# Patient Record
Sex: MALE | Race: WHITE | Employment: OTHER | URBAN - METROPOLITAN AREA
[De-identification: names, ages, dates, MRNs, and addresses within clinical notes are randomized per-mention and may not be internally consistent; named-entity substitution may affect disease eponyms.]

---

## 2017-03-24 ENCOUNTER — HOSPITAL ENCOUNTER (OUTPATIENT)
Dept: LAB | Age: 62
Discharge: HOME | End: 2017-03-24
Attending: FAMILY MEDICINE | Admitting: FAMILY MEDICINE

## 2017-03-24 LAB
ALBUMIN SERPL-MCNC: 3.6 GM/DL (ref 3.4–5)
ALP BLD-CCNC: 61 IU/L (ref 40–128)
ALT SERPL-CCNC: 14 U/L (ref 10–40)
AMYLASE: 143 U/L (ref 25–115)
ANION GAP SERPL CALCULATED.3IONS-SCNC: 13 MMOL/L (ref 4–16)
AST SERPL-CCNC: 12 IU/L (ref 15–37)
BACTERIA: ABNORMAL /HPF
BILIRUB SERPL-MCNC: 0.2 MG/DL (ref 0–1)
BILIRUBIN URINE: NEGATIVE MG/DL
BLOOD, URINE: NEGATIVE
BUN BLDV-MCNC: 14 MG/DL (ref 6–23)
CALCIUM SERPL-MCNC: 9.2 MG/DL (ref 8.3–10.6)
CHLORIDE BLD-SCNC: 101 MMOL/L (ref 99–110)
CHOLESTEROL: 167 MG/DL
CLARITY: CLEAR
CO2: 26 MMOL/L (ref 21–32)
COLOR: ABNORMAL
CREAT SERPL-MCNC: 1.3 MG/DL (ref 0.9–1.3)
GFR AFRICAN AMERICAN: >60 ML/MIN/1.73M2
GFR NON-AFRICAN AMERICAN: 56 ML/MIN/1.73M2
GLUCOSE BLD-MCNC: 112 MG/DL (ref 70–140)
GLUCOSE, URINE: NEGATIVE MG/DL
HCT VFR BLD CALC: 32 % (ref 42–52)
HDLC SERPL-MCNC: 26 MG/DL
HEMOGLOBIN: 9.7 GM/DL (ref 13.5–18)
KETONES, URINE: ABNORMAL MG/DL
LDL CHOLESTEROL DIRECT: 127 MG/DL
LEUKOCYTE ESTERASE, URINE: NEGATIVE
MCH RBC QN AUTO: 25.9 PG (ref 27–31)
MCHC RBC AUTO-ENTMCNC: 30.3 % (ref 32–36)
MCV RBC AUTO: 85.6 FL (ref 78–100)
MUCUS: ABNORMAL HPF
NITRITE URINE, QUANTITATIVE: NEGATIVE
PDW BLD-RTO: 15.9 % (ref 11.7–14.9)
PH, URINE: 5 (ref 5–8)
PLATELET # BLD: 403 K/CU MM (ref 140–440)
PMV BLD AUTO: 9.1 FL (ref 7.5–11.1)
POTASSIUM SERPL-SCNC: 4.2 MMOL/L (ref 3.5–5.1)
PROTEIN UA: NEGATIVE MG/DL
RBC # BLD: 3.74 M/CU MM (ref 4.6–6.2)
RBC URINE: 1 /HPF (ref 0–3)
SODIUM BLD-SCNC: 140 MMOL/L (ref 135–145)
SPECIFIC GRAVITY UA: 1.02 (ref 1–1.03)
SQUAMOUS EPITHELIAL: <1 /HPF
TOTAL PROTEIN: 6.7 GM/DL (ref 6.4–8.2)
TRIGL SERPL-MCNC: 103 MG/DL
TSH HIGH SENSITIVITY: 2.36 UIU/ML (ref 0.27–4.2)
UROBILINOGEN, URINE: 1 EU/DL (ref 0.2–1)
WBC # BLD: 8.5 K/CU MM (ref 4–10.5)
WBC UA: 3 /HPF (ref 0–2)

## 2017-03-25 LAB — TESTOSTERONE TOTAL-MALE: 254

## 2017-10-16 ENCOUNTER — HOSPITAL ENCOUNTER (OUTPATIENT)
Dept: LAB | Age: 62
Discharge: HOME | End: 2017-10-16
Attending: FAMILY MEDICINE | Admitting: FAMILY MEDICINE

## 2017-10-16 LAB
ALBUMIN SERPL-MCNC: 4.2 GM/DL (ref 3.4–5)
ALP BLD-CCNC: 71 IU/L (ref 40–128)
ALT SERPL-CCNC: 12 U/L (ref 10–40)
ANION GAP SERPL CALCULATED.3IONS-SCNC: 10 MMOL/L (ref 4–16)
AST SERPL-CCNC: 12 IU/L (ref 15–37)
BACTERIA: ABNORMAL /HPF
BILIRUB SERPL-MCNC: 0.4 MG/DL (ref 0–1)
BILIRUBIN URINE: NEGATIVE MG/DL
BLOOD, URINE: NEGATIVE
BUN BLDV-MCNC: 17 MG/DL (ref 6–23)
CALCIUM SERPL-MCNC: 9 MG/DL (ref 8.3–10.6)
CHLORIDE BLD-SCNC: 99 MMOL/L (ref 99–110)
CHOLESTEROL: 134 MG/DL
CLARITY: CLEAR
CO2: 29 MMOL/L (ref 21–32)
COLOR: YELLOW
CREAT SERPL-MCNC: 1.4 MG/DL (ref 0.9–1.3)
GFR AFRICAN AMERICAN: >60 ML/MIN/1.73M2
GFR NON-AFRICAN AMERICAN: 51 ML/MIN/1.73M2
GLUCOSE FASTING: 138 MG/DL (ref 70–99)
GLUCOSE, URINE: NEGATIVE MG/DL
HCT VFR BLD CALC: 46.8 % (ref 42–52)
HDLC SERPL-MCNC: 28 MG/DL
HEMOGLOBIN: 15.5 GM/DL (ref 13.5–18)
KETONES, URINE: NEGATIVE MG/DL
LDL CHOLESTEROL DIRECT: 77 MG/DL
LEUKOCYTE ESTERASE, URINE: NEGATIVE
MCH RBC QN AUTO: 29.7 PG (ref 27–31)
MCHC RBC AUTO-ENTMCNC: 33.1 % (ref 32–36)
MCV RBC AUTO: 89.7 FL (ref 78–100)
MUCUS: ABNORMAL HPF
NITRITE URINE, QUANTITATIVE: NEGATIVE
PDW BLD-RTO: 14.1 % (ref 11.7–14.9)
PH, URINE: 7 (ref 5–8)
PLATELET # BLD: 228 K/CU MM (ref 140–440)
PMV BLD AUTO: 9.8 FL (ref 7.5–11.1)
POTASSIUM SERPL-SCNC: 4 MMOL/L (ref 3.5–5.1)
PROTEIN UA: 30 MG/DL
RBC # BLD: 5.22 M/CU MM (ref 4.6–6.2)
RBC URINE: 2 /HPF (ref 0–3)
SODIUM BLD-SCNC: 138 MMOL/L (ref 135–145)
SPECIFIC GRAVITY UA: 1.02 (ref 1–1.03)
SQUAMOUS EPITHELIAL: <1 /HPF
TOTAL PROTEIN: 6.8 GM/DL (ref 6.4–8.2)
TRICHOMONAS: ABNORMAL /HPF
TRIGL SERPL-MCNC: 136 MG/DL
TSH HIGH SENSITIVITY: 2.54 UIU/ML (ref 0.27–4.2)
UROBILINOGEN, URINE: 1 MG/DL (ref 0.2–1)
WBC # BLD: 9.8 K/CU MM (ref 4–10.5)
WBC UA: 3 /HPF (ref 0–2)
YEAST: ABNORMAL /HPF

## 2017-10-17 LAB — TESTOSTERONE TOTAL-MALE: 390

## 2018-04-23 ENCOUNTER — HOSPITAL ENCOUNTER (OUTPATIENT)
Dept: LAB | Age: 63
Discharge: HOME | End: 2018-04-23
Attending: NURSE PRACTITIONER | Admitting: NURSE PRACTITIONER

## 2018-04-23 LAB
ESTIMATED AVERAGE GLUCOSE: 160 MG/DL
HBA1C MFR BLD: 7.2 % (ref 4.2–6.3)
HCT VFR BLD CALC: 45.6 % (ref 42–52)
HEMOGLOBIN: 15 GM/DL (ref 13.5–18)
MCH RBC QN AUTO: 31.2 PG (ref 27–31)
MCHC RBC AUTO-ENTMCNC: 32.9 % (ref 32–36)
MCV RBC AUTO: 94.8 FL (ref 78–100)
PDW BLD-RTO: 13.6 % (ref 11.7–14.9)
PLATELET # BLD: 231 K/CU MM (ref 140–440)
PMV BLD AUTO: 9.8 FL (ref 7.5–11.1)
RBC # BLD: 4.81 M/CU MM (ref 4.6–6.2)
WBC # BLD: 10.7 K/CU MM (ref 4–10.5)

## 2018-04-24 LAB
PROSTATE SPECIFIC ANTIGEN FREE: 0.4
PROSTATE SPECIFIC ANTIGEN PERCENT FREE: 50
PROSTATE SPECIFIC ANTIGEN: 0.8

## 2018-04-25 LAB — TESTOSTERONE TOTAL-MALE: 512

## 2019-01-11 ENCOUNTER — HOSPITAL ENCOUNTER (OUTPATIENT)
Age: 64
Discharge: HOME | End: 2019-01-11
Payer: MEDICARE

## 2019-01-11 LAB
ALBUMIN SERPL-MCNC: 4.4 GM/DL (ref 3.4–5)
ALP BLD-CCNC: 60 IU/L (ref 40–128)
ALT SERPL-CCNC: 36 U/L (ref 10–40)
ANION GAP SERPL CALCULATED.3IONS-SCNC: 15 MMOL/L (ref 4–16)
AST SERPL-CCNC: 23 IU/L (ref 15–37)
BASOPHILS ABSOLUTE: 0.1 K/CU MM
BASOPHILS RELATIVE PERCENT: 0.9 % (ref 0–1)
BILIRUB SERPL-MCNC: 0.4 MG/DL (ref 0–1)
BUN BLDV-MCNC: 30 MG/DL (ref 6–23)
CALCIUM SERPL-MCNC: 9.7 MG/DL (ref 8.3–10.6)
CHLORIDE BLD-SCNC: 99 MMOL/L (ref 99–110)
CHOLESTEROL: 127 MG/DL
CO2: 24 MMOL/L (ref 21–32)
CREAT SERPL-MCNC: 2.5 MG/DL (ref 0.9–1.3)
DIFFERENTIAL TYPE: ABNORMAL
EOSINOPHILS ABSOLUTE: 1.2 K/CU MM
EOSINOPHILS RELATIVE PERCENT: 10.8 % (ref 0–3)
ESTIMATED AVERAGE GLUCOSE: 146 MG/DL
GFR AFRICAN AMERICAN: 32 ML/MIN/1.73M2
GFR NON-AFRICAN AMERICAN: 26 ML/MIN/1.73M2
GLUCOSE FASTING: 118 MG/DL (ref 70–99)
HBA1C MFR BLD: 6.7 % (ref 4.2–6.3)
HCT VFR BLD CALC: 45.1 % (ref 42–52)
HDLC SERPL-MCNC: 36 MG/DL
HEMOGLOBIN: 14.5 GM/DL (ref 13.5–18)
IMMATURE NEUTROPHIL %: 0.3 % (ref 0–0.43)
LDL CHOLESTEROL DIRECT: 75 MG/DL
LYMPHOCYTES ABSOLUTE: 3.1 K/CU MM
LYMPHOCYTES RELATIVE PERCENT: 26.6 % (ref 24–44)
MCH RBC QN AUTO: 30.7 PG (ref 27–31)
MCHC RBC AUTO-ENTMCNC: 32.2 % (ref 32–36)
MCV RBC AUTO: 95.6 FL (ref 78–100)
MONOCYTES ABSOLUTE: 0.8 K/CU MM
MONOCYTES RELATIVE PERCENT: 6.6 % (ref 0–4)
NUCLEATED RBC %: 0 %
PDW BLD-RTO: 12.8 % (ref 11.7–14.9)
PLATELET # BLD: 246 K/CU MM (ref 140–440)
PMV BLD AUTO: 10 FL (ref 7.5–11.1)
POTASSIUM SERPL-SCNC: 4.9 MMOL/L (ref 3.5–5.1)
PROSTATE SPECIFIC ANTIGEN: 0.48 NG/ML (ref 0–4)
RBC # BLD: 4.72 M/CU MM (ref 4.6–6.2)
SEGMENTED NEUTROPHILS ABSOLUTE COUNT: 6.3 K/CU MM
SEGMENTED NEUTROPHILS RELATIVE PERCENT: 54.8 % (ref 36–66)
SODIUM BLD-SCNC: 138 MMOL/L (ref 135–145)
T4 FREE: 1.15 NG/DL (ref 0.9–1.8)
TOTAL IMMATURE NEUTOROPHIL: 0.04 K/CU MM
TOTAL NUCLEATED RBC: 0 K/CU MM
TOTAL PROTEIN: 6.6 GM/DL (ref 6.4–8.2)
TRIGL SERPL-MCNC: 158 MG/DL
TSH HIGH SENSITIVITY: 5.99 UIU/ML (ref 0.27–4.2)
WBC # BLD: 11.5 K/CU MM (ref 4–10.5)

## 2019-01-11 PROCEDURE — 83721 ASSAY OF BLOOD LIPOPROTEIN: CPT

## 2019-01-11 PROCEDURE — 84403 ASSAY OF TOTAL TESTOSTERONE: CPT

## 2019-01-11 PROCEDURE — G0103 PSA SCREENING: HCPCS

## 2019-01-11 PROCEDURE — 80053 COMPREHEN METABOLIC PANEL: CPT

## 2019-01-11 PROCEDURE — 84443 ASSAY THYROID STIM HORMONE: CPT

## 2019-01-11 PROCEDURE — 85025 COMPLETE CBC W/AUTO DIFF WBC: CPT

## 2019-01-11 PROCEDURE — 84439 ASSAY OF FREE THYROXINE: CPT

## 2019-01-11 PROCEDURE — 36415 COLL VENOUS BLD VENIPUNCTURE: CPT

## 2019-01-11 PROCEDURE — 80061 LIPID PANEL: CPT

## 2019-01-11 PROCEDURE — 83036 HEMOGLOBIN GLYCOSYLATED A1C: CPT

## 2019-01-14 LAB — TESTOSTERONE TOTAL-MALE: 17

## 2019-04-25 ENCOUNTER — HOSPITAL ENCOUNTER (OUTPATIENT)
Age: 64
Discharge: HOME | End: 2019-04-25
Payer: MEDICARE

## 2019-04-25 ENCOUNTER — HOSPITAL ENCOUNTER (OUTPATIENT)
Dept: GENERAL RADIOLOGY | Age: 64
Discharge: HOME | End: 2019-04-25
Payer: MEDICARE

## 2019-04-25 DIAGNOSIS — M25.562 LEFT KNEE PAIN, UNSPECIFIED CHRONICITY: ICD-10-CM

## 2019-04-25 LAB
ALBUMIN SERPL-MCNC: 4.2 GM/DL (ref 3.4–5)
ALP BLD-CCNC: 60 IU/L (ref 40–128)
ALT SERPL-CCNC: 16 U/L (ref 10–40)
ANION GAP SERPL CALCULATED.3IONS-SCNC: 12 MMOL/L (ref 4–16)
AST SERPL-CCNC: 14 IU/L (ref 15–37)
BILIRUB SERPL-MCNC: 0.2 MG/DL (ref 0–1)
BUN BLDV-MCNC: 21 MG/DL (ref 6–23)
CALCIUM SERPL-MCNC: 9.1 MG/DL (ref 8.3–10.6)
CHLORIDE BLD-SCNC: 98 MMOL/L (ref 99–110)
CO2: 28 MMOL/L (ref 21–32)
CREAT SERPL-MCNC: 2.1 MG/DL (ref 0.9–1.3)
GFR AFRICAN AMERICAN: 39 ML/MIN/1.73M2
GFR NON-AFRICAN AMERICAN: 32 ML/MIN/1.73M2
GLUCOSE BLD-MCNC: 118 MG/DL (ref 70–99)
POTASSIUM SERPL-SCNC: 4.6 MMOL/L (ref 3.5–5.1)
SODIUM BLD-SCNC: 138 MMOL/L (ref 135–145)
T4 FREE: 1.11 NG/DL (ref 0.9–1.8)
TOTAL PROTEIN: 6.5 GM/DL (ref 6.4–8.2)
TSH HIGH SENSITIVITY: 3.88 UIU/ML (ref 0.27–4.2)

## 2019-04-25 PROCEDURE — 73560 X-RAY EXAM OF KNEE 1 OR 2: CPT

## 2019-04-25 PROCEDURE — 84443 ASSAY THYROID STIM HORMONE: CPT

## 2019-04-25 PROCEDURE — 80053 COMPREHEN METABOLIC PANEL: CPT

## 2019-04-25 PROCEDURE — 36415 COLL VENOUS BLD VENIPUNCTURE: CPT

## 2019-04-25 PROCEDURE — 84439 ASSAY OF FREE THYROXINE: CPT

## 2019-04-25 PROCEDURE — 84403 ASSAY OF TOTAL TESTOSTERONE: CPT

## 2019-04-27 LAB
TESTOSTERONE TOTAL-MALE: 665 NG/DL (ref 300–720)
TESTOSTERONE TOTAL-MALE: NORMAL NG/DL (ref 300–720)

## 2019-05-24 ENCOUNTER — HOSPITAL ENCOUNTER (OUTPATIENT)
Dept: NEUROLOGY | Age: 64
Discharge: HOME | End: 2019-05-24
Payer: COMMERCIAL

## 2019-07-31 ENCOUNTER — HOSPITAL ENCOUNTER (OUTPATIENT)
Dept: ULTRASOUND IMAGING | Age: 64
Discharge: HOME | End: 2019-07-31
Payer: MEDICARE

## 2019-07-31 ENCOUNTER — HOSPITAL ENCOUNTER (OUTPATIENT)
Age: 64
Discharge: HOME | End: 2019-07-31
Payer: MEDICARE

## 2019-07-31 DIAGNOSIS — N18.30 CHRONIC KIDNEY DISEASE, STAGE III (MODERATE) (HCC): ICD-10-CM

## 2019-07-31 DIAGNOSIS — I12.9: ICD-10-CM

## 2019-07-31 LAB
ALBUMIN SERPL-MCNC: 4 GM/DL (ref 3.4–5)
ANION GAP SERPL CALCULATED.3IONS-SCNC: 11 MMOL/L (ref 4–16)
BACTERIA: NEGATIVE /HPF
BASOPHILS ABSOLUTE: 0.1 K/CU MM
BASOPHILS RELATIVE PERCENT: 1.1 % (ref 0–1)
BILIRUBIN URINE: NEGATIVE MG/DL
BLOOD, URINE: NEGATIVE
BUN BLDV-MCNC: 18 MG/DL (ref 6–23)
CALCIUM SERPL-MCNC: 9.2 MG/DL (ref 8.3–10.6)
CHLORIDE BLD-SCNC: 99 MMOL/L (ref 99–110)
CLARITY: CLEAR
CO2: 25 MMOL/L (ref 21–32)
COLOR: YELLOW
CREAT SERPL-MCNC: 2.1 MG/DL (ref 0.9–1.3)
CREATININE URINE: 329.4 MG/DL (ref 39–259)
DIFFERENTIAL TYPE: ABNORMAL
EOSINOPHILS ABSOLUTE: 0.8 K/CU MM
EOSINOPHILS RELATIVE PERCENT: 9 % (ref 0–3)
GFR AFRICAN AMERICAN: 39 ML/MIN/1.73M2
GFR NON-AFRICAN AMERICAN: 32 ML/MIN/1.73M2
GLUCOSE BLD-MCNC: 125 MG/DL (ref 70–99)
GLUCOSE, URINE: NEGATIVE MG/DL
HCT VFR BLD CALC: 45.9 % (ref 42–52)
HEMOGLOBIN: 14.4 GM/DL (ref 13.5–18)
IMMATURE NEUTROPHIL %: 0.5 % (ref 0–0.43)
KETONES, URINE: ABNORMAL MG/DL
LEUKOCYTE ESTERASE, URINE: NEGATIVE
LYMPHOCYTES ABSOLUTE: 2 K/CU MM
LYMPHOCYTES RELATIVE PERCENT: 23.1 % (ref 24–44)
MCH RBC QN AUTO: 29.5 PG (ref 27–31)
MCHC RBC AUTO-ENTMCNC: 31.4 % (ref 32–36)
MCV RBC AUTO: 94.1 FL (ref 78–100)
MONOCYTES ABSOLUTE: 0.6 K/CU MM
MONOCYTES RELATIVE PERCENT: 7 % (ref 0–4)
MUCUS: ABNORMAL HPF
NITRITE URINE, QUANTITATIVE: NEGATIVE
NUCLEATED RBC %: 0 %
PDW BLD-RTO: 14.5 % (ref 11.7–14.9)
PH, URINE: 5 (ref 5–8)
PHOSPHORUS: 2.3 MG/DL (ref 2.5–4.9)
PLATELET # BLD: 248 K/CU MM (ref 140–440)
PMV BLD AUTO: 10.9 FL (ref 7.5–11.1)
POTASSIUM SERPL-SCNC: 4.7 MMOL/L (ref 3.5–5.1)
PROT/CREAT RATIO, UR: ABNORMAL
PROTEIN UA: NEGATIVE MG/DL
RBC # BLD: 4.88 M/CU MM (ref 4.6–6.2)
RBC URINE: 2 /HPF (ref 0–3)
SEGMENTED NEUTROPHILS ABSOLUTE COUNT: 5 K/CU MM
SEGMENTED NEUTROPHILS RELATIVE PERCENT: 59.3 % (ref 36–66)
SODIUM BLD-SCNC: 135 MMOL/L (ref 135–145)
SPECIFIC GRAVITY UA: 1.02 (ref 1–1.03)
SQUAMOUS EPITHELIAL: <1 /HPF
TOTAL IMMATURE NEUTOROPHIL: 0.04 K/CU MM
TOTAL NUCLEATED RBC: 0 K/CU MM
TRICHOMONAS: ABNORMAL /HPF
URINE TOTAL PROTEIN: 30.8 MG/DL
UROBILINOGEN, URINE: 1 MG/DL (ref 0.2–1)
WBC # BLD: 8.4 K/CU MM (ref 4–10.5)
WBC UA: 2 /HPF (ref 0–2)

## 2019-07-31 PROCEDURE — 84156 ASSAY OF PROTEIN URINE: CPT

## 2019-07-31 PROCEDURE — 83516 IMMUNOASSAY NONANTIBODY: CPT

## 2019-07-31 PROCEDURE — 76775 US EXAM ABDO BACK WALL LIM: CPT

## 2019-07-31 PROCEDURE — 81001 URINALYSIS AUTO W/SCOPE: CPT

## 2019-07-31 PROCEDURE — 36415 COLL VENOUS BLD VENIPUNCTURE: CPT

## 2019-07-31 PROCEDURE — 82570 ASSAY OF URINE CREATININE: CPT

## 2019-07-31 PROCEDURE — 85025 COMPLETE CBC W/AUTO DIFF WBC: CPT

## 2019-07-31 PROCEDURE — 84165 PROTEIN E-PHORESIS SERUM: CPT

## 2019-07-31 PROCEDURE — 80069 RENAL FUNCTION PANEL: CPT

## 2019-08-01 LAB
ALBUMIN ELP: 3.5 GM/DL (ref 3.2–5.6)
ALPHA-1-GLOBULIN: 0.2 GM/DL (ref 0.1–0.4)
ALPHA-2-GLOBULIN: 0.8 GM/DL (ref 0.4–1.2)
BETA GLOBULIN: 0.9 GM/DL (ref 0.5–1.3)
GAMMA GLOBULIN: 0.8 GM/DL (ref 0.5–1.6)
SPEP INTERPRETATION: ABNORMAL
TOTAL PROTEIN: 6.2 GM/DL (ref 6.4–8.2)

## 2019-08-02 LAB
MYELOPEROXIDASE AB: 0 AU/ML (ref 0–19)
MYELOPEROXIDASE AB: NORMAL AU/ML (ref 0–19)
SERINE PR3 (ANCA): 0 AU/ML (ref 0–19)
SERINE PR3 (ANCA): NORMAL AU/ML (ref 0–19)

## 2019-08-30 ENCOUNTER — HOSPITAL ENCOUNTER (OUTPATIENT)
Dept: ULTRASOUND IMAGING | Age: 64
Discharge: HOME | End: 2019-08-30
Payer: MEDICARE

## 2019-08-30 ENCOUNTER — HOSPITAL ENCOUNTER (OUTPATIENT)
Age: 64
Discharge: HOME | End: 2019-08-30
Payer: MEDICARE

## 2019-08-30 ENCOUNTER — HOSPITAL ENCOUNTER (OUTPATIENT)
Dept: NUCLEAR MEDICINE | Age: 64
Discharge: HOME | End: 2019-08-30
Payer: MEDICARE

## 2019-08-30 DIAGNOSIS — N18.6 TYPE 2 DIABETES MELLITUS WITH ESRD (END-STAGE RENAL DISEASE) (HCC): ICD-10-CM

## 2019-08-30 DIAGNOSIS — N18.30 CHRONIC KIDNEY DISEASE, STAGE III (MODERATE) (HCC): ICD-10-CM

## 2019-08-30 DIAGNOSIS — E11.22 TYPE 2 DIABETES MELLITUS WITH ESRD (END-STAGE RENAL DISEASE) (HCC): ICD-10-CM

## 2019-08-30 DIAGNOSIS — I12.9 RENAL HYPERTENSION: ICD-10-CM

## 2019-08-30 PROCEDURE — 93975 VASCULAR STUDY: CPT

## 2019-08-30 PROCEDURE — 3430000000 HC RX DIAGNOSTIC RADIOPHARMACEUTICAL: Performed by: INTERNAL MEDICINE

## 2019-08-30 PROCEDURE — 78708 K FLOW/FUNCT IMAGE W/DRUG: CPT

## 2019-08-30 PROCEDURE — 6360000002 HC RX W HCPCS: Performed by: INTERNAL MEDICINE

## 2019-08-30 PROCEDURE — 76775 US EXAM ABDO BACK WALL LIM: CPT

## 2019-08-30 PROCEDURE — A9562 TC99M MERTIATIDE: HCPCS | Performed by: INTERNAL MEDICINE

## 2019-08-30 RX ORDER — FUROSEMIDE 10 MG/ML
40 INJECTION INTRAMUSCULAR; INTRAVENOUS ONCE
Status: COMPLETED | OUTPATIENT
Start: 2019-08-30 | End: 2019-08-30

## 2019-08-30 RX ADMIN — Medication 5.5 MILLICURIE: at 10:15

## 2019-08-30 RX ADMIN — FUROSEMIDE 40 MG: 10 INJECTION, SOLUTION INTRAMUSCULAR; INTRAVENOUS at 10:35

## 2019-10-17 ENCOUNTER — HOSPITAL ENCOUNTER (OUTPATIENT)
Age: 64
Discharge: HOME | End: 2019-10-17
Payer: MEDICARE

## 2019-10-17 LAB
ANION GAP SERPL CALCULATED.3IONS-SCNC: 13 MMOL/L (ref 4–16)
BUN BLDV-MCNC: 21 MG/DL (ref 6–23)
CALCIUM SERPL-MCNC: 9.4 MG/DL (ref 8.3–10.6)
CHLORIDE BLD-SCNC: 97 MMOL/L (ref 99–110)
CO2: 27 MMOL/L (ref 21–32)
CREAT SERPL-MCNC: 2 MG/DL (ref 0.9–1.3)
GFR AFRICAN AMERICAN: 41 ML/MIN/1.73M2
GFR NON-AFRICAN AMERICAN: 34 ML/MIN/1.73M2
GLUCOSE BLD-MCNC: 120 MG/DL (ref 70–99)
POTASSIUM SERPL-SCNC: 4.3 MMOL/L (ref 3.5–5.1)
SODIUM BLD-SCNC: 137 MMOL/L (ref 135–145)

## 2019-10-17 PROCEDURE — 36415 COLL VENOUS BLD VENIPUNCTURE: CPT

## 2019-10-17 PROCEDURE — 80048 BASIC METABOLIC PNL TOTAL CA: CPT

## 2019-10-21 ENCOUNTER — HOSPITAL ENCOUNTER (OUTPATIENT)
Dept: GENERAL RADIOLOGY | Age: 64
Discharge: HOME | End: 2019-10-21
Payer: MEDICARE

## 2019-10-21 ENCOUNTER — HOSPITAL ENCOUNTER (OUTPATIENT)
Age: 64
Discharge: HOME | End: 2019-10-21
Payer: MEDICARE

## 2019-10-21 DIAGNOSIS — M51.16 INTERVERTEBRAL DISC DISORDER WITH RADICULOPATHY OF LUMBAR REGION: ICD-10-CM

## 2019-10-21 PROCEDURE — 72220 X-RAY EXAM SACRUM TAILBONE: CPT

## 2019-10-21 PROCEDURE — 72100 X-RAY EXAM L-S SPINE 2/3 VWS: CPT

## 2019-12-13 ENCOUNTER — HOSPITAL ENCOUNTER (OUTPATIENT)
Dept: MRI IMAGING | Age: 64
Discharge: HOME | End: 2019-12-13
Payer: MEDICARE

## 2019-12-13 ENCOUNTER — HOSPITAL ENCOUNTER (OUTPATIENT)
Age: 64
Discharge: HOME | End: 2019-12-13
Payer: MEDICARE

## 2019-12-13 DIAGNOSIS — M54.16 LUMBAR RADICULOPATHY: ICD-10-CM

## 2019-12-13 DIAGNOSIS — M40.204 KYPHOSIS OF THORACIC REGION, UNSPECIFIED KYPHOSIS TYPE: ICD-10-CM

## 2019-12-13 PROCEDURE — 36415 COLL VENOUS BLD VENIPUNCTURE: CPT

## 2019-12-13 PROCEDURE — 72146 MRI CHEST SPINE W/O DYE: CPT

## 2019-12-13 PROCEDURE — 72148 MRI LUMBAR SPINE W/O DYE: CPT

## 2019-12-16 LAB
3-OH-COTININE: 119 NG/ML
COTININE: 230 NG/ML
NICOTINE: 11 NG/ML
NICOTINE: NORMAL NG/ML

## 2020-02-08 ENCOUNTER — HOSPITAL ENCOUNTER (OUTPATIENT)
Age: 65
Discharge: HOME | End: 2020-02-08
Payer: MEDICARE

## 2020-02-08 LAB
ALBUMIN SERPL-MCNC: 4.2 GM/DL (ref 3.4–5)
ALP BLD-CCNC: 66 IU/L (ref 40–128)
ALT SERPL-CCNC: 16 U/L (ref 10–40)
ANION GAP SERPL CALCULATED.3IONS-SCNC: 15 MMOL/L (ref 4–16)
AST SERPL-CCNC: 14 IU/L (ref 15–37)
BILIRUB SERPL-MCNC: 0.4 MG/DL (ref 0–1)
BUN BLDV-MCNC: 17 MG/DL (ref 6–23)
CALCIUM SERPL-MCNC: 9.2 MG/DL (ref 8.3–10.6)
CHLORIDE BLD-SCNC: 97 MMOL/L (ref 99–110)
CHOLESTEROL: 212 MG/DL
CO2: 25 MMOL/L (ref 21–32)
CREAT SERPL-MCNC: 1.9 MG/DL (ref 0.9–1.3)
CREATININE URINE: 124.5 MG/DL (ref 39–259)
ESTIMATED AVERAGE GLUCOSE: 151 MG/DL
GFR AFRICAN AMERICAN: 43 ML/MIN/1.73M2
GFR NON-AFRICAN AMERICAN: 36 ML/MIN/1.73M2
GLUCOSE FASTING: 132 MG/DL (ref 70–99)
HBA1C MFR BLD: 6.9 % (ref 4.2–6.3)
HCT VFR BLD CALC: 51.1 % (ref 42–52)
HDLC SERPL-MCNC: 32 MG/DL
HEMOGLOBIN: 16.2 GM/DL (ref 13.5–18)
LDL CHOLESTEROL DIRECT: 159 MG/DL
MCH RBC QN AUTO: 29.7 PG (ref 27–31)
MCHC RBC AUTO-ENTMCNC: 31.7 % (ref 32–36)
MCV RBC AUTO: 93.8 FL (ref 78–100)
MICROALBUMIN/CREAT 24H UR: 3.6 MG/DL
MICROALBUMIN/CREAT UR-RTO: 28.9 MG/G CREAT (ref 0–30)
PDW BLD-RTO: 13.9 % (ref 11.7–14.9)
PLATELET # BLD: 235 K/CU MM (ref 140–440)
PMV BLD AUTO: 10.9 FL (ref 7.5–11.1)
POTASSIUM SERPL-SCNC: 4.6 MMOL/L (ref 3.5–5.1)
PROSTATE SPECIFIC ANTIGEN: 0.69 NG/ML (ref 0–4)
RBC # BLD: 5.45 M/CU MM (ref 4.6–6.2)
SODIUM BLD-SCNC: 137 MMOL/L (ref 135–145)
TOTAL PROTEIN: 6.7 GM/DL (ref 6.4–8.2)
TRIGL SERPL-MCNC: 195 MG/DL
TSH HIGH SENSITIVITY: 2.96 UIU/ML (ref 0.27–4.2)
WBC # BLD: 8.8 K/CU MM (ref 4–10.5)

## 2020-02-08 PROCEDURE — 82570 ASSAY OF URINE CREATININE: CPT

## 2020-02-08 PROCEDURE — 82043 UR ALBUMIN QUANTITATIVE: CPT

## 2020-02-08 PROCEDURE — 80053 COMPREHEN METABOLIC PANEL: CPT

## 2020-02-08 PROCEDURE — 85027 COMPLETE CBC AUTOMATED: CPT

## 2020-02-08 PROCEDURE — 84443 ASSAY THYROID STIM HORMONE: CPT

## 2020-02-08 PROCEDURE — G0103 PSA SCREENING: HCPCS

## 2020-02-08 PROCEDURE — 84403 ASSAY OF TOTAL TESTOSTERONE: CPT

## 2020-02-08 PROCEDURE — 83036 HEMOGLOBIN GLYCOSYLATED A1C: CPT

## 2020-02-08 PROCEDURE — 83721 ASSAY OF BLOOD LIPOPROTEIN: CPT

## 2020-02-08 PROCEDURE — 80061 LIPID PANEL: CPT

## 2020-02-08 PROCEDURE — 36415 COLL VENOUS BLD VENIPUNCTURE: CPT

## 2020-02-11 LAB
TESTOSTERONE TOTAL-MALE: 340 NG/DL (ref 300–720)
TESTOSTERONE TOTAL-MALE: NORMAL NG/DL (ref 300–720)

## 2020-07-13 ENCOUNTER — HOSPITAL ENCOUNTER (OUTPATIENT)
Age: 65
Discharge: HOME | End: 2020-07-13
Payer: MEDICARE

## 2020-07-13 LAB
ALBUMIN SERPL-MCNC: 4.4 GM/DL (ref 3.4–5)
ALP BLD-CCNC: 63 IU/L (ref 40–128)
ALT SERPL-CCNC: 16 U/L (ref 10–40)
ANION GAP SERPL CALCULATED.3IONS-SCNC: 12 MMOL/L (ref 4–16)
AST SERPL-CCNC: 13 IU/L (ref 15–37)
BASOPHILS ABSOLUTE: 0.1 K/CU MM
BASOPHILS RELATIVE PERCENT: 1.1 % (ref 0–1)
BILIRUB SERPL-MCNC: 0.4 MG/DL (ref 0–1)
BUN BLDV-MCNC: 19 MG/DL (ref 6–23)
CALCIUM SERPL-MCNC: 9.2 MG/DL (ref 8.3–10.6)
CHLORIDE BLD-SCNC: 96 MMOL/L (ref 99–110)
CHOLESTEROL: 215 MG/DL
CO2: 27 MMOL/L (ref 21–32)
CREAT SERPL-MCNC: 1.8 MG/DL (ref 0.9–1.3)
DIFFERENTIAL TYPE: ABNORMAL
EOSINOPHILS ABSOLUTE: 0.7 K/CU MM
EOSINOPHILS RELATIVE PERCENT: 8.3 % (ref 0–3)
ESTIMATED AVERAGE GLUCOSE: 137 MG/DL
GFR AFRICAN AMERICAN: 46 ML/MIN/1.73M2
GFR NON-AFRICAN AMERICAN: 38 ML/MIN/1.73M2
GLUCOSE BLD-MCNC: 119 MG/DL (ref 70–99)
HBA1C MFR BLD: 6.4 % (ref 4.2–6.3)
HCT VFR BLD CALC: 52.6 % (ref 42–52)
HDLC SERPL-MCNC: 31 MG/DL
HEMOGLOBIN: 17 GM/DL (ref 13.5–18)
IMMATURE NEUTROPHIL %: 0.4 % (ref 0–0.43)
LDL CHOLESTEROL DIRECT: 158 MG/DL
LYMPHOCYTES ABSOLUTE: 2 K/CU MM
LYMPHOCYTES RELATIVE PERCENT: 21.9 % (ref 24–44)
MCH RBC QN AUTO: 30.4 PG (ref 27–31)
MCHC RBC AUTO-ENTMCNC: 32.3 % (ref 32–36)
MCV RBC AUTO: 94.1 FL (ref 78–100)
MONOCYTES ABSOLUTE: 0.6 K/CU MM
MONOCYTES RELATIVE PERCENT: 7.2 % (ref 0–4)
NUCLEATED RBC %: 0 %
PDW BLD-RTO: 14.6 % (ref 11.7–14.9)
PLATELET # BLD: 253 K/CU MM (ref 140–440)
PMV BLD AUTO: 10.2 FL (ref 7.5–11.1)
POTASSIUM SERPL-SCNC: 4.6 MMOL/L (ref 3.5–5.1)
PROSTATE SPECIFIC ANTIGEN: 0.71 NG/ML (ref 0–4)
RBC # BLD: 5.59 M/CU MM (ref 4.6–6.2)
SEGMENTED NEUTROPHILS ABSOLUTE COUNT: 5.4 K/CU MM
SEGMENTED NEUTROPHILS RELATIVE PERCENT: 61.1 % (ref 36–66)
SODIUM BLD-SCNC: 135 MMOL/L (ref 135–145)
T4 FREE: 1.19 NG/DL (ref 0.9–1.8)
TOTAL IMMATURE NEUTOROPHIL: 0.04 K/CU MM
TOTAL NUCLEATED RBC: 0 K/CU MM
TOTAL PROTEIN: 6.8 GM/DL (ref 6.4–8.2)
TRIGL SERPL-MCNC: 168 MG/DL
TSH HIGH SENSITIVITY: 2.83 UIU/ML (ref 0.27–4.2)
WBC # BLD: 8.9 K/CU MM (ref 4–10.5)

## 2020-07-13 PROCEDURE — 84439 ASSAY OF FREE THYROXINE: CPT

## 2020-07-13 PROCEDURE — G0103 PSA SCREENING: HCPCS

## 2020-07-13 PROCEDURE — 80053 COMPREHEN METABOLIC PANEL: CPT

## 2020-07-13 PROCEDURE — 80061 LIPID PANEL: CPT

## 2020-07-13 PROCEDURE — 83721 ASSAY OF BLOOD LIPOPROTEIN: CPT

## 2020-07-13 PROCEDURE — 84443 ASSAY THYROID STIM HORMONE: CPT

## 2020-07-13 PROCEDURE — 83036 HEMOGLOBIN GLYCOSYLATED A1C: CPT

## 2020-07-13 PROCEDURE — 85025 COMPLETE CBC W/AUTO DIFF WBC: CPT

## 2020-07-13 PROCEDURE — 36415 COLL VENOUS BLD VENIPUNCTURE: CPT

## 2020-08-27 ENCOUNTER — HOSPITAL ENCOUNTER (OUTPATIENT)
Age: 65
Discharge: HOME | End: 2020-08-27
Payer: COMMERCIAL

## 2020-08-27 PROCEDURE — 84403 ASSAY OF TOTAL TESTOSTERONE: CPT

## 2020-08-27 PROCEDURE — 36415 COLL VENOUS BLD VENIPUNCTURE: CPT

## 2020-08-29 LAB — TESTOSTERONE TOTAL-MALE: 313 NG/DL (ref 300–720)

## 2020-10-30 ENCOUNTER — HOSPITAL ENCOUNTER (OUTPATIENT)
Age: 65
Discharge: HOME | End: 2020-10-30
Payer: MEDICARE

## 2020-10-30 LAB
BASOPHILS ABSOLUTE: 0.1 K/CU MM
BASOPHILS RELATIVE PERCENT: 0.8 % (ref 0–1)
DIFFERENTIAL TYPE: ABNORMAL
EOSINOPHILS ABSOLUTE: 0.5 K/CU MM
EOSINOPHILS RELATIVE PERCENT: 4.5 % (ref 0–3)
HCT VFR BLD CALC: 56.1 % (ref 42–52)
HEMOGLOBIN: 18.7 GM/DL (ref 13.5–18)
IMMATURE NEUTROPHIL %: 1 % (ref 0–0.43)
LYMPHOCYTES ABSOLUTE: 2.3 K/CU MM
LYMPHOCYTES RELATIVE PERCENT: 22 % (ref 24–44)
MCH RBC QN AUTO: 31.5 PG (ref 27–31)
MCHC RBC AUTO-ENTMCNC: 33.3 % (ref 32–36)
MCV RBC AUTO: 94.4 FL (ref 78–100)
MONOCYTES ABSOLUTE: 0.7 K/CU MM
MONOCYTES RELATIVE PERCENT: 6.6 % (ref 0–4)
NUCLEATED RBC %: 0 %
PDW BLD-RTO: 15.1 % (ref 11.7–14.9)
PLATELET # BLD: 255 K/CU MM (ref 140–440)
PMV BLD AUTO: 10.7 FL (ref 7.5–11.1)
RBC # BLD: 5.94 M/CU MM (ref 4.6–6.2)
REASON FOR REJECTION: NORMAL
REJECTED TEST: NORMAL
SEGMENTED NEUTROPHILS ABSOLUTE COUNT: 6.8 K/CU MM
SEGMENTED NEUTROPHILS RELATIVE PERCENT: 65.1 % (ref 36–66)
TOTAL IMMATURE NEUTOROPHIL: 0.11 K/CU MM
TOTAL NUCLEATED RBC: 0 K/CU MM
WBC # BLD: 10.5 K/CU MM (ref 4–10.5)

## 2020-10-30 PROCEDURE — 85025 COMPLETE CBC W/AUTO DIFF WBC: CPT

## 2020-10-30 PROCEDURE — 83550 IRON BINDING TEST: CPT

## 2020-10-30 PROCEDURE — 36415 COLL VENOUS BLD VENIPUNCTURE: CPT

## 2020-11-03 ENCOUNTER — HOSPITAL ENCOUNTER (OUTPATIENT)
Age: 65
Setting detail: SPECIMEN
Discharge: HOME | End: 2020-11-03
Payer: MEDICARE

## 2020-11-03 LAB
IRON: 72 UG/DL (ref 59–158)
PCT TRANSFERRIN: 23 % (ref 10–44)
TOTAL IRON BINDING CAPACITY: 315 UG/DL (ref 250–450)
TROPONIN T: <0.01 NG/ML
UNSATURATED IRON BINDING CAPACITY: 243 UG/DL (ref 110–370)

## 2020-11-03 PROCEDURE — 83550 IRON BINDING TEST: CPT

## 2020-11-03 PROCEDURE — 84484 ASSAY OF TROPONIN QUANT: CPT

## 2020-11-03 PROCEDURE — 83540 ASSAY OF IRON: CPT

## 2020-11-03 PROCEDURE — 36415 COLL VENOUS BLD VENIPUNCTURE: CPT

## 2020-11-19 ENCOUNTER — HOSPITAL ENCOUNTER (OUTPATIENT)
Age: 65
Discharge: HOME | End: 2020-11-19
Payer: MEDICARE

## 2020-11-19 LAB
ANION GAP SERPL CALCULATED.3IONS-SCNC: 14 MMOL/L (ref 4–16)
BACTERIA: NEGATIVE /HPF
BILIRUBIN URINE: NEGATIVE MG/DL
BLOOD, URINE: NEGATIVE
BUN BLDV-MCNC: 11 MG/DL (ref 6–23)
CALCIUM SERPL-MCNC: 9.3 MG/DL (ref 8.3–10.6)
CHLORIDE BLD-SCNC: 92 MMOL/L (ref 99–110)
CLARITY: CLEAR
CO2: 25 MMOL/L (ref 21–32)
COLOR: YELLOW
CREAT SERPL-MCNC: 1.7 MG/DL (ref 0.9–1.3)
CREATININE URINE: 77.9 MG/DL (ref 39–259)
GFR AFRICAN AMERICAN: 49 ML/MIN/1.73M2
GFR NON-AFRICAN AMERICAN: 40 ML/MIN/1.73M2
GLUCOSE BLD-MCNC: 172 MG/DL (ref 70–99)
GLUCOSE, URINE: 50 MG/DL
KETONES, URINE: NEGATIVE MG/DL
LEUKOCYTE ESTERASE, URINE: NEGATIVE
MUCUS: ABNORMAL HPF
NITRITE URINE, QUANTITATIVE: NEGATIVE
PH, URINE: 6 (ref 5–8)
POTASSIUM SERPL-SCNC: 4.8 MMOL/L (ref 3.5–5.1)
PROT/CREAT RATIO, UR: 0.4
PROTEIN UA: 30 MG/DL
RBC URINE: 1 /HPF (ref 0–3)
SODIUM BLD-SCNC: 131 MMOL/L (ref 135–145)
SPECIFIC GRAVITY UA: 1.01 (ref 1–1.03)
TRICHOMONAS: ABNORMAL /HPF
URINE TOTAL PROTEIN: 28.8 MG/DL
UROBILINOGEN, URINE: NORMAL MG/DL (ref 0.2–1)
VITAMIN D 25-HYDROXY: 12.22 NG/ML
WBC UA: <1 /HPF (ref 0–2)

## 2020-11-19 PROCEDURE — 80048 BASIC METABOLIC PNL TOTAL CA: CPT

## 2020-11-19 PROCEDURE — 82306 VITAMIN D 25 HYDROXY: CPT

## 2020-11-19 PROCEDURE — 82570 ASSAY OF URINE CREATININE: CPT

## 2020-11-19 PROCEDURE — 81001 URINALYSIS AUTO W/SCOPE: CPT

## 2020-11-19 PROCEDURE — 36415 COLL VENOUS BLD VENIPUNCTURE: CPT

## 2020-11-19 PROCEDURE — 84156 ASSAY OF PROTEIN URINE: CPT

## 2020-11-20 PROBLEM — F17.200 SMOKING: Status: ACTIVE | Noted: 2020-11-20

## 2020-11-20 PROBLEM — I10 ESSENTIAL HYPERTENSION: Status: ACTIVE | Noted: 2020-11-20

## 2020-11-20 PROBLEM — E87.1 HYPONATREMIA: Status: ACTIVE | Noted: 2020-11-20

## 2020-11-20 PROBLEM — M89.9 CHRONIC KIDNEY DISEASE-MINERAL AND BONE DISORDER: Status: ACTIVE | Noted: 2020-11-20

## 2020-11-20 PROBLEM — E83.9 CHRONIC KIDNEY DISEASE-MINERAL AND BONE DISORDER: Status: ACTIVE | Noted: 2020-11-20

## 2020-11-20 PROBLEM — R06.09 DYSPNEA ON EXERTION: Status: ACTIVE | Noted: 2020-11-20

## 2020-11-20 PROBLEM — N18.31 STAGE 3A CHRONIC KIDNEY DISEASE (HCC): Status: ACTIVE | Noted: 2020-11-20

## 2020-11-20 PROBLEM — N18.9 CHRONIC KIDNEY DISEASE-MINERAL AND BONE DISORDER: Status: ACTIVE | Noted: 2020-11-20

## 2020-11-23 ENCOUNTER — HOSPITAL ENCOUNTER (OUTPATIENT)
Dept: CT IMAGING | Age: 65
Discharge: HOME | End: 2020-11-23
Payer: COMMERCIAL

## 2020-11-23 PROCEDURE — G0297 LDCT FOR LUNG CA SCREEN: HCPCS

## 2020-11-30 ENCOUNTER — INITIAL CONSULT (OUTPATIENT)
Dept: PULMONOLOGY | Age: 65
End: 2020-11-30
Payer: MEDICARE

## 2020-11-30 ENCOUNTER — TELEPHONE (OUTPATIENT)
Dept: PULMONOLOGY | Age: 65
End: 2020-11-30

## 2020-11-30 VITALS
DIASTOLIC BLOOD PRESSURE: 78 MMHG | OXYGEN SATURATION: 97 % | HEART RATE: 113 BPM | SYSTOLIC BLOOD PRESSURE: 142 MMHG | HEIGHT: 67 IN | BODY MASS INDEX: 34.84 KG/M2 | WEIGHT: 222 LBS

## 2020-11-30 PROBLEM — F17.200 SMOKING: Status: RESOLVED | Noted: 2020-11-20 | Resolved: 2020-11-30

## 2020-11-30 PROBLEM — J44.9 COPD, MODERATE (HCC): Status: ACTIVE | Noted: 2020-11-30

## 2020-11-30 PROBLEM — Z72.0 TOBACCO ABUSE: Status: ACTIVE | Noted: 2020-11-30

## 2020-11-30 PROBLEM — J43.9 PULMONARY EMPHYSEMA DETERMINED BY X-RAY (HCC): Status: ACTIVE | Noted: 2020-11-30

## 2020-11-30 PROBLEM — G47.19 EXCESSIVE DAYTIME SLEEPINESS: Status: ACTIVE | Noted: 2020-11-30

## 2020-11-30 LAB
EXPIRATORY TIME-POST: NORMAL
EXPIRATORY TIME: NORMAL
FEF 25-75% %CHNG: NORMAL
FEF 25-75% %PRED-POST: NORMAL
FEF 25-75% %PRED-PRE: NORMAL
FEF 25-75% PRED: NORMAL
FEF 25-75%-POST: NORMAL
FEF 25-75%-PRE: NORMAL
FEV1 %PRED-POST: 53.7 %
FEV1 %PRED-PRE: 46.4 %
FEV1 PRED: 2.99 L
FEV1-POST: 1.61 L
FEV1-PRE: 1.39 L
FEV1/FVC %PRED-POST: 93.3 %
FEV1/FVC %PRED-PRE: 92.2 %
FEV1/FVC PRED: 74.2 %
FEV1/FVC-POST: 69.2 %
FEV1/FVC-PRE: 68.4 %
FVC %PRED-POST: 57.5 L
FVC %PRED-PRE: 50.2 %
FVC PRED: 4.04 L
FVC-POST: 2.32 L
FVC-PRE: 2.03 L
PEF %PRED-POST: NORMAL
PEF %PRED-PRE: NORMAL
PEF PRED: NORMAL
PEF%CHNG: NORMAL
PEF-POST: NORMAL
PEF-PRE: NORMAL

## 2020-11-30 PROCEDURE — 3017F COLORECTAL CA SCREEN DOC REV: CPT | Performed by: INTERNAL MEDICINE

## 2020-11-30 PROCEDURE — 4040F PNEUMOC VAC/ADMIN/RCVD: CPT | Performed by: INTERNAL MEDICINE

## 2020-11-30 PROCEDURE — G8484 FLU IMMUNIZE NO ADMIN: HCPCS | Performed by: INTERNAL MEDICINE

## 2020-11-30 PROCEDURE — G8417 CALC BMI ABV UP PARAM F/U: HCPCS | Performed by: INTERNAL MEDICINE

## 2020-11-30 PROCEDURE — G8427 DOCREV CUR MEDS BY ELIG CLIN: HCPCS | Performed by: INTERNAL MEDICINE

## 2020-11-30 PROCEDURE — 99204 OFFICE O/P NEW MOD 45 MIN: CPT | Performed by: INTERNAL MEDICINE

## 2020-11-30 PROCEDURE — 3023F SPIROM DOC REV: CPT | Performed by: INTERNAL MEDICINE

## 2020-11-30 PROCEDURE — G8926 SPIRO NO PERF OR DOC: HCPCS | Performed by: INTERNAL MEDICINE

## 2020-11-30 PROCEDURE — 1036F TOBACCO NON-USER: CPT | Performed by: INTERNAL MEDICINE

## 2020-11-30 PROCEDURE — 1123F ACP DISCUSS/DSCN MKR DOCD: CPT | Performed by: INTERNAL MEDICINE

## 2020-11-30 RX ORDER — BUDESONIDE AND FORMOTEROL FUMARATE DIHYDRATE 160; 4.5 UG/1; UG/1
1 AEROSOL RESPIRATORY (INHALATION) 2 TIMES DAILY
COMMUNITY
End: 2021-02-11

## 2020-11-30 RX ORDER — ALBUTEROL SULFATE 90 UG/1
2 AEROSOL, METERED RESPIRATORY (INHALATION) EVERY 6 HOURS PRN
Qty: 1 INHALER | Refills: 11 | Status: SHIPPED | OUTPATIENT
Start: 2020-11-30 | End: 2021-01-01 | Stop reason: ALTCHOICE

## 2020-11-30 RX ORDER — ALBUTEROL SULFATE 90 UG/1
2 AEROSOL, METERED RESPIRATORY (INHALATION) EVERY 6 HOURS PRN
COMMUNITY
End: 2021-02-11

## 2020-11-30 RX ORDER — PREDNISONE 1 MG/1
TABLET ORAL
Qty: 38 TABLET | Refills: 0 | Status: SHIPPED | OUTPATIENT
Start: 2020-11-30 | End: 2021-02-11 | Stop reason: ALTCHOICE

## 2020-11-30 ASSESSMENT — PULMONARY FUNCTION TESTS
FEV1_PERCENT_PREDICTED_POST: 53.7
FEV1/FVC_POST: 69.2
FEV1/FVC_PERCENT_PREDICTED_PRE: 92.2
FEV1_PRE: 1.39
FVC_PERCENT_PREDICTED_POST: 57.5
FVC_POST: 2.32
FEV1_PREDICTED: 2.99
FEV1/FVC_PREDICTED: 74.2
FVC_PERCENT_PREDICTED_PRE: 50.2
FVC_PRE: 2.03
FEV1_PERCENT_PREDICTED_PRE: 46.4
FEV1_POST: 1.61
FEV1/FVC_PERCENT_PREDICTED_POST: 93.3
FEV1/FVC_PRE: 68.4
FVC_PREDICTED: 4.04

## 2020-11-30 NOTE — PROGRESS NOTES
Subjective:   CHIEF COMPLAINT / HPI:    Fatoumata Bunch is a 78-year-old male referred here for evaluation of shortness of breath/dyspnea on exertion, weakness and the recent finding of emphysema on a low-dose CT lung screen. He has been a pack-a-day smoker for 43 years but recently stopped 3 weeks ago. He mentions that he has had chest congestion with cough over the past month and does expectorate a small amount of sputum which is nonpurulent and he has had no episodes of hemoptysis. He does carry history of chronic kidney disease with a single functioning left kidney. He denies pleuritic chest pain and does not carry history of pulmonary embolus. During his last visit with Dr. Katlyn Shannon he was started on azithromycin for bronchitis. He mentions that he has been on inhalers in the past and was recently advised to get Symbicort but it was far too expensive. He does not have a rescue albuterol inhaler at this time. He denies a history of asthma as a child or adolescent but mentions that his brother had asthma. 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 He also carries a history of testicular cancer. He is not aware of a family history of chronic lung disease or lung cancer. He states that he has had a long history of snoring and more recently excessive daytime sleepiness but has not been told he had witnessed apnea and has never undergone a work-up for obstructive sleep apnea. He has had no known COVID-19 exposure and denies any fever associate with cough. He has been socially distancing himself and wears a face mask when out in public. He carries a long history of GERD but is on Prilosec and has not symptomatic at this time. Office spirometry demonstrates an FEV1 of 1.39 L with an FVC of 2.03 L consistent with a moderate obstructive defect. Because his FEV1 and FVC are both reduced I cannot rule out a restrictive lung process without further testing.   Following bronchodilators he had a no inflammation, no tenderness. NEURO: oriented X 3, No focal deficits  SKIN: no rashes, No lesions    RADIOLOGY: CT lung screening 11/23/2020 showed mild emphysematous changes but no worrisome lung mass seen    PFT: Office spirometry demonstrates an FEV1 of 1.39 L with an FVC of 2.03 L consistent with a moderate obstructive defect. Because his FEV1 and FVC are both reduced I cannot rule out a restrictive lung process without further testing. Following bronchodilators he had a significant and almost asthmatic-like response. Assessment:     1. Dyspnea on exertion    2. COPD, moderate ([address]    3. Pulmonary emphysema determined by X-ray ([address]    4. Tobacco abuse    5. Excessive daytime sleepiness        Plan:   I suspect his dyspnea exertion is multifactorial and probably a combination of mild to moderate COPD with emphysema, deconditioning, overweight. He states that he has not had a cardiac evaluation recently and I think that would be very important in the work-up of his dyspnea on exertion. This would also include an echocardiogram to determine whether there is any evidence of pulmonary hypertension. He may have obstructive sleep apnea and I will obtain apnea screening. I will try to find a long-acting bronchodilator with a either inhaled steroid or muscarinic agent that he can afford and allow him to use a ProAir albuterol rescue inhaler as needed for rescue. I urged him to continue to be smoke-free. We have discussed the need to maintain yearly flu immunization, pneumococcal vaccination. We have discussed Coronavirus precaution including handwashing practice, wiping items touched in public such as gas pumps, door handles, shopping carts, etc. Self monitoring for infection - fever, chills, cough, SOB. Should they develop symptoms they should call office for further instructions.     Return in about 6 weeks (around 1/11/2021) for Recheck for COPD, Recheck for Shortness of Breath, Recheck for emphysema. This dictation was performed with a verbal recognition program and it was checked for errors. It is possible that there are still dictated errors within this office note. Any errors should be brought immediately to my attention for correction. All efforts were made to ensure that this office note is accurate.

## 2021-01-01 ENCOUNTER — HOSPITAL ENCOUNTER (OUTPATIENT)
Age: 66
Discharge: HOME | DRG: 315 | End: 2021-10-27
Payer: MEDICARE

## 2021-01-01 ENCOUNTER — HOSPITAL ENCOUNTER (OUTPATIENT)
Dept: CARDIAC REHAB | Age: 66
Setting detail: THERAPIES SERIES
Discharge: HOME | End: 2021-10-28
Payer: MEDICARE

## 2021-01-01 ENCOUNTER — HOSPITAL ENCOUNTER (INPATIENT)
Age: 66
LOS: 1 days | Discharge: HOME | DRG: 291 | End: 2021-07-04
Attending: HOSPITALIST | Admitting: HOSPITALIST
Payer: MEDICARE

## 2021-01-01 ENCOUNTER — APPOINTMENT (OUTPATIENT)
Dept: ULTRASOUND IMAGING | Age: 66
DRG: 315 | End: 2021-01-01
Payer: MEDICARE

## 2021-01-01 ENCOUNTER — HOSPITAL ENCOUNTER (OUTPATIENT)
Dept: CARDIAC REHAB | Age: 66
Setting detail: THERAPIES SERIES
Discharge: HOME | End: 2021-09-20
Payer: MEDICARE

## 2021-01-01 ENCOUNTER — HOSPITAL ENCOUNTER (OUTPATIENT)
Dept: CARDIAC REHAB | Age: 66
Setting detail: THERAPIES SERIES
Discharge: HOME | End: 2021-06-22
Payer: MEDICARE

## 2021-01-01 ENCOUNTER — APPOINTMENT (OUTPATIENT)
Dept: GENERAL RADIOLOGY | Age: 66
DRG: 291 | End: 2021-01-01
Payer: MEDICARE

## 2021-01-01 ENCOUNTER — APPOINTMENT (OUTPATIENT)
Dept: CT IMAGING | Age: 66
DRG: 315 | End: 2021-01-01
Payer: MEDICARE

## 2021-01-01 ENCOUNTER — APPOINTMENT (OUTPATIENT)
Dept: NUCLEAR MEDICINE | Age: 66
DRG: 315 | End: 2021-01-01
Payer: MEDICARE

## 2021-01-01 ENCOUNTER — HOSPITAL ENCOUNTER (OUTPATIENT)
Dept: CARDIAC REHAB | Age: 66
Setting detail: THERAPIES SERIES
Discharge: HOME | End: 2021-07-20
Payer: MEDICARE

## 2021-01-01 ENCOUNTER — HOSPITAL ENCOUNTER (OUTPATIENT)
Dept: CARDIAC REHAB | Age: 66
Setting detail: THERAPIES SERIES
Discharge: HOME | End: 2021-08-02
Payer: MEDICARE

## 2021-01-01 ENCOUNTER — HOSPITAL ENCOUNTER (OUTPATIENT)
Dept: CARDIAC REHAB | Age: 66
Setting detail: THERAPIES SERIES
Discharge: HOME | End: 2021-09-14
Payer: MEDICARE

## 2021-01-01 ENCOUNTER — HOSPITAL ENCOUNTER (OUTPATIENT)
Dept: CARDIAC REHAB | Age: 66
Setting detail: THERAPIES SERIES
Discharge: HOME | End: 2021-09-13
Payer: MEDICARE

## 2021-01-01 ENCOUNTER — HOSPITAL ENCOUNTER (OUTPATIENT)
Dept: CARDIAC REHAB | Age: 66
Setting detail: THERAPIES SERIES
Discharge: HOME | End: 2021-07-13
Payer: MEDICARE

## 2021-01-01 ENCOUNTER — HOSPITAL ENCOUNTER (OUTPATIENT)
Age: 66
Discharge: HOME | End: 2021-08-19
Payer: MEDICARE

## 2021-01-01 ENCOUNTER — HOSPITAL ENCOUNTER (OUTPATIENT)
Dept: CARDIAC REHAB | Age: 66
Setting detail: THERAPIES SERIES
Discharge: HOME | End: 2021-06-28
Payer: MEDICARE

## 2021-01-01 ENCOUNTER — HOSPITAL ENCOUNTER (OUTPATIENT)
Dept: CARDIAC REHAB | Age: 66
Setting detail: THERAPIES SERIES
Discharge: HOME | End: 2021-06-21
Payer: MEDICARE

## 2021-01-01 ENCOUNTER — TELEPHONE (OUTPATIENT)
Dept: CARDIOLOGY CLINIC | Age: 66
End: 2021-01-01

## 2021-01-01 ENCOUNTER — OFFICE VISIT (OUTPATIENT)
Dept: PULMONOLOGY | Age: 66
End: 2021-01-01
Payer: MEDICARE

## 2021-01-01 ENCOUNTER — HOSPITAL ENCOUNTER (OUTPATIENT)
Dept: CARDIAC REHAB | Age: 66
Setting detail: THERAPIES SERIES
Discharge: HOME | End: 2021-08-10
Payer: MEDICARE

## 2021-01-01 ENCOUNTER — HOSPITAL ENCOUNTER (OUTPATIENT)
Dept: CARDIAC REHAB | Age: 66
Setting detail: THERAPIES SERIES
Discharge: HOME | End: 2021-08-09
Payer: MEDICARE

## 2021-01-01 ENCOUNTER — APPOINTMENT (OUTPATIENT)
Dept: GENERAL RADIOLOGY | Age: 66
End: 2021-01-01
Attending: INTERNAL MEDICINE
Payer: MEDICARE

## 2021-01-01 ENCOUNTER — INITIAL CONSULT (OUTPATIENT)
Dept: CARDIOLOGY CLINIC | Age: 66
End: 2021-01-01
Payer: MEDICARE

## 2021-01-01 ENCOUNTER — HOSPITAL ENCOUNTER (OUTPATIENT)
Dept: CARDIAC REHAB | Age: 66
Setting detail: THERAPIES SERIES
Discharge: HOME | End: 2021-08-17
Payer: MEDICARE

## 2021-01-01 ENCOUNTER — HOSPITAL ENCOUNTER (OUTPATIENT)
Dept: CARDIAC REHAB | Age: 66
Setting detail: THERAPIES SERIES
Discharge: HOME | End: 2021-09-16
Payer: MEDICARE

## 2021-01-01 ENCOUNTER — HOSPITAL ENCOUNTER (OUTPATIENT)
Dept: CARDIAC REHAB | Age: 66
Setting detail: THERAPIES SERIES
Discharge: HOME | End: 2021-09-02
Payer: MEDICARE

## 2021-01-01 ENCOUNTER — HOSPITAL ENCOUNTER (OUTPATIENT)
Dept: CARDIAC REHAB | Age: 66
Setting detail: THERAPIES SERIES
Discharge: HOME | End: 2021-08-26
Payer: MEDICARE

## 2021-01-01 ENCOUNTER — HOSPITAL ENCOUNTER (OUTPATIENT)
Dept: CARDIAC REHAB | Age: 66
Setting detail: THERAPIES SERIES
Discharge: HOME | End: 2021-08-03
Payer: MEDICARE

## 2021-01-01 ENCOUNTER — HOSPITAL ENCOUNTER (OUTPATIENT)
Age: 66
Discharge: HOME | DRG: 291 | End: 2021-12-01
Payer: MEDICARE

## 2021-01-01 ENCOUNTER — NURSE ONLY (OUTPATIENT)
Dept: CARDIOLOGY CLINIC | Age: 66
End: 2021-01-01

## 2021-01-01 ENCOUNTER — HOSPITAL ENCOUNTER (OUTPATIENT)
Age: 66
Discharge: HOME | End: 2021-05-11
Payer: MEDICARE

## 2021-01-01 ENCOUNTER — HOSPITAL ENCOUNTER (OUTPATIENT)
Dept: CARDIAC REHAB | Age: 66
Setting detail: THERAPIES SERIES
Discharge: HOME | End: 2021-07-08
Payer: MEDICARE

## 2021-01-01 ENCOUNTER — APPOINTMENT (OUTPATIENT)
Dept: MRI IMAGING | Age: 66
DRG: 291 | End: 2021-01-01
Payer: MEDICARE

## 2021-01-01 ENCOUNTER — OFFICE VISIT (OUTPATIENT)
Dept: CARDIOLOGY CLINIC | Age: 66
End: 2021-01-01

## 2021-01-01 ENCOUNTER — TELEPHONE (OUTPATIENT)
Dept: PULMONOLOGY | Age: 66
End: 2021-01-01

## 2021-01-01 ENCOUNTER — HOSPITAL ENCOUNTER (OUTPATIENT)
Dept: CARDIAC REHAB | Age: 66
Setting detail: THERAPIES SERIES
Discharge: HOME | End: 2021-09-09
Payer: MEDICARE

## 2021-01-01 ENCOUNTER — APPOINTMENT (OUTPATIENT)
Dept: CARDIAC REHAB | Age: 66
End: 2021-01-01
Payer: MEDICARE

## 2021-01-01 ENCOUNTER — APPOINTMENT (OUTPATIENT)
Dept: GENERAL RADIOLOGY | Age: 66
DRG: 315 | End: 2021-01-01
Payer: MEDICARE

## 2021-01-01 ENCOUNTER — HOSPITAL ENCOUNTER (OUTPATIENT)
Dept: CARDIAC REHAB | Age: 66
Setting detail: THERAPIES SERIES
Discharge: HOME | End: 2021-07-19
Payer: MEDICARE

## 2021-01-01 ENCOUNTER — HOSPITAL ENCOUNTER (OUTPATIENT)
Dept: CARDIAC REHAB | Age: 66
Setting detail: THERAPIES SERIES
Discharge: HOME | End: 2021-08-30
Payer: MEDICARE

## 2021-01-01 ENCOUNTER — HOSPITAL ENCOUNTER (OUTPATIENT)
Dept: CARDIAC REHAB | Age: 66
Setting detail: THERAPIES SERIES
Discharge: HOME | End: 2021-10-26
Payer: MEDICARE

## 2021-01-01 ENCOUNTER — HOSPITAL ENCOUNTER (OUTPATIENT)
Dept: CARDIAC REHAB | Age: 66
Setting detail: THERAPIES SERIES
Discharge: HOME | End: 2021-07-26
Payer: MEDICARE

## 2021-01-01 ENCOUNTER — HOSPITAL ENCOUNTER (OUTPATIENT)
Dept: CARDIAC CATH/INVASIVE PROCEDURES | Age: 66
Setting detail: OBSERVATION
Discharge: ANOTHER HOSPITAL | End: 2021-05-06
Attending: INTERNAL MEDICINE | Admitting: HOSPITALIST
Payer: MEDICARE

## 2021-01-01 ENCOUNTER — HOSPITAL ENCOUNTER (OUTPATIENT)
Dept: GENERAL RADIOLOGY | Age: 66
Discharge: HOME | DRG: 315 | End: 2021-10-27
Payer: MEDICARE

## 2021-01-01 ENCOUNTER — HOSPITAL ENCOUNTER (OUTPATIENT)
Dept: CARDIAC REHAB | Age: 66
Setting detail: THERAPIES SERIES
Discharge: HOME | End: 2021-07-12
Payer: MEDICARE

## 2021-01-01 ENCOUNTER — HOSPITAL ENCOUNTER (OUTPATIENT)
Dept: CARDIAC REHAB | Age: 66
Setting detail: THERAPIES SERIES
Discharge: HOME | End: 2021-07-22
Payer: MEDICARE

## 2021-01-01 ENCOUNTER — HOSPITAL ENCOUNTER (OUTPATIENT)
Dept: CARDIAC REHAB | Age: 66
Setting detail: THERAPIES SERIES
Discharge: HOME | End: 2021-10-18
Payer: MEDICARE

## 2021-01-01 ENCOUNTER — HOSPITAL ENCOUNTER (OUTPATIENT)
Dept: CARDIAC CATH/INVASIVE PROCEDURES | Age: 66
Discharge: HOME | End: 2021-11-02
Attending: INTERNAL MEDICINE | Admitting: INTERNAL MEDICINE
Payer: MEDICARE

## 2021-01-01 ENCOUNTER — HOSPITAL ENCOUNTER (OUTPATIENT)
Dept: CARDIAC REHAB | Age: 66
Setting detail: THERAPIES SERIES
Discharge: HOME | End: 2021-06-16
Payer: MEDICARE

## 2021-01-01 ENCOUNTER — HOSPITAL ENCOUNTER (OUTPATIENT)
Dept: CARDIAC REHAB | Age: 66
Setting detail: THERAPIES SERIES
Discharge: HOME | End: 2021-08-05
Payer: MEDICARE

## 2021-01-01 ENCOUNTER — HOSPITAL ENCOUNTER (OUTPATIENT)
Age: 66
Setting detail: SPECIMEN
Discharge: HOME | DRG: 315 | End: 2021-10-26
Payer: MEDICARE

## 2021-01-01 ENCOUNTER — NURSE ONLY (OUTPATIENT)
Dept: CARDIOLOGY CLINIC | Age: 66
End: 2021-01-01
Payer: MEDICARE

## 2021-01-01 ENCOUNTER — HOSPITAL ENCOUNTER (OUTPATIENT)
Age: 66
Discharge: HOME | End: 2021-04-20
Payer: MEDICARE

## 2021-01-01 ENCOUNTER — HOSPITAL ENCOUNTER (OUTPATIENT)
Dept: CARDIAC REHAB | Age: 66
Setting detail: THERAPIES SERIES
Discharge: HOME | End: 2021-10-21
Payer: MEDICARE

## 2021-01-01 ENCOUNTER — HOSPITAL ENCOUNTER (OUTPATIENT)
Age: 66
Discharge: HOME | End: 2021-10-04
Payer: MEDICARE

## 2021-01-01 ENCOUNTER — HOSPITAL ENCOUNTER (OUTPATIENT)
Age: 66
Discharge: HOME | DRG: 291 | End: 2021-07-01
Payer: MEDICARE

## 2021-01-01 ENCOUNTER — HOSPITAL ENCOUNTER (OUTPATIENT)
Dept: LAB | Age: 66
Discharge: HOME | End: 2021-05-03
Payer: MEDICARE

## 2021-01-01 ENCOUNTER — HOSPITAL ENCOUNTER (OUTPATIENT)
Dept: CARDIAC REHAB | Age: 66
Setting detail: THERAPIES SERIES
Discharge: HOME | End: 2021-08-23
Payer: MEDICARE

## 2021-01-01 ENCOUNTER — HOSPITAL ENCOUNTER (OUTPATIENT)
Age: 66
Discharge: HOME | End: 2021-06-18
Payer: MEDICARE

## 2021-01-01 ENCOUNTER — OFFICE VISIT (OUTPATIENT)
Dept: CARDIOLOGY CLINIC | Age: 66
End: 2021-01-01
Payer: MEDICARE

## 2021-01-01 ENCOUNTER — HOSPITAL ENCOUNTER (OUTPATIENT)
Age: 66
Discharge: HOME | End: 2021-07-12
Payer: MEDICARE

## 2021-01-01 ENCOUNTER — HOSPITAL ENCOUNTER (OUTPATIENT)
Dept: CARDIAC REHAB | Age: 66
Setting detail: THERAPIES SERIES
Discharge: HOME | End: 2021-06-29
Payer: MEDICARE

## 2021-01-01 ENCOUNTER — APPOINTMENT (OUTPATIENT)
Dept: CT IMAGING | Age: 66
DRG: 291 | End: 2021-01-01
Payer: MEDICARE

## 2021-01-01 ENCOUNTER — HOSPITAL ENCOUNTER (OUTPATIENT)
Dept: CARDIAC REHAB | Age: 66
Setting detail: THERAPIES SERIES
Discharge: HOME | End: 2021-08-19
Payer: MEDICARE

## 2021-01-01 ENCOUNTER — HOSPITAL ENCOUNTER (OUTPATIENT)
Dept: GENERAL RADIOLOGY | Age: 66
Discharge: HOME | DRG: 291 | End: 2021-12-01
Payer: MEDICARE

## 2021-01-01 ENCOUNTER — HOSPITAL ENCOUNTER (OUTPATIENT)
Dept: CARDIAC REHAB | Age: 66
Setting detail: THERAPIES SERIES
Discharge: HOME | End: 2021-08-16
Payer: MEDICARE

## 2021-01-01 ENCOUNTER — HOSPITAL ENCOUNTER (INPATIENT)
Age: 66
LOS: 2 days | Discharge: HOME | DRG: 291 | End: 2021-07-31
Attending: HOSPITALIST | Admitting: HOSPITALIST
Payer: MEDICARE

## 2021-01-01 ENCOUNTER — HOSPITAL ENCOUNTER (OUTPATIENT)
Dept: CARDIAC REHAB | Age: 66
Setting detail: THERAPIES SERIES
Discharge: HOME | End: 2021-10-19
Payer: MEDICARE

## 2021-01-01 ENCOUNTER — HOSPITAL ENCOUNTER (OUTPATIENT)
Dept: CARDIAC REHAB | Age: 66
Setting detail: THERAPIES SERIES
Discharge: HOME | End: 2021-08-31
Payer: MEDICARE

## 2021-01-01 ENCOUNTER — HOSPITAL ENCOUNTER (OUTPATIENT)
Dept: CT IMAGING | Age: 66
Discharge: HOME | End: 2021-10-13
Payer: MEDICARE

## 2021-01-01 ENCOUNTER — HOSPITAL ENCOUNTER (INPATIENT)
Age: 66
LOS: 2 days | Discharge: HOME | DRG: 291 | End: 2021-12-04
Attending: EMERGENCY MEDICINE
Payer: MEDICARE

## 2021-01-01 ENCOUNTER — HOSPITAL ENCOUNTER (INPATIENT)
Age: 66
LOS: 2 days | Discharge: HOME | DRG: 315 | End: 2021-10-30
Attending: STUDENT IN AN ORGANIZED HEALTH CARE EDUCATION/TRAINING PROGRAM
Payer: MEDICARE

## 2021-01-01 VITALS
DIASTOLIC BLOOD PRESSURE: 74 MMHG | SYSTOLIC BLOOD PRESSURE: 118 MMHG | BODY MASS INDEX: 38.97 KG/M2 | WEIGHT: 233.91 LBS | RESPIRATION RATE: 15 BRPM | TEMPERATURE: 97.6 F | HEART RATE: 89 BPM | HEIGHT: 65 IN | OXYGEN SATURATION: 99 %

## 2021-01-01 VITALS
TEMPERATURE: 97.9 F | BODY MASS INDEX: 38.45 KG/M2 | OXYGEN SATURATION: 93 % | DIASTOLIC BLOOD PRESSURE: 55 MMHG | HEART RATE: 71 BPM | RESPIRATION RATE: 16 BRPM | SYSTOLIC BLOOD PRESSURE: 98 MMHG | HEIGHT: 67 IN | WEIGHT: 245 LBS

## 2021-01-01 VITALS
SYSTOLIC BLOOD PRESSURE: 120 MMHG | HEART RATE: 76 BPM | RESPIRATION RATE: 16 BRPM | BODY MASS INDEX: 37.31 KG/M2 | WEIGHT: 237.7 LBS | HEIGHT: 67 IN | DIASTOLIC BLOOD PRESSURE: 79 MMHG | TEMPERATURE: 98.3 F | OXYGEN SATURATION: 96 %

## 2021-01-01 VITALS
BODY MASS INDEX: 38.57 KG/M2 | HEIGHT: 66 IN | OXYGEN SATURATION: 98 % | WEIGHT: 240 LBS | SYSTOLIC BLOOD PRESSURE: 100 MMHG | DIASTOLIC BLOOD PRESSURE: 64 MMHG | HEART RATE: 78 BPM

## 2021-01-01 VITALS
SYSTOLIC BLOOD PRESSURE: 118 MMHG | DIASTOLIC BLOOD PRESSURE: 72 MMHG | BODY MASS INDEX: 39.37 KG/M2 | WEIGHT: 245 LBS | HEIGHT: 66 IN | HEART RATE: 76 BPM

## 2021-01-01 VITALS — SYSTOLIC BLOOD PRESSURE: 120 MMHG | DIASTOLIC BLOOD PRESSURE: 80 MMHG

## 2021-01-01 VITALS
DIASTOLIC BLOOD PRESSURE: 68 MMHG | HEART RATE: 62 BPM | HEIGHT: 67 IN | RESPIRATION RATE: 36 BRPM | SYSTOLIC BLOOD PRESSURE: 118 MMHG | BODY MASS INDEX: 36.91 KG/M2 | OXYGEN SATURATION: 98 % | WEIGHT: 235.2 LBS

## 2021-01-01 VITALS
RESPIRATION RATE: 19 BRPM | BODY MASS INDEX: 39.15 KG/M2 | WEIGHT: 235 LBS | TEMPERATURE: 98.3 F | HEART RATE: 90 BPM | SYSTOLIC BLOOD PRESSURE: 147 MMHG | DIASTOLIC BLOOD PRESSURE: 95 MMHG | HEIGHT: 65 IN | OXYGEN SATURATION: 97 %

## 2021-01-01 VITALS
TEMPERATURE: 98 F | BODY MASS INDEX: 38.94 KG/M2 | SYSTOLIC BLOOD PRESSURE: 155 MMHG | WEIGHT: 242.29 LBS | RESPIRATION RATE: 14 BRPM | HEART RATE: 85 BPM | DIASTOLIC BLOOD PRESSURE: 98 MMHG | HEIGHT: 66 IN | OXYGEN SATURATION: 99 %

## 2021-01-01 VITALS — TEMPERATURE: 96.7 F

## 2021-01-01 VITALS
WEIGHT: 242 LBS | HEIGHT: 65 IN | TEMPERATURE: 96.2 F | OXYGEN SATURATION: 94 % | BODY MASS INDEX: 40.32 KG/M2 | RESPIRATION RATE: 15 BRPM | HEART RATE: 93 BPM | SYSTOLIC BLOOD PRESSURE: 132 MMHG | DIASTOLIC BLOOD PRESSURE: 64 MMHG

## 2021-01-01 VITALS
BODY MASS INDEX: 37.39 KG/M2 | DIASTOLIC BLOOD PRESSURE: 80 MMHG | HEART RATE: 103 BPM | SYSTOLIC BLOOD PRESSURE: 116 MMHG | WEIGHT: 238.2 LBS | HEIGHT: 67 IN

## 2021-01-01 DIAGNOSIS — Z95.810 S/P ICD (INTERNAL CARDIAC DEFIBRILLATOR) PROCEDURE: Primary | ICD-10-CM

## 2021-01-01 DIAGNOSIS — R09.89 GLOBUS SENSATION: ICD-10-CM

## 2021-01-01 DIAGNOSIS — R79.89 ELEVATED BRAIN NATRIURETIC PEPTIDE (BNP) LEVEL: ICD-10-CM

## 2021-01-01 DIAGNOSIS — I13.0 CARDIORENAL SYNDROME WITH RENAL FAILURE, STAGE 1-4 OR UNSPECIFIED CHRONIC KIDNEY DISEASE, WITH HEART FAILURE (HCC): Primary | ICD-10-CM

## 2021-01-01 DIAGNOSIS — I25.10 CAD IN NATIVE ARTERY: ICD-10-CM

## 2021-01-01 DIAGNOSIS — G47.33 OBSTRUCTIVE SLEEP APNEA: ICD-10-CM

## 2021-01-01 DIAGNOSIS — Z95.0 STATUS POST PLACEMENT OF CARDIAC PACEMAKER: Primary | ICD-10-CM

## 2021-01-01 DIAGNOSIS — R06.02 SHORTNESS OF BREATH: ICD-10-CM

## 2021-01-01 DIAGNOSIS — N18.30 STAGE 3 CHRONIC KIDNEY DISEASE, UNSPECIFIED WHETHER STAGE 3A OR 3B CKD (HCC): ICD-10-CM

## 2021-01-01 DIAGNOSIS — I48.0 PAROXYSMAL ATRIAL FIBRILLATION (HCC): ICD-10-CM

## 2021-01-01 DIAGNOSIS — J44.9 COPD, MODERATE (HCC): Primary | ICD-10-CM

## 2021-01-01 DIAGNOSIS — R06.00 DYSPNEA AND RESPIRATORY ABNORMALITIES: ICD-10-CM

## 2021-01-01 DIAGNOSIS — I50.22 CHF (CONGESTIVE HEART FAILURE), NYHA CLASS I, CHRONIC, SYSTOLIC (HCC): ICD-10-CM

## 2021-01-01 DIAGNOSIS — D73.89 SPLENIC LESION: ICD-10-CM

## 2021-01-01 DIAGNOSIS — R77.8 ELEVATED TROPONIN: ICD-10-CM

## 2021-01-01 DIAGNOSIS — R22.1 NECK SWELLING: ICD-10-CM

## 2021-01-01 DIAGNOSIS — R07.9 CHEST PAIN, UNSPECIFIED TYPE: Primary | ICD-10-CM

## 2021-01-01 DIAGNOSIS — I95.9 HYPOTENSION, UNSPECIFIED HYPOTENSION TYPE: ICD-10-CM

## 2021-01-01 DIAGNOSIS — N18.31 STAGE 3A CHRONIC KIDNEY DISEASE (HCC): ICD-10-CM

## 2021-01-01 DIAGNOSIS — I25.5 ISCHEMIC CARDIOMYOPATHY: Primary | ICD-10-CM

## 2021-01-01 DIAGNOSIS — R06.89 DYSPNEA AND RESPIRATORY ABNORMALITIES: ICD-10-CM

## 2021-01-01 DIAGNOSIS — J43.9 PULMONARY EMPHYSEMA DETERMINED BY X-RAY (HCC): ICD-10-CM

## 2021-01-01 DIAGNOSIS — I10 ESSENTIAL HYPERTENSION: Primary | ICD-10-CM

## 2021-01-01 DIAGNOSIS — Z72.0 TOBACCO ABUSE: ICD-10-CM

## 2021-01-01 DIAGNOSIS — Z79.01 LONG TERM (CURRENT) USE OF ANTICOAGULANTS: ICD-10-CM

## 2021-01-01 DIAGNOSIS — I49.8 BIGEMINAL RHYTHM: ICD-10-CM

## 2021-01-01 DIAGNOSIS — I50.42 CHRONIC COMBINED SYSTOLIC AND DIASTOLIC CONGESTIVE HEART FAILURE (HCC): ICD-10-CM

## 2021-01-01 DIAGNOSIS — R00.1 SYMPTOMATIC BRADYCARDIA: Primary | ICD-10-CM

## 2021-01-01 DIAGNOSIS — R10.84 GENERALIZED ABDOMINAL PAIN: ICD-10-CM

## 2021-01-01 DIAGNOSIS — R06.02 SHORTNESS OF BREATH: Primary | ICD-10-CM

## 2021-01-01 DIAGNOSIS — R14.0 ABDOMINAL DISTENSION: ICD-10-CM

## 2021-01-01 DIAGNOSIS — N18.9 CHRONIC KIDNEY DISEASE, UNSPECIFIED CKD STAGE: ICD-10-CM

## 2021-01-01 DIAGNOSIS — Z01.810 PRE-OPERATIVE CARDIOVASCULAR EXAMINATION: ICD-10-CM

## 2021-01-01 LAB
ABO/RH: NORMAL
ACTIVATED CLOTTING TIME, LOW RANGE: 213 SEC
ALBUMIN SERPL-MCNC: 3.8 GM/DL (ref 3.4–5)
ALBUMIN SERPL-MCNC: 4.1 GM/DL (ref 3.4–5)
ALBUMIN SERPL-MCNC: 4.2 GM/DL (ref 3.4–5)
ALBUMIN SERPL-MCNC: 4.3 GM/DL (ref 3.4–5)
ALP BLD-CCNC: 41 IU/L (ref 40–128)
ALP BLD-CCNC: 48 IU/L (ref 40–128)
ALP BLD-CCNC: 52 IU/L (ref 40–129)
ALP BLD-CCNC: 58 IU/L (ref 40–129)
ALP BLD-CCNC: 58 IU/L (ref 40–129)
ALP BLD-CCNC: 70 IU/L (ref 40–129)
ALP BLD-CCNC: 76 IU/L (ref 40–128)
ALP BLD-CCNC: 77 IU/L (ref 40–129)
ALT SERPL-CCNC: 11 U/L (ref 10–40)
ALT SERPL-CCNC: 11 U/L (ref 10–40)
ALT SERPL-CCNC: 12 U/L (ref 10–40)
ALT SERPL-CCNC: 14 U/L (ref 10–40)
ALT SERPL-CCNC: 14 U/L (ref 10–40)
ALT SERPL-CCNC: 23 U/L (ref 10–40)
ALT SERPL-CCNC: 24 U/L (ref 10–40)
ALT SERPL-CCNC: 33 U/L (ref 10–40)
ANION GAP SERPL CALCULATED.3IONS-SCNC: 10 MMOL/L (ref 4–16)
ANION GAP SERPL CALCULATED.3IONS-SCNC: 11 MMOL/L (ref 4–16)
ANION GAP SERPL CALCULATED.3IONS-SCNC: 12 MMOL/L (ref 4–16)
ANION GAP SERPL CALCULATED.3IONS-SCNC: 13 MMOL/L (ref 4–16)
ANION GAP SERPL CALCULATED.3IONS-SCNC: 13 MMOL/L (ref 4–16)
ANION GAP SERPL CALCULATED.3IONS-SCNC: 14 MMOL/L (ref 4–16)
ANION GAP SERPL CALCULATED.3IONS-SCNC: 14 MMOL/L (ref 4–16)
ANION GAP SERPL CALCULATED.3IONS-SCNC: 7 MMOL/L (ref 4–16)
ANION GAP SERPL CALCULATED.3IONS-SCNC: 8 MMOL/L (ref 4–16)
ANION GAP SERPL CALCULATED.3IONS-SCNC: 9 MMOL/L (ref 4–16)
ANTIBODY SCREEN: NEGATIVE
APTT: 29 SECONDS (ref 25.1–37.1)
APTT: 30.9 SECONDS (ref 25.1–37.1)
APTT: 31.5 SECONDS (ref 25.1–37.1)
APTT: 32.5 SECONDS (ref 25.1–37.1)
APTT: 32.7 SECONDS (ref 25.1–37.1)
AST SERPL-CCNC: 10 IU/L (ref 15–37)
AST SERPL-CCNC: 10 IU/L (ref 15–37)
AST SERPL-CCNC: 11 IU/L (ref 15–37)
AST SERPL-CCNC: 11 IU/L (ref 15–37)
AST SERPL-CCNC: 13 IU/L (ref 15–37)
AST SERPL-CCNC: 15 IU/L (ref 15–37)
AST SERPL-CCNC: 16 IU/L (ref 15–37)
AST SERPL-CCNC: 18 IU/L (ref 15–37)
BASE EXCESS MIXED: 1.7 (ref 0–1.2)
BASOPHILS ABSOLUTE: 0.1 K/CU MM
BASOPHILS RELATIVE PERCENT: 0.6 % (ref 0–1)
BASOPHILS RELATIVE PERCENT: 0.7 % (ref 0–1)
BASOPHILS RELATIVE PERCENT: 0.7 % (ref 0–1)
BASOPHILS RELATIVE PERCENT: 0.9 % (ref 0–1)
BASOPHILS RELATIVE PERCENT: 1 % (ref 0–1)
BASOPHILS RELATIVE PERCENT: 1.3 % (ref 0–1)
BILIRUB SERPL-MCNC: 0.3 MG/DL (ref 0–1)
BILIRUB SERPL-MCNC: 0.4 MG/DL (ref 0–1)
BILIRUB SERPL-MCNC: 0.5 MG/DL (ref 0–1)
BILIRUB SERPL-MCNC: 0.6 MG/DL (ref 0–1)
BILIRUB SERPL-MCNC: 0.8 MG/DL (ref 0–1)
BILIRUBIN DIRECT: 0.2 MG/DL (ref 0–0.3)
BILIRUBIN DIRECT: 0.2 MG/DL (ref 0–0.3)
BILIRUBIN, INDIRECT: 0.2 MG/DL (ref 0–0.7)
BILIRUBIN, INDIRECT: 0.4 MG/DL (ref 0–0.7)
BUN BLDV-MCNC: 18 MG/DL (ref 6–23)
BUN BLDV-MCNC: 19 MG/DL (ref 6–23)
BUN BLDV-MCNC: 20 MG/DL (ref 6–23)
BUN BLDV-MCNC: 21 MG/DL (ref 6–23)
BUN BLDV-MCNC: 23 MG/DL (ref 6–23)
BUN BLDV-MCNC: 23 MG/DL (ref 6–23)
BUN BLDV-MCNC: 26 MG/DL (ref 6–23)
BUN BLDV-MCNC: 26 MG/DL (ref 6–23)
BUN BLDV-MCNC: 27 MG/DL (ref 6–23)
BUN BLDV-MCNC: 28 MG/DL (ref 6–23)
BUN BLDV-MCNC: 29 MG/DL (ref 6–23)
BUN BLDV-MCNC: 30 MG/DL (ref 6–23)
BUN BLDV-MCNC: 30 MG/DL (ref 6–23)
CALCIUM SERPL-MCNC: 8.8 MG/DL (ref 8.3–10.6)
CALCIUM SERPL-MCNC: 9 MG/DL (ref 8.3–10.6)
CALCIUM SERPL-MCNC: 9.1 MG/DL (ref 8.3–10.6)
CALCIUM SERPL-MCNC: 9.1 MG/DL (ref 8.3–10.6)
CALCIUM SERPL-MCNC: 9.2 MG/DL (ref 8.3–10.6)
CALCIUM SERPL-MCNC: 9.3 MG/DL (ref 8.3–10.6)
CALCIUM SERPL-MCNC: 9.3 MG/DL (ref 8.3–10.6)
CALCIUM SERPL-MCNC: 9.5 MG/DL (ref 8.3–10.6)
CALCIUM SERPL-MCNC: 9.5 MG/DL (ref 8.3–10.6)
CALCIUM SERPL-MCNC: 9.6 MG/DL (ref 8.3–10.6)
CALCIUM SERPL-MCNC: 9.6 MG/DL (ref 8.3–10.6)
CALCIUM SERPL-MCNC: 9.7 MG/DL (ref 8.3–10.6)
CALCIUM SERPL-MCNC: 9.8 MG/DL (ref 8.3–10.6)
CARBON MONOXIDE, BLOOD: 3.6 % (ref 0–5)
CHLORIDE BLD-SCNC: 100 MMOL/L (ref 99–110)
CHLORIDE BLD-SCNC: 93 MMOL/L (ref 99–110)
CHLORIDE BLD-SCNC: 95 MMOL/L (ref 99–110)
CHLORIDE BLD-SCNC: 96 MMOL/L (ref 99–110)
CHLORIDE BLD-SCNC: 97 MMOL/L (ref 99–110)
CHLORIDE BLD-SCNC: 98 MMOL/L (ref 99–110)
CHLORIDE BLD-SCNC: 99 MMOL/L (ref 99–110)
CHOLESTEROL: 194 MG/DL
CHP ED QC CHECK: YES
CO2 CONTENT: 27.9 MMOL/L (ref 19–24)
CO2: 22 MMOL/L (ref 21–32)
CO2: 24 MMOL/L (ref 21–32)
CO2: 24 MMOL/L (ref 21–32)
CO2: 25 MMOL/L (ref 21–32)
CO2: 26 MMOL/L (ref 21–32)
CO2: 26 MMOL/L (ref 21–32)
CO2: 27 MMOL/L (ref 21–32)
CO2: 27 MMOL/L (ref 21–32)
CO2: 28 MMOL/L (ref 21–32)
CO2: 29 MMOL/L (ref 21–32)
CO2: 30 MMOL/L (ref 21–32)
CO2: 31 MMOL/L (ref 21–32)
CO2: 31 MMOL/L (ref 21–32)
CO2: 32 MMOL/L (ref 21–32)
COMMENT: ABNORMAL
CREAT SERPL-MCNC: 1.5 MG/DL (ref 0.9–1.3)
CREAT SERPL-MCNC: 1.5 MG/DL (ref 0.9–1.3)
CREAT SERPL-MCNC: 1.6 MG/DL (ref 0.9–1.3)
CREAT SERPL-MCNC: 1.7 MG/DL (ref 0.9–1.3)
CREAT SERPL-MCNC: 1.7 MG/DL (ref 0.9–1.3)
CREAT SERPL-MCNC: 1.8 MG/DL (ref 0.9–1.3)
CREAT SERPL-MCNC: 1.8 MG/DL (ref 0.9–1.3)
CREAT SERPL-MCNC: 1.9 MG/DL (ref 0.9–1.3)
CREAT SERPL-MCNC: 2 MG/DL (ref 0.9–1.3)
CREAT SERPL-MCNC: 2.1 MG/DL (ref 0.9–1.3)
CREAT SERPL-MCNC: 2.2 MG/DL (ref 0.9–1.3)
CREAT SERPL-MCNC: 2.2 MG/DL (ref 0.9–1.3)
CREAT SERPL-MCNC: 2.3 MG/DL (ref 0.9–1.3)
CREATININE URINE: 140.8 MG/DL (ref 39–259)
D DIMER: 553 NG/ML(DDU)
D DIMER: 564 NG/ML(DDU)
DIFFERENTIAL TYPE: ABNORMAL
EKG ATRIAL RATE: 102 BPM
EKG ATRIAL RATE: 73 BPM
EKG ATRIAL RATE: 76 BPM
EKG ATRIAL RATE: 85 BPM
EKG ATRIAL RATE: 93 BPM
EKG DIAGNOSIS: NORMAL
EKG P AXIS: 28 DEGREES
EKG P AXIS: 34 DEGREES
EKG P AXIS: 56 DEGREES
EKG P AXIS: 56 DEGREES
EKG P-R INTERVAL: 146 MS
EKG P-R INTERVAL: 156 MS
EKG P-R INTERVAL: 162 MS
EKG P-R INTERVAL: 162 MS
EKG P-R INTERVAL: 170 MS
EKG Q-T INTERVAL: 332 MS
EKG Q-T INTERVAL: 370 MS
EKG Q-T INTERVAL: 386 MS
EKG Q-T INTERVAL: 390 MS
EKG Q-T INTERVAL: 426 MS
EKG QRS DURATION: 88 MS
EKG QRS DURATION: 92 MS
EKG QRS DURATION: 94 MS
EKG QRS DURATION: 96 MS
EKG QRS DURATION: 96 MS
EKG QTC CALCULATION (BAZETT): 432 MS
EKG QTC CALCULATION (BAZETT): 438 MS
EKG QTC CALCULATION (BAZETT): 459 MS
EKG QTC CALCULATION (BAZETT): 469 MS
EKG QTC CALCULATION (BAZETT): 472 MS
EKG R AXIS: 129 DEGREES
EKG R AXIS: 43 DEGREES
EKG R AXIS: 69 DEGREES
EKG R AXIS: 72 DEGREES
EKG R AXIS: 79 DEGREES
EKG T AXIS: -3 DEGREES
EKG T AXIS: 14 DEGREES
EKG T AXIS: 203 DEGREES
EKG T AXIS: 69 DEGREES
EKG T AXIS: 9 DEGREES
EKG VENTRICULAR RATE: 102 BPM
EKG VENTRICULAR RATE: 73 BPM
EKG VENTRICULAR RATE: 76 BPM
EKG VENTRICULAR RATE: 85 BPM
EKG VENTRICULAR RATE: 98 BPM
EOSINOPHILS ABSOLUTE: 0.5 K/CU MM
EOSINOPHILS ABSOLUTE: 0.6 K/CU MM
EOSINOPHILS ABSOLUTE: 0.6 K/CU MM
EOSINOPHILS ABSOLUTE: 0.7 K/CU MM
EOSINOPHILS ABSOLUTE: 0.8 K/CU MM
EOSINOPHILS ABSOLUTE: 1.4 K/CU MM
EOSINOPHILS RELATIVE PERCENT: 10.6 % (ref 0–3)
EOSINOPHILS RELATIVE PERCENT: 14.7 % (ref 0–3)
EOSINOPHILS RELATIVE PERCENT: 6.2 % (ref 0–3)
EOSINOPHILS RELATIVE PERCENT: 7.1 % (ref 0–3)
EOSINOPHILS RELATIVE PERCENT: 7.8 % (ref 0–3)
EOSINOPHILS RELATIVE PERCENT: 7.9 % (ref 0–3)
EOSINOPHILS RELATIVE PERCENT: 7.9 % (ref 0–3)
EOSINOPHILS RELATIVE PERCENT: 8.4 % (ref 0–3)
EOSINOPHILS RELATIVE PERCENT: 8.8 % (ref 0–3)
EOSINOPHILS RELATIVE PERCENT: 9.3 % (ref 0–3)
ESTIMATED AVERAGE GLUCOSE: 151 MG/DL
ESTIMATED AVERAGE GLUCOSE: 194 MG/DL
ESTIMATED AVERAGE GLUCOSE: 301 MG/DL
GFR AFRICAN AMERICAN: 34 ML/MIN/1.73M2
GFR AFRICAN AMERICAN: 36 ML/MIN/1.73M2
GFR AFRICAN AMERICAN: 36 ML/MIN/1.73M2
GFR AFRICAN AMERICAN: 38 ML/MIN/1.73M2
GFR AFRICAN AMERICAN: 41 ML/MIN/1.73M2
GFR AFRICAN AMERICAN: 43 ML/MIN/1.73M2
GFR AFRICAN AMERICAN: 46 ML/MIN/1.73M2
GFR AFRICAN AMERICAN: 46 ML/MIN/1.73M2
GFR AFRICAN AMERICAN: 49 ML/MIN/1.73M2
GFR AFRICAN AMERICAN: 49 ML/MIN/1.73M2
GFR AFRICAN AMERICAN: 52 ML/MIN/1.73M2
GFR AFRICAN AMERICAN: 56 ML/MIN/1.73M2
GFR AFRICAN AMERICAN: 56 ML/MIN/1.73M2
GFR NON-AFRICAN AMERICAN: 29 ML/MIN/1.73M2
GFR NON-AFRICAN AMERICAN: 30 ML/MIN/1.73M2
GFR NON-AFRICAN AMERICAN: 30 ML/MIN/1.73M2
GFR NON-AFRICAN AMERICAN: 32 ML/MIN/1.73M2
GFR NON-AFRICAN AMERICAN: 33 ML/MIN/1.73M2
GFR NON-AFRICAN AMERICAN: 35 ML/MIN/1.73M2
GFR NON-AFRICAN AMERICAN: 36 ML/MIN/1.73M2
GFR NON-AFRICAN AMERICAN: 38 ML/MIN/1.73M2
GFR NON-AFRICAN AMERICAN: 38 ML/MIN/1.73M2
GFR NON-AFRICAN AMERICAN: 40 ML/MIN/1.73M2
GFR NON-AFRICAN AMERICAN: 40 ML/MIN/1.73M2
GFR NON-AFRICAN AMERICAN: 43 ML/MIN/1.73M2
GFR NON-AFRICAN AMERICAN: 47 ML/MIN/1.73M2
GFR NON-AFRICAN AMERICAN: 47 ML/MIN/1.73M2
GLUCOSE BLD-MCNC: 100 MG/DL (ref 70–99)
GLUCOSE BLD-MCNC: 109 MG/DL (ref 70–99)
GLUCOSE BLD-MCNC: 111 MG/DL (ref 70–99)
GLUCOSE BLD-MCNC: 112 MG/DL (ref 70–99)
GLUCOSE BLD-MCNC: 117 MG/DL (ref 70–99)
GLUCOSE BLD-MCNC: 118 MG/DL (ref 70–99)
GLUCOSE BLD-MCNC: 125 MG/DL (ref 70–99)
GLUCOSE BLD-MCNC: 126 MG/DL (ref 70–99)
GLUCOSE BLD-MCNC: 126 MG/DL (ref 70–99)
GLUCOSE BLD-MCNC: 128 MG/DL (ref 70–99)
GLUCOSE BLD-MCNC: 128 MG/DL (ref 70–99)
GLUCOSE BLD-MCNC: 132 MG/DL (ref 70–99)
GLUCOSE BLD-MCNC: 134 MG/DL (ref 70–99)
GLUCOSE BLD-MCNC: 135 MG/DL (ref 70–99)
GLUCOSE BLD-MCNC: 135 MG/DL (ref 70–99)
GLUCOSE BLD-MCNC: 137 MG/DL (ref 70–99)
GLUCOSE BLD-MCNC: 138 MG/DL (ref 70–99)
GLUCOSE BLD-MCNC: 143 MG/DL (ref 70–99)
GLUCOSE BLD-MCNC: 146 MG/DL (ref 70–99)
GLUCOSE BLD-MCNC: 146 MG/DL (ref 70–99)
GLUCOSE BLD-MCNC: 157 MG/DL (ref 70–99)
GLUCOSE BLD-MCNC: 160 MG/DL (ref 70–99)
GLUCOSE BLD-MCNC: 160 MG/DL (ref 70–99)
GLUCOSE BLD-MCNC: 164 MG/DL (ref 70–99)
GLUCOSE BLD-MCNC: 169 MG/DL (ref 70–99)
GLUCOSE BLD-MCNC: 179 MG/DL (ref 70–99)
GLUCOSE BLD-MCNC: 187 MG/DL (ref 70–99)
GLUCOSE BLD-MCNC: 189 MG/DL (ref 70–99)
GLUCOSE BLD-MCNC: 193 MG/DL (ref 70–99)
GLUCOSE BLD-MCNC: 202 MG/DL (ref 70–99)
GLUCOSE BLD-MCNC: 205 MG/DL (ref 70–99)
GLUCOSE BLD-MCNC: 217 MG/DL (ref 70–99)
GLUCOSE BLD-MCNC: 234 MG/DL (ref 70–99)
GLUCOSE BLD-MCNC: 244 MG/DL (ref 70–99)
GLUCOSE BLD-MCNC: 277 MG/DL (ref 70–99)
GLUCOSE BLD-MCNC: 293 MG/DL (ref 70–99)
GLUCOSE BLD-MCNC: 302 MG/DL (ref 70–99)
GLUCOSE BLD-MCNC: 324 MG/DL (ref 70–99)
GLUCOSE BLD-MCNC: 350 MG/DL (ref 70–99)
GLUCOSE BLD-MCNC: 351 MG/DL (ref 70–99)
GLUCOSE BLD-MCNC: 387 MG/DL (ref 70–99)
GLUCOSE BLD-MCNC: 412 MG/DL (ref 70–99)
GLUCOSE BLD-MCNC: 574 MG/DL (ref 70–99)
GLUCOSE BLD-MCNC: 589 MG/DL
GLUCOSE BLD-MCNC: 589 MG/DL (ref 70–99)
GLUCOSE BLD-MCNC: 87 MG/DL (ref 70–99)
GLUCOSE BLD-MCNC: 91 MG/DL (ref 70–99)
GLUCOSE BLD-MCNC: 97 MG/DL (ref 70–99)
HBA1C MFR BLD: 12.1 % (ref 4.2–6.3)
HBA1C MFR BLD: 6.9 % (ref 4.2–6.3)
HBA1C MFR BLD: 8.4 % (ref 4.2–6.3)
HCO3 ARTERIAL: 26.6 MMOL/L (ref 18–23)
HCT VFR BLD CALC: 37.6 % (ref 42–52)
HCT VFR BLD CALC: 40.1 % (ref 42–52)
HCT VFR BLD CALC: 41.3 % (ref 42–52)
HCT VFR BLD CALC: 42 % (ref 42–52)
HCT VFR BLD CALC: 43.1 % (ref 42–52)
HCT VFR BLD CALC: 43.3 % (ref 42–52)
HCT VFR BLD CALC: 43.6 % (ref 42–52)
HCT VFR BLD CALC: 45.6 % (ref 42–52)
HCT VFR BLD CALC: 48.3 % (ref 42–52)
HCT VFR BLD CALC: 49.1 % (ref 42–52)
HDLC SERPL-MCNC: 42 MG/DL
HEMOGLOBIN: 12.3 GM/DL (ref 13.5–18)
HEMOGLOBIN: 12.7 GM/DL (ref 13.5–18)
HEMOGLOBIN: 13.5 GM/DL (ref 13.5–18)
HEMOGLOBIN: 13.7 GM/DL (ref 13.5–18)
HEMOGLOBIN: 13.9 GM/DL (ref 13.5–18)
HEMOGLOBIN: 14 GM/DL (ref 13.5–18)
HEMOGLOBIN: 14.1 GM/DL (ref 13.5–18)
HEMOGLOBIN: 14.3 GM/DL (ref 13.5–18)
HEMOGLOBIN: 14.4 GM/DL (ref 13.5–18)
HEMOGLOBIN: 14.8 GM/DL (ref 13.5–18)
HEMOGLOBIN: 15.1 GM/DL (ref 13.5–18)
HEMOGLOBIN: 15.6 GM/DL (ref 13.5–18)
IMMATURE NEUTROPHIL %: 0.2 % (ref 0–0.43)
IMMATURE NEUTROPHIL %: 0.3 % (ref 0–0.43)
IMMATURE NEUTROPHIL %: 0.4 % (ref 0–0.43)
IMMATURE NEUTROPHIL %: 0.5 % (ref 0–0.43)
IMMATURE NEUTROPHIL %: 0.7 % (ref 0–0.43)
IMMATURE NEUTROPHIL %: 0.7 % (ref 0–0.43)
INR BLD: 0.8 INDEX
INR BLD: 0.85 INDEX
INR BLD: 0.88 INDEX
INR BLD: 0.89 INDEX
INR BLD: 0.91 INDEX
INR BLD: 0.91 INDEX
IRON: 119 UG/DL (ref 59–158)
LACTATE: 0.8 MMOL/L (ref 0.4–2)
LACTIC ACID, SEPSIS: 1 MMOL/L (ref 0.5–1.9)
LDL CHOLESTEROL DIRECT: 136 MG/DL
LIPASE: 15 IU/L (ref 13–60)
LV EF: 18 %
LVEF MODALITY: NORMAL
LYMPHOCYTES ABSOLUTE: 1.6 K/CU MM
LYMPHOCYTES ABSOLUTE: 1.7 K/CU MM
LYMPHOCYTES ABSOLUTE: 1.7 K/CU MM
LYMPHOCYTES ABSOLUTE: 1.8 K/CU MM
LYMPHOCYTES ABSOLUTE: 1.9 K/CU MM
LYMPHOCYTES ABSOLUTE: 1.9 K/CU MM
LYMPHOCYTES ABSOLUTE: 2 K/CU MM
LYMPHOCYTES ABSOLUTE: 2.2 K/CU MM
LYMPHOCYTES RELATIVE PERCENT: 17.8 % (ref 24–44)
LYMPHOCYTES RELATIVE PERCENT: 19.4 % (ref 24–44)
LYMPHOCYTES RELATIVE PERCENT: 20.8 % (ref 24–44)
LYMPHOCYTES RELATIVE PERCENT: 21 % (ref 24–44)
LYMPHOCYTES RELATIVE PERCENT: 21.9 % (ref 24–44)
LYMPHOCYTES RELATIVE PERCENT: 23.5 % (ref 24–44)
LYMPHOCYTES RELATIVE PERCENT: 23.6 % (ref 24–44)
LYMPHOCYTES RELATIVE PERCENT: 23.8 % (ref 24–44)
LYMPHOCYTES RELATIVE PERCENT: 25.2 % (ref 24–44)
LYMPHOCYTES RELATIVE PERCENT: 26.6 % (ref 24–44)
MAGNESIUM: 1.9 MG/DL (ref 1.8–2.4)
MAGNESIUM: 1.9 MG/DL (ref 1.8–2.4)
MAGNESIUM: 2 MG/DL (ref 1.8–2.4)
MAGNESIUM: 2 MG/DL (ref 1.8–2.4)
MAGNESIUM: 2.1 MG/DL (ref 1.8–2.4)
MAGNESIUM: 2.2 MG/DL (ref 1.8–2.4)
MAGNESIUM: 2.3 MG/DL (ref 1.8–2.4)
MAGNESIUM: 2.3 MG/DL (ref 1.8–2.4)
MCH RBC QN AUTO: 28.6 PG (ref 27–31)
MCH RBC QN AUTO: 28.6 PG (ref 27–31)
MCH RBC QN AUTO: 28.7 PG (ref 27–31)
MCH RBC QN AUTO: 28.7 PG (ref 27–31)
MCH RBC QN AUTO: 28.9 PG (ref 27–31)
MCH RBC QN AUTO: 29.4 PG (ref 27–31)
MCH RBC QN AUTO: 29.7 PG (ref 27–31)
MCH RBC QN AUTO: 30.2 PG (ref 27–31)
MCH RBC QN AUTO: 30.7 PG (ref 27–31)
MCH RBC QN AUTO: 31 PG (ref 27–31)
MCH RBC QN AUTO: 31.3 PG (ref 27–31)
MCH RBC QN AUTO: 31.4 PG (ref 27–31)
MCHC RBC AUTO-ENTMCNC: 31.3 % (ref 32–36)
MCHC RBC AUTO-ENTMCNC: 31.7 % (ref 32–36)
MCHC RBC AUTO-ENTMCNC: 31.8 % (ref 32–36)
MCHC RBC AUTO-ENTMCNC: 32.1 % (ref 32–36)
MCHC RBC AUTO-ENTMCNC: 32.3 % (ref 32–36)
MCHC RBC AUTO-ENTMCNC: 32.5 % (ref 32–36)
MCHC RBC AUTO-ENTMCNC: 32.7 % (ref 32–36)
MCHC RBC AUTO-ENTMCNC: 33 % (ref 32–36)
MCHC RBC AUTO-ENTMCNC: 33.2 % (ref 32–36)
MCHC RBC AUTO-ENTMCNC: 33.3 % (ref 32–36)
MCV RBC AUTO: 86.8 FL (ref 78–100)
MCV RBC AUTO: 86.8 FL (ref 78–100)
MCV RBC AUTO: 88.5 FL (ref 78–100)
MCV RBC AUTO: 88.8 FL (ref 78–100)
MCV RBC AUTO: 89.9 FL (ref 78–100)
MCV RBC AUTO: 91.6 FL (ref 78–100)
MCV RBC AUTO: 94 FL (ref 78–100)
MCV RBC AUTO: 94.3 FL (ref 78–100)
MCV RBC AUTO: 95.1 FL (ref 78–100)
MCV RBC AUTO: 95.2 FL (ref 78–100)
MCV RBC AUTO: 95.9 FL (ref 78–100)
MCV RBC AUTO: 96.6 FL (ref 78–100)
METHEMOGLOBIN ARTERIAL: 1.2 %
MICROALBUMIN/CREAT 24H UR: 1.9 MG/DL
MICROALBUMIN/CREAT UR-RTO: 13.5 MG/G CREAT (ref 0–30)
MONOCYTES ABSOLUTE: 0.5 K/CU MM
MONOCYTES ABSOLUTE: 0.6 K/CU MM
MONOCYTES ABSOLUTE: 0.7 K/CU MM
MONOCYTES ABSOLUTE: 0.7 K/CU MM
MONOCYTES ABSOLUTE: 0.8 K/CU MM
MONOCYTES RELATIVE PERCENT: 6.6 % (ref 0–4)
MONOCYTES RELATIVE PERCENT: 6.7 % (ref 0–4)
MONOCYTES RELATIVE PERCENT: 6.7 % (ref 0–4)
MONOCYTES RELATIVE PERCENT: 7.2 % (ref 0–4)
MONOCYTES RELATIVE PERCENT: 7.5 % (ref 0–4)
MONOCYTES RELATIVE PERCENT: 7.9 % (ref 0–4)
MONOCYTES RELATIVE PERCENT: 7.9 % (ref 0–4)
MONOCYTES RELATIVE PERCENT: 8 % (ref 0–4)
MONOCYTES RELATIVE PERCENT: 8.1 % (ref 0–4)
MONOCYTES RELATIVE PERCENT: 8.8 % (ref 0–4)
NUCLEATED RBC %: 0 %
O2 SATURATION: 93.2 % (ref 96–97)
PCO2 ARTERIAL: 42 MMHG (ref 32–45)
PCT TRANSFERRIN: 43 % (ref 10–44)
PDW BLD-RTO: 13.3 % (ref 11.7–14.9)
PDW BLD-RTO: 13.4 % (ref 11.7–14.9)
PDW BLD-RTO: 13.5 % (ref 11.7–14.9)
PDW BLD-RTO: 14.1 % (ref 11.7–14.9)
PDW BLD-RTO: 14.3 % (ref 11.7–14.9)
PDW BLD-RTO: 14.3 % (ref 11.7–14.9)
PDW BLD-RTO: 14.5 % (ref 11.7–14.9)
PDW BLD-RTO: 14.7 % (ref 11.7–14.9)
PDW BLD-RTO: 14.7 % (ref 11.7–14.9)
PH BLOOD: 7.41 (ref 7.34–7.45)
PHOSPHORUS: 3.5 MG/DL (ref 2.5–4.9)
PHOSPHORUS: 3.6 MG/DL (ref 2.5–4.9)
PLATELET # BLD: 193 K/CU MM (ref 140–440)
PLATELET # BLD: 195 K/CU MM (ref 140–440)
PLATELET # BLD: 198 K/CU MM (ref 140–440)
PLATELET # BLD: 199 K/CU MM (ref 140–440)
PLATELET # BLD: 199 K/CU MM (ref 140–440)
PLATELET # BLD: 202 K/CU MM (ref 140–440)
PLATELET # BLD: 205 K/CU MM (ref 140–440)
PLATELET # BLD: 206 K/CU MM (ref 140–440)
PLATELET # BLD: 207 K/CU MM (ref 140–440)
PLATELET # BLD: 219 K/CU MM (ref 140–440)
PLATELET # BLD: 222 K/CU MM (ref 140–440)
PLATELET # BLD: 231 K/CU MM (ref 140–440)
PMV BLD AUTO: 10.2 FL (ref 7.5–11.1)
PMV BLD AUTO: 10.3 FL (ref 7.5–11.1)
PMV BLD AUTO: 10.4 FL (ref 7.5–11.1)
PMV BLD AUTO: 10.4 FL (ref 7.5–11.1)
PMV BLD AUTO: 10.7 FL (ref 7.5–11.1)
PMV BLD AUTO: 10.8 FL (ref 7.5–11.1)
PMV BLD AUTO: 10.9 FL (ref 7.5–11.1)
PMV BLD AUTO: 11 FL (ref 7.5–11.1)
PMV BLD AUTO: 11 FL (ref 7.5–11.1)
PMV BLD AUTO: 11.3 FL (ref 7.5–11.1)
PMV BLD AUTO: 11.3 FL (ref 7.5–11.1)
PMV BLD AUTO: 9.9 FL (ref 7.5–11.1)
PO2 ARTERIAL: 74 MMHG (ref 75–100)
POTASSIUM SERPL-SCNC: 3.6 MMOL/L (ref 3.5–5.1)
POTASSIUM SERPL-SCNC: 3.7 MMOL/L (ref 3.5–5.1)
POTASSIUM SERPL-SCNC: 3.7 MMOL/L (ref 3.5–5.1)
POTASSIUM SERPL-SCNC: 3.8 MMOL/L (ref 3.5–5.1)
POTASSIUM SERPL-SCNC: 4 MMOL/L (ref 3.5–5.1)
POTASSIUM SERPL-SCNC: 4 MMOL/L (ref 3.5–5.1)
POTASSIUM SERPL-SCNC: 4.1 MMOL/L (ref 3.5–5.1)
POTASSIUM SERPL-SCNC: 4.2 MMOL/L (ref 3.5–5.1)
POTASSIUM SERPL-SCNC: 4.3 MMOL/L (ref 3.5–5.1)
POTASSIUM SERPL-SCNC: 4.4 MMOL/L (ref 3.5–5.1)
POTASSIUM SERPL-SCNC: 4.5 MMOL/L (ref 3.5–5.1)
POTASSIUM SERPL-SCNC: 4.5 MMOL/L (ref 3.5–5.1)
POTASSIUM SERPL-SCNC: 4.6 MMOL/L (ref 3.5–5.1)
POTASSIUM SERPL-SCNC: 4.7 MMOL/L (ref 3.5–5.1)
PRO-BNP: 2530 PG/ML
PRO-BNP: 2553 PG/ML
PRO-BNP: 2808 PG/ML
PRO-BNP: 3049 PG/ML
PRO-BNP: 3166 PG/ML
PRO-BNP: 3367 PG/ML
PRO-BNP: 3383 PG/ML
PRO-BNP: 3525 PG/ML
PROTHROMBIN TIME: 10.3 SECONDS (ref 11.7–14.5)
PROTHROMBIN TIME: 10.9 SECONDS (ref 11.7–14.5)
PROTHROMBIN TIME: 11 SECONDS (ref 11.7–14.5)
PROTHROMBIN TIME: 11.3 SECONDS (ref 11.7–14.5)
PROTHROMBIN TIME: 11.5 SECONDS (ref 11.7–14.5)
PROTHROMBIN TIME: 11.7 SECONDS (ref 11.7–14.5)
RBC # BLD: 3.92 M/CU MM (ref 4.6–6.2)
RBC # BLD: 4.21 M/CU MM (ref 4.6–6.2)
RBC # BLD: 4.35 M/CU MM (ref 4.6–6.2)
RBC # BLD: 4.38 M/CU MM (ref 4.6–6.2)
RBC # BLD: 4.53 M/CU MM (ref 4.6–6.2)
RBC # BLD: 4.89 M/CU MM (ref 4.6–6.2)
RBC # BLD: 4.91 M/CU MM (ref 4.6–6.2)
RBC # BLD: 4.98 M/CU MM (ref 4.6–6.2)
RBC # BLD: 4.99 M/CU MM (ref 4.6–6.2)
RBC # BLD: 4.99 M/CU MM (ref 4.6–6.2)
RBC # BLD: 5.14 M/CU MM (ref 4.6–6.2)
RBC # BLD: 5.46 M/CU MM (ref 4.6–6.2)
SARS-COV-2, NAAT: NOT DETECTED
SARS-COV-2, NAAT: NOT DETECTED
SARS-COV-2: NOT DETECTED
SARS-COV-2: NOT DETECTED
SEGMENTED NEUTROPHILS ABSOLUTE COUNT: 4.1 K/CU MM
SEGMENTED NEUTROPHILS ABSOLUTE COUNT: 4.3 K/CU MM
SEGMENTED NEUTROPHILS ABSOLUTE COUNT: 4.6 K/CU MM
SEGMENTED NEUTROPHILS ABSOLUTE COUNT: 4.7 K/CU MM
SEGMENTED NEUTROPHILS ABSOLUTE COUNT: 4.7 K/CU MM
SEGMENTED NEUTROPHILS ABSOLUTE COUNT: 4.8 K/CU MM
SEGMENTED NEUTROPHILS ABSOLUTE COUNT: 4.9 K/CU MM
SEGMENTED NEUTROPHILS ABSOLUTE COUNT: 5.1 K/CU MM
SEGMENTED NEUTROPHILS ABSOLUTE COUNT: 5.7 K/CU MM
SEGMENTED NEUTROPHILS ABSOLUTE COUNT: 6.2 K/CU MM
SEGMENTED NEUTROPHILS RELATIVE PERCENT: 51.3 % (ref 36–66)
SEGMENTED NEUTROPHILS RELATIVE PERCENT: 55.7 % (ref 36–66)
SEGMENTED NEUTROPHILS RELATIVE PERCENT: 56.6 % (ref 36–66)
SEGMENTED NEUTROPHILS RELATIVE PERCENT: 58.6 % (ref 36–66)
SEGMENTED NEUTROPHILS RELATIVE PERCENT: 60.4 % (ref 36–66)
SEGMENTED NEUTROPHILS RELATIVE PERCENT: 61 % (ref 36–66)
SEGMENTED NEUTROPHILS RELATIVE PERCENT: 61.9 % (ref 36–66)
SEGMENTED NEUTROPHILS RELATIVE PERCENT: 62.8 % (ref 36–66)
SEGMENTED NEUTROPHILS RELATIVE PERCENT: 63.7 % (ref 36–66)
SEGMENTED NEUTROPHILS RELATIVE PERCENT: 67.3 % (ref 36–66)
SEX HORMONE BINDING GLOBULIN: 41 NMOL/L (ref 11–80)
SODIUM BLD-SCNC: 129 MMOL/L (ref 135–145)
SODIUM BLD-SCNC: 130 MMOL/L (ref 135–145)
SODIUM BLD-SCNC: 132 MMOL/L (ref 135–145)
SODIUM BLD-SCNC: 133 MMOL/L (ref 135–145)
SODIUM BLD-SCNC: 134 MMOL/L (ref 135–145)
SODIUM BLD-SCNC: 135 MMOL/L (ref 135–145)
SODIUM BLD-SCNC: 136 MMOL/L (ref 135–145)
SODIUM BLD-SCNC: 137 MMOL/L (ref 135–145)
SODIUM BLD-SCNC: 138 MMOL/L (ref 135–145)
SODIUM BLD-SCNC: 139 MMOL/L (ref 135–145)
SODIUM BLD-SCNC: 140 MMOL/L (ref 135–145)
SOURCE: NORMAL
T4 FREE: 1.33 NG/DL (ref 0.9–1.8)
TESTOSTERONE FREE PERCENT: 1.5 % (ref 1.6–2.9)
TESTOSTERONE FREE: 6 PG/ML (ref 47–244)
TESTOSTERONE TOTAL-MALE: 40 NG/DL (ref 300–720)
TESTOSTERONE TOTAL-MALE: 6 NG/DL (ref 300–720)
TESTOSTERONE TOTAL-MALE: 766 NG/DL (ref 300–720)
TOTAL IMMATURE NEUTOROPHIL: 0.02 K/CU MM
TOTAL IMMATURE NEUTOROPHIL: 0.03 K/CU MM
TOTAL IMMATURE NEUTOROPHIL: 0.04 K/CU MM
TOTAL IMMATURE NEUTOROPHIL: 0.05 K/CU MM
TOTAL IMMATURE NEUTOROPHIL: 0.06 K/CU MM
TOTAL IRON BINDING CAPACITY: 277 UG/DL (ref 250–450)
TOTAL NUCLEATED RBC: 0 K/CU MM
TOTAL PROTEIN: 5.7 GM/DL (ref 6.4–8.2)
TOTAL PROTEIN: 5.9 GM/DL (ref 6.4–8.2)
TOTAL PROTEIN: 6.2 GM/DL (ref 6.4–8.2)
TOTAL PROTEIN: 6.3 GM/DL (ref 6.4–8.2)
TOTAL PROTEIN: 6.3 GM/DL (ref 6.4–8.2)
TOTAL PROTEIN: 6.5 GM/DL (ref 6.4–8.2)
TOTAL PROTEIN: 6.8 GM/DL (ref 6.4–8.2)
TOTAL PROTEIN: 7.1 GM/DL (ref 6.4–8.2)
TRIGL SERPL-MCNC: 136 MG/DL
TROPONIN T: 0.01 NG/ML
TROPONIN T: 0.01 NG/ML
TROPONIN T: 0.02 NG/ML
TROPONIN T: 0.04 NG/ML
TSH HIGH SENSITIVITY: 2.88 UIU/ML (ref 0.27–4.2)
TSH HIGH SENSITIVITY: 3.56 UIU/ML (ref 0.27–4.2)
UNSATURATED IRON BINDING CAPACITY: 158 UG/DL (ref 110–370)
WBC # BLD: 10.8 K/CU MM (ref 4–10.5)
WBC # BLD: 7.4 K/CU MM (ref 4–10.5)
WBC # BLD: 7.5 K/CU MM (ref 4–10.5)
WBC # BLD: 7.6 K/CU MM (ref 4–10.5)
WBC # BLD: 7.6 K/CU MM (ref 4–10.5)
WBC # BLD: 7.9 K/CU MM (ref 4–10.5)
WBC # BLD: 8 K/CU MM (ref 4–10.5)
WBC # BLD: 8 K/CU MM (ref 4–10.5)
WBC # BLD: 8.1 K/CU MM (ref 4–10.5)
WBC # BLD: 9 K/CU MM (ref 4–10.5)
WBC # BLD: 9.2 K/CU MM (ref 4–10.5)
WBC # BLD: 9.2 K/CU MM (ref 4–10.5)

## 2021-01-01 PROCEDURE — 36415 COLL VENOUS BLD VENIPUNCTURE: CPT

## 2021-01-01 PROCEDURE — 2709999900 HC NON-CHARGEABLE SUPPLY

## 2021-01-01 PROCEDURE — C1892 INTRO/SHEATH,FIXED,PEEL-AWAY: HCPCS

## 2021-01-01 PROCEDURE — U0003 INFECTIOUS AGENT DETECTION BY NUCLEIC ACID (DNA OR RNA); SEVERE ACUTE RESPIRATORY SYNDROME CORONAVIRUS 2 (SARS-COV-2) (CORONAVIRUS DISEASE [COVID-19]), AMPLIFIED PROBE TECHNIQUE, MAKING USE OF HIGH THROUGHPUT TECHNOLOGIES AS DESCRIBED BY CMS-2020-01-R: HCPCS

## 2021-01-01 PROCEDURE — 83735 ASSAY OF MAGNESIUM: CPT

## 2021-01-01 PROCEDURE — 6370000000 HC RX 637 (ALT 250 FOR IP): Performed by: INTERNAL MEDICINE

## 2021-01-01 PROCEDURE — 1123F ACP DISCUSS/DSCN MKR DOCD: CPT | Performed by: INTERNAL MEDICINE

## 2021-01-01 PROCEDURE — 83880 ASSAY OF NATRIURETIC PEPTIDE: CPT

## 2021-01-01 PROCEDURE — 74183 MRI ABD W/O CNTR FLWD CNTR: CPT

## 2021-01-01 PROCEDURE — 84484 ASSAY OF TROPONIN QUANT: CPT

## 2021-01-01 PROCEDURE — 80053 COMPREHEN METABOLIC PANEL: CPT

## 2021-01-01 PROCEDURE — G0423 INTENS CARDIAC REHAB NO EXER: HCPCS

## 2021-01-01 PROCEDURE — 2060000000 HC ICU INTERMEDIATE R&B

## 2021-01-01 PROCEDURE — 6370000000 HC RX 637 (ALT 250 FOR IP): Performed by: NURSE PRACTITIONER

## 2021-01-01 PROCEDURE — 82248 BILIRUBIN DIRECT: CPT

## 2021-01-01 PROCEDURE — 86901 BLOOD TYPING SEROLOGIC RH(D): CPT

## 2021-01-01 PROCEDURE — 93005 ELECTROCARDIOGRAM TRACING: CPT | Performed by: EMERGENCY MEDICINE

## 2021-01-01 PROCEDURE — 85730 THROMBOPLASTIN TIME PARTIAL: CPT

## 2021-01-01 PROCEDURE — G0422 INTENS CARDIAC REHAB W/EXERC: HCPCS

## 2021-01-01 PROCEDURE — 83605 ASSAY OF LACTIC ACID: CPT

## 2021-01-01 PROCEDURE — 94640 AIRWAY INHALATION TREATMENT: CPT

## 2021-01-01 PROCEDURE — 3017F COLORECTAL CA SCREEN DOC REV: CPT | Performed by: INTERNAL MEDICINE

## 2021-01-01 PROCEDURE — 71046 X-RAY EXAM CHEST 2 VIEWS: CPT

## 2021-01-01 PROCEDURE — 78582 LUNG VENTILAT&PERFUS IMAGING: CPT

## 2021-01-01 PROCEDURE — 84403 ASSAY OF TOTAL TESTOSTERONE: CPT

## 2021-01-01 PROCEDURE — 2580000003 HC RX 258: Performed by: INTERNAL MEDICINE

## 2021-01-01 PROCEDURE — 99213 OFFICE O/P EST LOW 20 MIN: CPT | Performed by: INTERNAL MEDICINE

## 2021-01-01 PROCEDURE — C1894 INTRO/SHEATH, NON-LASER: HCPCS

## 2021-01-01 PROCEDURE — 6360000002 HC RX W HCPCS

## 2021-01-01 PROCEDURE — 80061 LIPID PANEL: CPT

## 2021-01-01 PROCEDURE — 1200000000 HC SEMI PRIVATE

## 2021-01-01 PROCEDURE — 6360000002 HC RX W HCPCS: Performed by: STUDENT IN AN ORGANIZED HEALTH CARE EDUCATION/TRAINING PROGRAM

## 2021-01-01 PROCEDURE — G8427 DOCREV CUR MEDS BY ELIG CLIN: HCPCS | Performed by: INTERNAL MEDICINE

## 2021-01-01 PROCEDURE — 93010 ELECTROCARDIOGRAM REPORT: CPT | Performed by: INTERNAL MEDICINE

## 2021-01-01 PROCEDURE — 80048 BASIC METABOLIC PNL TOTAL CA: CPT

## 2021-01-01 PROCEDURE — C1777 LEAD, AICD, ENDO SINGLE COIL: HCPCS

## 2021-01-01 PROCEDURE — G8926 SPIRO NO PERF OR DOC: HCPCS | Performed by: INTERNAL MEDICINE

## 2021-01-01 PROCEDURE — G0378 HOSPITAL OBSERVATION PER HR: HCPCS | Performed by: INTERNAL MEDICINE

## 2021-01-01 PROCEDURE — 6360000002 HC RX W HCPCS: Performed by: HOSPITALIST

## 2021-01-01 PROCEDURE — 99153 MOD SED SAME PHYS/QHP EA: CPT

## 2021-01-01 PROCEDURE — 87635 SARS-COV-2 COVID-19 AMP PRB: CPT

## 2021-01-01 PROCEDURE — 85025 COMPLETE CBC W/AUTO DIFF WBC: CPT

## 2021-01-01 PROCEDURE — 94761 N-INVAS EAR/PLS OXIMETRY MLT: CPT

## 2021-01-01 PROCEDURE — 33249 INSJ/RPLCMT DEFIB W/LEAD(S): CPT | Performed by: INTERNAL MEDICINE

## 2021-01-01 PROCEDURE — G0378 HOSPITAL OBSERVATION PER HR: HCPCS

## 2021-01-01 PROCEDURE — 84132 ASSAY OF SERUM POTASSIUM: CPT

## 2021-01-01 PROCEDURE — APPNB60 APP NON BILLABLE TIME 46-60 MINS: Performed by: NURSE PRACTITIONER

## 2021-01-01 PROCEDURE — 6360000002 HC RX W HCPCS: Performed by: INTERNAL MEDICINE

## 2021-01-01 PROCEDURE — 74176 CT ABD & PELVIS W/O CONTRAST: CPT

## 2021-01-01 PROCEDURE — 82962 GLUCOSE BLOOD TEST: CPT

## 2021-01-01 PROCEDURE — 99285 EMERGENCY DEPT VISIT HI MDM: CPT

## 2021-01-01 PROCEDURE — 1036F TOBACCO NON-USER: CPT | Performed by: NURSE PRACTITIONER

## 2021-01-01 PROCEDURE — 33249 INSJ/RPLCMT DEFIB W/LEAD(S): CPT

## 2021-01-01 PROCEDURE — 85379 FIBRIN DEGRADATION QUANT: CPT

## 2021-01-01 PROCEDURE — 85610 PROTHROMBIN TIME: CPT

## 2021-01-01 PROCEDURE — 3017F COLORECTAL CA SCREEN DOC REV: CPT | Performed by: NURSE PRACTITIONER

## 2021-01-01 PROCEDURE — 6370000000 HC RX 637 (ALT 250 FOR IP): Performed by: CLINICAL NURSE SPECIALIST

## 2021-01-01 PROCEDURE — 96365 THER/PROPH/DIAG IV INF INIT: CPT

## 2021-01-01 PROCEDURE — 99223 1ST HOSP IP/OBS HIGH 75: CPT | Performed by: INTERNAL MEDICINE

## 2021-01-01 PROCEDURE — 51798 US URINE CAPACITY MEASURE: CPT

## 2021-01-01 PROCEDURE — 71250 CT THORAX DX C-: CPT

## 2021-01-01 PROCEDURE — 93308 TTE F-UP OR LMTD: CPT

## 2021-01-01 PROCEDURE — 6360000002 HC RX W HCPCS: Performed by: EMERGENCY MEDICINE

## 2021-01-01 PROCEDURE — 2580000003 HC RX 258: Performed by: CLINICAL NURSE SPECIALIST

## 2021-01-01 PROCEDURE — 4040F PNEUMOC VAC/ADMIN/RCVD: CPT | Performed by: NURSE PRACTITIONER

## 2021-01-01 PROCEDURE — U0005 INFEC AGEN DETEC AMPLI PROBE: HCPCS

## 2021-01-01 PROCEDURE — 83036 HEMOGLOBIN GLYCOSYLATED A1C: CPT

## 2021-01-01 PROCEDURE — 4040F PNEUMOC VAC/ADMIN/RCVD: CPT | Performed by: INTERNAL MEDICINE

## 2021-01-01 PROCEDURE — 84100 ASSAY OF PHOSPHORUS: CPT

## 2021-01-01 PROCEDURE — 6370000000 HC RX 637 (ALT 250 FOR IP): Performed by: PHYSICIAN ASSISTANT

## 2021-01-01 PROCEDURE — 99205 OFFICE O/P NEW HI 60 MIN: CPT | Performed by: NURSE PRACTITIONER

## 2021-01-01 PROCEDURE — 1036F TOBACCO NON-USER: CPT | Performed by: INTERNAL MEDICINE

## 2021-01-01 PROCEDURE — 99214 OFFICE O/P EST MOD 30 MIN: CPT | Performed by: INTERNAL MEDICINE

## 2021-01-01 PROCEDURE — 1111F DSCHRG MED/CURRENT MED MERGE: CPT | Performed by: NURSE PRACTITIONER

## 2021-01-01 PROCEDURE — A9539 TC99M PENTETATE: HCPCS | Performed by: STUDENT IN AN ORGANIZED HEALTH CARE EDUCATION/TRAINING PROGRAM

## 2021-01-01 PROCEDURE — 83690 ASSAY OF LIPASE: CPT

## 2021-01-01 PROCEDURE — 99024 POSTOP FOLLOW-UP VISIT: CPT | Performed by: INTERNAL MEDICINE

## 2021-01-01 PROCEDURE — 2700000000 HC OXYGEN THERAPY PER DAY

## 2021-01-01 PROCEDURE — G8427 DOCREV CUR MEDS BY ELIG CLIN: HCPCS | Performed by: NURSE PRACTITIONER

## 2021-01-01 PROCEDURE — 93306 TTE W/DOPPLER COMPLETE: CPT

## 2021-01-01 PROCEDURE — 96375 TX/PRO/DX INJ NEW DRUG ADDON: CPT

## 2021-01-01 PROCEDURE — 82570 ASSAY OF URINE CREATININE: CPT

## 2021-01-01 PROCEDURE — 99222 1ST HOSP IP/OBS MODERATE 55: CPT | Performed by: INTERNAL MEDICINE

## 2021-01-01 PROCEDURE — 85027 COMPLETE CBC AUTOMATED: CPT

## 2021-01-01 PROCEDURE — 94664 DEMO&/EVAL PT USE INHALER: CPT

## 2021-01-01 PROCEDURE — C1721 AICD, DUAL CHAMBER: HCPCS

## 2021-01-01 PROCEDURE — 37799 UNLISTED PX VASCULAR SURGERY: CPT

## 2021-01-01 PROCEDURE — 71045 X-RAY EXAM CHEST 1 VIEW: CPT

## 2021-01-01 PROCEDURE — 86850 RBC ANTIBODY SCREEN: CPT

## 2021-01-01 PROCEDURE — C1769 GUIDE WIRE: HCPCS

## 2021-01-01 PROCEDURE — 93005 ELECTROCARDIOGRAM TRACING: CPT | Performed by: CLINICAL NURSE SPECIALIST

## 2021-01-01 PROCEDURE — 94618 PULMONARY STRESS TESTING: CPT

## 2021-01-01 PROCEDURE — 83540 ASSAY OF IRON: CPT

## 2021-01-01 PROCEDURE — 2000000000 HC ICU R&B

## 2021-01-01 PROCEDURE — 83550 IRON BINDING TEST: CPT

## 2021-01-01 PROCEDURE — 1123F ACP DISCUSS/DSCN MKR DOCD: CPT | Performed by: NURSE PRACTITIONER

## 2021-01-01 PROCEDURE — C1887 CATHETER, GUIDING: HCPCS

## 2021-01-01 PROCEDURE — 99221 1ST HOSP IP/OBS SF/LOW 40: CPT | Performed by: SURGERY

## 2021-01-01 PROCEDURE — 84443 ASSAY THYROID STIM HORMONE: CPT

## 2021-01-01 PROCEDURE — 3430000000 HC RX DIAGNOSTIC RADIOPHARMACEUTICAL: Performed by: STUDENT IN AN ORGANIZED HEALTH CARE EDUCATION/TRAINING PROGRAM

## 2021-01-01 PROCEDURE — 3023F SPIROM DOC REV: CPT | Performed by: INTERNAL MEDICINE

## 2021-01-01 PROCEDURE — 6370000000 HC RX 637 (ALT 250 FOR IP): Performed by: HOSPITALIST

## 2021-01-01 PROCEDURE — 85347 COAGULATION TIME ACTIVATED: CPT

## 2021-01-01 PROCEDURE — 83721 ASSAY OF BLOOD LIPOPROTEIN: CPT

## 2021-01-01 PROCEDURE — 2580000003 HC RX 258: Performed by: NURSE PRACTITIONER

## 2021-01-01 PROCEDURE — G8417 CALC BMI ABV UP PARAM F/U: HCPCS | Performed by: NURSE PRACTITIONER

## 2021-01-01 PROCEDURE — 96374 THER/PROPH/DIAG INJ IV PUSH: CPT

## 2021-01-01 PROCEDURE — 99222 1ST HOSP IP/OBS MODERATE 55: CPT | Performed by: SPECIALIST

## 2021-01-01 PROCEDURE — C1751 CATH, INF, PER/CENT/MIDLINE: HCPCS

## 2021-01-01 PROCEDURE — 2580000003 HC RX 258

## 2021-01-01 PROCEDURE — 93880 EXTRACRANIAL BILAT STUDY: CPT

## 2021-01-01 PROCEDURE — 86900 BLOOD TYPING SEROLOGIC ABO: CPT

## 2021-01-01 PROCEDURE — G8484 FLU IMMUNIZE NO ADMIN: HCPCS | Performed by: INTERNAL MEDICINE

## 2021-01-01 PROCEDURE — 96366 THER/PROPH/DIAG IV INF ADDON: CPT

## 2021-01-01 PROCEDURE — 99024 POSTOP FOLLOW-UP VISIT: CPT | Performed by: NURSE PRACTITIONER

## 2021-01-01 PROCEDURE — 2580000003 HC RX 258: Performed by: STUDENT IN AN ORGANIZED HEALTH CARE EDUCATION/TRAINING PROGRAM

## 2021-01-01 PROCEDURE — 99211 OFF/OP EST MAY X REQ PHY/QHP: CPT | Performed by: INTERNAL MEDICINE

## 2021-01-01 PROCEDURE — 82043 UR ALBUMIN QUANTITATIVE: CPT

## 2021-01-01 PROCEDURE — 2580000003 HC RX 258: Performed by: HOSPITALIST

## 2021-01-01 PROCEDURE — G8484 FLU IMMUNIZE NO ADMIN: HCPCS | Performed by: NURSE PRACTITIONER

## 2021-01-01 PROCEDURE — 99152 MOD SED SAME PHYS/QHP 5/>YRS: CPT

## 2021-01-01 PROCEDURE — 84270 ASSAY OF SEX HORMONE GLOBUL: CPT

## 2021-01-01 PROCEDURE — 2580000003 HC RX 258: Performed by: PHYSICIAN ASSISTANT

## 2021-01-01 PROCEDURE — G8417 CALC BMI ABV UP PARAM F/U: HCPCS | Performed by: INTERNAL MEDICINE

## 2021-01-01 PROCEDURE — 93460 R&L HRT ART/VENTRICLE ANGIO: CPT

## 2021-01-01 PROCEDURE — 74181 MRI ABDOMEN W/O CONTRAST: CPT

## 2021-01-01 PROCEDURE — 93005 ELECTROCARDIOGRAM TRACING: CPT | Performed by: STUDENT IN AN ORGANIZED HEALTH CARE EDUCATION/TRAINING PROGRAM

## 2021-01-01 PROCEDURE — 96361 HYDRATE IV INFUSION ADD-ON: CPT

## 2021-01-01 PROCEDURE — 93005 ELECTROCARDIOGRAM TRACING: CPT | Performed by: HOSPITALIST

## 2021-01-01 PROCEDURE — 84439 ASSAY OF FREE THYROXINE: CPT

## 2021-01-01 PROCEDURE — 70490 CT SOFT TISSUE NECK W/O DYE: CPT

## 2021-01-01 PROCEDURE — 6360000004 HC RX CONTRAST MEDICATION

## 2021-01-01 PROCEDURE — 6360000004 HC RX CONTRAST MEDICATION: Performed by: INTERNAL MEDICINE

## 2021-01-01 PROCEDURE — 82803 BLOOD GASES ANY COMBINATION: CPT

## 2021-01-01 PROCEDURE — C1898 LEAD, PMKR, OTHER THAN TRANS: HCPCS

## 2021-01-01 PROCEDURE — 6360000002 HC RX W HCPCS: Performed by: NURSE PRACTITIONER

## 2021-01-01 PROCEDURE — 93798 PHYS/QHP OP CAR RHAB W/ECG: CPT

## 2021-01-01 PROCEDURE — A9540 TC99M MAA: HCPCS | Performed by: STUDENT IN AN ORGANIZED HEALTH CARE EDUCATION/TRAINING PROGRAM

## 2021-01-01 PROCEDURE — 93000 ELECTROCARDIOGRAM COMPLETE: CPT | Performed by: INTERNAL MEDICINE

## 2021-01-01 PROCEDURE — 1111F DSCHRG MED/CURRENT MED MERGE: CPT | Performed by: INTERNAL MEDICINE

## 2021-01-01 PROCEDURE — A9577 INJ MULTIHANCE: HCPCS | Performed by: INTERNAL MEDICINE

## 2021-01-01 RX ORDER — AMIODARONE HYDROCHLORIDE 200 MG/1
200 TABLET ORAL DAILY
Qty: 30 TABLET | Refills: 1 | Status: CANCELLED | OUTPATIENT
Start: 2021-01-01

## 2021-01-01 RX ORDER — GABAPENTIN 300 MG/1
CAPSULE ORAL
COMMUNITY
Start: 2021-01-01 | End: 2021-01-01

## 2021-01-01 RX ORDER — ATORVASTATIN CALCIUM 40 MG/1
40 TABLET, FILM COATED ORAL NIGHTLY
Status: DISCONTINUED | OUTPATIENT
Start: 2021-01-01 | End: 2021-01-01

## 2021-01-01 RX ORDER — CLOPIDOGREL BISULFATE 75 MG/1
75 TABLET ORAL DAILY
Status: DISCONTINUED | OUTPATIENT
Start: 2021-01-01 | End: 2021-01-01 | Stop reason: HOSPADM

## 2021-01-01 RX ORDER — SODIUM CHLORIDE 0.9 % (FLUSH) 0.9 %
5-40 SYRINGE (ML) INJECTION PRN
Status: DISCONTINUED | OUTPATIENT
Start: 2021-01-01 | End: 2021-01-01 | Stop reason: HOSPADM

## 2021-01-01 RX ORDER — TORSEMIDE 20 MG/1
20 TABLET ORAL DAILY
Status: DISCONTINUED | OUTPATIENT
Start: 2021-01-01 | End: 2021-01-01

## 2021-01-01 RX ORDER — SODIUM CHLORIDE 0.9 % (FLUSH) 0.9 %
5-40 SYRINGE (ML) INJECTION EVERY 12 HOURS SCHEDULED
Status: DISCONTINUED | OUTPATIENT
Start: 2021-01-01 | End: 2021-01-01 | Stop reason: HOSPADM

## 2021-01-01 RX ORDER — SODIUM CHLORIDE 9 MG/ML
25 INJECTION, SOLUTION INTRAVENOUS PRN
Status: DISCONTINUED | OUTPATIENT
Start: 2021-01-01 | End: 2021-01-01 | Stop reason: HOSPADM

## 2021-01-01 RX ORDER — DEXTROSE MONOHYDRATE 50 MG/ML
100 INJECTION, SOLUTION INTRAVENOUS PRN
Status: DISCONTINUED | OUTPATIENT
Start: 2021-01-01 | End: 2021-01-01 | Stop reason: HOSPADM

## 2021-01-01 RX ORDER — CARVEDILOL 12.5 MG/1
12.5 TABLET ORAL 2 TIMES DAILY WITH MEALS
Qty: 60 TABLET | Refills: 3 | Status: ON HOLD
Start: 2021-01-01 | End: 2021-01-01 | Stop reason: HOSPADM

## 2021-01-01 RX ORDER — CARVEDILOL 12.5 MG/1
12.5 TABLET ORAL 2 TIMES DAILY WITH MEALS
Status: DISCONTINUED | OUTPATIENT
Start: 2021-01-01 | End: 2021-01-01 | Stop reason: HOSPADM

## 2021-01-01 RX ORDER — INSULIN GLARGINE 100 [IU]/ML
30 INJECTION, SOLUTION SUBCUTANEOUS NIGHTLY
Status: DISCONTINUED | OUTPATIENT
Start: 2021-01-01 | End: 2021-01-01 | Stop reason: HOSPADM

## 2021-01-01 RX ORDER — POLYETHYLENE GLYCOL 3350 17 G/17G
17 POWDER, FOR SOLUTION ORAL DAILY PRN
Status: DISCONTINUED | OUTPATIENT
Start: 2021-01-01 | End: 2021-01-01 | Stop reason: HOSPADM

## 2021-01-01 RX ORDER — ONDANSETRON 2 MG/ML
4 INJECTION INTRAMUSCULAR; INTRAVENOUS EVERY 6 HOURS PRN
Status: DISCONTINUED | OUTPATIENT
Start: 2021-01-01 | End: 2021-01-01

## 2021-01-01 RX ORDER — LISINOPRIL 2.5 MG/1
2.5 TABLET ORAL DAILY
Status: DISCONTINUED | OUTPATIENT
Start: 2021-01-01 | End: 2021-01-01 | Stop reason: HOSPADM

## 2021-01-01 RX ORDER — METOPROLOL SUCCINATE 25 MG/1
25 TABLET, EXTENDED RELEASE ORAL DAILY
Qty: 30 TABLET | Refills: 3 | Status: SHIPPED | OUTPATIENT
Start: 2021-01-01 | End: 2021-01-01 | Stop reason: ALTCHOICE

## 2021-01-01 RX ORDER — LISINOPRIL 20 MG/1
20 TABLET ORAL DAILY
Status: DISCONTINUED | OUTPATIENT
Start: 2021-01-01 | End: 2021-01-01 | Stop reason: HOSPADM

## 2021-01-01 RX ORDER — RANOLAZINE 500 MG/1
500 TABLET, EXTENDED RELEASE ORAL 2 TIMES DAILY
Qty: 60 TABLET | Refills: 3
Start: 2021-01-01 | End: 2021-01-01

## 2021-01-01 RX ORDER — PIOGLITAZONEHYDROCHLORIDE 15 MG/1
15 TABLET ORAL DAILY
Status: ON HOLD | COMMUNITY
End: 2021-01-01 | Stop reason: HOSPADM

## 2021-01-01 RX ORDER — ATROPINE SULFATE 0.4 MG/ML
0.5 AMPUL (ML) INJECTION
Status: DISCONTINUED | OUTPATIENT
Start: 2021-01-01 | End: 2021-01-01 | Stop reason: HOSPADM

## 2021-01-01 RX ORDER — LORATADINE 10 MG/1
10 TABLET ORAL DAILY
Status: ON HOLD | COMMUNITY
End: 2021-01-01 | Stop reason: HOSPADM

## 2021-01-01 RX ORDER — INSULIN GLARGINE 100 [IU]/ML
30 INJECTION, SOLUTION SUBCUTANEOUS NIGHTLY
Status: DISCONTINUED | OUTPATIENT
Start: 2021-01-01 | End: 2021-01-01

## 2021-01-01 RX ORDER — ASPIRIN 81 MG/1
243 TABLET, CHEWABLE ORAL ONCE
Status: COMPLETED | OUTPATIENT
Start: 2021-01-01 | End: 2021-01-01

## 2021-01-01 RX ORDER — FUROSEMIDE 20 MG/1
20 TABLET ORAL DAILY
Status: DISCONTINUED | OUTPATIENT
Start: 2021-01-01 | End: 2021-01-01

## 2021-01-01 RX ORDER — NITROGLYCERIN 0.4 MG/1
0.4 TABLET SUBLINGUAL EVERY 5 MIN PRN
Status: DISCONTINUED | OUTPATIENT
Start: 2021-01-01 | End: 2021-01-01 | Stop reason: HOSPADM

## 2021-01-01 RX ORDER — POTASSIUM CHLORIDE 7.45 MG/ML
10 INJECTION INTRAVENOUS PRN
Status: DISCONTINUED | OUTPATIENT
Start: 2021-01-01 | End: 2021-01-01 | Stop reason: HOSPADM

## 2021-01-01 RX ORDER — AMIODARONE HYDROCHLORIDE 200 MG/1
200 TABLET ORAL DAILY
Qty: 30 TABLET | Refills: 3 | Status: ON HOLD | OUTPATIENT
Start: 2021-01-01 | End: 2022-01-01 | Stop reason: HOSPADM

## 2021-01-01 RX ORDER — PROMETHAZINE HYDROCHLORIDE 12.5 MG/1
12.5 TABLET ORAL EVERY 6 HOURS PRN
Status: DISCONTINUED | OUTPATIENT
Start: 2021-01-01 | End: 2021-01-01 | Stop reason: HOSPADM

## 2021-01-01 RX ORDER — METOPROLOL SUCCINATE 25 MG/1
25 TABLET, EXTENDED RELEASE ORAL DAILY
Qty: 30 TABLET | Refills: 3 | Status: ON HOLD | OUTPATIENT
Start: 2021-01-01 | End: 2021-01-01 | Stop reason: HOSPADM

## 2021-01-01 RX ORDER — METOPROLOL SUCCINATE 25 MG/1
25 TABLET, EXTENDED RELEASE ORAL DAILY
Status: DISCONTINUED | OUTPATIENT
Start: 2021-01-01 | End: 2021-01-01 | Stop reason: HOSPADM

## 2021-01-01 RX ORDER — TRAMADOL HYDROCHLORIDE 50 MG/1
50 TABLET ORAL EVERY 6 HOURS PRN
Status: DISCONTINUED | OUTPATIENT
Start: 2021-01-01 | End: 2021-01-01 | Stop reason: HOSPADM

## 2021-01-01 RX ORDER — PANTOPRAZOLE SODIUM 40 MG/1
40 TABLET, DELAYED RELEASE ORAL
Status: DISCONTINUED | OUTPATIENT
Start: 2021-01-01 | End: 2021-01-01 | Stop reason: HOSPADM

## 2021-01-01 RX ORDER — DEXTROSE MONOHYDRATE 25 G/50ML
12.5 INJECTION, SOLUTION INTRAVENOUS PRN
Status: DISCONTINUED | OUTPATIENT
Start: 2021-01-01 | End: 2021-01-01 | Stop reason: HOSPADM

## 2021-01-01 RX ORDER — FLUTICASONE PROPIONATE 50 MCG
1 SPRAY, SUSPENSION (ML) NASAL DAILY
Status: DISCONTINUED | OUTPATIENT
Start: 2021-01-01 | End: 2021-01-01 | Stop reason: HOSPADM

## 2021-01-01 RX ORDER — MAGNESIUM SULFATE 1 G/100ML
1000 INJECTION INTRAVENOUS ONCE
Status: COMPLETED | OUTPATIENT
Start: 2021-01-01 | End: 2021-01-01

## 2021-01-01 RX ORDER — NICOTINE POLACRILEX 4 MG
15 LOZENGE BUCCAL PRN
Status: DISCONTINUED | OUTPATIENT
Start: 2021-01-01 | End: 2021-01-01 | Stop reason: HOSPADM

## 2021-01-01 RX ORDER — MIDODRINE HYDROCHLORIDE 10 MG/1
10 TABLET ORAL
Qty: 90 TABLET | Refills: 3 | Status: SHIPPED | OUTPATIENT
Start: 2021-01-01 | End: 2021-01-01

## 2021-01-01 RX ORDER — ASPIRIN 81 MG/1
81 TABLET, CHEWABLE ORAL DAILY
Status: DISCONTINUED | OUTPATIENT
Start: 2021-01-01 | End: 2021-01-01 | Stop reason: HOSPADM

## 2021-01-01 RX ORDER — ACETAMINOPHEN 650 MG/1
650 SUPPOSITORY RECTAL EVERY 6 HOURS PRN
Status: DISCONTINUED | OUTPATIENT
Start: 2021-01-01 | End: 2021-01-01 | Stop reason: HOSPADM

## 2021-01-01 RX ORDER — ISOSORBIDE MONONITRATE 30 MG/1
30 TABLET, EXTENDED RELEASE ORAL DAILY
Status: DISCONTINUED | OUTPATIENT
Start: 2021-01-01 | End: 2021-01-01 | Stop reason: HOSPADM

## 2021-01-01 RX ORDER — ONDANSETRON 2 MG/ML
4 INJECTION INTRAMUSCULAR; INTRAVENOUS EVERY 6 HOURS PRN
Status: DISCONTINUED | OUTPATIENT
Start: 2021-01-01 | End: 2021-01-01 | Stop reason: HOSPADM

## 2021-01-01 RX ORDER — ACETAMINOPHEN 325 MG/1
650 TABLET ORAL EVERY 6 HOURS PRN
Status: DISCONTINUED | OUTPATIENT
Start: 2021-01-01 | End: 2021-01-01 | Stop reason: HOSPADM

## 2021-01-01 RX ORDER — LANCETS 30 GAUGE
1 EACH MISCELLANEOUS 4 TIMES DAILY
Qty: 100 EACH | Refills: 1 | Status: SHIPPED | OUTPATIENT
Start: 2021-01-01

## 2021-01-01 RX ORDER — FUROSEMIDE 10 MG/ML
40 INJECTION INTRAMUSCULAR; INTRAVENOUS ONCE
Status: COMPLETED | OUTPATIENT
Start: 2021-01-01 | End: 2021-01-01

## 2021-01-01 RX ORDER — SODIUM CHLORIDE 9 MG/ML
INJECTION, SOLUTION INTRAVENOUS CONTINUOUS
Status: DISCONTINUED | OUTPATIENT
Start: 2021-01-01 | End: 2021-01-01

## 2021-01-01 RX ORDER — DULOXETIN HYDROCHLORIDE 30 MG/1
30 CAPSULE, DELAYED RELEASE ORAL DAILY
Status: DISCONTINUED | OUTPATIENT
Start: 2021-01-01 | End: 2021-01-01 | Stop reason: HOSPADM

## 2021-01-01 RX ORDER — METOPROLOL SUCCINATE 50 MG/1
50 TABLET, EXTENDED RELEASE ORAL 2 TIMES DAILY
Qty: 30 TABLET | Refills: 3 | Status: SHIPPED | OUTPATIENT
Start: 2021-01-01 | End: 2022-01-01 | Stop reason: SDUPTHER

## 2021-01-01 RX ORDER — INSULIN GLARGINE 100 [IU]/ML
20 INJECTION, SOLUTION SUBCUTANEOUS NIGHTLY
Status: DISCONTINUED | OUTPATIENT
Start: 2021-01-01 | End: 2021-01-01

## 2021-01-01 RX ORDER — SPIRONOLACTONE 25 MG/1
25 TABLET ORAL DAILY
Status: DISCONTINUED | OUTPATIENT
Start: 2021-01-01 | End: 2021-01-01 | Stop reason: HOSPADM

## 2021-01-01 RX ORDER — POTASSIUM CHLORIDE 20 MEQ/1
40 TABLET, EXTENDED RELEASE ORAL ONCE
Status: DISCONTINUED | OUTPATIENT
Start: 2021-01-01 | End: 2021-01-01

## 2021-01-01 RX ORDER — CARVEDILOL 6.25 MG/1
6.25 TABLET ORAL 2 TIMES DAILY
Status: DISCONTINUED | OUTPATIENT
Start: 2021-01-01 | End: 2021-01-01

## 2021-01-01 RX ORDER — SODIUM CHLORIDE, SODIUM LACTATE, POTASSIUM CHLORIDE, CALCIUM CHLORIDE 600; 310; 30; 20 MG/100ML; MG/100ML; MG/100ML; MG/100ML
INJECTION, SOLUTION INTRAVENOUS ONCE
Status: COMPLETED | OUTPATIENT
Start: 2021-01-01 | End: 2021-01-01

## 2021-01-01 RX ORDER — BUDESONIDE AND FORMOTEROL FUMARATE DIHYDRATE 80; 4.5 UG/1; UG/1
2 AEROSOL RESPIRATORY (INHALATION) 2 TIMES DAILY
COMMUNITY
Start: 2021-01-01

## 2021-01-01 RX ORDER — ALBUTEROL SULFATE 2.5 MG/3ML
2.5 SOLUTION RESPIRATORY (INHALATION) EVERY 4 HOURS PRN
Status: DISCONTINUED | OUTPATIENT
Start: 2021-01-01 | End: 2021-01-01 | Stop reason: HOSPADM

## 2021-01-01 RX ORDER — OMEPRAZOLE 20 MG/1
20 CAPSULE, DELAYED RELEASE ORAL DAILY
Status: ON HOLD | COMMUNITY
End: 2021-01-01 | Stop reason: HOSPADM

## 2021-01-01 RX ORDER — AMLODIPINE BESYLATE 10 MG/1
10 TABLET ORAL DAILY
Status: DISCONTINUED | OUTPATIENT
Start: 2021-01-01 | End: 2021-01-01 | Stop reason: HOSPADM

## 2021-01-01 RX ORDER — DIPHENHYDRAMINE HCL 25 MG
25 TABLET ORAL ONCE
Status: COMPLETED | OUTPATIENT
Start: 2021-01-01 | End: 2021-01-01

## 2021-01-01 RX ORDER — TESTOSTERONE CYPIONATE 200 MG/ML
1 INJECTION INTRAMUSCULAR
COMMUNITY
Start: 2021-01-01 | End: 2021-01-01 | Stop reason: ALTCHOICE

## 2021-01-01 RX ORDER — ATORVASTATIN CALCIUM 40 MG/1
40 TABLET, FILM COATED ORAL NIGHTLY
Qty: 30 TABLET | Refills: 3
Start: 2021-01-01 | End: 2021-01-01

## 2021-01-01 RX ORDER — FUROSEMIDE 40 MG/1
40 TABLET ORAL DAILY
Qty: 60 TABLET | Refills: 3 | Status: CANCELLED | OUTPATIENT
Start: 2021-01-01

## 2021-01-01 RX ORDER — TORSEMIDE 20 MG/1
20 TABLET ORAL DAILY
Qty: 30 TABLET | Refills: 3 | Status: ON HOLD | OUTPATIENT
Start: 2021-01-01 | End: 2022-01-01 | Stop reason: HOSPADM

## 2021-01-01 RX ORDER — ASPIRIN 81 MG/1
81 TABLET, CHEWABLE ORAL DAILY
Qty: 30 TABLET | Refills: 3 | Status: ON HOLD | OUTPATIENT
Start: 2021-01-01 | End: 2021-01-01 | Stop reason: HOSPADM

## 2021-01-01 RX ORDER — KIT FOR THE PREPARATION OF TECHNETIUM TC 99M PENTETATE 20 MG/1
27.8 INJECTION, POWDER, LYOPHILIZED, FOR SOLUTION INTRAVENOUS; RESPIRATORY (INHALATION)
Status: COMPLETED | OUTPATIENT
Start: 2021-01-01 | End: 2021-01-01

## 2021-01-01 RX ORDER — LISINOPRIL 2.5 MG/1
5 TABLET ORAL DAILY
Qty: 30 TABLET | Refills: 3 | Status: SHIPPED | OUTPATIENT
Start: 2021-01-01 | End: 2021-01-01 | Stop reason: HOSPADM

## 2021-01-01 RX ORDER — TESTOSTERONE ENANTHATE 200 MG/ML
200 INJECTION, SOLUTION INTRAMUSCULAR
Status: ON HOLD | COMMUNITY
End: 2021-01-01 | Stop reason: HOSPADM

## 2021-01-01 RX ORDER — ACETAMINOPHEN 325 MG/1
650 TABLET ORAL EVERY 4 HOURS PRN
Status: DISCONTINUED | OUTPATIENT
Start: 2021-01-01 | End: 2021-01-01 | Stop reason: SDUPTHER

## 2021-01-01 RX ORDER — PANTOPRAZOLE SODIUM 40 MG/1
40 TABLET, DELAYED RELEASE ORAL
Status: DISCONTINUED | OUTPATIENT
Start: 2021-01-01 | End: 2021-01-01

## 2021-01-01 RX ORDER — TORSEMIDE 20 MG/1
20 TABLET ORAL DAILY
Status: CANCELLED | OUTPATIENT
Start: 2021-01-01

## 2021-01-01 RX ORDER — SPIRONOLACTONE 25 MG/1
25 TABLET ORAL DAILY
Qty: 90 TABLET | Refills: 1 | Status: SHIPPED | OUTPATIENT
Start: 2021-01-01 | End: 2021-01-01 | Stop reason: ALTCHOICE

## 2021-01-01 RX ORDER — FLUTICASONE PROPIONATE 50 MCG
1 SPRAY, SUSPENSION (ML) NASAL DAILY
COMMUNITY

## 2021-01-01 RX ORDER — MIDODRINE HYDROCHLORIDE 5 MG/1
10 TABLET ORAL
Status: DISCONTINUED | OUTPATIENT
Start: 2021-01-01 | End: 2021-01-01 | Stop reason: HOSPADM

## 2021-01-01 RX ORDER — INSULIN GLARGINE 100 [IU]/ML
30 INJECTION, SOLUTION SUBCUTANEOUS NIGHTLY
Status: ON HOLD | COMMUNITY
End: 2022-01-01 | Stop reason: HOSPADM

## 2021-01-01 RX ORDER — DOBUTAMINE HYDROCHLORIDE 200 MG/100ML
2.5 INJECTION INTRAVENOUS CONTINUOUS
Status: DISCONTINUED | OUTPATIENT
Start: 2021-01-01 | End: 2021-01-01

## 2021-01-01 RX ORDER — POTASSIUM CHLORIDE 750 MG/1
10 TABLET, EXTENDED RELEASE ORAL 2 TIMES DAILY
Status: ON HOLD | COMMUNITY
End: 2022-01-01 | Stop reason: HOSPADM

## 2021-01-01 RX ORDER — NITROGLYCERIN 0.4 MG/1
TABLET SUBLINGUAL
Qty: 25 TABLET | Refills: 3 | Status: SHIPPED | OUTPATIENT
Start: 2021-01-01

## 2021-01-01 RX ORDER — DIAZEPAM 5 MG/1
5 TABLET ORAL ONCE
Status: COMPLETED | OUTPATIENT
Start: 2021-01-01 | End: 2021-01-01

## 2021-01-01 RX ORDER — MORPHINE SULFATE 4 MG/ML
4 INJECTION, SOLUTION INTRAMUSCULAR; INTRAVENOUS EVERY 30 MIN PRN
Status: DISCONTINUED | OUTPATIENT
Start: 2021-01-01 | End: 2021-01-01

## 2021-01-01 RX ORDER — BUDESONIDE AND FORMOTEROL FUMARATE DIHYDRATE 80; 4.5 UG/1; UG/1
2 AEROSOL RESPIRATORY (INHALATION) 2 TIMES DAILY
Status: DISCONTINUED | OUTPATIENT
Start: 2021-01-01 | End: 2021-01-01 | Stop reason: HOSPADM

## 2021-01-01 RX ORDER — MIDODRINE HYDROCHLORIDE 5 MG/1
5 TABLET ORAL
Status: DISCONTINUED | OUTPATIENT
Start: 2021-01-01 | End: 2021-01-01

## 2021-01-01 RX ORDER — RANOLAZINE 500 MG/1
500 TABLET, EXTENDED RELEASE ORAL 2 TIMES DAILY
Status: DISCONTINUED | OUTPATIENT
Start: 2021-01-01 | End: 2021-01-01 | Stop reason: HOSPADM

## 2021-01-01 RX ORDER — INSULIN GLARGINE 100 [IU]/ML
30-50 INJECTION, SOLUTION SUBCUTANEOUS NIGHTLY
Qty: 5 PEN | Refills: 3 | Status: ON HOLD | OUTPATIENT
Start: 2021-01-01 | End: 2022-01-01 | Stop reason: HOSPADM

## 2021-01-01 RX ORDER — TORSEMIDE 20 MG/1
20 TABLET ORAL DAILY
Qty: 30 TABLET | Refills: 3 | Status: SHIPPED | OUTPATIENT
Start: 2021-01-01 | End: 2021-01-01 | Stop reason: SDUPTHER

## 2021-01-01 RX ORDER — ALBUTEROL SULFATE 90 UG/1
2 AEROSOL, METERED RESPIRATORY (INHALATION) EVERY 6 HOURS PRN
Status: DISCONTINUED | OUTPATIENT
Start: 2021-01-01 | End: 2021-01-01 | Stop reason: HOSPADM

## 2021-01-01 RX ORDER — LISINOPRIL 2.5 MG/1
2.5 TABLET ORAL DAILY
Qty: 30 TABLET | Refills: 3 | Status: SHIPPED | OUTPATIENT
Start: 2021-01-01 | End: 2021-01-01 | Stop reason: ALTCHOICE

## 2021-01-01 RX ORDER — MIDODRINE HYDROCHLORIDE 10 MG/1
10 TABLET ORAL
Qty: 90 TABLET | Refills: 3 | Status: SHIPPED | OUTPATIENT
Start: 2021-01-01 | End: 2021-01-01 | Stop reason: HOSPADM

## 2021-01-01 RX ORDER — CARVEDILOL 12.5 MG/1
12.5 TABLET ORAL 2 TIMES DAILY WITH MEALS
Status: DISCONTINUED | OUTPATIENT
Start: 2021-01-01 | End: 2021-01-01

## 2021-01-01 RX ORDER — SODIUM CHLORIDE 0.9 % (FLUSH) 0.9 %
5-40 SYRINGE (ML) INJECTION 2 TIMES DAILY
Status: DISCONTINUED | OUTPATIENT
Start: 2021-01-01 | End: 2021-01-01 | Stop reason: HOSPADM

## 2021-01-01 RX ORDER — PEN NEEDLE, DIABETIC 31 G X1/4"
1 NEEDLE, DISPOSABLE MISCELLANEOUS DAILY
Qty: 100 EACH | Refills: 3 | Status: SHIPPED | OUTPATIENT
Start: 2021-01-01

## 2021-01-01 RX ORDER — SIMVASTATIN 10 MG
10 TABLET ORAL DAILY
Status: ON HOLD | COMMUNITY
End: 2021-01-01 | Stop reason: HOSPADM

## 2021-01-01 RX ORDER — ONDANSETRON 4 MG/1
4 TABLET, ORALLY DISINTEGRATING ORAL EVERY 8 HOURS PRN
Status: DISCONTINUED | OUTPATIENT
Start: 2021-01-01 | End: 2021-01-01 | Stop reason: HOSPADM

## 2021-01-01 RX ORDER — FUROSEMIDE 10 MG/ML
40 INJECTION INTRAMUSCULAR; INTRAVENOUS 2 TIMES DAILY
Status: DISCONTINUED | OUTPATIENT
Start: 2021-01-01 | End: 2021-01-01 | Stop reason: HOSPADM

## 2021-01-01 RX ORDER — TRAMADOL HYDROCHLORIDE 50 MG/1
50 TABLET ORAL EVERY 8 HOURS PRN
Qty: 9 TABLET | Refills: 0 | Status: SHIPPED | OUTPATIENT
Start: 2021-01-01 | End: 2021-01-01

## 2021-01-01 RX ORDER — METOPROLOL SUCCINATE 25 MG/1
25 TABLET, EXTENDED RELEASE ORAL DAILY
Qty: 30 TABLET | Refills: 3 | Status: SHIPPED | OUTPATIENT
Start: 2021-01-01 | End: 2021-01-01 | Stop reason: HOSPADM

## 2021-01-01 RX ORDER — POTASSIUM CHLORIDE 20 MEQ/1
40 TABLET, EXTENDED RELEASE ORAL DAILY
Status: DISCONTINUED | OUTPATIENT
Start: 2021-01-01 | End: 2021-01-01 | Stop reason: HOSPADM

## 2021-01-01 RX ORDER — TORSEMIDE 20 MG/1
20 TABLET ORAL DAILY
Status: DISCONTINUED | OUTPATIENT
Start: 2021-01-01 | End: 2021-01-01 | Stop reason: HOSPADM

## 2021-01-01 RX ORDER — AMIODARONE HYDROCHLORIDE 200 MG/1
200 TABLET ORAL DAILY
Status: DISCONTINUED | OUTPATIENT
Start: 2021-01-01 | End: 2021-01-01 | Stop reason: HOSPADM

## 2021-01-01 RX ORDER — OXYCODONE HYDROCHLORIDE 5 MG/1
5 TABLET ORAL EVERY 4 HOURS PRN
Status: DISCONTINUED | OUTPATIENT
Start: 2021-01-01 | End: 2021-01-01 | Stop reason: HOSPADM

## 2021-01-01 RX ORDER — INSULIN GLARGINE 100 [IU]/ML
25 INJECTION, SOLUTION SUBCUTANEOUS NIGHTLY
Status: DISCONTINUED | OUTPATIENT
Start: 2021-01-01 | End: 2021-01-01

## 2021-01-01 RX ORDER — ACETYLCYSTEINE 200 MG/ML
600 SOLUTION ORAL; RESPIRATORY (INHALATION) 2 TIMES DAILY
Status: DISCONTINUED | OUTPATIENT
Start: 2021-01-01 | End: 2021-01-01 | Stop reason: HOSPADM

## 2021-01-01 RX ORDER — DOPAMINE HYDROCHLORIDE 160 MG/100ML
2-20 INJECTION, SOLUTION INTRAVENOUS CONTINUOUS
Status: DISCONTINUED | OUTPATIENT
Start: 2021-01-01 | End: 2021-01-01

## 2021-01-01 RX ORDER — ONDANSETRON 4 MG/1
4 TABLET, ORALLY DISINTEGRATING ORAL EVERY 8 HOURS PRN
Status: DISCONTINUED | OUTPATIENT
Start: 2021-01-01 | End: 2021-01-01

## 2021-01-01 RX ORDER — ATORVASTATIN CALCIUM 40 MG/1
40 TABLET, FILM COATED ORAL NIGHTLY
Status: DISCONTINUED | OUTPATIENT
Start: 2021-01-01 | End: 2021-01-01 | Stop reason: HOSPADM

## 2021-01-01 RX ORDER — POTASSIUM CHLORIDE 20 MEQ/1
40 TABLET, EXTENDED RELEASE ORAL ONCE
Status: COMPLETED | OUTPATIENT
Start: 2021-01-01 | End: 2021-01-01

## 2021-01-01 RX ORDER — ATORVASTATIN CALCIUM 20 MG/1
10 TABLET, FILM COATED ORAL NIGHTLY
Status: DISCONTINUED | OUTPATIENT
Start: 2021-01-01 | End: 2021-01-01 | Stop reason: HOSPADM

## 2021-01-01 RX ORDER — BLOOD-GLUCOSE METER
1 KIT MISCELLANEOUS DAILY
Qty: 1 KIT | Refills: 0 | Status: SHIPPED | OUTPATIENT
Start: 2021-01-01

## 2021-01-01 RX ORDER — INSULIN GLARGINE 100 [IU]/ML
35 INJECTION, SOLUTION SUBCUTANEOUS NIGHTLY
Status: DISCONTINUED | OUTPATIENT
Start: 2021-01-01 | End: 2021-01-01

## 2021-01-01 RX ORDER — LISINOPRIL 20 MG/1
20 TABLET ORAL DAILY
Qty: 30 TABLET | Refills: 3
Start: 2021-01-01 | End: 2021-01-01 | Stop reason: SDUPTHER

## 2021-01-01 RX ORDER — METOPROLOL SUCCINATE 50 MG/1
50 TABLET, EXTENDED RELEASE ORAL 2 TIMES DAILY
Status: DISCONTINUED | OUTPATIENT
Start: 2021-01-01 | End: 2021-01-01 | Stop reason: HOSPADM

## 2021-01-01 RX ORDER — MORPHINE SULFATE 2 MG/ML
1 INJECTION, SOLUTION INTRAMUSCULAR; INTRAVENOUS
Status: DISCONTINUED | OUTPATIENT
Start: 2021-01-01 | End: 2021-01-01 | Stop reason: HOSPADM

## 2021-01-01 RX ORDER — ATORVASTATIN CALCIUM 40 MG/1
40 TABLET, FILM COATED ORAL NIGHTLY
COMMUNITY

## 2021-01-01 RX ORDER — HEPARIN SODIUM 5000 [USP'U]/ML
5000 INJECTION, SOLUTION INTRAVENOUS; SUBCUTANEOUS EVERY 8 HOURS SCHEDULED
Status: DISCONTINUED | OUTPATIENT
Start: 2021-01-01 | End: 2021-01-01 | Stop reason: HOSPADM

## 2021-01-01 RX ORDER — NITROGLYCERIN 0.4 MG/1
TABLET SUBLINGUAL
Qty: 25 TABLET | Refills: 3 | Status: CANCELLED | OUTPATIENT
Start: 2021-01-01

## 2021-01-01 RX ORDER — 0.9 % SODIUM CHLORIDE 0.9 %
250 INTRAVENOUS SOLUTION INTRAVENOUS ONCE
Status: COMPLETED | OUTPATIENT
Start: 2021-01-01 | End: 2021-01-01

## 2021-01-01 RX ORDER — MAGNESIUM SULFATE IN WATER 40 MG/ML
2000 INJECTION, SOLUTION INTRAVENOUS PRN
Status: DISCONTINUED | OUTPATIENT
Start: 2021-01-01 | End: 2021-01-01 | Stop reason: HOSPADM

## 2021-01-01 RX ORDER — LISINOPRIL 40 MG/1
40 TABLET ORAL DAILY
Status: DISCONTINUED | OUTPATIENT
Start: 2021-01-01 | End: 2021-01-01

## 2021-01-01 RX ADMIN — ASPIRIN 243 MG: 81 TABLET, CHEWABLE ORAL at 17:26

## 2021-01-01 RX ADMIN — Medication 10 ML: at 22:28

## 2021-01-01 RX ADMIN — SPIRONOLACTONE 25 MG: 25 TABLET ORAL at 09:21

## 2021-01-01 RX ADMIN — MIDODRINE HYDROCHLORIDE 10 MG: 5 TABLET ORAL at 07:52

## 2021-01-01 RX ADMIN — SODIUM CHLORIDE: 9 INJECTION, SOLUTION INTRAVENOUS at 09:42

## 2021-01-01 RX ADMIN — MIDODRINE HYDROCHLORIDE 10 MG: 5 TABLET ORAL at 16:05

## 2021-01-01 RX ADMIN — INSULIN LISPRO 2 UNITS: 100 INJECTION, SOLUTION INTRAVENOUS; SUBCUTANEOUS at 13:14

## 2021-01-01 RX ADMIN — METOPROLOL SUCCINATE 25 MG: 25 TABLET, EXTENDED RELEASE ORAL at 11:08

## 2021-01-01 RX ADMIN — AMIODARONE HYDROCHLORIDE 200 MG: 200 TABLET ORAL at 15:19

## 2021-01-01 RX ADMIN — INSULIN LISPRO 1 UNITS: 100 INJECTION, SOLUTION INTRAVENOUS; SUBCUTANEOUS at 12:33

## 2021-01-01 RX ADMIN — IVABRADINE 5 MG: 5 TABLET, FILM COATED ORAL at 13:14

## 2021-01-01 RX ADMIN — APIXABAN 5 MG: 5 TABLET, FILM COATED ORAL at 20:57

## 2021-01-01 RX ADMIN — LISINOPRIL 2.5 MG: 2.5 TABLET ORAL at 10:49

## 2021-01-01 RX ADMIN — PANTOPRAZOLE SODIUM 40 MG: 40 TABLET, DELAYED RELEASE ORAL at 09:38

## 2021-01-01 RX ADMIN — MAGNESIUM SULFATE HEPTAHYDRATE 1000 MG: 1 INJECTION, SOLUTION INTRAVENOUS at 17:10

## 2021-01-01 RX ADMIN — MIDODRINE HYDROCHLORIDE 5 MG: 5 TABLET ORAL at 11:57

## 2021-01-01 RX ADMIN — KIT FOR THE PREPARATION OF TECHNETIUM TC 99M PENTETATE 27.8 MILLICURIE: 20 INJECTION, POWDER, LYOPHILIZED, FOR SOLUTION INTRAVENOUS; RESPIRATORY (INHALATION) at 14:20

## 2021-01-01 RX ADMIN — BUDESONIDE AND FORMOTEROL FUMARATE DIHYDRATE 2 PUFF: 80; 4.5 AEROSOL RESPIRATORY (INHALATION) at 07:59

## 2021-01-01 RX ADMIN — CEFAZOLIN SODIUM 1000 MG: 1 INJECTION, POWDER, FOR SOLUTION INTRAMUSCULAR; INTRAVENOUS at 15:38

## 2021-01-01 RX ADMIN — METOPROLOL SUCCINATE 50 MG: 50 TABLET, EXTENDED RELEASE ORAL at 12:16

## 2021-01-01 RX ADMIN — FUROSEMIDE 40 MG: 10 INJECTION, SOLUTION INTRAMUSCULAR; INTRAVENOUS at 10:49

## 2021-01-01 RX ADMIN — ATORVASTATIN CALCIUM 40 MG: 40 TABLET, FILM COATED ORAL at 20:39

## 2021-01-01 RX ADMIN — BUDESONIDE AND FORMOTEROL FUMARATE DIHYDRATE 2 PUFF: 80; 4.5 AEROSOL RESPIRATORY (INHALATION) at 08:17

## 2021-01-01 RX ADMIN — SODIUM CHLORIDE, PRESERVATIVE FREE 10 ML: 5 INJECTION INTRAVENOUS at 12:14

## 2021-01-01 RX ADMIN — FUROSEMIDE 40 MG: 10 INJECTION, SOLUTION INTRAVENOUS at 15:55

## 2021-01-01 RX ADMIN — METOPROLOL SUCCINATE 50 MG: 50 TABLET, EXTENDED RELEASE ORAL at 21:15

## 2021-01-01 RX ADMIN — DIAZEPAM 5 MG: 5 TABLET ORAL at 08:45

## 2021-01-01 RX ADMIN — CLOPIDOGREL BISULFATE 75 MG: 75 TABLET, FILM COATED ORAL at 12:16

## 2021-01-01 RX ADMIN — ATORVASTATIN CALCIUM 40 MG: 40 TABLET, FILM COATED ORAL at 22:28

## 2021-01-01 RX ADMIN — CLOPIDOGREL BISULFATE 75 MG: 75 TABLET, FILM COATED ORAL at 09:21

## 2021-01-01 RX ADMIN — METOPROLOL SUCCINATE 50 MG: 50 TABLET, EXTENDED RELEASE ORAL at 20:54

## 2021-01-01 RX ADMIN — TORSEMIDE 20 MG: 20 TABLET ORAL at 10:35

## 2021-01-01 RX ADMIN — FUROSEMIDE 40 MG: 10 INJECTION, SOLUTION INTRAMUSCULAR; INTRAVENOUS at 09:34

## 2021-01-01 RX ADMIN — SPIRONOLACTONE 25 MG: 25 TABLET ORAL at 10:36

## 2021-01-01 RX ADMIN — ISOSORBIDE MONONITRATE 30 MG: 30 TABLET, EXTENDED RELEASE ORAL at 15:04

## 2021-01-01 RX ADMIN — SPIRONOLACTONE 25 MG: 25 TABLET ORAL at 07:52

## 2021-01-01 RX ADMIN — ENOXAPARIN SODIUM 40 MG: 40 INJECTION SUBCUTANEOUS at 09:21

## 2021-01-01 RX ADMIN — MIDODRINE HYDROCHLORIDE 10 MG: 5 TABLET ORAL at 17:29

## 2021-01-01 RX ADMIN — CARVEDILOL 12.5 MG: 12.5 TABLET, FILM COATED ORAL at 09:03

## 2021-01-01 RX ADMIN — AMIODARONE HYDROCHLORIDE 200 MG: 200 TABLET ORAL at 12:16

## 2021-01-01 RX ADMIN — ASPIRIN 81 MG: 81 TABLET, CHEWABLE ORAL at 07:52

## 2021-01-01 RX ADMIN — FUROSEMIDE 40 MG: 10 INJECTION, SOLUTION INTRAMUSCULAR; INTRAVENOUS at 10:26

## 2021-01-01 RX ADMIN — MIDODRINE HYDROCHLORIDE 10 MG: 5 TABLET ORAL at 10:35

## 2021-01-01 RX ADMIN — TORSEMIDE 20 MG: 20 TABLET ORAL at 12:08

## 2021-01-01 RX ADMIN — SPIRONOLACTONE 25 MG: 25 TABLET ORAL at 10:49

## 2021-01-01 RX ADMIN — SODIUM CHLORIDE, PRESERVATIVE FREE 10 ML: 5 INJECTION INTRAVENOUS at 20:24

## 2021-01-01 RX ADMIN — TORSEMIDE 20 MG: 20 TABLET ORAL at 07:52

## 2021-01-01 RX ADMIN — BUDESONIDE AND FORMOTEROL FUMARATE DIHYDRATE 2 PUFF: 80; 4.5 AEROSOL RESPIRATORY (INHALATION) at 20:37

## 2021-01-01 RX ADMIN — BUDESONIDE AND FORMOTEROL FUMARATE DIHYDRATE 2 PUFF: 80; 4.5 AEROSOL RESPIRATORY (INHALATION) at 08:02

## 2021-01-01 RX ADMIN — INSULIN LISPRO 2 UNITS: 100 INJECTION, SOLUTION INTRAVENOUS; SUBCUTANEOUS at 07:53

## 2021-01-01 RX ADMIN — APIXABAN 5 MG: 5 TABLET, FILM COATED ORAL at 21:15

## 2021-01-01 RX ADMIN — POTASSIUM CHLORIDE 40 MEQ: 1500 TABLET, EXTENDED RELEASE ORAL at 12:17

## 2021-01-01 RX ADMIN — METOPROLOL SUCCINATE 25 MG: 25 TABLET, EXTENDED RELEASE ORAL at 09:21

## 2021-01-01 RX ADMIN — TORSEMIDE 20 MG: 20 TABLET ORAL at 12:16

## 2021-01-01 RX ADMIN — FUROSEMIDE 40 MG: 10 INJECTION, SOLUTION INTRAMUSCULAR; INTRAVENOUS at 20:23

## 2021-01-01 RX ADMIN — PANTOPRAZOLE SODIUM 40 MG: 40 TABLET, DELAYED RELEASE ORAL at 07:52

## 2021-01-01 RX ADMIN — METOPROLOL SUCCINATE 50 MG: 50 TABLET, EXTENDED RELEASE ORAL at 15:19

## 2021-01-01 RX ADMIN — SODIUM CHLORIDE, PRESERVATIVE FREE 10 ML: 5 INJECTION INTRAVENOUS at 22:28

## 2021-01-01 RX ADMIN — APIXABAN 5 MG: 5 TABLET, FILM COATED ORAL at 12:16

## 2021-01-01 RX ADMIN — DOPAMINE HYDROCHLORIDE IN DEXTROSE 2 MCG/KG/MIN: 1.6 INJECTION, SOLUTION INTRAVENOUS at 09:20

## 2021-01-01 RX ADMIN — APIXABAN 5 MG: 5 TABLET, FILM COATED ORAL at 15:19

## 2021-01-01 RX ADMIN — ATORVASTATIN CALCIUM 40 MG: 40 TABLET, FILM COATED ORAL at 20:23

## 2021-01-01 RX ADMIN — FUROSEMIDE 20 MG: 20 TABLET ORAL at 09:38

## 2021-01-01 RX ADMIN — ASPIRIN 81 MG: 81 TABLET, CHEWABLE ORAL at 17:30

## 2021-01-01 RX ADMIN — CLOPIDOGREL BISULFATE 75 MG: 75 TABLET ORAL at 17:30

## 2021-01-01 RX ADMIN — CLOPIDOGREL BISULFATE 75 MG: 75 TABLET ORAL at 11:09

## 2021-01-01 RX ADMIN — ASPIRIN 81 MG: 81 TABLET, CHEWABLE ORAL at 09:21

## 2021-01-01 RX ADMIN — FLUTICASONE PROPIONATE 1 SPRAY: 50 SPRAY, METERED NASAL at 12:14

## 2021-01-01 RX ADMIN — METOPROLOL SUCCINATE 25 MG: 25 TABLET, EXTENDED RELEASE ORAL at 10:49

## 2021-01-01 RX ADMIN — GADOBENATE DIMEGLUMINE 10 ML: 529 INJECTION, SOLUTION INTRAVENOUS at 08:23

## 2021-01-01 RX ADMIN — DIPHENHYDRAMINE HYDROCHLORIDE 25 MG: 25 TABLET ORAL at 08:45

## 2021-01-01 RX ADMIN — CLOPIDOGREL BISULFATE 75 MG: 75 TABLET, FILM COATED ORAL at 10:35

## 2021-01-01 RX ADMIN — BUDESONIDE AND FORMOTEROL FUMARATE DIHYDRATE 2 PUFF: 80; 4.5 AEROSOL RESPIRATORY (INHALATION) at 20:13

## 2021-01-01 RX ADMIN — AMIODARONE HYDROCHLORIDE 200 MG: 200 TABLET ORAL at 10:35

## 2021-01-01 RX ADMIN — POTASSIUM CHLORIDE 40 MEQ: 1500 TABLET, EXTENDED RELEASE ORAL at 07:51

## 2021-01-01 RX ADMIN — ACETYLCYSTEINE 600 MG: 200 SOLUTION ORAL; RESPIRATORY (INHALATION) at 08:57

## 2021-01-01 RX ADMIN — ASPIRIN 81 MG: 81 TABLET, CHEWABLE ORAL at 09:03

## 2021-01-01 RX ADMIN — METOPROLOL SUCCINATE 25 MG: 25 TABLET, EXTENDED RELEASE ORAL at 07:52

## 2021-01-01 RX ADMIN — APIXABAN 5 MG: 5 TABLET, FILM COATED ORAL at 10:35

## 2021-01-01 RX ADMIN — PERFLUTREN 2.2 MG: 6.52 INJECTION, SUSPENSION INTRAVENOUS at 10:22

## 2021-01-01 RX ADMIN — SODIUM CHLORIDE 250 ML: 9 INJECTION, SOLUTION INTRAVENOUS at 09:21

## 2021-01-01 RX ADMIN — BUDESONIDE AND FORMOTEROL FUMARATE DIHYDRATE 2 PUFF: 80; 4.5 AEROSOL RESPIRATORY (INHALATION) at 19:51

## 2021-01-01 RX ADMIN — INSULIN LISPRO 1 UNITS: 100 INJECTION, SOLUTION INTRAVENOUS; SUBCUTANEOUS at 12:18

## 2021-01-01 RX ADMIN — ENOXAPARIN SODIUM 40 MG: 40 INJECTION SUBCUTANEOUS at 07:51

## 2021-01-01 RX ADMIN — CARVEDILOL 12.5 MG: 12.5 TABLET, FILM COATED ORAL at 17:30

## 2021-01-01 RX ADMIN — ASPIRIN 81 MG: 81 TABLET, CHEWABLE ORAL at 10:49

## 2021-01-01 RX ADMIN — CLOPIDOGREL BISULFATE 75 MG: 75 TABLET ORAL at 10:49

## 2021-01-01 RX ADMIN — BUDESONIDE AND FORMOTEROL FUMARATE DIHYDRATE 2 PUFF: 80; 4.5 AEROSOL RESPIRATORY (INHALATION) at 20:01

## 2021-01-01 RX ADMIN — SODIUM CHLORIDE, PRESERVATIVE FREE 10 ML: 5 INJECTION INTRAVENOUS at 07:54

## 2021-01-01 RX ADMIN — PANTOPRAZOLE SODIUM 40 MG: 40 TABLET, DELAYED RELEASE ORAL at 06:29

## 2021-01-01 RX ADMIN — SODIUM CHLORIDE, PRESERVATIVE FREE 10 ML: 5 INJECTION INTRAVENOUS at 10:50

## 2021-01-01 RX ADMIN — MIDODRINE HYDROCHLORIDE 5 MG: 5 TABLET ORAL at 16:29

## 2021-01-01 RX ADMIN — CLOPIDOGREL BISULFATE 75 MG: 75 TABLET, FILM COATED ORAL at 07:52

## 2021-01-01 RX ADMIN — SPIRONOLACTONE 25 MG: 25 TABLET ORAL at 12:17

## 2021-01-01 RX ADMIN — ASPIRIN 81 MG: 81 TABLET, CHEWABLE ORAL at 16:05

## 2021-01-01 RX ADMIN — INSULIN LISPRO 4 UNITS: 100 INJECTION, SOLUTION INTRAVENOUS; SUBCUTANEOUS at 09:42

## 2021-01-01 RX ADMIN — CLOPIDOGREL BISULFATE 75 MG: 75 TABLET ORAL at 09:34

## 2021-01-01 RX ADMIN — ASPIRIN 81 MG: 81 TABLET, CHEWABLE ORAL at 09:33

## 2021-01-01 RX ADMIN — DOBUTAMINE HYDROCHLORIDE 2.5 MCG/KG/MIN: 200 INJECTION INTRAVENOUS at 07:50

## 2021-01-01 RX ADMIN — ASPIRIN 81 MG: 81 TABLET, CHEWABLE ORAL at 11:08

## 2021-01-01 RX ADMIN — MIDODRINE HYDROCHLORIDE 10 MG: 5 TABLET ORAL at 11:08

## 2021-01-01 RX ADMIN — MIDODRINE HYDROCHLORIDE 10 MG: 5 TABLET ORAL at 09:37

## 2021-01-01 RX ADMIN — METOPROLOL SUCCINATE 50 MG: 50 TABLET, EXTENDED RELEASE ORAL at 10:35

## 2021-01-01 RX ADMIN — MIDODRINE HYDROCHLORIDE 10 MG: 5 TABLET ORAL at 13:13

## 2021-01-01 RX ADMIN — BUDESONIDE AND FORMOTEROL FUMARATE DIHYDRATE 2 PUFF: 80; 4.5 AEROSOL RESPIRATORY (INHALATION) at 08:16

## 2021-01-01 RX ADMIN — CLOPIDOGREL BISULFATE 75 MG: 75 TABLET, FILM COATED ORAL at 15:19

## 2021-01-01 RX ADMIN — SODIUM CHLORIDE, PRESERVATIVE FREE 10 ML: 5 INJECTION INTRAVENOUS at 10:36

## 2021-01-01 RX ADMIN — SODIUM CHLORIDE, PRESERVATIVE FREE 10 ML: 5 INJECTION INTRAVENOUS at 21:16

## 2021-01-01 RX ADMIN — SODIUM CHLORIDE: 9 INJECTION, SOLUTION INTRAVENOUS at 08:45

## 2021-01-01 RX ADMIN — INSULIN GLARGINE 20 UNITS: 100 INJECTION, SOLUTION SUBCUTANEOUS at 20:38

## 2021-01-01 RX ADMIN — SODIUM CHLORIDE, PRESERVATIVE FREE 10 ML: 5 INJECTION INTRAVENOUS at 11:09

## 2021-01-01 RX ADMIN — DOBUTAMINE HYDROCHLORIDE 2.5 MCG/KG/MIN: 200 INJECTION INTRAVENOUS at 04:43

## 2021-01-01 RX ADMIN — BUDESONIDE AND FORMOTEROL FUMARATE DIHYDRATE 2 PUFF: 80; 4.5 AEROSOL RESPIRATORY (INHALATION) at 07:54

## 2021-01-01 RX ADMIN — SODIUM CHLORIDE, PRESERVATIVE FREE 5 ML: 5 INJECTION INTRAVENOUS at 15:02

## 2021-01-01 RX ADMIN — INSULIN GLARGINE 30 UNITS: 100 INJECTION, SOLUTION SUBCUTANEOUS at 23:16

## 2021-01-01 RX ADMIN — IVABRADINE 5 MG: 5 TABLET, FILM COATED ORAL at 17:10

## 2021-01-01 RX ADMIN — INSULIN LISPRO 12 UNITS: 100 INJECTION, SOLUTION INTRAVENOUS; SUBCUTANEOUS at 17:22

## 2021-01-01 RX ADMIN — CLOPIDOGREL BISULFATE 75 MG: 75 TABLET, FILM COATED ORAL at 16:05

## 2021-01-01 RX ADMIN — CLOPIDOGREL BISULFATE 75 MG: 75 TABLET ORAL at 09:04

## 2021-01-01 RX ADMIN — SODIUM CHLORIDE, POTASSIUM CHLORIDE, SODIUM LACTATE AND CALCIUM CHLORIDE: 600; 310; 30; 20 INJECTION, SOLUTION INTRAVENOUS at 20:59

## 2021-01-01 RX ADMIN — FUROSEMIDE 40 MG: 10 INJECTION, SOLUTION INTRAVENOUS at 12:52

## 2021-01-01 RX ADMIN — SPIRONOLACTONE 25 MG: 25 TABLET ORAL at 09:34

## 2021-01-01 RX ADMIN — Medication 6 MILLICURIE: at 14:20

## 2021-01-01 RX ADMIN — SODIUM CHLORIDE, PRESERVATIVE FREE 10 ML: 5 INJECTION INTRAVENOUS at 09:34

## 2021-01-01 RX ADMIN — ATORVASTATIN CALCIUM 40 MG: 40 TABLET, FILM COATED ORAL at 22:11

## 2021-01-01 RX ADMIN — POTASSIUM CHLORIDE 40 MEQ: 1500 TABLET, EXTENDED RELEASE ORAL at 12:08

## 2021-01-01 RX ADMIN — INSULIN LISPRO 2 UNITS: 100 INJECTION, SOLUTION INTRAVENOUS; SUBCUTANEOUS at 13:52

## 2021-01-01 RX ADMIN — SODIUM CHLORIDE, PRESERVATIVE FREE 10 ML: 5 INJECTION INTRAVENOUS at 20:39

## 2021-01-01 ASSESSMENT — HEART SCORE: ECG: 0

## 2021-01-01 ASSESSMENT — ENCOUNTER SYMPTOMS
PHOTOPHOBIA: 0
SHORTNESS OF BREATH: 1
CHEST TIGHTNESS: 0
CONSTIPATION: 0
ABDOMINAL PAIN: 0
BACK PAIN: 0
NAUSEA: 0
STRIDOR: 0
NAUSEA: 0
BLOOD IN STOOL: 0
PHOTOPHOBIA: 0
DIARRHEA: 0
SHORTNESS OF BREATH: 1
EYE PAIN: 0
ABDOMINAL PAIN: 0
BLOOD IN STOOL: 0
DIARRHEA: 0
COLOR CHANGE: 0
VOMITING: 0
ABDOMINAL PAIN: 0
ABDOMINAL PAIN: 1
WHEEZING: 0
COUGH: 0
ABDOMINAL DISTENTION: 1
VOMITING: 0
COLOR CHANGE: 0
BACK PAIN: 0
VOMITING: 0
COUGH: 0
COUGH: 0
CONSTIPATION: 0
VOMITING: 0
BACK PAIN: 0
COLOR CHANGE: 0
COLOR CHANGE: 0
NAUSEA: 0
WHEEZING: 0
SHORTNESS OF BREATH: 1
DIARRHEA: 0
ABDOMINAL DISTENTION: 0
WHEEZING: 0
DIARRHEA: 0
EYE PAIN: 0
BLOOD IN STOOL: 0
COUGH: 0
CHEST TIGHTNESS: 0
BACK PAIN: 1
ABDOMINAL DISTENTION: 1
BLOOD IN STOOL: 0
WHEEZING: 0
PHOTOPHOBIA: 0
CHEST TIGHTNESS: 0
EYES NEGATIVE: 1
CHEST TIGHTNESS: 0
EYE PAIN: 0
SHORTNESS OF BREATH: 1
CONSTIPATION: 0
NAUSEA: 0

## 2021-01-01 ASSESSMENT — PAIN SCALES - GENERAL
PAINLEVEL_OUTOF10: 0
PAINLEVEL_OUTOF10: 2
PAINLEVEL_OUTOF10: 0
PAINLEVEL_OUTOF10: 4
PAINLEVEL_OUTOF10: 0

## 2021-01-01 ASSESSMENT — PAIN DESCRIPTION - PAIN TYPE: TYPE: ACUTE PAIN

## 2021-01-01 ASSESSMENT — PAIN DESCRIPTION - LOCATION
LOCATION: CHEST
LOCATION: CHEST

## 2021-01-05 ENCOUNTER — HOSPITAL ENCOUNTER (OUTPATIENT)
Age: 66
Discharge: HOME | End: 2021-01-05
Payer: MEDICARE

## 2021-01-05 PROCEDURE — 36415 COLL VENOUS BLD VENIPUNCTURE: CPT

## 2021-01-05 PROCEDURE — 84270 ASSAY OF SEX HORMONE GLOBUL: CPT

## 2021-01-05 PROCEDURE — 84403 ASSAY OF TOTAL TESTOSTERONE: CPT

## 2021-01-07 LAB
SEX HORMONE BINDING GLOBULIN: 43 NMOL/L (ref 11–80)
TESTOSTERONE FREE PERCENT: 1.6 % (ref 1.6–2.9)
TESTOSTERONE FREE: 71 PG/ML (ref 47–244)
TESTOSTERONE TOTAL-MALE: 442 NG/DL (ref 300–720)

## 2021-01-27 ENCOUNTER — TELEPHONE (OUTPATIENT)
Dept: PULMONOLOGY | Age: 66
End: 2021-01-27

## 2021-01-27 DIAGNOSIS — G47.33 OBSTRUCTIVE SLEEP APNEA: Primary | ICD-10-CM

## 2021-02-02 ENCOUNTER — TELEPHONE (OUTPATIENT)
Dept: PULMONOLOGY | Age: 66
End: 2021-02-02

## 2021-02-02 DIAGNOSIS — G47.33 OBSTRUCTIVE SLEEP APNEA: Primary | ICD-10-CM

## 2021-02-03 ENCOUNTER — OFFICE VISIT (OUTPATIENT)
Dept: PULMONOLOGY | Age: 66
End: 2021-02-03
Payer: MEDICARE

## 2021-02-03 VITALS
WEIGHT: 227 LBS | BODY MASS INDEX: 35.63 KG/M2 | SYSTOLIC BLOOD PRESSURE: 124 MMHG | HEART RATE: 108 BPM | HEIGHT: 67 IN | DIASTOLIC BLOOD PRESSURE: 74 MMHG | OXYGEN SATURATION: 96 %

## 2021-02-03 DIAGNOSIS — R06.09 DYSPNEA ON EXERTION: ICD-10-CM

## 2021-02-03 DIAGNOSIS — J43.9 PULMONARY EMPHYSEMA DETERMINED BY X-RAY (HCC): ICD-10-CM

## 2021-02-03 DIAGNOSIS — Z72.0 TOBACCO ABUSE: ICD-10-CM

## 2021-02-03 DIAGNOSIS — J44.9 COPD, MODERATE (HCC): Primary | ICD-10-CM

## 2021-02-03 DIAGNOSIS — G47.33 OBSTRUCTIVE SLEEP APNEA: ICD-10-CM

## 2021-02-03 DIAGNOSIS — J44.9 COPD, MODERATE (HCC): ICD-10-CM

## 2021-02-03 PROCEDURE — G8427 DOCREV CUR MEDS BY ELIG CLIN: HCPCS | Performed by: INTERNAL MEDICINE

## 2021-02-03 PROCEDURE — 3023F SPIROM DOC REV: CPT | Performed by: INTERNAL MEDICINE

## 2021-02-03 PROCEDURE — 1123F ACP DISCUSS/DSCN MKR DOCD: CPT | Performed by: INTERNAL MEDICINE

## 2021-02-03 PROCEDURE — 1036F TOBACCO NON-USER: CPT | Performed by: INTERNAL MEDICINE

## 2021-02-03 PROCEDURE — 4040F PNEUMOC VAC/ADMIN/RCVD: CPT | Performed by: INTERNAL MEDICINE

## 2021-02-03 PROCEDURE — G8926 SPIRO NO PERF OR DOC: HCPCS | Performed by: INTERNAL MEDICINE

## 2021-02-03 PROCEDURE — 3017F COLORECTAL CA SCREEN DOC REV: CPT | Performed by: INTERNAL MEDICINE

## 2021-02-03 PROCEDURE — G8484 FLU IMMUNIZE NO ADMIN: HCPCS | Performed by: INTERNAL MEDICINE

## 2021-02-03 PROCEDURE — 99213 OFFICE O/P EST LOW 20 MIN: CPT | Performed by: INTERNAL MEDICINE

## 2021-02-03 PROCEDURE — G8417 CALC BMI ABV UP PARAM F/U: HCPCS | Performed by: INTERNAL MEDICINE

## 2021-02-03 RX ORDER — UMECLIDINIUM BROMIDE AND VILANTEROL TRIFENATATE 62.5; 25 UG/1; UG/1
1 POWDER RESPIRATORY (INHALATION) DAILY
Qty: 1 EACH | Refills: 11 | Status: SHIPPED | OUTPATIENT
Start: 2021-02-03 | End: 2021-01-01 | Stop reason: ALTCHOICE

## 2021-02-03 NOTE — PROGRESS NOTES
SUBJECTIVE:  Chief Complaint: Moderate COPD, dyspnea exertion, pulmonary emphysema, tobacco abuse, untreated obstructive sleep apnea  Mr. Ina Velasquez states that since he stopped smoking several months ago he has improved dramatically and continues to take Anoro daily and albuterol rescue as needed. He still notes dyspnea exertion. He apparently has very severe peripheral vascular disease and is having problems with his right foot and may need surgery. He did undergo apnea screening which demonstrated moderate obstructive sleep apnea but he is refusing a formal sleep study and CPAP therapy. He denies any fever associate with cough and has had no known COVID-19 exposure. He has not received the Covid vaccine yet      ROS:  Constitution:  HEENT: Negative for ear, throat pain  Cardiovascular: Negative for chest pain, syncope, edema  Pulmonary: See HPI  Musculoskeletal: Negative for DVT, myalgias, arthralgias    OBJECTIVE:  /74   Pulse 108   Ht 5' 7\" (1.702 m)   Wt 227 lb (103 kg)   SpO2 96%   BMI 35.55 kg/m²      Physical Exam:  Constitutional:  He appears well developed and well-nourished. Neck:  Supple, No palpable lymphadenopathy, No JVD  Cardiovascular:  S1, S2 Normal, Regular rhythm, no murmurs or gallops, No pericardial  rubs. Pulmonary: Diminished breath sounds bilaterally without wheezing or rhonchi  Abdomen: Not examined  Extremities: no edema, No DVT  Neurologic: Oriented x3, No focal deficits    Radiology: CT lung screening on 11/23/2020 showed mild emphysematous changes with no worrisome lung mass  PFT: Office spirometry on 11/30/2020 demonstrated severe obstructive defect with a significant response to bronchodilators          ASSESSMENT:    1. COPD, moderate ([address]    2. Dyspnea on exertion    3. Pulmonary emphysema determined by X-ray ([address]    4. Tobacco abuse    5.  Obstructive sleep apnea          PLAN:

## 2021-02-05 ENCOUNTER — TELEPHONE (OUTPATIENT)
Dept: PULMONOLOGY | Age: 66
End: 2021-02-05

## 2021-02-05 DIAGNOSIS — G47.33 OBSTRUCTIVE SLEEP APNEA: Primary | ICD-10-CM

## 2021-02-05 DIAGNOSIS — R06.09 DYSPNEA ON EXERTION: ICD-10-CM

## 2021-02-05 DIAGNOSIS — J43.9 PULMONARY EMPHYSEMA DETERMINED BY X-RAY (HCC): ICD-10-CM

## 2021-02-05 DIAGNOSIS — Z72.0 TOBACCO ABUSE: ICD-10-CM

## 2021-02-05 DIAGNOSIS — J44.9 COPD, MODERATE (HCC): ICD-10-CM

## 2021-02-11 PROBLEM — I12.9 HYPERTENSIVE RENAL DISEASE: Status: ACTIVE | Noted: 2021-02-11

## 2021-02-22 ENCOUNTER — HOSPITAL ENCOUNTER (OUTPATIENT)
Age: 66
Discharge: HOME | End: 2021-02-22
Payer: MEDICARE

## 2021-02-22 LAB
ANION GAP SERPL CALCULATED.3IONS-SCNC: 11 MMOL/L (ref 4–16)
BUN BLDV-MCNC: 15 MG/DL (ref 6–23)
CALCIUM SERPL-MCNC: 9.2 MG/DL (ref 8.3–10.6)
CHLORIDE BLD-SCNC: 94 MMOL/L (ref 99–110)
CO2: 28 MMOL/L (ref 21–32)
CREAT SERPL-MCNC: 1.9 MG/DL (ref 0.9–1.3)
GFR AFRICAN AMERICAN: 43 ML/MIN/1.73M2
GFR NON-AFRICAN AMERICAN: 36 ML/MIN/1.73M2
GLUCOSE BLD-MCNC: 237 MG/DL (ref 70–99)
POTASSIUM SERPL-SCNC: 4.9 MMOL/L (ref 3.5–5.1)
SODIUM BLD-SCNC: 133 MMOL/L (ref 135–145)

## 2021-02-22 PROCEDURE — 80048 BASIC METABOLIC PNL TOTAL CA: CPT

## 2021-02-22 PROCEDURE — 36415 COLL VENOUS BLD VENIPUNCTURE: CPT

## 2021-03-22 ENCOUNTER — TELEPHONE (OUTPATIENT)
Dept: PULMONOLOGY | Age: 66
End: 2021-03-22

## 2021-05-06 PROBLEM — R94.39 ABNORMAL STRESS TEST: Status: ACTIVE | Noted: 2021-01-01

## 2021-05-06 PROBLEM — I25.10 CAD IN NATIVE ARTERY: Status: ACTIVE | Noted: 2021-01-01

## 2021-05-06 NOTE — H&P
[address] [place] [address], [address] [place] [address]                              HISTORY AND PHYSICAL    PATIENT NAME: Fidelina Kitchen                  :        1955  MED REC NO:   [id number]                          ROOM:  ACCOUNT NO:   [de-identified]                           ADMIT DATE: 2021  PROVIDER:     Lynette Dan MD    INDICATIONS:  Abnormal stress test, heart failure. HISTORY OF PRESENT ILLNESS:  This is a 53-year-old male patient, a  patient of MICHAEL Ramirez. The patient has been getting  progressive shortness of breath present. He is unable to even walk  without getting a dyspnea present, but he continues to smoke also. He  has had an abnormal stress test.  His stress test shows EF is 21%. Large inferior and apical wall myocardial infarction with more than 60%  myocardial perfusion absent. Associated aortic aneurysm present. Infarct ischemia is also present. Therefore, the cardiac  catheterization is being performed. PAST MEDICAL HISTORY:  History of having hypertension; hyperlipidemia;  _____, sees Dr. Divya Fletcher for that; COPD; sleep apnea, does not use a CPAP. Diabetes, peripheral vascular disease present. History of having  hyponatremia, history of laceration of the liver and spleen, history of  testicular cancer. PAST SURGERIES:  Hiatal hernia repair, left knee surgery, left toe  surgery, left testicle removal, right biceps repair, skin incisions. SOCIAL HISTORY:  Smokes about 2-3 cigarettes. ALLERGIES:  NKDA. MEDICATIONS:  He is on inhalers, lisinopril 10 mg a day, loratadine,  omeprazole, pioglitazone, prednisone, testosterone, Zocor. PHYSICAL EXAMINATION:  GENERAL:  The patient is awake, alert, and answering questions, not in  acute distress. VITAL SIGNS:  Temperature is afebrile, pulse is 75, blood pressure is  135/80. HEENT:  Head is normocephalic and atraumatic.   Pupils are equal

## 2021-05-06 NOTE — PROGRESS NOTES
Will admit to hospitalist for now  And transfer when bed available  Spoke with [facility]  Receiving physician is DR. Amelia Andrews at [facility]  CAD/HFrEF and pulmonary HTN and renal insuffiencey

## 2021-05-06 NOTE — H&P
History and Physical      Name:  Rebeka Harrison /Age/Sex: 1955  (77 y.o. male)   MRN & CSN:  [id number] & [id number] Admission Date/Time: 2021  8:12 AM   Location:  9757/4105-[id number] PCP: Rere Gentile Day: 1    Assessment and Plan:   Rebeka Harrison is a 77 y.o.  male  who presents with abnormal stress test    1) Abnormal Stress due to Severe 2 V CAD  -Stress test reportedly abnormal per cardiology  -Had Rt and Lt heart cath today: which showed LAD mid 90% and distal LAD 99% stenosis as well as RCA proximal 90% and mid 99% stenosis. Moderately elevated Rt heart pressures per report  -Cardiology on board; continue ASA, Statin  -Plan is to transfer to [facility]; cardiology making arrangement     2)Chronic HFrEF  -Continue Coreg, ACEI  -Not in acute exacerbation    Other chronic medical condition; medication resumed unless contraindicated  -CKD 3  -COPD  -Obesity  -Essential HTN  -GERD  -Nicotine Use disorder      Diet DIET CARDIAC;   DVT Prophylaxis [] Lovenox, []  Heparin, [] SCDs, [] Ambulation   GI Prophylaxis [] PPI,  [] H2 Blocker,  [] Carafate,  [] Diet/Tube Feeds   Code Status Full Code   Disposition Patient requires continued admission due to severe 2 V CAD   MDM [] Low, [x] Moderate,[]  High  Patient's risk as above due to severe CAD     History of Present Illness:     Chief Complaint: <principal problem not specified>  Rebeka Harrison is a 77 y.o.  male  who presents with abnormal stress test. Patient reported recurrent exertional SOB, now occurring occasionally at rest. Denied any associated chest pain, palpitations, dizziness. No PND or orthopnea. No cough, fever or chills. Patient is a current smoker; 1/2PPD. Had [facility] today which showed severe 2 V CAD and will be transferred to [place] whenever accepted per cardiology.        Ten point ROS reviewed negative, unless as noted above    Objective:   No intake or output data in the 24 hours ending 21 1449   Vitals: PRN  sodium chloride, 25 mL, PRN  atropine, 0.5 mg, Once PRN  morphine, 1 mg, Once PRN  albuterol sulfate HFA, 2 puff, Q6H PRN  sodium chloride flush, 5-40 mL, PRN  sodium chloride, 25 mL, PRN  promethazine, 12.5 mg, Q6H PRN    Or  ondansetron, 4 mg, Q6H PRN  polyethylene glycol, 17 g, Daily PRN  acetaminophen, 650 mg, Q6H PRN    Or  acetaminophen, 650 mg, Q6H PRN          Electronically signed by Marce Pritchett MD on 5/6/2021 at 2:49 PM

## 2021-05-06 NOTE — PROGRESS NOTES
Per , patient may go to [address] [place] [address] until bed available at [facility].

## 2021-05-06 NOTE — PROCEDURES
[address], [address] [place] [address]                            CARDIAC CATHETERIZATION    PATIENT NAME: Criss Diaz                  :        1955  MED REC NO:   [id number]                          ROOM:  ACCOUNT NO:   [de-identified]                           ADMIT DATE: 2021  PROVIDER:     Yamini Cyr MD    DATE OF PROCEDURE:  2021    INDICATION:  Heart failure, coronary artery disease, abnormal stress  test.    This is a 67-year-old male patient brought to cath lab today. Informed  consent was obtained from the patient. The patient was prepped and  draped in a sterile fashion. The patient was injected with 5 mL of 2%  lidocaine in the right radial region. Using a radial needle, the right  radial artery was cannulized, and a 5/6-Namibian sheath was placed in the  right radial artery, and a 5-Namibian venous sheath was placed in the  right brachial vein. Entire procedure was done using guidewire, sheath  was flushed in between the procedure. Next, using a TIG catheter, right coronary angiogram was performed. Right coronary angiogram revealed right coronary artery has a proximal  90% stenosis noted, followed by a mid 99% calcified stenosis noted. AYLA 2 flow is noted. In the PD and the PL branch, there is a  competitive flow noted from the left system also present. Using a pigtail catheter, EDP was measured. EDP is around 28 mmHg  present. On the pullback, there is no gradient present across the  aortic valve. Using a TIG catheter, left coronary angiogram was performed. Left  coronary angiogram revealed the left main is patent. It trifurcates  into LAD, circ, and ramus branch. Circ is a medium-sized vessel, gives  off the medium-sized branch. OM has mild disease noted. There is a  collateral circulation filling the distal right coronary artery.   The  ramus is a small-sized vessel, that is patent. LAD is a medium-sized  vessel, reaches and wraps the apex. Gives off the large diagonal  branch. LAD has a mid 90% stenosis noted right at the diagonal branch,  and then followed by distal apical LAD has a 99% stenosis noted with a  left-to-left collateral circulation present. Right heart catheterization was performed. Right heart catheterization  shows that RA pressure is 15/13, with a mean of 14, RV is 42/21 with a  mean of 22, and PA is 45/20 with a mean of 33. Pulmonary wedge pressure  is 37/38 with a mean of 35 present. IMPRESSION:  1. Left main is patent. 2.  Circ has mild disease noted. 3.  Ramus is a small-sized vessel that is patent. 4. LAD has a mid 90% stenosis noted, followed by apical LAD 99%  stenosis noted, with a left-to-left collateral circulation present. There is a collateral circulation filling the distal right coronary  artery. 5.  Right coronary artery has a proximal 90% stenosis, followed by a mid  99% stenosis noted. There is a AYLA 2 flow present. 6.  EDP is around 28 mmHg present. 7.  Right heart pressure is elevated. The patient has a severe two-vessel coronary artery disease noted of the  LAD and the right coronary artery, and elevated right heart pressure  present. The patient is at a very high risk for surgery because of his multiple  comorbid conditions, renal insufficiency, and pulmonary hypertension. Discussed with the family, and they would like to have a second opinion,  and we will transfer the patient to [facility] for a high-risk  angioplasty. The patient will be followed further.       Blood loss 20cc    Cardiac rehab    Sally Mckinney MD    D: 05/06/2021 11:25:35       T: 05/06/2021 11:33:24     [id number]  Job#: [id number]     Doc#: [id number]    CC:

## 2021-05-07 NOTE — DISCHARGE SUMMARY
carvedilol (COREG) 12.5 MG tablet  Take 1 tablet by mouth 2 times daily (with meals)             DULoxetine (CYMBALTA) 30 MG extended release capsule  30 mg daily              isosorbide mononitrate (IMDUR) 30 MG extended release tablet  TAKE 1 TABLET BY MOUTH EVERY DAY             lisinopril (PRINIVIL;ZESTRIL) 20 MG tablet  Take 1 tablet by mouth daily             omeprazole (PRILOSEC) 20 MG delayed release capsule  Take 20 mg by mouth daily             ranolazine (RANEXA) 500 MG extended release tablet  Take 1 tablet by mouth 2 times daily             testosterone cypionate (DEPOTESTOTERONE CYPIONATE) 200 MG/ML injection  200 mg every 14 days. umeclidinium-vilanterol (ANORO ELLIPTA) 62.5-25 MCG/INH AEPB inhaler  Inhale 1 puff into the lungs daily             vitamin D (ERGOCALCIFEROL) 1.25 MG (92617 UT) CAPS capsule  Take 1 capsule by mouth once a week                 Objective Findings at Discharge:   BP (!) 147/95   Pulse 90   Temp 98.3 °F (36.8 °C) (Oral)   Resp 19   Ht 5' 5\" (1.651 m)   Wt 235 lb (106.6 kg)   SpO2 97%   BMI 39.11 kg/m²            PHYSICAL EXAM   GEN Awake male, sitting upright in bed in no apparent distress. Appears given age. EYES Pupils are equally round. No scleral erythema, discharge, or conjunctivitis. HENT Mucous membranes are moist. Oral pharynx without exudates, no evidence of thrush. NECK Supple, no apparent thyromegaly or masses. RESP Clear to auscultation, no wheezes, rales or rhonchi. Symmetric chest movement while on room air. CARDIO/VASC S1/S2 auscultated. Regular rate without appreciable murmurs, rubs, or gallops. No JVD or carotid bruits. Peripheral pulses equal bilaterally and palpable. No peripheral edema. GI Abdomen is soft without significant tenderness, masses, or guarding. Bowel sounds are normoactive. Rectal exam deferred.  No costovertebral angle tenderness. Hart catheter is not present.   HEME/LYMPH No palpable cervical lymphadenopathy and no hepatosplenomegaly. No petechiae or ecchymoses. MSK No gross joint deformities. SKIN Normal coloration, warm, dry. NEURO Cranial nerves appear grossly intact, normal speech, no lateralizing weakness. PSYCH Awake, alert, oriented x 4. Affect appropriate. BMP/CBC  No results for input(s): NA, K, CL, CO2, BUN, CREATININE, WBC, HEMOGLOBIN, HCT, PLT in the last 72 hours.     Invalid input(s): GLU    IMAGING:  As above    Discharge Time of 25 minutes    Electronically signed by Babak Sal MD on 5/7/2021 at 6:59 AM

## 2021-05-13 PROBLEM — N18.30 STAGE 3 CHRONIC KIDNEY DISEASE (HCC): Status: ACTIVE | Noted: 2020-11-20

## 2021-07-02 PROBLEM — R07.9 CHEST PAIN: Status: ACTIVE | Noted: 2021-01-01

## 2021-07-02 NOTE — ED PROVIDER NOTES
12 lead EKG per my interpretation:  Sinus Tachycardia 102  Axis is   Normal  QTc is  432  There is no specific T wave changes appreciated. There is no specific ST wave changes appreciated.     Prior EKG to compare with was not available         Kalpana Garg DO  07/02/21 0452

## 2021-07-02 NOTE — ED PROVIDER NOTES
EMERGENCY DEPARTMENT ENCOUNTER        PCP: [id number]  [facility]    Chief Complaint   Patient presents with    Chest Pain    Shortness of Breath     This patient was not evaluated by the attending physician. I have independently evaluated this patient. HPI    Cinthya Brunson is a 77 y.o. male who presents with left-sided chest pain and shortness of breath. Onset 2 days ago. Patient describes chest pain as dull. No known aggravating or alleviating factors. Patient states he saw his cardiologist today who sent him to the emergency department for evaluation. Patient has had stents placed earlier this May and again in June. Patient states he was recently started on water pill which did help with his breathing. Patient denies lower extremity pain or swelling. REVIEW OF SYSTEMS    Constitutional:  Denies fever, chills. HENT:  Denies sore throat or ear pain   Cardiovascular:  See HPI. Respiratory:   See HPI.       GI:  Denies abdominal pain, vomiting, or diarrhea  :  Denies any urinary symptoms   Musculoskeletal:  Denies back pain   Skin:  Denies rash  Neurologic:  Denies focal weakness or sensory changes   Lymphatic:  Denies swollen glands     All other review of systems are negative  See HPI and nursing notes for additional information     PAST MEDICAL & SURGICAL HISTORY    Past Medical History:   Diagnosis Date    [facility])     testicular    COPD, moderate ([address] 11/30/2020    Excessive daytime sleepiness 11/30/2020    GERD (gastroesophageal reflux disease)     Hypertension     Obstructive sleep apnea 2/3/2021    Pulmonary emphysema determined by X-[facility] ([facility] 11/30/2020    Testicular hypofunction     thus on testosterone    Tobacco abuse 11/30/2020     Past Surgical History:   Procedure Laterality Date    ABDOMINAL AORTIC ANEURYSM REPAIR, ENDOVASCULAR  11/30/2015    endologix aaa device  mri conditional  card scanned into pacs    HERNIA REPAIR      LEG SURGERY  10/2020 camncer removed       CURRENT MEDICATIONS        ALLERGIES    No Known Allergies    SOCIAL & FAMILY HISTORY    Social History     Socioeconomic History    Marital status: Single     Spouse name: None    Number of children: None    Years of education: None    Highest education level: None   Occupational History    None   Tobacco Use    Smoking status: Former Smoker     Quit date: 2020     Years since quittin.6    Smokeless tobacco: Never Used   Substance and Sexual Activity    Alcohol use: Never    Drug use: Never    Sexual activity: None   Other Topics Concern    None   Social History Narrative    None     Social Determinants of Health     Financial Resource Strain:     Difficulty of Paying Living Expenses:    Food Insecurity:     Worried About Running Out of Food in the Last Year:     Ran Out of Food in the Last Year:    Transportation Needs:     Lack of Transportation (Medical):  Lack of Transportation (Non-Medical):    Physical Activity:     Days of Exercise per Week:     Minutes of Exercise per Session:    Stress:     Feeling of Stress :    Social Connections:     Frequency of Communication with Friends and Family:     Frequency of Social Gatherings with Friends and Family:     Attends [employer] Services:     Active Member of Clubs or Organizations:     Attends Club or Organization Meetings:     Marital Status:    Intimate Partner Violence:     Fear of Current or Ex-Partner:     Emotionally Abused:     Physically Abused:     Sexually Abused:      History reviewed. No pertinent family history. PHYSICAL EXAM    VITAL SIGNS: /81 Comment: Simultaneous filing. User may not have seen previous data. Pulse 80 Comment: Simultaneous filing. User may not have seen previous data.   Temp 98.3 °F (36.8 °C) (Oral)   Resp 14   Ht 5' 7\" (1.702 m)   Wt (!) 339 lb (153.8 kg)   SpO2 100%   BMI 53.09 kg/m²    Constitutional: Elderly male, no acute distress   HENT: Atraumatic, moist mucus membranes  Neck/Lymphatics: supple, no JVD, no swollen nodes  Respiratory:  Lungs Clear, no retractions   Cardiovascular: Normal rate and rhythm  Vascular: Radial pulses 2+ equal bilaterally  GI:  Soft, nontender, normal bowel sounds  Musculoskeletal:  No edema, no deformities  Integument:  Skin warm and dry, no petechiae   Neurologic:  Alert & oriented, normal speech  Psych: Pleasant affect, no hallucinations      EKG Interpretation  Please see ED physician's note for EKG interpretation      RADIOLOGY/PROCEDURES    CT CHEST WO CONTRAST   Preliminary Result   1. Mild emphysematous changes with no acute cardiopulmonary findings. 2. Cholelithiasis. 3. Indeterminate 3.4 x 2.9 cm mildly hyperdense lesion in the upper spleen   new since 2012. Findings are likely benign in the absence of known   malignancy, constitutional symptoms, or immunocompromise. XR CHEST (2 VW)   Final Result   No acute abnormality.                LABS:  Results for orders placed or performed during the hospital encounter of 07/02/21   CBC Auto Differential   Result Value Ref Range    WBC 7.4 4.0 - 10.5 K/CU MM    RBC 4.35 (L) 4.6 - 6.2 M/CU MM    Hemoglobin 13.5 13.5 - 18.0 GM/DL    Hematocrit 42.0 42 - 52 %    MCV 96.6 78 - 100 FL    MCH 31.0 27 - 31 PG    MCHC 32.1 32.0 - 36.0 %    RDW 14.3 11.7 - 14.9 %    Platelets 207 707 - 429 K/CU MM    MPV 10.4 7.5 - 11.1 FL    Differential Type AUTOMATED DIFFERENTIAL     Segs Relative 55.7 36 - 66 %    Lymphocytes % 25.2 24 - 44 %    Monocytes % 7.2 (H) 0 - 4 %    Eosinophils % 10.6 (H) 0 - 3 %    Basophils % 1.0 0 - 1 %    Segs Absolute 4.1 K/CU MM    Lymphocytes Absolute 1.9 K/CU MM    Monocytes Absolute 0.5 K/CU MM    Eosinophils Absolute 0.8 K/CU MM    Basophils Absolute 0.1 K/CU MM    Nucleated RBC % 0.0 %    Total Nucleated RBC 0.0 K/CU MM    Total Immature Neutrophil 0.02 K/CU MM    Immature Neutrophil % 0.3 0 - 0.43 %   Comprehensive Metabolic Panel   Result in emergency department. Clinical  IMPRESSION    1. Chest pain, unspecified type    2. Shortness of breath    3. Elevated troponin        Patient admitted      Comment: Please note this report has been produced using speech recognition software and may contain errors related to that system including errors in grammar, punctuation, and spelling, as well as words and phrases that may be inappropriate. If there are any questions or concerns please feel free to contact the dictating provider for clarification.        Asher Hernandez PA-C  07/02/21 3150

## 2021-07-02 NOTE — H&P
History and Physical      Name:  Bala Finley /Age/Sex: 1955  (77 y.o. male)   MRN & CSN:  [id number] & [id number] Admission Date/Time: 2021  4:51 PM   Location:  ED19/ED-19 PCP: Mattie Tipton Day: 1    Assessment and Plan:   Bala Finley is a 77 y.o.  male PCI to RCA on 2021 and stent to LAD and diagonal on 2021 who presents with chest pressure, shortness of breath    Assessment and plan:  Chest pain rule out ACS:   · Troponin 0 0.019,BP 97/47, MAP 64,  EKG sinus tachycardia, , QTc 432. Heart score: 6 points moderate score  · Aspirin 243 at 1726  · X-ray no acute abnormality  ·   EDMAR on CKD stage III: CR ending up 2.0- 2.3(baseline CR 1.9). On Lasix, had additional dose of Lasix last night. Will hold ACE/ARB's and Lasix for now    Acute on Chronic systolic heart failure: proBNP 2530 (BNP 3383 7 2020) Weight gain. No lower extremity edema x-ray nonacute EF 25±5%-, echo 2021    NIDDM type II: Hemoglobin A1c 2021 8.4%-    Essential hypertension: BP low side, hold lisinopril, Coreg    CAD s/p PCI to RCA on 2021 and drug-eluting stent to the LAD and diagonal 2021 on DAPT  Plavix and aspirin:    COPD: Currently on room air, not on exacerbation, continue albuterol    Ex-smoker: Quit 2021,  1 PPD 45 years    Chronic conditions:  Ischemic cardiomyopathy:   Hyperlipidemia continue statin   obstructive sleep apnea: Respiratory care evaluation  AAA s/p repair   GERD    Plan:  · Trend serial troponins q. 3 hours x 2  · Continue aspirin and Plavix, patient had recent stent placed, 2021 currently on D AP T.  · Continue cardiac monitoring  · Inpatient cardiology consulted: Appreciate recommendation. · Discussed case with Dr. Teresita Muro. will continue trending serial troponins and hold lisinopril and Lasix as recommended.   · Avoid nephrotoxic drugs, renal dosing  · BMP and CBC daily  · Daily weight recording  · Strict intake output chart  · Accu-Chek before meals and at bedtime, SSI  · Continue bronchodilator, respiratory care evaluation  · Keep SPO2>92%      Diet  carb controlled diet   DVT Prophylaxis [] Lovenox, []  Heparin, [x] SCDs, [] Ambulation   GI Prophylaxis [] PPI,  [] H2 Blocker,  [] Carafate,  [] Diet/Tube Feeds   Code Status  full code   Disposition Patient requires continued admission due to chest pressure, shortness of breath   MDM [] Low, [x] Moderate,[]  High       History of Present Illness:     Chief Complaint: <principal problem not specified> chest pressure/shortness of breath  Levi Damon is a 77 y.o.  male CAD s/p PCI and stent, hypertension, hyperlipidemia, diabetes mellitus, ex-smoker who presents with chest pressure and shortness of breath     Patient recently had PCI drug-eluting stent x 2, LAD and diagonal on 6/1/2021. He started cardiac rehab last week. While he was at the rehab yesterday, he felt fatigue, chest pressure, shortness of breath and they  have to stop therapy. He was seen at the cardiology office yesterday for the same and was prescribed to take an additional dose of Lasix last night. He took the dose and felt little bit better but  started having shortness of breath and chest pressure around 1:30 AM.  He describes the chest pressure as burning, left side with no radiation. Its intermittent, without diaphoresis, dizziness or tearing back pain. Weight gain of 238 to 250lbs in a week. He took  his morning medications and was not feeling better. His daughter forced him to come to the ER. He called his cardiologist office who also recommended him to come to the ER. Patient states he has chronic numbness on the right leg and trouble with left knee       Review of Systems   Constitutional: Positive for fatigue and unexpected weight change. Negative for activity change, chills and fever. HENT: Negative. Eyes: Negative. Respiratory: Positive for shortness of breath.  Negative for cough, chest tightness, wheezing and stridor. Cardiovascular: Negative for palpitations and leg swelling. Chest pressure   Gastrointestinal: Positive for abdominal distention. Negative for abdominal pain, blood in stool, diarrhea, nausea and vomiting. Genitourinary: Negative for dysuria, flank pain, hematuria and urgency. Musculoskeletal: Positive for back pain and gait problem. Negative for arthralgias and myalgias. Right leg numbness   Skin: Negative for color change and pallor. Neurological: Positive for headaches. Negative for tremors, weakness and light-headedness. Psychiatric/Behavioral: Negative for agitation and hallucinations. The patient is not nervous/anxious. Ten point ROS reviewed negative, unless as noted above    Objective:   No intake or output data in the 24 hours ending 07/02/21 1816   Vitals:   Vitals:    07/02/21 1714   BP: 102/81   Pulse: 80   Resp: 14   Temp: 98.3   SpO2: 100%     Physical Exam:   Physical Exam  Constitutional:       Appearance: Normal appearance. He is obese. He is not ill-appearing or toxic-appearing. HENT:      Head: Normocephalic and atraumatic. Cardiovascular:      Rate and Rhythm: Normal rate and regular rhythm. Pulses: Normal pulses. Heart sounds: Normal heart sounds. No murmur heard. No friction rub. Comments: Peripheral pulses equal bilaterally and palpable  Pulmonary:      Effort: Pulmonary effort is normal.      Breath sounds: Examination of the right-lower field reveals decreased breath sounds. Examination of the left-lower field reveals decreased breath sounds. Decreased breath sounds present. Chest:      Chest wall: No tenderness. Abdominal:      General: Bowel sounds are normal. There is no distension. Palpations: Abdomen is soft. Tenderness: There is no abdominal tenderness. Musculoskeletal:         General: No swelling. Normal range of motion. Right lower leg: No edema.       Left lower leg: No edema.   Skin:     General: Skin is warm. Neurological:      General: No focal deficit present. Mental Status: He is alert. Mental status is at baseline. Cranial Nerves: No cranial nerve deficit. Psychiatric:         Mood and Affect: Mood normal.       Past Medical History:      Past Medical History:   Diagnosis Date    Cancer ([facility]     testicular    COPD, moderate ([facility] 2020    Excessive daytime sleepiness 2020    GERD (gastroesophageal reflux disease)     Hypertension     Obstructive sleep apnea 2/3/2021    Pulmonary emphysema determined by X-ray [facility]) 2020    Testicular hypofunction     thus on testosterone    Tobacco abuse 2020     PSHX:  has a past surgical history that includes hernia repair; AAA repair, endovascular (2015); and Leg Surgery (10/2020). Allergies: No Known Allergies    FAM HX: family history is not on file. Soc HX:   Social History     Socioeconomic History    Marital status: Single     Spouse name: None    Number of children: None    Years of education: None    Highest education level: None   Occupational History    None   Tobacco Use    Smoking status: Former Smoker     Quit date: 2020     Years since quittin.6    Smokeless tobacco: Never Used   Substance and Sexual Activity    Alcohol use: Never    Drug use: Never    Sexual activity: None   Other Topics Concern    None   Social History Narrative    None     Social Determinants of Health     Financial Resource Strain:     Difficulty of Paying Living Expenses:    Food Insecurity:     Worried About Running Out of Food in the Last Year:     [address] in the Last Year:    Transportation Needs:     Lack of Transportation (Medical):      Lack of Transportation (Non-Medical):    Physical Activity:     Days of Exercise per Week:     Minutes of Exercise per Session:    Stress:     Feeling of Stress :    Social Connections:     Frequency of Communication with Friends and Family:     Frequency of Social Gatherings with Friends and Family:     Attends [employer] Services:     Active Member of Clubs or Organizations:     Attends Club or Organization Meetings:     Marital Status:    Intimate Partner Violence:     Fear of Current or Ex-Partner:     Emotionally Abused:     Physically Abused:     Sexually Abused:        Medications:   Medications:    No current facility-administered medications on file prior to encounter. Current Outpatient Medications on File Prior to Encounter   Medication Sig Dispense Refill    clopidogrel (PLAVIX) 75 MG tablet Take 75 mg by mouth daily      fluticasone (FLONASE) 50 MCG/ACT nasal spray 1 spray by Each Nostril route daily      lisinopril (PRINIVIL;ZESTRIL) 40 MG tablet Take 1 tablet by mouth daily 30 tablet 2    aspirin 81 MG chewable tablet Take 1 tablet by mouth daily 30 tablet 3    atorvastatin (LIPITOR) 40 MG tablet Take 1 tablet by mouth nightly 30 tablet 3    carvedilol (COREG) 12.5 MG tablet Take 1 tablet by mouth 2 times daily (with meals) 60 tablet 3    umeclidinium-vilanterol (ANORO ELLIPTA) 62.5-25 MCG/INH AEPB inhaler Inhale 1 puff into the lungs daily 1 each 11    albuterol sulfate HFA (PROAIR HFA) 108 (90 Base) MCG/ACT inhaler Inhale 2 puffs into the lungs every 6 hours as needed for Wheezing 1 Inhaler 11    testosterone cypionate (DEPOTESTOTERONE CYPIONATE) 200 MG/ML injection 200 mg every 14 days. 0     Infusions:   PRN Meds:     XR CHEST (2 VW)  Result Date: 7/2/2021  No acute abnormality. EChocardiogram 5/7/2021  Left ventricular chamber dimension is mildly enlarged.     2. Left ventricular systolic function is severely reduced, with ejection   fraction estimated at 25 +/- 5% with wall motion abnormalities consistent with   ischemic cardiomyopathy. .     3. No left ventricular thrombus visualized.     4. The left ventricular diastolic function is grade I diastolic dysfunction,   consistent with low or normal atrial pressures.     5. The aortic root is dilated measuring 4.1 cm with an index of 1.8 cm/m2.     6. No significant valvular heart disease is present.     7. There is no comparison study available.        Electronically signed by MICHAEL Quintanilla on 7/2/2021 at 6:16 PM

## 2021-07-03 NOTE — CONSULTS
Dictated-[id number]  CAD s/p high risk PCI to RCA and LAD at [facility] about a month ago  Previous to that had an abnormal stress OP in May that showed MI with greater than 60% myocardial perfusion absent and EF 21%  Has been developing more SOB and orthopnea the last week or so  Now has a mild Lt.  Sided CP the last 2 days-not similar to CP he was having in April and May  Would admit  Trop mildly elevated- likely related to HF-continue to trend  IV Lasix for elevated BNP-watch Cr-He sees Dr. Mary Lozano if renal consult needed  Will follow  Thanks for consult      PCI in May 2021- two stents then June 2021-two stents  He has been dyspneic and weight gain  Dyspnea when he lays down  Has some chest pain, but no heaviness  No cough  Hx of CAD and PCI and HFrEf    Cath--5/2021  DICTATED --[id number]  LEFT MAIN PATENT  LAD MID 90% AT LARGE DIAGONAL AND DISTAL LAD 99% STENOSIS  RAMUS SMALL AND PATENT  LCX MILD DX  RCA PROXIMAL 90% AND MID 99% STENOSIS WITH AYLA-II FLOW =-RIGHT TO RIGHT AND LEFT TO RIGHT COLLATERAL  LVEDP 28  MODERATELY ELEVATED RIGHT HEART PRESSURES  SEVERE 2 V CAD  HFrEF-PULMONARY HTN     RIGHT RADIAL AND RIGHT BRACHIAL APPROACH  WILL DISCUSS WITH PATIENT AND FAMILY AND MAKE DECISION  HIGH RISK FOR CABG WITH MULTIPLE COMORBID MEDICAL CONDITIONS       Will try low dose dobutamine  Check for [place]      Electronically signed by Lemont Cheadle, MD on 7/2/21 at 8:18 PM EDT

## 2021-07-03 NOTE — PLAN OF CARE
Problem: Infection:  Goal: Will remain free from infection  Description: Will remain free from infection  7/3/2021 1055 by Alexsandra Cabezas RN  Outcome: Met This Shift  0/1/4059 6574 by Simón Brian RN  Outcome: Ongoing     Problem: Safety:  Goal: Free from accidental physical injury  Description: Free from accidental physical injury  7/3/2021 1055 by Alexsandra Cabezas RN  Outcome: Met This Shift  1/6/5805 1649 by Simón Brian RN  Outcome: Ongoing  Goal: Free from intentional harm  Description: Free from intentional harm  7/3/2021 1055 by Alexsandra Cabezas RN  Outcome: Met This Shift  0/8/7821 1054 by Simón Brian RN  Outcome: Ongoing     Problem: Daily Care:  Goal: Daily care needs are met  Description: Daily care needs are met  7/3/2021 1055 by Alexsandra Cabezas RN  Outcome: Met This Shift  8/8/5165 1676 by Simón Brian RN  Outcome: Ongoing     Problem: Pain:  Goal: Patient's pain/discomfort is manageable  Description: Patient's pain/discomfort is manageable  7/3/2021 1055 by Alexsandra Cabezas RN  Outcome: Met This Shift  1/3/5783 4134 by Simón Brian RN  Outcome: Ongoing     Problem: Skin Integrity:  Goal: Skin integrity will stabilize  Description: Skin integrity will stabilize  7/3/2021 1055 by Alexsandra Cabezas RN  Outcome: Met This Shift  9/9/0563 3935 by Simón Brian RN  Outcome: Ongoing

## 2021-07-03 NOTE — PROGRESS NOTES
Daily Progress Note     Pt. Awake, alert and feeling better  HR stable, ST at 105, BP low but stable  No CP, SOB improved    Feeling better-able to lay flat  Check echo today  Hx of CAD and PCI and hx of HFrEF and renal insuffiencey   Change to Toprol to control rate and keep on low dose dobutamine for now and add ProAmatine  Home by Monday if stable  Trop -no ACS -may need diuretics  May need an ICD if EF does not improve  Optimize medical treatment for heart failure based on renal function and BP      Acute on Chronic HFrEF    EF 20% range on last check    Recently PCI done at [facility]     Will need echo repeated when able    BNP elevated but downtrending    ON dobutamine    Change coreg to toprol as BP dropped taking it    Will add midodrine    CKD    Stable at 2.2    ON dobutamine and is not worsening    Stable for now    CAD s/p PCI    Cont. DAPT and BB    On Statin    Trop elevated d/t HF no ACS    Please check apnea link tonight  Will follow    [facility]-5/6/21  IMPRESSION:  1. Left main is patent. 2.  Circ has mild disease noted. 3.  Ramus is a small-sized vessel that is patent. 4. LAD has a mid 90% stenosis noted, followed by apical LAD 99%  stenosis noted, with a left-to-left collateral circulation present. There is a collateral circulation filling the distal right coronary  artery. 5.  Right coronary artery has a proximal 90% stenosis, followed by a mid  99% stenosis noted. There is a AYLA 2 flow present. 6.  EDP is around 28 mmHg present. 7.  Right heart pressure is elevated. PAST MEDICAL HISTORY:  Testicular cancer, COPD, GERD, hypertension and  sleep apnea.     PAST SURGICAL HISTORY:  AAA repair back in 2015, leg surgery in 2020 and  CAD status post multiple angioplasties and drug-eluting stents placed  last month.     ALLERGIES:  No known drug allergies.     FAMILY HISTORY:  Reviewed, noncontributory.     SOCIAL HISTORY:  The patient is a former smoker who quit about 3 months  ago.   He denies alcohol use.     HOME MEDICATIONS:  The patient was on Plavix 75 daily, Flonase,  lisinopril 40 mg daily, aspirin 81 mg daily, Lipitor 40 mg at bedtime,  Coreg 12.5 mg b.i.d., _____, Provera and testosterone. Objective:   BP (!) 96/59   Pulse 112   Temp 97.6 °F (36.4 °C) (Oral)   Resp 15   Ht 5' 7\" (1.702 m)   Wt 237 lb 11.2 oz (107.8 kg)   SpO2 95%   BMI 37.23 kg/m²       Intake/Output Summary (Last 24 hours) at 7/3/2021 1111  Last data filed at 7/3/2021 0203  Gross per 24 hour   Intake 240 ml   Output --   Net 240 ml       Medications:   Scheduled Meds:   [START ON 7/4/2021] metoprolol succinate  25 mg Oral Daily    midodrine  5 mg Oral TID WC    aspirin  81 mg Oral Daily    clopidogrel  75 mg Oral Daily    fluticasone  1 spray Each Nostril Daily    sodium chloride flush  5-40 mL Intravenous 2 times per day    atorvastatin  40 mg Oral Nightly    insulin lispro  0-6 Units Subcutaneous TID WC    insulin lispro  0-3 Units Subcutaneous Nightly      Infusions:   sodium chloride      dextrose      DOBUTamine 2.5 mcg/kg/min (07/03/21 0902)      PRN Meds:  sodium chloride flush, sodium chloride, acetaminophen **OR** acetaminophen, nitroGLYCERIN, glucose, dextrose, glucagon (rDNA), dextrose, albuterol sulfate HFA       Physical Exam:  Vitals:    07/03/21 1001   BP: (!) 96/59   Pulse: 112   Resp: 15   Temp:    SpO2:         General: AAO, NAD  Chest: Nontender  Cardiac: First and Second Heart Sounds are Normal, No Murmurs or Gallops noted  Lungs:Clear to auscultation and percussion. Abdomen: Soft, NT, ND, +BS  Extremities: No clubbing, no edema  Vascular:  Equal 2+ peripheral pulses.         Lab Data:  CBC:   Recent Labs     07/02/21  1521 07/03/21  0050   WBC 7.4 7.6   HGB 13.5 12.3*   HCT 42.0 37.6*   MCV 96.6 95.9    199     BMP:   Recent Labs     07/01/21  0903 07/02/21  1521 07/03/21  0050    138 138   K 4.5 4.1 4.0   CL 99 98* 100   CO2 29 31 29   BUN 21 26* 29*   CREATININE 2.0*

## 2021-07-03 NOTE — PROGRESS NOTES
Hospitalist Progress Note      Name:  Sal Crawley /Age/Sex: 1955  (77 y.o. male)   MRN & CSN:  [id number] & [id number] Admission Date/Time: 2021  4:51 PM   Location:  [address] [place] [address] PCP: Rodrigo Qiu Day: 2    Assessment and Plan:   Sal Crawley is a 77 y.o.  male  who presents with <principal problem not specified>    1. Acute systolic heart failure. Patient is on a follow-up of dobutamine along with Lasix. Patient seems to be improving from yesterday and feels much better. Continue present management. Melinda Monie echo results are pending troponins are slightly high but no further improvement increase is noted. 2. Acute on chronic EDMAR Baseline seems to be around 1.8 present time 2.2. If needed nephrology consult. Most likely underlying kidney disease due to diabetes and hypertension. 3. Insulin-dependent diabetes continue present management. Continue present management and assess as needed. 4.     Diet ADULT DIET; Regular; 5 carb choices (75 gm/meal)   DVT Prophylaxis [] Lovenox, []  Heparin, [] SCDs, [] Ambulation   GI Prophylaxis [] PPI,  [] H2 Blocker,  [] Carafate,  [] Diet/Tube Feeds   Code Status Full Code   Disposition Patient requires continued admission due to systolic acute congestive heart failure, acute on chronic EDMAR   MDM [] Low, [] Moderate,[x]  High  Patient's risk as above due to acute on chronic congestive heart failure systolic in nature     History of Present Illness:     Chief Complaint: <principal problem not specified>  Sal Crawley is a 77 y.o.  male  who presents with shortness of breath and orthopnea has been having some issues with shortness of breath which was exacerbated recently. Patient denies any chest pain. He has a significant history of multiple PCI's recently done  at [facility].  Cardiology is on board.   At the present time patient feels that he has improved a lot his shortness of breath is considerably better than from yesterday. Patient is able to lay down flat. Patient denies any chest pain nausea vomiting or diarrhea. He is able to urinate. No fever chills. Generalized weakness. Ten point ROS reviewed negative, unless as noted above    Objective: Intake/Output Summary (Last 24 hours) at 7/3/2021 1348  Last data filed at 7/3/2021 0905  Gross per 24 hour   Intake 240 ml   Output --   Net 240 ml      Vitals:   Vitals:    07/03/21 1203   BP: 87/60   Pulse: 77   Resp: 12   Temp:    SpO2:      Physical Exam:   GEN Awake male, sitting upright in bed in no apparent distress. Appears given age. No JVD  EYES Pupils are equally round. No scleral erythema, discharge, or conjunctivitis. HENT Mucous membranes are moist. Oral pharynx without exudates, no evidence of thrush. NECK Supple, no apparent thyromegaly or masses. No neck rigidity  RESP Clear to auscultation, no wheezes, rales or rhonchi. Symmetric chest movement while on room air. CARDIO/VASC regular rate rhythm no murmurs appreciated. GI Abdomen is soft without significant tenderness, masses, or guarding. Bowel sounds are normoactive.  not done   MSK No gross joint deformities. No obvious deformity noted. SKIN Normal coloration, warm, dry. NEURO Cranial nerves appear grossly intact, normal speech, no no unilateral weakness noted. S.  PSYCH Awake, alert, oriented x 4. Affect appropriate.     Medications:   Medications:    [START ON 7/4/2021] metoprolol succinate  25 mg Oral Daily    midodrine  5 mg Oral TID     aspirin  81 mg Oral Daily    clopidogrel  75 mg Oral Daily    fluticasone  1 spray Each Nostril Daily    sodium chloride flush  5-40 mL Intravenous 2 times per day    atorvastatin  40 mg Oral Nightly    insulin lispro  0-6 Units Subcutaneous TID     insulin lispro  0-3 Units Subcutaneous Nightly      Infusions:    sodium chloride      dextrose      DOBUTamine 2.5 mcg/kg/min (07/03/21 0902)     PRN Meds: sodium chloride flush, 5-40 mL, PRN  sodium chloride, 25 mL, PRN  acetaminophen, 650 mg, Q6H PRN   Or  acetaminophen, 650 mg, Q6H PRN  nitroGLYCERIN, 0.4 mg, Q5 Min PRN  glucose, 15 g, PRN  dextrose, 12.5 g, PRN  glucagon (rDNA), 1 mg, PRN  dextrose, 100 mL/hr, PRN  albuterol sulfate HFA, 2 puff, Q6H PRN          Electronically signed by Kenney Duke MD on 7/3/2021 at 1:48 PM

## 2021-07-03 NOTE — PROGRESS NOTES
Patient has had some bigem PVCs and a short run of SVT-asymptomatic. Dr Madelaine Gibson notified.  CW

## 2021-07-03 NOTE — CONSULTS
Nutrition Education    Consult received for heart failure diet guidelines. A1C 6.9 noted. Pt is currently enrolled in [address] [place] [address].     · Verbally reviewed information with Patient and Family  · Educated on Heart Healthy Consistent Carbohydrate Nutrition Therapy (Nutrition Care Manual), and encouraged Pt to comply with the [address] [place] [address] for best results. · Written educational materials provided. · Contact name and number provided. · Refer to Patient Education activity for more details.     Electronically signed by Asmita Perez RD, LD on 7/3/21 at 3:07 PM EDT    Contact: [phone]

## 2021-07-03 NOTE — CONSULTS
[address], [facility]                                  CONSULTATION    PATIENT NAME: Bob Godinez                  :        1955  MED REC NO:   [id number]                          ROOM:       3018  ACCOUNT NO:   [de-identified]                           ADMIT DATE: 2021  PROVIDER:     JUAN Harden    CONSULT DATE:  2021    Consult from Kel Villegas PA-C to Kai Lizama MD    CHIEF COMPLAINT:  Chest pain, shortness of breath. HISTORY OF PRESENT ILLNESS:  This is a 68-year-old male with a past  medical history of CAD status post multiple PCIs done recently on  2021 at [facility].  The patient was seen at the office  today and he was complaining of significant shortness of breath and  orthopnea symptoms that appeared to be getting worse, and new onset  left-sided chest pain that had started about 3 days prior. The patient  recently had been scheduled for an outpatient heart catheterization due  to a severely abnormal stress test that was noted outpatient. Left  heart cath was done on 2021 and it showed left main was patent. Left circ had mild disease. Ramus had mild disease. LAD had a 90%  stenosis and then a distal 99% stenosis. RCA also had a 90% stenosis  with a distal 99% stenosis. He was deemed to be very high risk for  surgery and therefore was sent to [facility] for high-risk PCI. PCI was done to both vessels on 2021. He states that he felt  better initially but over the past 1-2 weeks has started to feel more  short of breath and had a lot of trouble breathing when he lays down at  night time. He did start cardiac rehab recently and while he was at  rehab, he felt very fatigued, some chest pressure, shortness of breath  and he was told to stop his therapy because his heart rate and his blood  pressure were both elevated.   Therefore, an x4.  PSYCHIATRIC:  Normal mood and affect. RADIOLOGY:  Chest x-ray was checked in the ER and it shows no acute  abnormality. EKG was checked as well and it shows sinus tachycardia at  rate 102 with inferior infarct and possible anterolateral infarct. This  is fairly similar to his previous EKGs and he did have an infarct that  was confirmed on his recent stress test that was done outpatient. Last  stress test was done at beginning of May of this year and it showed that  his EF was reduced to 21% and it showed inferior apical MI with greater  than 60% myocardial profusion absent and he had mild to moderate  abdirizak-infarct ischemia as well. Therefore, left heart catheterization  was scheduled. Left heart cath was done here at the hospital on  05/06/2021 and left main was negative. Left circ had mild disease. Ramus had mild disease. LAD had a 90% stenosis and a distal 99%  stenosis. RCA had a proximal 90% stenosis and a distal 99% stenosis as  well. He was sent to [facility] for high-risk angioplasty which was done  on both his LAD and RCA. LABORATORY DATA:  BMP is checked. His creatinine is elevated at 2.3, it  was 2.0 yesterday. His baseline is around 1.9, otherwise electrolytes  are within normal limits. Magnesium is stable at 2.3. His BNP is  elevated at 2500, which is actually improved from 3300 yesterday. His  troponin is elevated at 0.019. LFTs within normal limits. CBC within  normal limits. IMPRESSION:  A 64-year-old male with a recent high-risk PCI done about a  month ago at [facility].  He has been getting more short of  breath and complaining of orthopnea symptoms over the last week or so. Now with a mild left-sided chest pain. Chest pain is atypical.  I  believe that it is noncardiac in origin. His troponin is mildly  elevated, most likely it is from heart failure and demand ischemia. Would continue to trend troponin.   His BNP is elevated, so I will put  him on IV Lasix at this time but need to be careful and watch  creatinine. Would consider Renal consult if necessary. We will order  echocardiogram to be done on Monday, if he is still here. Otherwise, we  will follow up with him outpatient if he is discharged over the weekend. Further treatment dictated by hospital course.       Agree with above    Electronically signed by Marcelo Jonas MD on 7/3/21 at 1:04 PM EDT      JUAN Terry    D: 07/02/2021 19:59:11       T: 07/02/2021 20:08:29     [id number]  Job#: [id number]     Doc#: [id number]    CC:  Marcelo Jonas MD

## 2021-07-04 PROBLEM — I50.23 CHF (CONGESTIVE HEART FAILURE), NYHA CLASS II, ACUTE ON CHRONIC, SYSTOLIC (HCC): Chronic | Status: ACTIVE | Noted: 2021-01-01

## 2021-07-04 PROBLEM — I50.43 CHF (CONGESTIVE HEART FAILURE), NYHA CLASS I, ACUTE ON CHRONIC, COMBINED (HCC): Status: ACTIVE | Noted: 2021-01-01

## 2021-07-04 NOTE — PROGRESS NOTES
HOTN and CKD    Can this be changed to ARNI: No    B-blocker Yes    Persistently symptomatic AA with NYHA class III-IV  EF < 35 despite being on ACE/ ARB/ARNI: No  hydralazine + Nitrate No,d/t HOTN    Loop Diuretic No, will require on D/C however    NYHA class II-IV with eGFR >30/mil/min/173.m2 and K <5.0 mEq/l   mineralcorctcoid receptor antagonist (aldactone/ eplerenone) in addition to ACE or ARB and in conjunction with B blocker  No, d/t HOTN     HR greater than 70 with patient with LVEF  < 35 Yes  Able to use Ivabradine Yes, may add at OP, would like to OMT with Toprol first if able       PAST MEDICAL HISTORY:  Testicular cancer, COPD, GERD, hypertension and  sleep apnea.     PAST SURGICAL HISTORY:  AAA repair back in 2015, leg surgery in 2020 and  CAD status post multiple angioplasties and drug-eluting stents placed  last month.     ALLERGIES:  No known drug allergies.     FAMILY HISTORY:  Reviewed, noncontributory.     SOCIAL HISTORY:  The patient is a former smoker who quit about 3 months  ago. [facility] denies alcohol use.     HOME MEDICATIONS:  The patient was on Plavix 75 daily, Flonase,  lisinopril 40 mg daily, aspirin 81 mg daily, Lipitor 40 mg at bedtime,  Coreg 12.5 mg b.i.d., _____, Provera and testosterone.       Objective:   BP (!) 117/47   Pulse 87   Temp 97.8 °F (36.6 °C) (Oral)   Resp 16   Ht 5' 7\" (1.702 m)   Wt 237 lb 11.2 oz (107.8 kg)   SpO2 95%   BMI 37.23 kg/m²       Intake/Output Summary (Last 24 hours) at 7/4/2021 1039  Last data filed at 7/4/2021 0522  Gross per 24 hour   Intake 355.96 ml   Output 1475 ml   Net -1119.04 ml       Medications:   Scheduled Meds:   metoprolol succinate  25 mg Oral Daily    midodrine  5 mg Oral TID WC    aspirin  81 mg Oral Daily    clopidogrel  75 mg Oral Daily    fluticasone  1 spray Each Nostril Daily    sodium chloride flush  5-40 mL Intravenous 2 times per day    atorvastatin  40 mg Oral Nightly    insulin lispro  0-6 Units Subcutaneous TID WC  insulin lispro  0-3 Units Subcutaneous Nightly      Infusions:   sodium chloride      dextrose        PRN Meds:  sodium chloride flush, sodium chloride, acetaminophen **OR** acetaminophen, nitroGLYCERIN, glucose, dextrose, glucagon (rDNA), dextrose, albuterol sulfate HFA       Physical Exam:  Vitals:    07/04/21 0844   BP:    Pulse:    Resp: 16   Temp:    SpO2: 95%        General: AAO, NAD  Chest: Nontender  Cardiac: First and Second Heart Sounds are Normal, No Murmurs or Gallops noted  Lungs:Clear to auscultation and percussion. Abdomen: Soft, NT, ND, +BS  Extremities: No clubbing, no edema  Vascular:  Equal 2+ peripheral pulses. Lab Data:  CBC:   Recent Labs     07/02/21  1521 07/03/21  0050   WBC 7.4 7.6   HGB 13.5 12.3*   HCT 42.0 37.6*   MCV 96.6 95.9    199     BMP:   Recent Labs     07/02/21  1521 07/02/21  1521 07/03/21  0050 07/03/21  2258 07/04/21  0244     --  138  --  135   K 4.1   < > 4.0 4.2 3.6   CL 98*  --  100  --  99   CO2 31  --  29  --  26   BUN 26*  --  29*  --  28*   CREATININE 2.3*  --  2.2*  --  2.0*    < > = values in this interval not displayed. LIVER PROFILE:   Recent Labs     07/02/21  1521 07/04/21  0244   AST 13* 10*   ALT 14 11   BILITOT 0.3 0.5   ALKPHOS 48 41     PT/INR:   Recent Labs     07/02/21  2155   PROTIME 11.5*   INR 0.89     APTT:   Recent Labs     07/02/21  2155   APTT 32.5     BNP:  No results for input(s): BNP in the last 72 hours.       Assessment:  Patient Active Problem List    Diagnosis Date Noted    Chest pain 07/02/2021    Abnormal stress test 05/06/2021    CAD in native artery 05/06/2021    Hypertensive renal disease 02/11/2021    Obstructive sleep apnea 02/03/2021    COPD, moderate ([address] 11/30/2020    Pulmonary emphysema determined by X-ray ([facility] [address] 11/30/2020    Tobacco abuse 11/30/2020    Excessive daytime sleepiness 11/30/2020    Stage 3 chronic kidney disease ([facility] [address] 11/20/2020    Dyspnea on exertion 11/20/2020    Chronic kidney disease-mineral and bone disorder 11/20/2020    Essential hypertension 11/20/2020    Hyponatremia 11/20/2020       Electronically signed by Roseann Mathews PA-C on 7/4/2021 at 10:39 AM  Electronically signed by Joel Mcmillan MD on 7/4/21 at 1:11 PM EDT

## 2021-07-04 NOTE — DISCHARGE SUMMARY
Discharge Summary    Name:  Waqar Francisco /Age/Sex: 1955  (39 y.o. male)   MRN & CSN:  [id number] & [id number] Admission Date/Time: 2021  4:51 PM   Attending:  Keo Carrero MD   Discharging Provider Keo North MD     Hospital Course:   Waqar Francisco is a 77 y.o.  male  who presents with CHF (congestive heart failure), NYHA class II, acute on chronic, [facility])    Hospital Course: Patient was admitted with some chest pain and shortness of breath which was acute in nature patient has a history of cardiomyopathy with an EF of %. Patient was admitted with acute hospital bed with telemetry bed MI was ruled out. Cardiology has involvement in his care. Patient was started on a small dose of dobutamine early on cardiac echo revealed EF of 20 to 25% with no further deterioration in function. During his stay there were few episodes of PVCs and an episode of SVT was noted. At the present time cardiology is assessing and evaluation for possible ICD which can be done as an outpatient. Patient was treated aggressively with IV Lasix to which he responded well to the treatment. At the present time patient is able to lay down flat he is able to walk around without any shortness of breath and feels back to his normal self. Patient was also noted to be in acute on chronic renal insufficiency his baseline runs about 1.8. His creatinine was 2.2 yesterday however which has improved to. Patient continues to make urine. This could be all because of the prerenal and combination of ACE inhibitor and Lasix together. At the present time the trend is positive and patient can be discharged with a creatinine with a follow-up as an outpatient. Patient has a history of diabetes on medication seems to be stable follow-up and adjust meds as needed. Oral patient impacted stable and ready for discharge.      Present on Admission:   Chest pain MI ruled out   CHF (congestive heart failure), NYHA class II, acute on chronic, systolic (HCC) acute on chronic back to compensated at the present time patient is not short of breath able to lay down flat   Chronic kidney disease-mineral and bone disorder multifactorial stable creatinine improving needs a follow-up. The patient expressed appropriate understanding of and agreement with the discharge recommendations, medications, and plan. Consults this admission:  IP CONSULT TO CARDIOLOGY  IP CONSULT TO HOSPITALIST  IP CONSULT TO HEART FAILURE NURSE/COORDINATOR  IP CONSULT TO DIETITIAN    Discharge Instruction:   Follow up appointments:  With primary care and cardiology  Primary care physician:  within 2 weeks    Diet:  regular diet and cardiac diet   Activity: activity as tolerated  Disposition: Discharged to:   [x]Home, []C, []SNF, []Acute Rehab, []Hospice Condition on discharge: Stable    Discharge Medications:      [employer]   Home Medication Instructions [id number]    Printed on:07/04/21 1110   Medication Information                      albuterol sulfate HFA (PROAIR HFA) 108 (90 Base) MCG/ACT inhaler  Inhale 2 puffs into the lungs every 6 hours as needed for Wheezing             aspirin 81 MG chewable tablet  Take 1 tablet by mouth daily             atorvastatin (LIPITOR) 40 MG tablet  Take 1 tablet by mouth nightly             budesonide-formoterol (SYMBICORT) 80-4.5 MCG/ACT AERO  Inhale 2 puffs into the lungs 2 times daily             carvedilol (COREG) 12.5 MG tablet  Take 1 tablet by mouth 2 times daily (with meals)             clopidogrel (PLAVIX) 75 MG tablet  Take 75 mg by mouth daily             fluticasone (FLONASE) 50 MCG/ACT nasal spray  1 spray by Each Nostril route daily             gabapentin (NEURONTIN) 300 MG capsule  AS DIRECTED 1 CAPSULE EVERY DAY X 1 DAY, THEN INCREASE TO TWICE A DAY THEREAFTER ORALLY 30 DAY(S)             lisinopril (PRINIVIL;ZESTRIL) 40 MG tablet  Take 1 tablet by mouth daily             loratadine (CLARITIN) 10 MG tablet  Take 10 mg by mouth daily             metoprolol tartrate (LOPRESSOR) 25 MG tablet  Take 12.5 mg by mouth 2 times daily             omeprazole (PRILOSEC) 20 MG delayed release capsule  Take 20 mg by mouth daily             pioglitazone (ACTOS) 15 MG tablet  Take 15 mg by mouth daily             simvastatin (ZOCOR) 10 MG tablet  Take 10 mg by mouth daily             testosterone cypionate (DEPOTESTOTERONE CYPIONATE) 200 MG/ML injection  200 mg every 14 days. testosterone enanthate (DELATESTRYL) 200 MG/ML injection  Inject 200 mg into the muscle every 14 days. umeclidinium-vilanterol (ANORO ELLIPTA) 62.5-25 MCG/INH AEPB inhaler  Inhale 1 puff into the lungs daily                 Objective Findings at Discharge:     Vitals:    07/04/21 0844 07/04/21 0900 07/04/21 1000 07/04/21 1100   BP:  113/65 (!) 89/54 113/71   Pulse:  88 82 86   Resp: 16 17 13 19   Temp:    97.9 °F (36.6 °C)   TempSrc:    Oral   SpO2: 95%   95%   Weight:       Height:                  Physical Exam: 07/04/21     GEN -Awake alert and oriented cooperative appearing male, sitting upright in bed , NAD.  body habitus. Appears given age. Like to sleep today has been waking up feeling back to normal self  EYES -Pupils are equally round. No scleral erythema, discharge, or conjunctivitis. HENT -MM are moist. Oral pharynx without exudates, no evidence of thrush. NECK -Supple, no apparent thyromegaly or masses. RESP -CTA, no wheezes, rales or rhonchi. Symmetric chest movement equal bilaterally  C/V -S1/S2 auscultated. RRR without appreciable M/R/G. No JVD or carotid bruits. Peripheral pulses equal bilaterally and palpable. No peripheral edema. GI -Abdomen is soft non distended and without significant tenderness. No masses or guarding. + BS Rectal exam deferred.  -data. MS -No gross joint deformities.   SKIN -Normal coloration, warm, dry.  NEURO-Cranial nerves appear grossly intact, normal speech, no lateralizing weakness. PSYC-Awake, alert, oriented x 4. Appropriate affect. Data:     Laboratory this visit:  Reviewed  Recent Labs     07/02/21  1521 07/03/21  0050   WBC 7.4 7.6   HGB 13.5 12.3*   HCT 42.0 37.6*    199      Recent Labs     07/02/21  1521 07/02/21  1521 07/03/21  0050 07/03/21  2258 07/04/21  0244     --  138  --  135   K 4.1   < > 4.0 4.2 3.6   CL 98*  --  100  --  99   CO2 31  --  29  --  26   BUN 26*  --  29*  --  28*   CREATININE 2.3*  --  2.2*  --  2.0*    < > = values in this interval not displayed. Recent Labs     07/02/21  1521 07/04/21  0244   AST 13* 10*   ALT 14 11   BILITOT 0.3 0.5   ALKPHOS 48 41     Recent Labs     07/02/21  2155   INR 0.89     No results for input(s): CKTOTAL, CKMB, CKMBINDEX in the last 72 hours. Invalid input(s): Kaykay Smoker input(s): PRO-BNP    Radiology this visit:  Reviewed. Echocardiogram complete 2D with doppler with color    Result Date: 7/3/2021  Transthoracic Echocardiography Report (TTE)  Demographics   Patient Name       Aureliano Goldman Date of Study       07/03/2021   Date of Birth      1955        Gender              Male   Age                77 year(s)        Race                   Patient Number     [id number]        Room Number         2624   Visit Number       [id number]   Corporate ID       [id number]   Accession Number   [id number]        Cat Rodgers RDMS   Ordering Physician Frannie Duenas MD  Interpreting        Frannie Duenas MD                                       Physician  Procedure Type of Study   TTE procedure:ECHOCARDIOGRAM COMPLETE 2D W DOPPLER W COLOR. Procedure Date Date: 07/03/2021 Start: 12:26 PM Study Location: Portable Technical Quality: Fair visualization Indications:Congestive heart failure.  Patient Status: Routine Height: 67 inches Weight: 237 pounds BSA: 2.17 m2 BMI: 37.12 kg/m2 HR: 71 bpm BP: 95/64 mmHg  Conclusions   Summary  Left ventricular systolic function is abnormal.  Ejection fraction is visually estimated at 15-20%. Moderately dilated right ventricle. Sclerotic, but non-stenotic aortic valve. No evidence of any pericardial effusion. Signature   ------------------------------------------------------------------  Electronically signed by Frannie Deunas MD (Interpreting  physician) on 07/03/2021 at 02:07 PM  ------------------------------------------------------------------   Findings   Left Ventricle  Left ventricular systolic function is abnormal.  Ejection fraction is visually estimated at 15-20%. Mild left ventricular hypertrophy. Left Atrium  Essentially normal left atrium. Right Atrium  Essentially normal right atrium. Right Ventricle  Moderately dilated right ventricle. Aortic Valve  Sclerotic, but non-stenotic aortic valve. Trace aortic regurgitation. Mitral Valve  Trace mitral regurgitation. Tricuspid Valve  Trace tricuspid regurgitation; normal RVSP. Pulmonic Valve  The pulmonic valve was not well visualized. Pericardial Effusion  No evidence of any pericardial effusion. Pleural Effusion  No evidence of pleural effusion.   M-Mode/2D Measurements & Calculations   LV Diastolic Dimension:  LV Systolic Dimension:  LA Dimension: 3 cmAO Root  6.11 cm                  5.53 cm                 Dimension: 4 cmLA Area: 22  LV FS:9.5 %              LV Volume Diastolic:    cm2  LV PW Diastolic: 1.20 cm 230 ml  LV PW Systolic: 9.71 cm  LV Volume Systolic: 597  Septum Diastolic: 7.60   ml  cm                       LV EDV/LV EDV Index:    RV Diastolic Dimension:  Septum Systolic: 7.65 cm 819 TR/90 m2LV ESV/LV   3.74 cm  CO: 4.94 l/min           ESV Index: 107 ml/49 m2  CI: 2.28 l/m*m2          EF Calculated (A4C):    LA/Aorta: 0.75                           24.1 %  LV Area Diastolic: 28.5  EF Calculated (2D): LA volume/Index: 61 ml  cm2                      20.7 %                  /86H7  LV Area Systolic: 92.4  cm2                      LV Length: 7.71 cm                            LVOT: 2.4 cm  Doppler Measurements & Calculations   MV Peak E-Wave: 77.9  AV Peak Velocity: 174 cm/s  LVOT Peak Velocity: 76  cm/s                  AV Peak Gradient: 12.11     cm/s  MV Peak A-Wave: 88.7  mmHg                        LVOT Mean Velocity: 54.7  cm/s                  AV Mean Velocity: 125 cm/s  cm/s  MV E/A Ratio: 0.88    AV Mean Gradient: 7 mmHg    LVOT Peak Gradient: 2  MV Peak Gradient:     AV VTI: 36.6 cm             mmHgLVOT Mean Gradient: 1  2.43 mmHg             AV Area (Continuity):1.9    mmHg                        cm2                         Estimated RVSP: 15 mmHg  MV P1/2t: 51 msec                                 Estimated RAP:3 mmHg  MVA by PHT:4.31 cm2   LVOT VTI: 15.4 cm   MV E' Septal          Estimated PASP: 15.39 mmHg  TR Velocity:176 cm/s  Velocity: 5.04 cm/s                               TR Gradient:12.39 mmHg  MV E' Lateral  Velocity: 8.01 cm/s  MV E/E' septal: 15.46  MV E/E' lateral: 9.73      XR CHEST (2 VW)    Result Date: 7/2/2021  EXAMINATION: TWO XRAY VIEWS OF THE CHEST 7/2/2021 2:36 pm COMPARISON: CT on 11/23/2020 HISTORY: Acute chest pain and shortness of breath. FINDINGS: Cardiomediastinal silhouette and pulmonary vasculature are normal. No consolidation, pleural effusion, or pneumothorax. No acute abnormality. CT CHEST WO CONTRAST    Result Date: 7/2/2021  EXAMINATION: CT OF THE CHEST WITHOUT CONTRAST 7/2/2021 8:26 pm TECHNIQUE: CT of the chest was performed without the administration of intravenous contrast. Multiplanar reformatted images are provided for review. Dose modulation, iterative reconstruction, and/or weight based adjustment of the mA/kV was utilized to reduce the radiation dose to as low as reasonably achievable.  COMPARISON: CT 11/23/2020, CT 07/14/2012 HISTORY: ORDERING SYSTEM

## 2021-07-04 NOTE — PROGRESS NOTES
Pt refused apnea link stated that he has had a study in the past and was given a cpap machine and he could not tolerate it and was not going to wear it so there was no need to do the apnea link

## 2021-07-04 NOTE — PROGRESS NOTES
Patient has been having 4,5, and 6 beat runs of Vtach he also had a 24 beat run of SVT each time checking on the patient he was asleep with respirations greater than 14. Woke patient up after SVT and states he was sleeping better than he has in weeks and denied any pain at that time.  Patient is completely asymptomatic at this time hospitilist notified, mag and potassium checked and are WNL will continue to monitor

## 2021-07-04 NOTE — PLAN OF CARE
Problem: Infection:  Goal: Will remain free from infection  Description: Will remain free from infection  7/4/2021 0034 by Oneil Rivero RN  Outcome: Ongoing  7/3/2021 1055 by Pk Souza RN  Outcome: Met This Shift     Problem: Safety:  Goal: Free from accidental physical injury  Description: Free from accidental physical injury  7/4/2021 0034 by Oneil Rivero RN  Outcome: Ongoing  7/3/2021 1055 by Pk Souza RN  Outcome: Met This Shift  Goal: Free from intentional harm  Description: Free from intentional harm  7/4/2021 0034 by Oneil Rivero RN  Outcome: Ongoing  7/3/2021 1055 by Pk Souza RN  Outcome: Met This Shift     Problem: Daily Care:  Goal: Daily care needs are met  Description: Daily care needs are met  7/4/2021 0034 by Oneil Rivero RN  Outcome: Ongoing  7/3/2021 1055 by Pk Souza RN  Outcome: Met This Shift     Problem: Pain:  Goal: Patient's pain/discomfort is manageable  Description: Patient's pain/discomfort is manageable  7/4/2021 0034 by Oneil Rivero RN  Outcome: Ongoing  7/3/2021 1055 by Pk Souza RN  Outcome: Met This Shift     Problem: Skin Integrity:  Goal: Skin integrity will stabilize  Description: Skin integrity will stabilize  7/4/2021 0034 by Oneil Rivero RN  Outcome: Ongoing  7/3/2021 1055 by Pk Souza RN  Outcome: Met This Shift     Problem: Discharge Planning:  Goal: Patients continuum of care needs are met  Description: Patients continuum of care needs are met  7/4/2021 0034 by Oneil Rivero RN  Outcome: Ongoing  7/3/2021 1055 by Pk Souza RN  Outcome: Ongoing     Problem: Falls - Risk of:  Goal: Will remain free from falls  Description: Will remain free from falls  Outcome: Ongoing  Goal: Absence of physical injury  Description: Absence of physical injury  Outcome: Ongoing

## 2021-07-04 NOTE — PROGRESS NOTES
Patient refused Apnea screening for PM RRT. Writer assessed patient this morning and patient continues to refuse the Apnea screening in the future per not able to sleep with testing equipment on. Writer D/C order and notified Dr. Sharon Arguelles.

## 2021-07-29 PROBLEM — R14.0 ABDOMINAL DISTENTION: Status: ACTIVE | Noted: 2021-01-01

## 2021-07-29 NOTE — CONSULTS
[address], [facility]                                  CONSULTATION    PATIENT NAME: Celeste Arora                  :        1955  MED REC NO:   [id number]                          ROOM:       ED28  ACCOUNT NO:   [de-identified]                           ADMIT DATE: 2021  PROVIDER:     Joel Mcmillan MD    CONSULT DATE:  2021    INDICATION:  Heart failure. HISTORY OF PRESENT ILLNESS:  This is a 47-year-old male patient who was  seen in the office today. Sent from the office for admission. The  patient has been getting progressive shortness of breath present, and he  feels like heaviness in the chest present and his abdomen is distended. He is having difficult time breathing with that. He has no leg edema  present. He has been gaining weight also. He denies any fevers or  chills present, but he does have shortness of breath present and  pressure present. The patient had an echo done back in 2021. LV dysfunction present. EF around 15-20% present. Moderately dilated ventricle present. The patient is known to have a history of coronary artery disease  present. Underwent a heart catheterization back in 2021. His left  main is patent. LAD, mid 90% stenosis and a large diagonal branch and  distal LAD was 99% stenosis. Ramus was a small vessel and patent. Circ  has mild disease. RCA with proximal 90% stenosis and mid 99% stenosis  noted. The patient was found to severe two-vessel coronary artery  disease and also pulmonary hypertension was noted. The patient went to  [place] [id number] angioplasty at [facility].  He  underwent angioplasty of the RCA and LAD. This was done in 2021. He  does have LV dysfunction. He has 50% _____ absent. EF was 21% at that  time. He had two stents placed in 2021.     PAST MEDICAL HISTORY:  History of coronary artery disease, has had  angioplasty done in 05/2021. Has severe LV dysfunction present and  pulmonary hypertension, hypertension, hyperlipidemia, testicular cancer,  COPD, GERD, and sleep apnea present. PAST SURGICAL HISTORY:  He had a AAA stent placement and hernia repair,  left knee surgery and angioplasty done. SOCIAL HISTORY:  He does not smoke, does not drink. ALLERGIES:  NKDA. MEDICATIONS AT HOME:  He is on Demadex 20 mg daily, Aldactone, Toprol,  lisinopril, Plavix, aspirin, Lipitor, and inhalers. PHYSICAL EXAMINATION:  GENERAL:  The patient is awake, alert, answering questions, not in acute  distress. VITAL SIGNS:  Temperature afebrile, pulse is 70, blood pressure is  135/80. HEENT:  Head is normocephalic, atraumatic. Pupils are equal and  reactive to light. CHEST:  Equal expansion. LUNGS:  Clear to auscultation. Decreased breath sounds present. HEART:  Has regular rate and rhythm. ABDOMEN:  Distended. EXTREMITIES:  No cyanosis or clubbing present. NEUROLOGIC:  Cranial nerves II through XII grossly intact. LABORATORY DATA:  BUN is 20, creatinine 1.8. Troponin is elevated, not  positive. LFTs are normal.  CBC is within normal range. IMPRESSION:  This is a 68-year-old male patient with a history of  coronary artery disease status post angioplasty done in May. Continues  to have dyspnea present. Getting progressive shortness of breath  present. He has LV dysfunction present. His EKG shows a sinus rhythm  with a narrow complex rhythm. We will get a CT of the abdomen, and we  will diurese him and we will make further recommendations based on  hospital course. We will consider getting an ICD also.         Quiana Cole MD    D: 07/29/2021 13:22:26       T: 07/29/2021 13:31:06     [email or web address]  Job#: [id number]     Doc#: [id number]    CC:

## 2021-07-29 NOTE — H&P
History and Physical      Name:  Erica Underwood /Age/Sex: 1955  (46 y.o. male)   MRN & CSN:  [id number] & [id number] Admission Date/Time: 2021  1:16 PM   Location:  ED28/ED-28 PCP: Tabatha Singh Day: 1    Assessment and Plan:   Erica Underwood is a 77 y.o.  male  who presents with  abd distention, dyspnea.     > Abd distention  > reported h/o Pancreatitis. > Spleen cyst on CT poss trauma/or  infection  -CT ABDOMEN PELVIS WO CONTRAST Additional Contrast? None  No significant ascites identified. Very small right pleural effusion. Patchy ground-glass nodular infiltrates within the lungs bilaterally, greater on the right. These are nonspecific but may reflect inflammation or infection, in which case consider both typical and atypical etiologies. There is mild interlobular septal thickening within the lower lungs, also nonspecific, suggesting interstitial edema. New cystic lesion is seen within the superior aspect of the spleen, along with new deformity of the splenic contour. Most likely that reflects interval trauma or infection. Correlation with the patient's history of be helpful. If there is no history of trauma or inflammation/infection, a follow-up MRI in approximately 6-12 months is recommended to better evaluate that. - admit , clear liquids, MRI, consult GI, Surgery, hold on pharmacological DVT proph    >Acute on chronic left ventricular systolic and diastolic dysfunction  >Ischemic cardiomyopathy- BiV ICD considered   >Coronary artery disease s/p PCI on   - IV lasix, aspirin, plavix, lisinopril, statin  - consult cardio    > CKD stage 3  - avoid nephrotoxins, IV lasix, consult nephro     > h/o COPD no exacerbation. - resume home inhalers. Diet No diet orders on file   DVT Prophylaxis [] SCDs   Code Status Prior   Disposition  Med surg with tele     History of Present Illness:     Chief Complaint: abd distention, dyspnea.    Erica Underwood is a 77 y.o.  male  who presents with abd distention, dyspnea. Pt presented with few days h/o worsening abd distention associated with dull generalized mild pain/discomfort, radiates across abd, no n/V, no diarrhea, no constipation, no F/C, pain worse with palpation, associated also with worsening dyspnea, worse with exertion, better with rest, no chest pain, been taking meds as prescribed, following diet restrictions as advised. 10-14 point ROS reviewed negative, unless as noted above     Objective:   No intake or output data in the 24 hours ending 07/29/21 1804   Vitals:   Vitals:    07/29/21 1702   BP: 114/71   Pulse: 86   Resp: 21   Temp:    SpO2:      Physical Exam:    GEN Awake male, sitting upright in bed in no apparent distress. Appears given age. Obese  EYES Pupils are equally round. No scleral erythema, discharge, or conjunctivitis. HENT Mucous membranes are moist.   NECK No apparent thyromegaly or masses. RESP Decreased air sounds. Symmetric chest movement . CARDIO/VASC S1/S2 auscultated. Regular rate . No peripheral edema. GI Abdomen is soft , generalized discomfort, distention, no guarding. Bowel sounds are normoactive. Rectal exam deferred.  Hart catheter is not present. MSK No gross joint deformities. Spontaneous movement of all extremities  SKIN Normal coloration, warm, dry. NEURO Cranial nerves appear grossly intact, normal speech, no lateralizing weakness. PSYCH Awake, alert, oriented x 4. Affect appropriate.     Past Medical History:      Past Medical History:   Diagnosis Date    Cancer [facility])     testicular    COPD, moderate ([address] 11/30/2020    Excessive daytime sleepiness 11/30/2020    GERD (gastroesophageal reflux disease)     Hypertension     Obstructive sleep apnea 2/3/2021    Pulmonary emphysema determined by X-ray [facility]) 11/30/2020    Testicular hypofunction     thus on testosterone    Tobacco abuse 11/30/2020     PSHX:  has a past surgical history that includes hernia repair; AAA repair, endovascular (2015); and Leg Surgery (10/2020). Allergies: No Known Allergies    FAM HX: DM, HTN  Soc HX:   Social History     Socioeconomic History    Marital status: Single     Spouse name: Not on file    Number of children: Not on file    Years of education: Not on file    Highest education level: Not on file   Occupational History    Not on file   Tobacco Use    Smoking status: Former Smoker     Quit date: 2020     Years since quittin.7    Smokeless tobacco: Never Used   Substance and Sexual Activity    Alcohol use: Never    Drug use: Never    Sexual activity: Not on file   Other Topics Concern    Not on file   Social History Narrative    Not on file     Social Determinants of Health     Financial Resource Strain:     Difficulty of Paying Living Expenses:    Food Insecurity:     Worried About Running Out of Food in the Last Year:     [address] in the Last Year:    Transportation Needs:     Lack of Transportation (Medical):  Lack of Transportation (Non-Medical):    Physical Activity:     Days of Exercise per Week:     Minutes of Exercise per Session:    Stress:     Feeling of Stress :    Social Connections:     Frequency of Communication with Friends and Family:     Frequency of Social Gatherings with Friends and Family:     Attends [employer] Services:     Active Member of Clubs or Organizations:     Attends Club or Organization Meetings:     Marital Status:    Intimate Partner Violence:     Fear of Current or Ex-Partner:     Emotionally Abused:     Physically Abused:     Sexually Abused:        Medications:   Medications:   Prior to Admission medications    Medication Sig Start Date End Date Taking? Authorizing Provider   nitroGLYCERIN (NITROSTAT) 0.4 MG SL tablet up to max of 3 total doses.  If no relief after 1 dose, call 911. 7   Maximo Altamirano MD   torsemide (DEMADEX) 20 MG tablet Take 1 tablet by mouth daily 7/4/21   Aniya Maloney MD   spironolactone (ALDACTONE) 25 MG tablet Take 1 tablet by mouth daily 7/4/21   Antnoella Villalobos MD   metoprolol succinate (TOPROL XL) 25 MG extended release tablet Take 1 tablet by mouth daily 7/5/21   Antonella Villalobos MD   lisinopril (PRINIVIL;ZESTRIL) 2.5 MG tablet Take 1 tablet by mouth daily 7/4/21   Antonella Villalboos MD   budesonide-formoterol (SYMBICORT) 80-4.5 MCG/ACT AERO Inhale 2 puffs into the lungs 2 times daily 5/8/21   Historical Provider, MD   gabapentin (NEURONTIN) 300 MG capsule AS DIRECTED 1 CAPSULE EVERY DAY X 1 DAY, THEN INCREASE TO TWICE A DAY THEREAFTER ORALLY 30 DAY(S) 4/6/21   Historical Provider, MD   loratadine (CLARITIN) 10 MG tablet Take 10 mg by mouth daily    Historical Provider, MD   omeprazole (PRILOSEC) 20 MG delayed release capsule Take 20 mg by mouth daily    Historical Provider, MD   clopidogrel (PLAVIX) 75 MG tablet Take 75 mg by mouth daily    Historical Provider, MD   aspirin 81 MG chewable tablet Take 1 tablet by mouth daily 5/7/21   Randa Daniels MD   atorvastatin (LIPITOR) 40 MG tablet Take 1 tablet by mouth nightly 5/6/21   Randa Daniels MD   umeclidinium-vilanterol (ANORO ELLIPTA) 62.5-25 MCG/INH AEPB inhaler Inhale 1 puff into the lungs daily 2/3/21   Sheyla Kline MD   albuterol sulfate HFA (PROAIR HFA) 108 (90 Base) MCG/ACT inhaler Inhale 2 puffs into the lungs every 6 hours as needed for Wheezing 11/30/20   Sheyla Kline MD      Infusions:    lactated ringers       PRN Meds: morphine, 4 mg, Q30 Min PRN          Electronically signed by Ivana Sanders MD on 7/29/2021 at 6:04 PM

## 2021-07-29 NOTE — CONSULTS
Dictated --[id number]    Will need admission for heart failure   HFrEF   Discussed with Karishma Fraga regarding possible BIV-ICD to help with heart failure  He has low EF 20% range, 60% myocardium absent on stress test   He has been on medical treatment   Check CT of abdomen  Cardio-renal syndrome    Electronically signed by Juan Ramon Sloan MD on 7/29/21 at 1:32 PM EDT

## 2021-07-29 NOTE — CONSULTS
Electrophysiology Consult Note      Reason for consultation:  Need for BIVICD    Chief complaint : Abdominal distention with pain, shortness of breath,     Referring physician: Dr. Geraldine Santoro      Primary care physician: Mercer Nageotte      History of Present Illness:     Patient is a 69-year-old male with history of COPD, hypertension, obstructive sleep apnea, coronary artery disease s/p PCI, abdominal aortic aneurysm repair, ischemic cardiomyopathy, history of pancreatitis, severe left ventricular systolic dysfunction presents with complaints of abdominal fullness and distention with pain. Patient reports shortness of breath with minimal exertion. Patient reports he cannot lie flat with abdominal fullness and also has shortness of breath with it. Patient reports weight gain of 10 pounds in 1 week. Patient feels tired and weak. Patient reports that he was at cardiac rehab today when he was noted to have heart rate of 150. He was immediately sent to the cardiology office then referred to the emergency room. Patient tells me that last week he has gained 2 pounds a day. He states that he was gradually worsening fatigue. He reports that he was gradually worsening shortness of breath with exertion that will resolve with rest.  He states that he has orthopnea. He states that he props himself up at night to sleep. He additionally complains of abdominal distention with associated pain. He states this is the biggest complaint as he is having his abdominal discomfort at this time    He reports that he had a left heart cath in May and was referred to [facility] for intervention. May 7, 2021 patient had a PCI to the RCA at [facility]. Patient had a PCI to LAD and diagonal on June 1, 2021 at [facility]. Patient had a follow-up echo July 2 and noted to have 15 to 20%. Patient was started on Coreg and lisinopril in May.   Then in July he was switched to Toprol-XL and Aldactone was added to his medical regimen. Patient reports that he has been diagnosed with diabetes however is not compliant with medication    Patient additionally reports that he has been on medical management for heart failure since May and has been following up with cardiology every 2 weeks to uptitrate his medications. He tells me that he was told that we would try to maximize medication for 3 months and then repeat echo to assess whether or not he will need ICD    Patient denies chest pain, palpitations, lightheadedness, dizziness, edema or syncope    Patient reports he had a bowel movement this morning    Pastmedical history:   Past Medical History:   Diagnosis Date    Cancer [facility])     testicular    COPD, moderate ([facility] [address] 11/30/2020    Excessive daytime sleepiness 11/30/2020    GERD (gastroesophageal reflux disease)     Hypertension     Obstructive sleep apnea 2/3/2021    Pulmonary emphysema determined by X-ray ([facility] 11/30/2020    Testicular hypofunction     thus on testosterone    Tobacco abuse 11/30/2020       Surgical history :   Past Surgical History:   Procedure Laterality Date    ABDOMINAL AORTIC ANEURYSM REPAIR, ENDOVASCULAR  11/30/2015    endologix aaa device  mri conditional  card scanned into pacs    HERNIA REPAIR      LEG SURGERY  10/2020    camncer removed       Family history: No family history on file. Social history :  reports that he quit smoking about 8 months ago. He has never used smokeless tobacco. He reports that he does not drink alcohol and does not use drugs. No Known Allergies    No current facility-administered medications on file prior to encounter. Current Outpatient Medications on File Prior to Encounter   Medication Sig Dispense Refill    nitroGLYCERIN (NITROSTAT) 0.4 MG SL tablet up to max of 3 total doses.  If no relief after 1 dose, call 911. 25 tablet 3    torsemide (DEMADEX) 20 MG tablet Take 1 tablet by mouth daily 30 tablet 3    spironolactone (ALDACTONE) 25 MG tablet Take 1 tablet by mouth daily 90 tablet 1    metoprolol succinate (TOPROL XL) 25 MG extended release tablet Take 1 tablet by mouth daily 30 tablet 3    lisinopril (PRINIVIL;ZESTRIL) 2.5 MG tablet Take 1 tablet by mouth daily 30 tablet 3    budesonide-formoterol (SYMBICORT) 80-4.5 MCG/ACT AERO Inhale 2 puffs into the lungs 2 times daily      gabapentin (NEURONTIN) 300 MG capsule AS DIRECTED 1 CAPSULE EVERY DAY X 1 DAY, THEN INCREASE TO TWICE A DAY THEREAFTER ORALLY 30 DAY(S)      loratadine (CLARITIN) 10 MG tablet Take 10 mg by mouth daily      omeprazole (PRILOSEC) 20 MG delayed release capsule Take 20 mg by mouth daily      clopidogrel (PLAVIX) 75 MG tablet Take 75 mg by mouth daily      aspirin 81 MG chewable tablet Take 1 tablet by mouth daily 30 tablet 3    atorvastatin (LIPITOR) 40 MG tablet Take 1 tablet by mouth nightly 30 tablet 3    umeclidinium-vilanterol (ANORO ELLIPTA) 62.5-25 MCG/INH AEPB inhaler Inhale 1 puff into the lungs daily 1 each 11    albuterol sulfate HFA (PROAIR HFA) 108 (90 Base) MCG/ACT inhaler Inhale 2 puffs into the lungs every 6 hours as needed for Wheezing 1 Inhaler 11       Review of Systems:   Review of Systems   Constitutional: Positive for fatigue. Negative for activity change, chills and fever. HENT: Negative for congestion, ear pain and tinnitus. Eyes: Negative for photophobia, pain and visual disturbance. Respiratory: Positive for shortness of breath. Negative for cough, chest tightness and wheezing. Cardiovascular: Negative for chest pain, palpitations and leg swelling. Gastrointestinal: Positive for abdominal distention and abdominal pain. Negative for blood in stool, constipation, diarrhea, nausea and vomiting. Endocrine: Negative for cold intolerance and heat intolerance. Genitourinary: Negative for dysuria, flank pain and hematuria. Musculoskeletal: Positive for arthralgias. Negative for back pain, myalgias and neck stiffness.    Skin: Negative for color change and rash. Allergic/Immunologic: Negative for food allergies. Neurological: Negative for dizziness, light-headedness, numbness and headaches. Hematological: Does not bruise/bleed easily. Psychiatric/Behavioral: Negative for agitation, behavioral problems and confusion. Examination:      Vitals:    07/29/21 1434 07/29/21 1510 07/29/21 1511 07/29/21 1602   BP: (!) 113/94  117/82 126/71   Pulse: 68 63  69   Resp: 15   15   Temp:       TempSrc:       SpO2: 95% 97% 97% 95%   Weight:       Height:            Body mass index is 38.37 kg/m². Physical Exam  Constitutional:       General: He is not in acute distress. Appearance: He is obese. He is not ill-appearing or diaphoretic. HENT:      Head: Normocephalic and atraumatic. Right Ear: External ear normal.      Left Ear: External ear normal.      Nose: No congestion. Mouth/Throat:      Mouth: Mucous membranes are moist.   Eyes:      Extraocular Movements: Extraocular movements intact. Conjunctiva/sclera: Conjunctivae normal.      Pupils: Pupils are equal, round, and reactive to light. Cardiovascular:      Rate and Rhythm: Normal rate and regular rhythm. Heart sounds: Murmur (Grade 2/6 systolic murmur) heard. Pulmonary:      Effort: Pulmonary effort is normal. No respiratory distress. Breath sounds: Wheezing and rales present. No rhonchi. Comments: Diminished breath sounds bilaterally  Abdominal:      General: There is distension. Tenderness: There is abdominal tenderness. Musculoskeletal:         General: No tenderness. Cervical back: Normal range of motion. No tenderness. Right lower leg: No edema. Left lower leg: No edema. Skin:     General: Skin is warm. Neurological:      General: No focal deficit present. Mental Status: He is alert and oriented to person, place, and time. Cranial Nerves: No cranial nerve deficit.    Psychiatric:         Mood and Affect: Mood normal.           CBC:   Lab Results   Component Value Date    WBC 9.2 07/29/2021    HGB 13.7 07/29/2021    HCT 41.3 07/29/2021     07/29/2021     Lipids:  Lab Results   Component Value Date    CHOL 194 04/20/2021    TRIG 136 04/20/2021    HDL 42 04/20/2021    LDLDIRECT 136 (H) 04/20/2021     PT/INR:   Lab Results   Component Value Date    INR 0.88 07/29/2021        BMP:    Lab Results   Component Value Date     07/29/2021    K 4.1 07/29/2021    CL 98 (L) 07/29/2021    CO2 29 07/29/2021    BUN 20 07/29/2021     CMP:   Lab Results   Component Value Date    AST 10 (L) 07/29/2021    PROT 6.5 07/29/2021    BILITOT 0.5 07/29/2021    ALKPHOS 58 07/29/2021     TSH:  No results found for: TSH    EKGINTERPRETATION - EKG Interpretation:        IMPRESSION / RECOMMENDATIONS:     Acute on chronic left ventricular systolic and diastolic dysfunction  Ischemic cardiomyopathy  Abdominal pain and distention   Coronary artery disease s/p PCI on June 1  CKD  Hypertension  COPD    Patient has abdominal pain and distention needs to be evaluated. His pain is out of proportion for heart failure exacerbation. I do not see any ascites. Patient only has minimal edema. Patient probably has some heart failure exacerbation but his presenting complaints are out of proportion as I said earlier. Patient had a PCI in June 1. We need to have optimal medical therapy for 3 months prior to deciding on device therapy. Patient QRS is less than 120 ms at this point so patient is not a candidate for BiV at this time. We could consider defibrillator but he needs to be on optimal medical therapy for 3 months prior to assessing for the device therapy. At this time we recommend to manage heart failure and also address abdominal pain    He has history of Pancreatitis too. Check for pancreatitis and also get lactic acid to rule out bowel ischemia    Thanks again for allowing me to participate in care of this patient. Please call me if you have any questions. With best regards. Missy Carlin MD, 7/29/2021 5:05 PM     Please note this report has been partially produced using speech recognition software and may contain errors related to that system including errors in grammar, punctuation, and spelling, as well as words and phrases that may be inappropriate. If there are any questions or concerns please feel free to contact the dictating provider for clarification.

## 2021-07-29 NOTE — CARE COORDINATION
Pt identified as potential readmission. Last admission 7/2/2021 - 7/4/2021 for presents with CHF (congestive heart failure), NYHA class II, acute on chronic, systolic (HCC)    Pt here today for abdominal swelling, shortness of breath and weight gain. Dyspnea and respiratory abnormalities    2. Abdominal distension    3. Unintentional weight loss        Pt is requiring readmission and there were no alternatives to admission to explore at this time.

## 2021-07-29 NOTE — ED NOTES
Bedside report given to HERON Torres and care transferred at this time.  Pt updated on plan for admission and waiting on room assignment at this time     Anthony Mcgrath RN  07/29/21 [id number]

## 2021-07-29 NOTE — ED PROVIDER NOTES
[facility] EMERGENCY DEPARTMENT ENCOUNTER    [facility] EMERGENCY DEPARTMENT        TRIAGE CHIEF COMPLAINT:   Shortness of Breath and Other (abd edema)      [place]:  Adilene Gomez is a 77 y.o. male that presents for abdominal swelling, shortness of breath and weight gain. Patient was sent to the ED for evaluation from [place] for [facility]ology, Dr. Arben Castaneda office. Dr. Casey Cope is present in the ED and is aware of the patient. Cardiology states patient has a history of three-vessel disease, was not a candidate for CABG secondary to risk factors and was sent to [facility] for cardiac stents of his LAD, RCA in March 2021 and then a second heart catheterization with additional stents in May 2021. Over the last week, he has had a 7 pound unintentional weight gain with increasing swelling in the abdomen as well as shortness of breath and \"pressure\" in the chest.  Was seen at cardiology office today and sent to the ED for evaluation for admission/diuresis. Patient states that her shortness of breath worsens with laying down flat. Denying any active chest pain or tearing/ripping sensation to the back. No increasing swelling in the lower legs, no calf pain or swelling. Last medical history includes coronary artery disease, chronic kidney disease, CHF, hypertension, hyperlipidemia, COPD. He does not have a baseline oxygen requirement. No no hemoptysis, productive cough, dizziness or lightheadedness. No other dysuria hematuria nausea vomiting or diarrhea.   Cardiology, last echocardiogram showed an EF of 20% with 60% myocardium absent on stress test.    ROS:  General:  No fevers, no chills  Cardiovascular:  See HPI    Respiratory:See HPI  Gastrointestinal:  See HPI    Musculoskeletal:  No muscle pain, no joint pain  Skin:  No rash, no pruritis, no easy bruising  Neurologic:  No speech problems, no headache, no extremity numbness, no extremity tingling, no extremity weakness  Psychiatric:  No anxiety  Genitourinary:  No dysuria, no hematuria  Extremities:  No edema    Past Medical History:   Diagnosis Date    Cancer ([facility] [address]     testicular    COPD, moderate ([facility] [address] 2020    Excessive daytime sleepiness 2020    GERD (gastroesophageal reflux disease)     Hypertension     Obstructive sleep apnea 2/3/2021    Pulmonary emphysema determined by X-ray [facility]) 2020    Testicular hypofunction     thus on testosterone    Tobacco abuse 2020     Past Surgical History:   Procedure Laterality Date    ABDOMINAL AORTIC ANEURYSM REPAIR, ENDOVASCULAR  2015    endologix aaa device  mri conditional  card scanned into pacs    HERNIA REPAIR      LEG SURGERY  10/2020    camncer removed     No family history on file. Social History     Socioeconomic History    Marital status: Single     Spouse name: Not on file    Number of children: Not on file    Years of education: Not on file    Highest education level: Not on file   Occupational History    Not on file   Tobacco Use    Smoking status: Former Smoker     Quit date: 2020     Years since quittin.7    Smokeless tobacco: Never Used   Substance and Sexual Activity    Alcohol use: Never    Drug use: Never    Sexual activity: Not on file   Other Topics Concern    Not on file   Social History Narrative    Not on file     Social Determinants of Health     Financial Resource Strain:     Difficulty of Paying Living Expenses:    Food Insecurity:     Worried About Running Out of Food in the Last Year:     [address] in the Last Year:    Transportation Needs:     Lack of Transportation (Medical):      Lack of Transportation (Non-Medical):    Physical Activity:     Days of Exercise per Week:     Minutes of Exercise per Session:    Stress:     Feeling of Stress :    Social Connections:     Frequency of Communication with Friends and Family:     Frequency of Social Gatherings with Friends and into the lungs every 6 hours as needed for Wheezing 1 Inhaler 11     No Known Allergies    Nursing Notes Reviewed  PHYSICAL EXAM    VITAL SIGNS: /71   Pulse 86   Temp 98.2 °F (36.8 °C) (Oral)   Resp 21   Ht 5' 7\" (1.702 m)   Wt 245 lb (111.1 kg)   SpO2 96%   BMI 38.37 kg/m²    Constitutional:  Well developed, Well nourished, appearing, nontoxic  Head:  Normocephalic, Atraumatic  Eyes:  EOMI. Sclera clear. Conjunctiva normal, No discharge. Neck/Lymphatics: Supple, no JVD, No lymphadenopathy  Cardiovascular:  RRR, Normal S1 & S2    Peripheral Vascular: Distal pulses 2+, Capillary refill <2seconds  Respiratory:  Respirations mildly labored, 20 reps per minute 96% on room air. Minutes but equal air exchange throughout. No rales or inspiratory stridor. GI:   No gross discoloration. abdomen is moderately distended, no fluid wave. Not tight drum. Distant bowel sounds present in all quadrants secondary to distention. No audible bruits. Soft,  Nondistended. Generalized nonfocal abdominal tenderness to palpation. No palpable pulsatile masses or obvious hernias. Back:  No CVA tenderness to percussion. Musculoskeletal: BUE/BLE symmetrical without atrophy or deformities  Integument:  Warm, Dry, Intact, Skin turgor and texture normal  Neurologic:  Alert & oriented x3 , No focal deficits noted. Cranial nerves II through XII grossly intact. No slurred speech. No facial droop. Normal gross motor coordination & motor strength bilateral upper and lower extremities.     Psychiatric:  Affect appropriate      I have reviewed and interpreted all of the currently available lab results from this visit (if applicable):  Results for orders placed or performed during the hospital encounter of 07/29/21   CBC auto diff   Result Value Ref Range    WBC 9.2 4.0 - 10.5 K/CU MM    RBC 4.38 (L) 4.6 - 6.2 M/CU MM    Hemoglobin 13.7 13.5 - 18.0 GM/DL    Hematocrit 41.3 (L) 42 - 52 %    MCV 94.3 78 - 100 FL    MCH 31.3 (H) 27 - 31 PG    MCHC 33.2 32.0 - 36.0 %    RDW 13.4 11.7 - 14.9 %    Platelets 883 749 - 362 K/CU MM    MPV 10.2 7.5 - 11.1 FL    Differential Type AUTOMATED DIFFERENTIAL     Segs Relative 67.3 (H) 36 - 66 %    Lymphocytes % 17.8 (L) 24 - 44 %    Monocytes % 6.7 (H) 0 - 4 %    Eosinophils % 7.1 (H) 0 - 3 %    Basophils % 0.9 0 - 1 %    Segs Absolute 6.2 K/CU MM    Lymphocytes Absolute 1.6 K/CU MM    Monocytes Absolute 0.6 K/CU MM    Eosinophils Absolute 0.7 K/CU MM    Basophils Absolute 0.1 K/CU MM    Nucleated RBC % 0.0 %    Total Nucleated RBC 0.0 K/CU MM    Total Immature Neutrophil 0.02 K/CU MM    Immature Neutrophil % 0.2 0 - 0.43 %   CMP   Result Value Ref Range    Sodium 136 135 - 145 MMOL/L    Potassium 4.1 3.5 - 5.1 MMOL/L    Chloride 98 (L) 99 - 110 mMol/L    CO2 29 21 - 32 MMOL/L    BUN 20 6 - 23 MG/DL    CREATININE 1.8 (H) 0.9 - 1.3 MG/DL    Glucose 169 (H) 70 - 99 MG/DL    Calcium 9.0 8.3 - 10.6 MG/DL    Albumin 4.3 3.4 - 5.0 GM/DL    Total Protein 6.5 6.4 - 8.2 GM/DL    Total Bilirubin 0.5 0.0 - 1.0 MG/DL    ALT 12 10 - 40 U/L    AST 10 (L) 15 - 37 IU/L    Alkaline Phosphatase 58 40 - 129 IU/L    GFR Non- 38 (L) >60 mL/min/1.73m2    GFR  46 (L) >60 mL/min/1.73m2    Anion Gap 9 4 - 16   Troponin   Result Value Ref Range    Troponin T 0.015 (H) <0.01 NG/ML   Brain Natriuretic Peptide   Result Value Ref Range    Pro-BNP 3,166 (H) <300 PG/ML   Protime/INR & PTT   Result Value Ref Range    Protime 11.3 (L) 11.7 - 14.5 SECONDS    INR 0.88 INDEX    aPTT 32.7 25.1 - 37.1 SECONDS   Lactate, Sepsis   Result Value Ref Range    Lactic Acid, Sepsis 1.0 0.5 - 1.9 mMOL/L   Lipase   Result Value Ref Range    Lipase 15 13 - 60 IU/L   EKG 12 Lead   Result Value Ref Range    Ventricular Rate 76 BPM    Atrial Rate 76 BPM    P-R Interval 146 ms    QRS Duration 92 ms    Q-T Interval 390 ms    QTc Calculation (Bazett) 438 ms    P Axis 28 degrees    R Axis 79 degrees    T Axis 9 degrees Diagnosis       Sinus rhythm with premature atrial complexes  Possible Inferior infarct (cited on or before 02-JUL-2021)  Abnormal ECG  When compared with ECG of 03-JUL-2021 06:41,  QRS axis shifted left          Radiographs (if obtained):  [] The following radiograph was interpreted by myself in the absence of a radiologist:   [] Radiologist's Report Reviewed:  CT ABDOMEN PELVIS WO CONTRAST Additional Contrast? None   Final Result   No significant ascites identified. Very small right pleural effusion. Patchy ground-glass nodular infiltrates   within the lungs bilaterally, greater on the right. These are nonspecific   but may reflect inflammation or infection, in which case consider both   typical and atypical etiologies. There is mild interlobular septal thickening within the lower lungs, also   nonspecific, suggesting interstitial edema. New cystic lesion is seen within the superior aspect of the spleen, along   with new deformity of the splenic contour. Most likely that reflects   interval trauma or infection. Correlation with the patient's history of be   helpful. If there is no history of trauma or inflammation/infection, a   follow-up MRI in approximately 6-12 months is recommended to better evaluate   that. XR CHEST (2 VW)   Final Result   Mild bibasilar atelectasis. No focal consolidation.               CT ABDOMEN PELVIS WO CONTRAST Additional Contrast? None (Final result)  Result time 07/29/21 15:34:45  Final result by Loy Purvis MD (07/29/21 15:34:45)                Impression:    No significant ascites identified. Very small right pleural effusion.  Patchy ground-glass nodular infiltrates   within the lungs bilaterally, greater on the right.  These are nonspecific   but may reflect inflammation or infection, in which case consider both   typical and atypical etiologies.      There is mild interlobular septal thickening within the lower lungs, also   nonspecific, suggesting interstitial edema. New cystic lesion is seen within the superior aspect of the spleen, along   with new deformity of the splenic contour.  Most likely that reflects   interval trauma or infection.  Correlation with the patient's history of be   helpful.  If there is no history of trauma or inflammation/infection, a   follow-up MRI in approximately 6-12 months is recommended to better evaluate   that. Narrative:    EXAMINATION:   CT OF THE ABDOMEN AND PELVIS WITHOUT CONTRAST 7/29/2021 11:25 am     TECHNIQUE:   CT of the abdomen and pelvis was performed without the administration of   intravenous contrast. Multiplanar reformatted images are provided for review. Dose modulation, iterative reconstruction, and/or weight based adjustment of   the mA/kV was utilized to reduce the radiation dose to as low as reasonably   achievable. COMPARISON:   07/14/2012     HISTORY:   ORDERING SYSTEM PROVIDED HISTORY: ascites   TECHNOLOGIST PROVIDED HISTORY:   Reason for exam:->ascites   Additional Contrast?->None   Decision Support Exception - unselect if not a suspected or confirmed   emergency medical condition->Emergency Medical Condition (MA)   Reason for Exam: ascites     FINDINGS:   Lower Chest: Very small right pleural effusion is noted.  Mild interlobular   septal thickening is noted within the lower lungs.  Patchy ground-glass   nodular infiltrates are noted bilaterally, greater on the right (for example   series 2, image 12).  Noncontrast imaging of the base of the heart is   unremarkable. Lack of intravenous contrast limits evaluation of the solid abdominal   viscera, the hollow abdominal viscera, and the vascular structures. Organs: That said, no acute hepatic abnormality identified.  Uncomplicated   cholelithiasis is noted.      Limited noncontrast imaging of the spleen shows new contour deformity along   its superior margin, with development of of an associated 3.7 cm cyst.  That cyst shows homogeneous low-attenuation (approximately 18 Hounsfield units),   with smooth margins. Limited noncontrast imaging of the pancreas is unremarkable.  Right kidney is   atrophic, new when compared to the previous exam.  Left kidney unremarkable. GI/Bowel: Mild diverticulosis of the large bowel is present without CT   evidence of diverticulitis.  The large bowel is otherwise unremarkable in   appearance.  The appendix is not well visualized.  No asymmetric pericecal   inflammation is seen to suggest acute appendicitis. Distal esophagus, stomach, duodenal sweep, and the remainder of the small   bowel are unremarkable. Pelvis: Prostate and urinary bladder within normal limits.  No free pelvic   fluid. Peritoneum/Retroperitoneum: Patient is status post abdominal aortic aneurysm   repair with an endo stent.  Native aneurysm sac measures 5.0 cm.  No previous   examinations are available to gauge stability of that. No upper abdominal, retroperitoneal, iliac chain, or inguinal lymphadenopathy. Bones/Soft Tissues: Evidence of a remote proximal left rectus femoris muscle   tear, seen previously.  Benign-appearing lipoma within the sartorius is also   again noted. No osteolytic or osteoblastic bone lesions are identified.  No acute bony   abnormalities are seen.                     XR CHEST (2 VW) (Final result)  Result time 07/29/21 12:35:12  Final result by Alvin Nina MD (07/29/21 12:35:12)                Impression:    Mild bibasilar atelectasis.  No focal consolidation. Narrative:    EXAMINATION:   TWO XRAY VIEWS OF THE CHEST     7/29/2021 12:18 pm     COMPARISON:   July 2, 2021     HISTORY:   ORDERING SYSTEM PROVIDED HISTORY: shortness of breath   TECHNOLOGIST PROVIDED HISTORY:   Reason for exam:->shortness of breath   Reason for Exam: sob     FINDINGS:   Stable cardiomediastinal silhouette.  There is no focal consolidation,   pleural effusion, or pneumothorax.  Mild bibasilar atelectasis is noted.  The   osseous structures are stable.                       EKG Interpretation  Please see ED physician's note - Dr. Milagros Vazquez - for EKG interpretation        Chart review shows recent radiographs:  Echocardiogram complete 2D with doppler with color    Result Date: 7/3/2021  Transthoracic Echocardiography Report (TTE)  Demographics   Patient Name       Jonathan Bullard Date of Study       07/03/2021   Date of Birth      1955        Gender              Male   Age                77 year(s)        Race                   Patient Number     [id number]        Room Number         6964   Visit Number       [id number]   Corporate ID       [id number]   Accession Number   [id number]        [facility]   Ordering Physician Lars Rosenbaum MD  Interpreting        Lars Rosenbaum MD                                       Physician  Procedure Type of Study   TTE procedure:ECHOCARDIOGRAM COMPLETE 2D W DOPPLER W COLOR. Procedure Date Date: 07/03/2021 Start: 12:26 PM Study Location: Portable Technical Quality: Fair visualization Indications:Congestive heart failure. Patient Status: Routine Height: 67 inches Weight: 237 pounds BSA: 2.17 m2 BMI: 37.12 kg/m2 HR: 71 bpm BP: 95/64 mmHg  Conclusions   Summary  Left ventricular systolic function is abnormal.  Ejection fraction is visually estimated at 15-20%. Moderately dilated right ventricle. Sclerotic, but non-stenotic aortic valve. No evidence of any pericardial effusion. Signature   ------------------------------------------------------------------  Electronically signed by Lars Rosenbaum MD (Interpreting  physician) on 07/03/2021 at 02:07 PM  ------------------------------------------------------------------   Findings   Left Ventricle  Left ventricular systolic function is abnormal.  Ejection fraction is visually estimated at 15-20%.   Mild cm2                         Estimated RVSP: 15 mmHg  MV P1/2t: 51 msec                                 Estimated RAP:3 mmHg  MVA by PHT:4.31 cm2   LVOT VTI: 15.4 cm   MV E' Septal          Estimated PASP: 15.39 mmHg  TR Velocity:176 cm/s  Velocity: 5.04 cm/s                               TR Gradient:12.39 mmHg  MV E' Lateral  Velocity: 8.01 cm/s  MV E/E' septal: 15.46  MV E/E' lateral: 9.73      XR CHEST (2 VW)    Result Date: 7/29/2021  EXAMINATION: TWO XRAY VIEWS OF THE CHEST 7/29/2021 12:18 pm COMPARISON: July 2, 2021 HISTORY: ORDERING SYSTEM PROVIDED HISTORY: shortness of breath TECHNOLOGIST PROVIDED HISTORY: Reason for exam:->shortness of breath Reason for Exam: sob FINDINGS: Stable cardiomediastinal silhouette. There is no focal consolidation, pleural effusion, or pneumothorax. Mild bibasilar atelectasis is noted. The osseous structures are stable. Mild bibasilar atelectasis. No focal consolidation. XR CHEST (2 VW)    Result Date: 7/2/2021  EXAMINATION: TWO XRAY VIEWS OF THE CHEST 7/2/2021 2:36 pm COMPARISON: CT on 11/23/2020 HISTORY: Acute chest pain and shortness of breath. FINDINGS: Cardiomediastinal silhouette and pulmonary vasculature are normal. No consolidation, pleural effusion, or pneumothorax. No acute abnormality. CT CHEST WO CONTRAST    Result Date: 7/4/2021  EXAMINATION: CT OF THE CHEST WITHOUT CONTRAST 7/2/2021 8:26 pm TECHNIQUE: CT of the chest was performed without the administration of intravenous contrast. Multiplanar reformatted images are provided for review. Dose modulation, iterative reconstruction, and/or weight based adjustment of the mA/kV was utilized to reduce the radiation dose to as low as reasonably achievable.  COMPARISON: CT 11/23/2020, CT 07/14/2012 HISTORY: ORDERING SYSTEM PROVIDED HISTORY: dyspnea TECHNOLOGIST PROVIDED HISTORY: Reason for exam:->dyspnea Reason for Exam: chest pain, sob Acuity: Acute Type of Exam: Initial FINDINGS: Mediastinum: No pathologically enlarged thoracic adenopathy. Small pericardial effusion. The thoracic aorta is normal in caliber with mild-to-moderate atherosclerosis. The main pulmonary artery is normal in caliber. Coronary arterial calcifications. The esophagus is unremarkable. Lungs/pleura: No new focal consolidation, pleural effusion or pneumothorax. Mild emphysematous changes. The central airways are otherwise patent. Minimal left basilar atelectasis. Upper Abdomen: There is cholelithiasis. There is an indeterminate 3.4 x 2.9 cm mildly hyperdense lesion in the upper spleen. Soft Tissues/Bones: No acute findings in the bones or soft tissues. There is mild-to-moderate central canal narrowing at T11-T12.     1. Mild emphysematous changes with no acute cardiopulmonary findings. 2. Cholelithiasis. 3. Indeterminate 3.4 x 2.9 cm mildly hyperdense lesion in the upper spleen new since 2012. Findings are likely benign in the absence of known malignancy, constitutional symptoms, or immunocompromise. ED COURSE & MEDICAL DECISION MAKING       Vital signs and nursing notes reviewed during ED course. I have independently evaluated this patient . Supervising physician - Dr. Ashely Booker - was present in ED and available for consultation throughout entirety of patient's care. All pertinent Lab data and radiographic results reviewed with patient at bedside. The patient and/or the family were informed of the results of any tests/labs/imaging, the treatment plan, and time was allotted to answer questions. Clinical Impression:  1. Generalized abdominal pain    2. Dyspnea and respiratory abnormalities    3. Abdominal distension    4. Chronic kidney disease, unspecified CKD stage    5. Splenic lesion    6. Elevated troponin    7. Elevated brain natriuretic peptide (BNP) level        Patient presents with abdominal distention, shortness of breath and chest pressure.   On exam, pleasant 78-year-old male, mildly uncomfortable as the cause for patient's abdominal distention and weight gain, recommends further work-up for abdominal distention for possible abdominal causes. Patient has had previous history of cirrhosis, did add on lactic acid and lipase which were both normal.  At this time, we will plan to consult with hospitalist for further evaluation, pain control, diuresis and management. I did consult with Dr. Acosta Quintero - hospitlaist - and discussed patient's history, ED presentation/course including any pertinent laboratory findings and imaging study findings. He/she agrees to hospital admission. Patient is admitted to the hospital in stable condition. In consideration of current COVID19 pandemic, with effort to minimize unnecessary provider exposure, this patient was seen at bedside by me independently. However, in compliance with current hospital IMELDA/ED protocol, prior to admission I did discuss this patient case with emergency department physician, Dr. Miriam Perez, who did agree with ED workup/evaluation and plan for admission. Of note, this Pt was NOT admitted to the ICU. Diagnosis and plan discussed in detail with patient who understands and agrees. Disposition referral (if applicable):  No follow-up provider specified.     Disposition medications (if applicable):  New Prescriptions    No medications on file         (Please note that portions of this note may have been completed with a voice recognition program. Efforts were made to edit the dictations but occasionally words are mis-transcribed.)          Allegra Pérez PA-C  07/29/21 [id number]

## 2021-07-29 NOTE — ED PROVIDER NOTES
EKG:   Sinus rhythm with occasional PACs. Rate of 76. GA interval 146, QRS 92, QTc 438. No ST elevations or depressions. Normal T waves. Q waves in the inferior leads. Impression: Abnormal EKG. When compared to previous EKG from 7/3/2021, there are no significant changes.       Malou Segal MD  07/29/21 [id number]

## 2021-07-30 PROBLEM — R06.89 DYSPNEA AND RESPIRATORY ABNORMALITIES: Status: ACTIVE | Noted: 2020-11-20

## 2021-07-30 PROBLEM — R06.00 DYSPNEA AND RESPIRATORY ABNORMALITIES: Status: ACTIVE | Noted: 2020-11-20

## 2021-07-30 NOTE — PROGRESS NOTES
Hospitalist Progress Note      Patient:  Melba Brady    Unit/Bed:3013/3013-A  YOB: 1955  MRN: [id number]   Acct: [de-identified]   PCP: Andra Mitchell  Date of Admission: 7/29/2021    Assessment and Plan:        1. Abdominal distention with history of pancreatitis: CT abdomen/pelvis showed new cystic lesion within the superior aspect of the spleen possible traumatic/infectious. And recommended MRI in 6 to 12 months. Surgery team consulted and recommended against any intervention at this time and follow-up with them for possible scopes as an outpatient. 2. Splenic cyst: Documented on CT abdomen/pelvis possibly traumatic/infectious: Per surgery team no intervention at this time. 3. Acute on chronic left ventricular systolic and diastolic dysfunction/heart failure reduced ejection fraction 15 to 20%: Cardiology on the case managing, fluid restriction, reduce salt intake, daily weights, monitor urinary output, Lasix 40 mg twice daily, patient will benefit from CHF clinic as an outpatient. 4. Ischemic cardiomyopathy status post 2 drug-eluting stents in May this year acknowledged: Seen by EP with plan for ICD once is stable in September as per cardiology recommendations  5. Possible cardiorenal syndrome: Nephrology and cardiology on the case  6. CKD stage IIIb: Avoid nephrotoxins, nephrology been consulted  7. COPD/stable: Continue home inhalers  8. Former smoker: Mentioned he quit smoking long time ago and he is not eating salt  9. BMI more than 35/morbid obesity: Counseled about lifestyle modification    PMH, PSH, SH, FHX reviewed in chart review snap shot in EPIC    CC: Abdominal distention with history of pancreatitis, splenic cyst, heart failure reduced ejection fraction    HPI: Initial admission HPI by admitting physician reviewed as below:  Melba Brady is a 77 y.o.  male  who presents with abd distention, dyspnea.       Pt presented with few days h/o worsening abd distention associated with dull generalized mild pain/discomfort, radiates across abd, no n/V, no diarrhea, no constipation, no F/C, pain worse with palpation, associated also with worsening dyspnea, worse with exertion, better with rest, no chest pain, been taking meds as prescribed, following diet restrictions as advised. For further details and updates please refer to assessment and plan at the beginning of the note. ROS (10 point review of systems completed. Pertinent positives noted. Otherwise ROS is negative) : Abdominal distention, shortness of breath  PMH:  Per HPI and reviewed in the chart  SHX: Reviewed in the chart, also the patient  [address] [facility]: Reviewed in the chart, also with the patient  Allergies: Reviewed in the chart  Medications:     sodium chloride        sodium chloride flush  5-40 mL Intravenous BID    aspirin  81 mg Oral Daily    budesonide-formoterol  2 puff Inhalation BID    atorvastatin  40 mg Oral Nightly    clopidogrel  75 mg Oral Daily    lisinopril  2.5 mg Oral Daily    spironolactone  25 mg Oral Daily    pantoprazole  40 mg Oral QAM AC    metoprolol succinate  25 mg Oral Daily    furosemide  40 mg Intravenous BID       Subjective 7/30/2021: Mentioned he feels better, he quit smoking long time ago, he had good bowel movement and pain well, his abdomen distended but feels better today, he mentioned his been having PND with orthopnea, he does have heart failure clinic follow-up as an outpatient. For further information please refer to my assessment and plan at the beginning of the note. Nursing notes, labs, imaging, and vitals reviewed.     Vital Signs:   Vitals reviewed and compared with the previous ones  /74   Pulse 76   Temp 98.1 °F (36.7 °C) (Oral)   Resp 17   Ht 5' 7\" (1.702 m)   Wt 245 lb (111.1 kg)   SpO2 99%   BMI 38.37 kg/m²      Intake/Output Summary (Last 24 hours) at 7/30/2021 1739  Last data filed at 7/30/2021 1302  Gross per 24 hour   Intake --   Output 2225 ml Net -2225 ml        General:   Age-appropriate, in no acute distress, morbid obesity  HEENT:  normocephalic and atraumatic. No scleral icterus. PERRLA. Neck: supple. No thyromegaly. Lungs: Bibasilar crackles, no wheeziness  Cardiac: S1, S2, LILLIE appreciated, no JVD. Abdomen: soft. Nontender. Bowel sounds positive. Extremities: No cyanosis, +2 bilateral pedal edema   vasculature: capillary refill < 3 seconds. Pedal pulses intact bilaterally. Skin:  warm and dry. Not clammy. Psych:  Alert to time, person and place. Communicable. Affect appropriate  Lymph:  No supraclavicular ,and no anterior cervical lymphadenopathy. Neurologic:  No focal deficit. CN II-XII grossly intact. Motor and Sensory capacity intact in all extremities. Labs:   Recent Labs     07/29/21  1240 07/30/21  0331   WBC 9.2 7.9   HGB 13.7 12.7*   HCT 41.3* 40.1*    195     Recent Labs     07/29/21  1240 07/30/21  0331    137   K 4.1 3.7   CL 98* 98*   CO2 29 31   BUN 20 18   CREATININE 1.8* 1.7*   CALCIUM 9.0 9.0     Recent Labs     07/29/21  1240 07/30/21  0331   AST 10* 11*   ALT 12 11   BILIDIR  --  0.2   BILITOT 0.5 0.6   ALKPHOS 58 58     Recent Labs     07/29/21  1240   INR 0.88     No results for input(s): CKTOTAL, TROPONINI in the last 72 hours.     Microbiology:    Blood culture #1: No results found for: BC    Blood culture #2:No results found for: Venkatesh Huerta    Organism:No results found for: ORG    No results found for: LABGRAM    MRSA culture only:No results found for: [facility]    Urine culture: No results found for: LABURIN    Respiratory culture: No results found for: CULTRESP    Aerobic and Anaerobic :  No results found for: LABAERO  No results found for: LABANAE    Urinalysis:      Lab Results   Component Value Date    NITRU NEGATIVE 11/19/2020    WBCUA <1 11/19/2020    BACTERIA NEGATIVE 11/19/2020    RBCUA 1 11/19/2020    BLOODU NEGATIVE 11/19/2020    SPECGRAV 1.011 11/19/2020       Radiology:  CT ABDOMEN PELVIS WO CONTRAST Additional Contrast? None   Final Result   No significant ascites identified. Very small right pleural effusion. Patchy ground-glass nodular infiltrates   within the lungs bilaterally, greater on the right. These are nonspecific   but may reflect inflammation or infection, in which case consider both   typical and atypical etiologies. There is mild interlobular septal thickening within the lower lungs, also   nonspecific, suggesting interstitial edema. New cystic lesion is seen within the superior aspect of the spleen, along   with new deformity of the splenic contour. Most likely that reflects   interval trauma or infection. Correlation with the patient's history of be   helpful. If there is no history of trauma or inflammation/infection, a   follow-up MRI in approximately 6-12 months is recommended to better evaluate   that. XR CHEST (2 VW)   Final Result   Mild bibasilar atelectasis. No focal consolidation. MRI ABDOMEN W WO CONTRAST    (Results Pending)     CT ABDOMEN PELVIS WO CONTRAST Additional Contrast? None    Result Date: 7/29/2021  EXAMINATION: CT OF THE ABDOMEN AND PELVIS WITHOUT CONTRAST 7/29/2021 11:25 am TECHNIQUE: CT of the abdomen and pelvis was performed without the administration of intravenous contrast. Multiplanar reformatted images are provided for review. Dose modulation, iterative reconstruction, and/or weight based adjustment of the mA/kV was utilized to reduce the radiation dose to as low as reasonably achievable. COMPARISON: 07/14/2012 HISTORY: ORDERING SYSTEM PROVIDED HISTORY: ascites TECHNOLOGIST PROVIDED HISTORY: Reason for exam:->ascites Additional Contrast?->None Decision Support Exception - unselect if not a suspected or confirmed emergency medical condition->Emergency Medical Condition (MA) Reason for Exam: ascites FINDINGS: Lower Chest: Very small right pleural effusion is noted.   Mild interlobular septal thickening is noted within the lower lungs. Patchy ground-glass nodular infiltrates are noted bilaterally, greater on the right (for example series 2, image 12). Noncontrast imaging of the base of the heart is unremarkable. Lack of intravenous contrast limits evaluation of the solid abdominal viscera, the hollow abdominal viscera, and the vascular structures. Organs: That said, no acute hepatic abnormality identified. Uncomplicated cholelithiasis is noted. Limited noncontrast imaging of the spleen shows new contour deformity along its superior margin, with development of of an associated 3.7 cm cyst.  That cyst shows homogeneous low-attenuation (approximately 18 Hounsfield units), with smooth margins. Limited noncontrast imaging of the pancreas is unremarkable. Right kidney is atrophic, new when compared to the previous exam.  Left kidney unremarkable. GI/Bowel: Mild diverticulosis of the large bowel is present without CT evidence of diverticulitis. The large bowel is otherwise unremarkable in appearance. The appendix is not well visualized. No asymmetric pericecal inflammation is seen to suggest acute appendicitis. Distal esophagus, stomach, duodenal sweep, and the remainder of the small bowel are unremarkable. Pelvis: Prostate and urinary bladder within normal limits. No free pelvic fluid. Peritoneum/Retroperitoneum: Patient is status post abdominal aortic aneurysm repair with an endo stent. Native aneurysm sac measures 5.0 cm. No previous examinations are available to gauge stability of that. No upper abdominal, retroperitoneal, iliac chain, or inguinal lymphadenopathy. Bones/Soft Tissues: Evidence of a remote proximal left rectus femoris muscle tear, seen previously. Benign-appearing lipoma within the sartorius is also again noted. No osteolytic or osteoblastic bone lesions are identified. No acute bony abnormalities are seen. No significant ascites identified. Very small right pleural effusion.   Patchy ground-glass nodular infiltrates within the lungs bilaterally, greater on the right. These are nonspecific but may reflect inflammation or infection, in which case consider both typical and atypical etiologies. There is mild interlobular septal thickening within the lower lungs, also nonspecific, suggesting interstitial edema. New cystic lesion is seen within the superior aspect of the spleen, along with new deformity of the splenic contour. Most likely that reflects interval trauma or infection. Correlation with the patient's history of be helpful. If there is no history of trauma or inflammation/infection, a follow-up MRI in approximately 6-12 months is recommended to better evaluate that. XR CHEST (2 VW)    Result Date: 7/29/2021  EXAMINATION: TWO XRAY VIEWS OF THE CHEST 7/29/2021 12:18 pm COMPARISON: July 2, 2021 HISTORY: ORDERING SYSTEM PROVIDED HISTORY: shortness of breath TECHNOLOGIST PROVIDED HISTORY: Reason for exam:->shortness of breath Reason for Exam: sob FINDINGS: Stable cardiomediastinal silhouette. There is no focal consolidation, pleural effusion, or pneumothorax. Mild bibasilar atelectasis is noted. The osseous structures are stable. Mild bibasilar atelectasis. No focal consolidation. Discussed plan with patient and nurse. Patient and nurse verbalized understanding and agree. All questions addressed with concerns. Please excuse my TYPOS!     Electronically signed by Duane Carpio MD on 7/30/2021 at 5:39 PM

## 2021-07-30 NOTE — PLAN OF CARE
Problem: Pain:  Goal: Pain level will decrease  Description: Pain level will decrease  Outcome: Ongoing  Goal: Control of acute pain  Description: Control of acute pain  Outcome: Ongoing  Goal: Control of chronic pain  Description: Control of chronic pain  Outcome: Ongoing     Problem: Activity:  Goal: Risk for activity intolerance will decrease  Description: Risk for activity intolerance will decrease  Outcome: Ongoing     Problem:  Bowel/Gastric:  Goal: Bowel function will improve  Description: Bowel function will improve  Outcome: Ongoing  Goal: Diagnostic test results will improve  Description: Diagnostic test results will improve  Outcome: Ongoing  Goal: Occurrences of nausea will decrease  Description: Occurrences of nausea will decrease  Outcome: Ongoing  Goal: Occurrences of vomiting will decrease  Description: Occurrences of vomiting will decrease  Outcome: Ongoing     Problem: Fluid Volume:  Goal: Maintenance of adequate hydration will improve  Description: Maintenance of adequate hydration will improve  Outcome: Ongoing     Problem: Health Behavior:  Goal: Ability to state signs and symptoms to report to health care provider will improve  Description: Ability to state signs and symptoms to report to health care provider will improve  Outcome: Ongoing     Problem: Physical Regulation:  Goal: Complications related to the disease process, condition or treatment will be avoided or minimized  Description: Complications related to the disease process, condition or treatment will be avoided or minimized  Outcome: Ongoing  Goal: Ability to maintain clinical measurements within normal limits will improve  Description: Ability to maintain clinical measurements within normal limits will improve  Outcome: Ongoing     Problem: Sensory:  Goal: Pain level will decrease  Description: Pain level will decrease  Outcome: Ongoing  Goal: Ability to identify factors that increase the pain will improve  Description: Ability to identify factors that increase the pain will improve  Outcome: Ongoing  Goal: Ability to notify healthcare provider of pain before it becomes unmanageable or unbearable will improve  Description: Ability to notify healthcare provider of pain before it becomes unmanageable or unbearable will improve  Outcome: Ongoing

## 2021-07-30 NOTE — CONSULTS
Nephrology Service Consultation      [address], [address] [address]  Phone: [phone]  Office Hours: 8:30AM - 4:30PM  Monday - Friday            Patient:  Jose Castro  MRN: [id number]  Consulting physician:  Jose Alex MD  Reason for Consult: elevated creat      PCP: Elige Gosselin    HISTORY OF PRESENT ILLNESS:   The patient is a 77 y.o. male with CAD, COPD, CKD3 presented due to abdominal distention and pain . He reports frequent BMs. Renal consult for cr 1.7. He is an office pt. He has had many coronary stents placed recently  He denies diarrhea, vomiting  He is on rm air  He also reports shortness of breath    REVIEW OF SYSTEMS:  14 point ROS is Negative. See positive ROS per HPI    Past Medical History:        Diagnosis Date    Cancer [facility])     testicular    COPD, moderate ([address] 11/30/2020    Excessive daytime sleepiness 11/30/2020    GERD (gastroesophageal reflux disease)     Hypertension     Obstructive sleep apnea 2/3/2021    Pulmonary emphysema determined by X-ray [facility]) 11/30/2020    Testicular hypofunction     thus on testosterone    Tobacco abuse 11/30/2020       Past Surgical History:        Procedure Laterality Date    ABDOMINAL AORTIC ANEURYSM REPAIR, ENDOVASCULAR  11/30/2015    endologix aaa device  mri conditional  card scanned into pacs    HERNIA REPAIR      LEG SURGERY  10/2020    camncer removed       Medications:   Prior to Admission medications    Medication Sig Start Date End Date Taking? Authorizing Provider   nitroGLYCERIN (NITROSTAT) 0.4 MG SL tablet up to max of 3 total doses.  If no relief after 1 dose, call 911. 7/4/21   Cleo Rodas MD   torsemide [facility]) 20 MG tablet Take 1 tablet by mouth daily 7/4/21   Cleo Rodas MD   spironolactone (ALDACTONE) 25 MG tablet Take 1 tablet by mouth daily 7/4/21   Kenton Keyes MD   metoprolol succinate (TOPROL XL) 25 MG extended release tablet Take 1 tablet by mouth daily 7/5/21   Belinda Kinney MD   lisinopril (PRINIVIL;ZESTRIL) 2.5 MG tablet Take 1 tablet by mouth daily 7/4/21   Belinda Kinney MD   budesonide-formoterol (SYMBICORT) 80-4.5 MCG/ACT AERO Inhale 2 puffs into the lungs 2 times daily 5/8/21   Historical Provider, MD   loratadine (CLARITIN) 10 MG tablet Take 10 mg by mouth daily    Historical Provider, MD   omeprazole (PRILOSEC) 20 MG delayed release capsule Take 20 mg by mouth daily    Historical Provider, MD   clopidogrel (PLAVIX) 75 MG tablet Take 75 mg by mouth daily    Historical Provider, MD   aspirin 81 MG chewable tablet Take 1 tablet by mouth daily 5/7/21   Toño Carmichael MD   atorvastatin (LIPITOR) 40 MG tablet Take 1 tablet by mouth nightly 5/6/21   Toño Carmichael MD   umeclidinium-vilanterol (ANORO ELLIPTA) 62.5-25 MCG/INH AEPB inhaler Inhale 1 puff into the lungs daily 2/3/21   Liane Love MD   albuterol sulfate HFA (PROAIR HFA) 108 (90 Base) MCG/ACT inhaler Inhale 2 puffs into the lungs every 6 hours as needed for Wheezing 11/30/20   Liane Love MD        Allergies:  Patient has no known allergies. Social History:   TOBACCO:   reports that he quit smoking about 8 months ago. He has never used smokeless tobacco.  ETOH:   reports no history of alcohol use. OCCUPATION:      Family History:   No family history on file.         Physical Exam:    Vitals: BP (!) 124/107   Pulse 71   Temp 97.7 °F (36.5 °C) (Oral)   Resp 17   Ht 5' 7\" (1.702 m)   Wt 245 lb (111.1 kg)   SpO2 93%   BMI 38.37 kg/m²   General appearance: in no acute distress, appears stated age  Skin: Skin color, texture, turgor normal. No rashes or lesions  HEENT: normocephalic, atraumatic  Neck: supple, trachea midline  Lungs: clear to auscultation bilaterally, breathing comfortably  Heart[de-identified] regular rate and rhythm, S1, S2 normal,  Abdomen: tender in LUQ  Extremities: extremities normal, atraumatic, no cyanosis or edema  Neurologic: Mental status: alert, oriented, interactive, following commands  Psychiatric: mood and affect appropriate    CBC:   Recent Labs     07/29/21  1240 07/30/21  0331   WBC 9.2 7.9   HGB 13.7 12.7*    195     BMP:    Recent Labs     07/29/21  1240 07/30/21  0331    137   K 4.1 3.7   CL 98* 98*   CO2 29 31   BUN 20 18   CREATININE 1.8* 1.7*   GLUCOSE 169* 126*     Hepatic:   Recent Labs     07/29/21  1240 07/30/21  0331   AST 10* 11*   ALT 12 11   BILITOT 0.5 0.6   ALKPHOS 58 58         Assessment and Recommendations     Patient Active Problem List    Diagnosis Date Noted    Abdominal distention 07/29/2021    CHF (congestive heart failure), NYHA class II, acute on chronic, systolic (HCC) 93/18/9720    Acute on chronic combined systolic and diastolic congestive heart failure, NYHA class 3 ([address] 07/04/2021    Chest pain 07/02/2021    Abnormal stress test 05/06/2021    CAD in native artery 05/06/2021    Hypertensive renal disease 02/11/2021    Obstructive sleep apnea 02/03/2021    COPD, moderate ([address] 11/30/2020    Pulmonary emphysema determined by X-ray ([address] 11/30/2020    Tobacco abuse 11/30/2020    Excessive daytime sleepiness 11/30/2020    Stage 3 chronic kidney disease ([address] 11/20/2020    Dyspnea on exertion 11/20/2020    Chronic kidney disease-mineral and bone disorder 11/20/2020    Essential hypertension 11/20/2020    Hyponatremia 11/20/2020     -CKD3  -HTN  -CAD s/p many stents recently  -AAA hx sp EVAR  -Abdominal discomfort in LUQ  -Splenic cyst  -Shortness of breath with ground glass lung infiltrates  -COPD  -CHF with ef 155    Plan;  -Cr at baseline, 1.7  -Does not look clinically in volume overload  -Avoid nephrotoxins  -Continue supportive mgmt  -Dr Arteaga is his pulmonologist    Electronically signed by Aleja Grant DO on 7/30/2021 at 7:55 AM    ADULT Jayde Bynum MD  [address] [facility]  [address]  [address]  [place], [place] [address]  PHONE: [phone]  FAX: [phone]

## 2021-07-30 NOTE — PROGRESS NOTES
Daily Progress Note    Doing better-heart rate and BP stable  Hx of CAD and HFrEF   Seen by Ep plan for ICD once stable in September  Abdominal pain stable  He is stable from cardiac stand  Cardio-renal syndrome keep on diuretics    Increase activity     Awake, alert, feeling ok  SR on tele, HR and BP stable  Denies CP. Dyspnea is stable     HFrEF  - EF 15 - 20%  -  BiV ICD considered - not a candidate at this time per electrophysiology  - optimize med tx   - f/u in office    Hx CAD s/p PCI 5/21  - continue med tx     Abd distension   - Splenic cyst on CT scan   - MRI abd pending  - Gen surgery following   - management per primary     Will continue to follow         CT abd 7/29/21  Impression   No significant ascites identified.       Very small right pleural effusion.  Patchy ground-glass nodular infiltrates   within the lungs bilaterally, greater on the right.  These are nonspecific   but may reflect inflammation or infection, in which case consider both   typical and atypical etiologies.       There is mild interlobular septal thickening within the lower lungs, also   nonspecific, suggesting interstitial edema.       New cystic lesion is seen within the superior aspect of the spleen, along   with new deformity of the splenic contour.  Most likely that reflects   interval trauma or infection.  Correlation with the patient's history of be   helpful.  If there is no history of trauma or inflammation/infection, a   follow-up MRI in approximately 6-12 months is recommended to better evaluate   that.           Echo 7/3/21  Summary   Left ventricular systolic function is abnormal.   Ejection fraction is visually estimated at 15-20%. Moderately dilated right ventricle. Sclerotic, but non-stenotic aortic valve. No evidence of any pericardial effusion. PAST MEDICAL HISTORY:  History of coronary artery disease, has had  angioplasty done in 05/2021.   Has severe LV dysfunction present and  pulmonary CL 98* 98*   CO2 29 31   BUN 20 18   CREATININE 1.8* 1.7*     LIVER PROFILE:   Recent Labs     07/29/21  1240 07/29/21  1712 07/30/21  0331   AST 10*  --  11*   ALT 12  --  11   LIPASE  --  15  --    BILIDIR  --   --  0.2   BILITOT 0.5  --  0.6   ALKPHOS 58  --  58     PT/INR:   Recent Labs     07/29/21  1240   PROTIME 11.3*   INR 0.88     APTT:   Recent Labs     07/29/21  1240   APTT 32.7     BNP:  No results for input(s): BNP in the last 72 hours.       Assessment:  Patient Active Problem List    Diagnosis Date Noted    Generalized abdominal pain     Splenic lesion     Abdominal distention 07/29/2021    CHF (congestive heart failure), NYHA class II, acute on chronic, systolic ([address] 15/96/6211    Acute on chronic combined systolic and diastolic congestive heart failure, NYHA class 3 ([address] 07/04/2021    Chest pain 07/02/2021    Abnormal stress test 05/06/2021    CAD in native artery 05/06/2021    Hypertensive renal disease 02/11/2021    Obstructive sleep apnea 02/03/2021    COPD, moderate ([address] 11/30/2020    Pulmonary emphysema determined by X-ray ([address] 11/30/2020    Tobacco abuse 11/30/2020    Excessive daytime sleepiness 11/30/2020    Stage 3 chronic kidney disease ([address] 11/20/2020    Dyspnea and respiratory abnormalities 11/20/2020    Chronic kidney disease-mineral and bone disorder 11/20/2020    Essential hypertension 11/20/2020    Hyponatremia 11/20/2020       Electronically signed by MICHAEL Flanagan CNP on 7/30/2021 at 10:45 AM     Electronically signed by Belinda Kinney MD on 7/30/21 at 11:03 AM EDT

## 2021-07-30 NOTE — CONSULTS
Department of Internal Medicine  Gastroenterology Consult Note  Mona Gamino MD      Reason for Consult:  abd pain and distention    Primary Care Physician:  Pasha Harrison    History Obtained From:  patient    HISTORY OF PRESENT ILLNESS:              The patient is a 77 y.o.  male admitted with a several day history of abd pain and distention. The pain was described as a constant ache with occasional sharp twinges of pain. He had no alteration in his bowel habits and neither flatus nor BM's affected the pain. He has had no vomiting. CT abd was unremarkable except for a splenic cyst felt by surgery to be chronic and due top prior trauma. . He has had no melena, hematochezia or hematemesis. His last colonoscopy was over 10 years ago. he states that today the pain is much better    Past Medical History:        Diagnosis Date    Cancer [facility])     testicular    COPD, moderate ([address] 11/30/2020    Excessive daytime sleepiness 11/30/2020    GERD (gastroesophageal reflux disease)     Hypertension     Obstructive sleep apnea 2/3/2021    Pulmonary emphysema determined by X-ray [facility]) 11/30/2020    Testicular hypofunction     thus on testosterone    Tobacco abuse 11/30/2020       Past Surgical History:        Procedure Laterality Date    ABDOMINAL AORTIC ANEURYSM REPAIR, ENDOVASCULAR  11/30/2015    endologix aaa device  mri conditional  card scanned into pacs    HERNIA REPAIR      LEG SURGERY  10/2020    camncer removed       Medications Prior to Admission:    Prior to Admission medications    Medication Sig Start Date End Date Taking? Authorizing Provider   testosterone cypionate (DEPOTESTOTERONE CYPIONATE) 200 MG/ML injection Inject 1 mL into the muscle every 14 days. 7/21/21   Historical Provider, MD   nitroGLYCERIN (NITROSTAT) 0.4 MG SL tablet up to max of 3 total doses.  If no relief after 1 dose, call 911. 7/4/21   Michael Draper MD   torsemide (DEMADEX) 20 MG tablet Take 1 tablet by mouth daily 7/4/21   Aniya Maloney MD   spironolactone (ALDACTONE) 25 MG tablet Take 1 tablet by mouth daily 7/4/21   Antonella Villalobos MD   metoprolol succinate (TOPROL XL) 25 MG extended release tablet Take 1 tablet by mouth daily 7/5/21   Antonella Villalobos MD   lisinopril (PRINIVIL;ZESTRIL) 2.5 MG tablet Take 1 tablet by mouth daily 7/4/21   Antonella Villalobos MD   budesonide-formoterol (SYMBICORT) 80-4.5 MCG/ACT AERO Inhale 2 puffs into the lungs 2 times daily 5/8/21   Historical Provider, MD   loratadine (CLARITIN) 10 MG tablet Take 10 mg by mouth daily    Historical Provider, MD   omeprazole (PRILOSEC) 20 MG delayed release capsule Take 20 mg by mouth daily    Historical Provider, MD   clopidogrel (PLAVIX) 75 MG tablet Take 75 mg by mouth daily    Historical Provider, MD   aspirin 81 MG chewable tablet Take 1 tablet by mouth daily 5/7/21   Randa Daniels MD   atorvastatin (LIPITOR) 40 MG tablet Take 1 tablet by mouth nightly 5/6/21   Randa Daniels MD   umeclidinium-vilanterol (ANORO ELLIPTA) 62.5-25 MCG/INH AEPB inhaler Inhale 1 puff into the lungs daily 2/3/21   Sheyla Kline MD   albuterol sulfate HFA (PROAIR HFA) 108 (90 Base) MCG/ACT inhaler Inhale 2 puffs into the lungs every 6 hours as needed for Wheezing 11/30/20   Sheyla Kline MD       Allergies:  No Known Allergies. Social History:    TOBACCO:  No  ETOH:  No    Family History:   No family history on file. REVIEW OF SYSTEMS: (POSITIVES WILL BE UNDERLINED)  CONSTITUTIONAL:    Weight change,fatigue, fever, chills  EYES:  Diplopia, change in vision  EARS:  hearing loss, tinnitus, vertigo  NOSE:   epistaxis  MOUTH/THROAT:     hoarseness, sore throat. RESPIRATORY:  SOB,  cough, sputum, hemoptysis  CARDIOVASCULAR : chest pain,palpitations, dyspnea exertion, orthopnea, paroxysmal nocturnal dyspnea, pedal edema. GASTROINTESTINAL:  See HPI  GENITOURINARY:   dysuria, hematuria, . HEMATOLOGIC/LYMPHATIC:   Anemia, bleeding tendencies.   MUSCULOSKELETAL: myalgias,  joint pain  NEUROLOGICAL:   Loss of Consciousness, paresthesias, anesthesias, focal weakness  SKIN :   History of dermatitis, rashes  PSYCHIATRIC:  depression, , anxiety past psychosis  ENDOCRINE:  History of diabetes, thyroid disease  ALL/IMM : allergies, rashes    PHYSICAL EXAM:    Vitals:  /61   Pulse 71   Temp 97.7 °F (36.5 °C) (Oral)   Resp 18   Ht 5' 7\" (1.702 m)   Wt 245 lb (111.1 kg)   SpO2 95%   BMI 38.37 kg/m²   CONSTITUTIONAL: alert, cooperative, no apparent distress,   EYES:  pupils equal, round and reactive to light and sclera clear  ENT:  normocepalic, without obvious abnormality  NECK:  supple, symmetrical, trachea midline  HEMATOLOGIC/LYMPHATICS:  no cervical lymphadenopathy and no supraclavicular lymphadenopathy  LUNGS:  clear to auscultation  CARDIOVASCULAR:  regular rate and rhythm and no murmur noted  ABDOMEN:  Soft, mild tenderness in the LUQ with normal bowel sounds. His abd seems distended but no tympany or fluid wave. No organomegaly or masses  NEUROLOGIC: no focal deficit detected  SKIN:  no lesions  EXTREMITIES: no clubbing, cyanosis, or edema    IMPRESSION:  1) non-specific abd pain and distention- ?? etiology      RECOMMENDATIONS:  1) as he is feeling much better, possibly could be discharged and call my office for outpatient colonoscopy +/- EGD        Joe Batres M.D

## 2021-07-30 NOTE — PROGRESS NOTES
Physician Progress Note      PATIENTDalivier Medrano  [facility] #:                  [id number]  :                       1955  ADMIT DATE:       2021 1:16 PM  DISCH DATE:  RESPONDING  PROVIDER #:        Gianni Santo MD          QUERY TEXT:    Hospitalists,    Pt admitted with CHF exacerbation. Pt noted to also have ischemic   cardiomyopathy, LV dysfunction, VHD and CKD. If possible, please document in   progress notes and discharge summary the etiology of CHF, if able to be   determined. The medical record reflects the following:  Risk Factors: multiple risk factors  Clinical Indicators: HTN, CKD, LV dysfunction, ICM, CAD s/p PCI  Treatment: labs, imaging, Cardiology consult, diuresis    Thank you,  Amaya Rod RN CDS  [phone]  Options provided:  -- CHF due to Hypertensive Heart Disease  -- CHF due to Hypertensive Heart Disease and CAD  -- CHF not due to Hypertension but due to CAD  -- CHF due to Hypertensive Heart Disease and ICMP  -- CHF not due to Hypertension but due to ICMP  -- CHF due to Hypertensive Heart Disease and Valvular Heart Disease  -- CHF not due to Hypertension but due to valvular heart disease  -- CHF due to HTN, CAD, ICMP and valvular disease  -- Other - I will add my own diagnosis  -- Disagree - Not applicable / Not valid  -- Disagree - Clinically unable to determine / Unknown  -- Refer to Clinical Documentation Reviewer    PROVIDER RESPONSE TEXT:    This patient has CHF due to hypertensive heart disease and CAD.     Query created by: Franchesca Loyd on 2021 3:51 PM      Electronically signed by:  Gianni Santo MD 2021 6:16 PM

## 2021-07-30 NOTE — PLAN OF CARE
Problem: Pain:  Goal: Pain level will decrease  Description: Pain level will decrease  7/30/2021 1903 by Rebeca Lamb RN  Outcome: Ongoing  7/30/2021 0508 by Erich Bain RN  Outcome: Ongoing  Goal: Control of acute pain  Description: Control of acute pain  7/30/2021 1903 by Rebeca Lamb RN  Outcome: Ongoing  7/30/2021 0508 by Erich Bain RN  Outcome: Ongoing  Goal: Control of chronic pain  Description: Control of chronic pain  7/30/2021 1903 by Rebeca Lamb RN  Outcome: Ongoing  7/30/2021 0508 by Erich Bain RN  Outcome: Ongoing     Problem: Activity:  Goal: Risk for activity intolerance will decrease  Description: Risk for activity intolerance will decrease  7/30/2021 1903 by Rebeca Lamb RN  Outcome: Ongoing  7/30/2021 0508 by Erich Bain RN  Outcome: Ongoing     Problem:  Bowel/Gastric:  Goal: Bowel function will improve  Description: Bowel function will improve  7/30/2021 1903 by Rebeca Lamb RN  Outcome: Ongoing  7/30/2021 0508 by Erich Bain RN  Outcome: Ongoing  Goal: Diagnostic test results will improve  Description: Diagnostic test results will improve  7/30/2021 1903 by Rebeca Lamb RN  Outcome: Ongoing  7/30/2021 0508 by Erich Bain RN  Outcome: Ongoing  Goal: Occurrences of nausea will decrease  Description: Occurrences of nausea will decrease  7/30/2021 1903 by Rebeca Lamb RN  Outcome: Ongoing  7/30/2021 0508 by Erich Bain RN  Outcome: Ongoing  Goal: Occurrences of vomiting will decrease  Description: Occurrences of vomiting will decrease  7/30/2021 1903 by Rebeca Lamb RN  Outcome: Ongoing  7/30/2021 0508 by Erich Bain RN  Outcome: Ongoing     Problem: Fluid Volume:  Goal: Maintenance of adequate hydration will improve  Description: Maintenance of adequate hydration will improve  7/30/2021 1903 by Rebeca Lamb RN  Outcome: Ongoing  7/30/2021 0508 by Erich Bain RN  Outcome: Ongoing     Problem: Health Behavior:  Goal: Ability to state signs and symptoms to report to health care provider will improve  Description: Ability to state signs and symptoms to report to health care provider will improve  7/30/2021 1903 by Marzena Us RN  Outcome: Ongoing  7/30/2021 0508 by Sharny Hinojosa RN  Outcome: Ongoing     Problem: Physical Regulation:  Goal: Complications related to the disease process, condition or treatment will be avoided or minimized  Description: Complications related to the disease process, condition or treatment will be avoided or minimized  7/30/2021 1903 by Marzena Us RN  Outcome: Ongoing  7/30/2021 0508 by Sharyn Hinojosa RN  Outcome: Ongoing  Goal: Ability to maintain clinical measurements within normal limits will improve  Description: Ability to maintain clinical measurements within normal limits will improve  7/30/2021 1903 by Marzena Us RN  Outcome: Ongoing  7/30/2021 0508 by Sharyn Hinojosa RN  Outcome: Ongoing     Problem: Sensory:  Goal: Pain level will decrease  Description: Pain level will decrease  7/30/2021 1903 by Marzena Us RN  Outcome: Ongoing  7/30/2021 0508 by Sharyn Hinojosa RN  Outcome: Ongoing  Goal: Ability to identify factors that increase the pain will improve  Description: Ability to identify factors that increase the pain will improve  7/30/2021 1903 by Marzena Us RN  Outcome: Ongoing  7/30/2021 0508 by Sharyn Hinojosa RN  Outcome: Ongoing  Goal: Ability to notify healthcare provider of pain before it becomes unmanageable or unbearable will improve  Description: Ability to notify healthcare provider of pain before it becomes unmanageable or unbearable will improve  7/30/2021 1903 by Marzena Us RN  Outcome: Ongoing  7/30/2021 0508 by Sharyn Hinojosa RN  Outcome: Ongoing

## 2021-07-30 NOTE — CONSULTS
Nutrition Education    · Pt is sleeping during my room visit. · Written educational materials provided to bedside: Heart Failure Nutrition Therapy (Nutrition Care Manual). · Contact name and number provided. · Will review in person with Pt as able over stay.     Electronically signed by Kenyon Saldana RD, EMILY on 7/30/21 at 2:49 PM EDT    Contact: [phone]

## 2021-07-30 NOTE — CARE COORDINATION
Chart Review:  Pt lives alone. Pt is independent of his ADLs Pt has a PCP and insurance to help with his meds. No needs identified at this time. CM available if needed. Consult CM if the need should arise.
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
## 2021-07-31 NOTE — PLAN OF CARE
Problem: Pain:  Goal: Pain level will decrease  Description: Pain level will decrease  7/31/2021 0346 by Pj Rosales RN  Outcome: Ongoing  7/30/2021 1903 by Jose Purvis RN  Outcome: Ongoing  Goal: Control of acute pain  Description: Control of acute pain  7/31/2021 0346 by Pj Rosales RN  Outcome: Ongoing  7/30/2021 1903 by Jose Purvis RN  Outcome: Ongoing  Goal: Control of chronic pain  Description: Control of chronic pain  7/31/2021 0346 by Pj Rosales RN  Outcome: Ongoing  7/30/2021 1903 by Jose Purvis RN  Outcome: Ongoing     Problem: Activity:  Goal: Risk for activity intolerance will decrease  Description: Risk for activity intolerance will decrease  7/31/2021 0346 by Pj Rosales RN  Outcome: Ongoing  7/30/2021 1903 by Jose Purvis RN  Outcome: Ongoing     Problem:  Bowel/Gastric:  Goal: Bowel function will improve  Description: Bowel function will improve  7/31/2021 0346 by Pj Rosales RN  Outcome: Ongoing  7/30/2021 1903 by Jose Purvis RN  Outcome: Ongoing  Goal: Diagnostic test results will improve  Description: Diagnostic test results will improve  7/31/2021 0346 by Pj Rosales RN  Outcome: Ongoing  7/30/2021 1903 by Jose Purvis RN  Outcome: Ongoing  Goal: Occurrences of nausea will decrease  Description: Occurrences of nausea will decrease  7/31/2021 0346 by Pj Rosales RN  Outcome: Ongoing  7/30/2021 1903 by Jose Purvis RN  Outcome: Ongoing  Goal: Occurrences of vomiting will decrease  Description: Occurrences of vomiting will decrease  7/31/2021 0346 by Pj Rosales RN  Outcome: Ongoing  7/30/2021 1903 by Jose Purvis RN  Outcome: Ongoing     Problem: Fluid Volume:  Goal: Maintenance of adequate hydration will improve  Description: Maintenance of adequate hydration will improve  7/31/2021 0346 by Pj Rosales RN  Outcome: Ongoing  7/30/2021 1903 by Jose Purvis RN  Outcome: Ongoing     Problem: Health Behavior:  Goal: Ability to state signs and symptoms to report to health care provider will improve  Description: Ability to state signs and symptoms to report to health care provider will improve  7/31/2021 0346 by Lizzie López RN  Outcome: Ongoing  7/30/2021 1903 by Dell Shepard RN  Outcome: Ongoing     Problem: Physical Regulation:  Goal: Complications related to the disease process, condition or treatment will be avoided or minimized  Description: Complications related to the disease process, condition or treatment will be avoided or minimized  7/31/2021 0346 by Lizzie López RN  Outcome: Ongoing  7/30/2021 1903 by Dell Shepard RN  Outcome: Ongoing  Goal: Ability to maintain clinical measurements within normal limits will improve  Description: Ability to maintain clinical measurements within normal limits will improve  7/31/2021 0346 by Lizzie López RN  Outcome: Ongoing  7/30/2021 1903 by Dell Shepard RN  Outcome: Ongoing     Problem: Sensory:  Goal: Pain level will decrease  Description: Pain level will decrease  7/31/2021 0346 by Lizzie López RN  Outcome: Ongoing  7/30/2021 1903 by Dell Shepard RN  Outcome: Ongoing  Goal: Ability to identify factors that increase the pain will improve  Description: Ability to identify factors that increase the pain will improve  7/31/2021 0346 by Lizzie López RN  Outcome: Ongoing  7/30/2021 1903 by Dell Shepard RN  Outcome: Ongoing  Goal: Ability to notify healthcare provider of pain before it becomes unmanageable or unbearable will improve  Description: Ability to notify healthcare provider of pain before it becomes unmanageable or unbearable will improve  7/31/2021 0346 by Lizzie López RN  Outcome: Ongoing  7/30/2021 1903 by Dell Shepard RN  Outcome: Ongoing

## 2021-07-31 NOTE — PROGRESS NOTES
-Cr 2 today  -On iv lasix  -Continue supportive mgmt    Patient Active Problem List    Diagnosis Date Noted    Generalized abdominal pain     Splenic lesion     Abdominal distention 07/29/2021    CHF (congestive heart failure), NYHA class II, acute on chronic, systolic ([address] 46/48/7639    Acute on chronic combined systolic and diastolic congestive heart failure, NYHA class 3 ([address] 07/04/2021    Chest pain 07/02/2021    Abnormal stress test 05/06/2021    CAD in native artery 05/06/2021    Hypertensive renal disease 02/11/2021    Obstructive sleep apnea 02/03/2021    COPD, moderate ([address] 11/30/2020    Pulmonary emphysema determined by X-ray ([address] 11/30/2020    Tobacco abuse 11/30/2020    Excessive daytime sleepiness 11/30/2020    Stage 3 chronic kidney disease ([address] 11/20/2020    Dyspnea and respiratory abnormalities 11/20/2020    Chronic kidney disease-mineral and bone disorder 11/20/2020    Essential hypertension 11/20/2020    Hyponatremia 11/20/2020
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
## 2021-07-31 NOTE — PROGRESS NOTES
Daily Progress Note    Awake, alert, feeling better   SR on tele, HR and BP stable  Denies CP. Dyspnea is stable    Encouraged increased activity     Overall doing better  Hx of CAD and HFrEf continue medical treatment  Being d/c home  Abdominal pain is stable  Will decide ICD as outpatient  Keep on cardiac meds prior to hospital    HFrEF  - EF 15 - 20%  -  Seen by EP - plan for ICD in September once stable   - optimize med tx   - f/u in office    Cardiorenal syndrome   - continue diuretics   - nephrology following      Hx CAD s/p PCI 5/21  - continue med tx      Abd distension   - Splenic cyst on CT scan   - MRI abd stable   - Gen surgery following   - management per primary      Will continue to follow     Echo 7/3/21  Summary   Left ventricular systolic function is abnormal.   Ejection fraction is visually estimated at 15-20%.   Moderately dilated right ventricle.   Sclerotic, but non-stenotic aortic valve.   No evidence of any pericardial effusion. MRI ABD 7/31/21  Impression   1. Partially limited study due to lack of intravenous contrast administration   related to malfunction of the contrast injector. 2. A 3.6 cm x 2.7 cm splenic lesion has signal characteristics of blood   products, most likely a hemorrhagic pseudocyst from prior infection or   trauma.  Of note, associated parenchymal scarring not present on 07/14/2012   is likely related.  No dedicated follow-up is recommended. 3. Liver iron deposition with no definite involvement elsewhere. Justina Donnaes is   superimposition of at least mild steatosis primarily in hepatic segment 8,   potentially a sequela of prior vascular insult given peripheral wedge-shaped   distribution.  No findings of cirrhosis.    4. Additional incidental findings as above.            CT abd 7/29/21  Impression   No significant ascites identified.       Very small right pleural effusion.  Patchy ground-glass nodular infiltrates   within the lungs bilaterally, greater on the Infusions:   sodium chloride        PRN Meds:  sodium chloride flush, sodium chloride, ondansetron **OR** ondansetron, acetaminophen **OR** acetaminophen, oxyCODONE, albuterol sulfate HFA       Physical Exam:  Vitals:    07/31/21 0930   BP: (!) 98/55   Pulse: 71   Resp: 16   Temp:    SpO2: 93%        General: A&Ox4, NAD  Chest: Nontender  Cardiac: s1 and s2 auscultated, no murmurs or gallops   Lungs: Breath sounds diminished bilaterally   Abdomen: Soft, NT, ND, +BS  Extremities: no cyanosis or edema   Vascular:  Equal 2+ peripheral pulses. Lab Data:  CBC:   Recent Labs     07/29/21  1240 07/30/21  0331 07/31/21  0447   WBC 9.2 7.9 9.0   HGB 13.7 12.7* 13.9   HCT 41.3* 40.1* 43.1   MCV 94.3 95.2 95.1    195 199     BMP:   Recent Labs     07/29/21  1240 07/30/21  0331 07/31/21  0447    137 135   K 4.1 3.7 3.8   CL 98* 98* 95*   CO2 29 31 32   BUN 20 18 20   CREATININE 1.8* 1.7* 2.0*     LIVER PROFILE:   Recent Labs     07/29/21  1240 07/29/21  1712 07/30/21  0331   AST 10*  --  11*   ALT 12  --  11   LIPASE  --  15  --    BILIDIR  --   --  0.2   BILITOT 0.5  --  0.6   ALKPHOS 58  --  58     PT/INR:   Recent Labs     07/29/21  1240   PROTIME 11.3*   INR 0.88     APTT:   Recent Labs     07/29/21  1240   APTT 32.7     BNP:  No results for input(s): BNP in the last 72 hours.       Assessment:  Patient Active Problem List    Diagnosis Date Noted    Generalized abdominal pain     Splenic lesion     Abdominal distention 07/29/2021    CHF (congestive heart failure), NYHA class II, acute on chronic, systolic ([facility] 19/04/2271    Acute on chronic combined systolic and diastolic congestive heart failure, NYHA class 3 ([address] 07/04/2021    Chest pain 07/02/2021    Abnormal stress test 05/06/2021    CAD in native artery 05/06/2021    Hypertensive renal disease 02/11/2021    Obstructive sleep apnea 02/03/2021    COPD, moderate ([facility] [facility] 11/30/2020    Pulmonary emphysema determined by X-ray ([facility] [address] 11/30/2020    Tobacco abuse 11/30/2020    Excessive daytime sleepiness 11/30/2020    Stage 3 chronic kidney disease ([facility] [address] 11/20/2020    Dyspnea and respiratory abnormalities 11/20/2020    Chronic kidney disease-mineral and bone disorder 11/20/2020    Essential hypertension 11/20/2020    Hyponatremia 11/20/2020       Electronically signed by MICHAEL Reddy - CNP on 7/31/2021 at 11:06 AM     Electronically signed by Tracy Mendez MD on 7/31/21 at 12:41 PM EDT

## 2021-08-04 NOTE — PROGRESS NOTES
SUBJECTIVE:  Chief Complaint: Moderate COPD, dyspnea on exertion, pulmonary emphysema, untreated obstructive sleep apnea, former smoker  Jaziel Harry states that he has not smoked since December 2020. He is feeling much better and has had no recent bronchitic infections. He was switched from Anoro to Symbicort because of price and insurance reasons. He has an albuterol rescue inhaler but does not require it very often. He denies any recent bronchitic infections and denies worsening shortness of breath or chest discomfort. He mentions that he did have stents placed in his been feeling much better since then. He has opted not to undergo therapy for his previously diagnosed obstructive sleep apnea. He has had no known COVID-19 exposure or infection but has opted not to get the COVID-19 vaccine so far. ROS:  Constitution:  HEENT: Negative for ear, throat pain  Cardiovascular: Negative for chest pain, syncope, edema  Pulmonary: See HPI  Musculoskeletal: Negative for DVT, myalgias, arthralgias    OBJECTIVE:  /64   Pulse 78   Ht 5' 6\" (1.676 m)   Wt 240 lb (108.9 kg)   SpO2 98%   BMI 38.74 kg/m²      Physical Exam:  Constitutional:  He appears well developed and well-nourished. Moderately overweight  Neck:  Supple, No palpable lymphadenopathy, No JVD, increased neck girth  Cardiovascular:  S1, S2 Normal, Regular rhythm, no murmurs or gallops, No pericardial  rubs. Pulmonary: Mildly diminished but otherwise fairly clear breath sounds throughout all areas  Abdomen: Not examined  Extremities: no edema, No DVT  Neurologic: Oriented x3, No focal deficits    Radiology: Chest x-ray on 7/29/2021 showed mild  bibasilar atelectasis with no focal consolidation  PFT: Office spirometry on 11/30/2020 demonstrated a severe obstructive defect with a significant response to bronchodilators      Echocardiogram: 7/3/2021  Left ventricular systolic function is abnormal.   Ejection fraction is visually estimated at 15-20%.

## 2021-08-19 PROBLEM — I50.22 CHF (CONGESTIVE HEART FAILURE), NYHA CLASS I, CHRONIC, SYSTOLIC (HCC): Status: ACTIVE | Noted: 2021-01-01

## 2021-09-16 NOTE — PROGRESS NOTES
Outpatient Medical Nutrition Therapy  - cardiac rehab  09/16/2021  9:03-10:00          Client History:    Pertinent medications:  Current Outpatient Medications   Medication Instructions    aspirin 81 mg, Oral, DAILY    atorvastatin (LIPITOR) 40 mg, Oral, NIGHTLY    budesonide-formoterol (SYMBICORT) 160-4.5 MCG/ACT AERO 2 puffs, Inhalation, 2 TIMES DAILY    budesonide-formoterol (SYMBICORT) 80-4.5 MCG/ACT AERO 2 puffs, Inhalation, 2 TIMES DAILY    clopidogrel (PLAVIX) 75 mg, Oral, DAILY    fluticasone (FLONASE) 50 MCG/ACT nasal spray 1 spray, Each Nostril, DAILY    nitroGLYCERIN (NITROSTAT) 0.4 MG SL tablet up to max of 3 total doses. If no relief after 1 dose, call 911.  potassium chloride (KLOR-CON M) 10 MEQ extended release tablet 10 mEq, Oral, 2 TIMES DAILY    testosterone cypionate (DEPOTESTOTERONE CYPIONATE) 200 MG/ML injection 1 mL, IntraMUSCular, EVERY 14 DAYS    torsemide (DEMADEX) 20 mg, Oral, 2 TIMES DAILY      Social hx: Lives alone. Retired . Daughter in the area visits weekly. Quit smoking last year, smoked for 40 years. Does not drink alcohol. Able to sleep well when comfortable. Does own food shopping and cooking. Pertinent Medical hx: COPD, prediabetes, CKD, cardiac stenting- 2 in May, 2 in June. Knee pain and foot pain. Activity habits: Attending exercise session in cardiac rehab 3 days per week. Has membership to a gym and may get an exercise bike. Food/nutrition habits: Eats 3 meals each day. Breakfast meal usually Cheerios or nutrigrain bar if quick meal. Drinks 2 cups of coffee each day, used to drink 2 pots each day. Lunch often salad or homemade tuna salad sandwich. Eats mainly fishes (tuna, salmon) and lean beef, some chicken but does not like chicken much. Has been eating more fruits and vegetables since starting cardiac rehab. Does not add salt when cooking or at the plate.      Biochemical data: April 2021 T chol -194, [place], [place], Trig-136  July 2021 - HbA1c 6.9% was 8.4% in April    Anthropometrics:    Ht:67\"  Wt: 235#   IBW: 162#  % of IBW: 145        BMI: 36.8, class II obesity      Weight hx: Weight trending down, was 250#. Reports some loss due to diuretics. Wants to continue loss to get back to 190#. Estimated needs: 1998-9423 calories/day (based on current weight with [address] [facility] [facility] and low active), may reduce for desired weight loss    Health priorities: Weight management, CAD    Nutrition dx: Nutrition-related knowledge deficit related to limited exposure to meal plan as evidenced by recent cardiac intervention in cardiac rehab program, was not on meal plan prior to program      Nutrition Prescription: low sodium plan 1500 mg/day, 4-5 servings fruits/vegetables each day     Nutrition Interventions: Discussion regarding meal plan with [employer] guidelines. Has been making many changes in the past months. Reading food labels and selecting low sodium, lower fat foods. Has increased daily intake of fruits and vegetables and trying some new options.      Nutrition related goals: continue to try one new fruit or vegetable each month, increase intake to achieve 4-5 servings each day     Adherence/barriers to goals: no barriers at this time     Primary learner: attended alone   Education materials provided: [employer] shopping list  Method of education:   Explanation    Handouts   Teach back     Response to education:    Verbalized understanding           Rec/plan: Patient to continue cardiac rehab program

## 2021-09-28 NOTE — DISCHARGE INSTR - DIET

## 2021-10-08 PROBLEM — J44.1 COPD EXACERBATION (HCC): Status: ACTIVE | Noted: 2021-01-01

## 2021-10-08 PROBLEM — R09.A2 GLOBUS SENSATION: Status: ACTIVE | Noted: 2021-01-01

## 2021-10-08 PROBLEM — R22.1 NECK SWELLING: Status: ACTIVE | Noted: 2021-01-01

## 2021-10-08 PROBLEM — R09.89 GLOBUS SENSATION: Status: ACTIVE | Noted: 2021-01-01

## 2021-10-14 NOTE — RESULT ENCOUNTER NOTE
TELL PT: THE CT OF THE NECK IS NORMAL, NO MASSES IN THE NECK  IF HE IS STILL HAVING THE SENSATION OF SOMETHING IN THE THROAT THEN STOP THE LISINOPRIL AND SEE IF THAT HELPS.   THX

## 2021-10-22 NOTE — PROGRESS NOTES
Electrophysiology Consult Note      Reason for consultation:  Need for BIVICD    Chief complaint : ICD    Referring physician: Dr. Clif Potts      Primary care physician: TIKI Su      History of Present Illness: This visit (10/22/2021)      Chief Complaint   Patient presents with    Other     here for evaluation for ICD placement. Has some chest tightness but no pain. Denies chest pain, palpations, edema,and dizziness    Shortness of Breath     SOB with walking, doing any mild activity. Previous visit:  Patient is a 78-year-old male with history of COPD, hypertension, obstructive sleep apnea, coronary artery disease s/p PCI, abdominal aortic aneurysm repair, ischemic cardiomyopathy, history of pancreatitis, severe left ventricular systolic dysfunction presents with complaints of abdominal fullness and distention with pain. Patient reports shortness of breath with minimal exertion. Patient reports he cannot lie flat with abdominal fullness and also has shortness of breath with it. Patient reports weight gain of 10 pounds in 1 week. Patient feels tired and weak. Patient reports that he was at cardiac rehab today when he was noted to have heart rate of 150. He was immediately sent to the cardiology office then referred to the emergency room. Patient tells me that last week he has gained 2 pounds a day. He states that he was gradually worsening fatigue. He reports that he was gradually worsening shortness of breath with exertion that will resolve with rest.  He states that he has orthopnea. He states that he props himself up at night to sleep. He additionally complains of abdominal distention with associated pain. He states this is the biggest complaint as he is having his abdominal discomfort at this time    He reports that he had a left heart cath in May and was referred to [facility] for intervention.   May 7, 2021 patient had a PCI to the RCA at [facility]. Patient had a PCI to LAD and diagonal on June 1, 2021 at [facility]. Patient had a follow-up echo July 2 and noted to have 15 to 20%. Patient was started on Coreg and lisinopril in May. Then in July he was switched to Toprol-XL and Aldactone was added to his medical regimen. Patient reports that he has been diagnosed with diabetes however is not compliant with medication    Patient additionally reports that he has been on medical management for heart failure since May and has been following up with cardiology every 2 weeks to uptitrate his medications. He tells me that he was told that we would try to maximize medication for 3 months and then repeat echo to assess whether or not he will need ICD    Patient denies chest pain, palpitations, lightheadedness, dizziness, edema or syncope    Patient reports he had a bowel movement this morning    Pastmedical history:   Past Medical History:   Diagnosis Date    Cancer [facility])     testicular    COPD, moderate ([facility] [address] 11/30/2020    Excessive daytime sleepiness 11/30/2020    GERD (gastroesophageal reflux disease)     Hypertension     Obstructive sleep apnea 2/3/2021    Pulmonary emphysema determined by X-ray ([facility] [address] 11/30/2020    Testicular hypofunction     thus on testosterone    Tobacco abuse 11/30/2020       Surgical history :   Past Surgical History:   Procedure Laterality Date    ABDOMINAL AORTIC ANEURYSM REPAIR, ENDOVASCULAR  11/30/2015    endologix aaa device  mri conditional  card scanned into pacs    HERNIA REPAIR      LEG SURGERY  10/2020    camncer removed       Family history: No family history on file. Social history :  reports that he quit smoking about a year ago. He has never used smokeless tobacco. He reports that he does not drink alcohol and does not use drugs.     No Known Allergies    Current Outpatient Medications on File Prior to Visit   Medication Sig Dispense Refill    torsemide (DEMADEX) 20 MG tablet TAKE 1 TABLET back pain, myalgias and neck stiffness. Skin: Negative for color change and rash. Allergic/Immunologic: Negative for food allergies. Neurological: Negative for dizziness, light-headedness, numbness and headaches. Hematological: Does not bruise/bleed easily. Psychiatric/Behavioral: Negative for agitation, behavioral problems and confusion. Examination:      Vitals:    10/22/21 1032   BP: 118/72   Pulse: 76   Weight: 245 lb (111.1 kg)   Height: 5' 6\" (1.676 m)        Body mass index is 39.54 kg/m². Physical Exam  Constitutional:       General: He is not in acute distress. Appearance: He is obese. He is not ill-appearing or diaphoretic. HENT:      Head: Normocephalic and atraumatic. Right Ear: External ear normal.      Left Ear: External ear normal.      Nose: No congestion. Mouth/Throat:      Mouth: Mucous membranes are moist.   Eyes:      Extraocular Movements: Extraocular movements intact. Conjunctiva/sclera: Conjunctivae normal.      Pupils: Pupils are equal, round, and reactive to light. Cardiovascular:      Rate and Rhythm: Normal rate and regular rhythm. Heart sounds: Murmur (Grade 2/6 systolic murmur) heard. Pulmonary:      Effort: Pulmonary effort is normal. No respiratory distress. Breath sounds: No wheezing, rhonchi or rales. Abdominal:      General: There is no distension. Tenderness: There is no abdominal tenderness. Musculoskeletal:         General: No tenderness. Cervical back: Normal range of motion. No tenderness. Right lower leg: No edema. Left lower leg: No edema. Skin:     General: Skin is warm. Neurological:      General: No focal deficit present. Mental Status: He is alert and oriented to person, place, and time. Cranial Nerves: No cranial nerve deficit.    Psychiatric:         Mood and Affect: Mood normal.           CBC:   Lab Results   Component Value Date    WBC 9.0 07/31/2021    HGB 13.9 07/31/2021    HCT 43.1 07/31/2021     07/31/2021     Lipids:  Lab Results   Component Value Date    CHOL 194 04/20/2021    TRIG 136 04/20/2021    HDL 42 04/20/2021    LDLDIRECT 136 (H) 04/20/2021     PT/INR:   Lab Results   Component Value Date    INR 0.88 07/29/2021        BMP:    Lab Results   Component Value Date     (L) 10/04/2021    K 4.6 10/04/2021    CL 96 (L) 10/04/2021    CO2 24 10/04/2021    BUN 27 (H) 10/04/2021     CMP:   Lab Results   Component Value Date    AST 11 (L) 07/30/2021    PROT 5.9 (L) 07/30/2021    BILITOT 0.6 07/30/2021    ALKPHOS 58 07/30/2021     TSH:  No results found for: TSH    EKGINTERPRETATION - EKG Interpretation:  Sinus rhythm PVC      IMPRESSION / RECOMMENDATIONS:     chronic left ventricular systolic and diastolic dysfunction  Ischemic cardiomyopathy LVEF 30-35% - Apical akineses  Coronary artery disease s/p PCI on June 1  CKD  Hypertension  COPD    Patient with NYHA class III symptoms and ischemic cardiomyopathy despite medical therapy (as best opimitized as they can - on coreg, lisinopril (further increase causing dizziness with low BPs), spironolactone)  patient also on echo has apical akinesis suggesting scar so given this we would recommend ICD for primary prevention    Patient does have 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 The patient opted to proceed with the device implantation. Also discussed about the possible Covid exposure given the pandemic situation and patient also voiced concerns regarding that.   With precautions we have been doing the procedures at the hospital and so far we have been able to limit the Covid exposure in the hospital for the procedural patients and we will take atmost precautions in the same way. Patient understands the risk and decided to proceed with procedure    Thanks again for allowing me to participate in care of this patient. Please call me if you have any questions. With best regards. Juliocesar Granados MD, 10/22/2021 11:11 AM     Please note this report has been partially produced using speech recognition software and may contain errors related to that system including errors in grammar, punctuation, and spelling, as well as words and phrases that may be inappropriate. If there are any questions or concerns please feel free to contact the dictating provider for clarification.

## 2021-10-22 NOTE — PATIENT INSTRUCTIONS
COVID test, pre-testing, and sign consent form on 10/25/21 at the [facility]. Procedure scheduled with Alvarado Perez on 11/2/21 at 0730, arrive at [facility] at [facility].

## 2021-10-26 NOTE — PROGRESS NOTES
Patient informed of instructions and guidance for performing test.  Throat swab performed. Swab placed into labeled collection tube. Collection tube and lab order placed in plastic bag. Bag placed in biohazard bag and placed in fridge.  called for . Patient here in office and educated on Dual ICD Implant, schedule for 11/2/21 @ 3130, with arrival @ [phone], @ [facility]; risk explained; and consents signed. Also copy of orders given for labs and CXR due 10/26/21 at [facility]. Instruction given to patient to :  NPO after midnight the night before procedure; call hospital at [phone] to pre-register. May take rest of morning meds of procedure  Patient voiced understanding. Copies of consent & info scanned in chart.

## 2021-10-28 PROBLEM — I95.9 HYPOTENSION, UNSPECIFIED: Status: ACTIVE | Noted: 2021-01-01

## 2021-10-28 NOTE — PROGRESS NOTES
Dizziness probably from hypotension  Chronic left ventricular systolic heart failure  PVCs  Diabetes mellitus  Obesity BMI 39  Chronic kidney disease      Patient appeared to be highly dizzy from hypotension episode. Patient had been noted to have PVCs before 2 and patient is already scheduled for ICD next week.   For now adjust the medication to normalize blood pressure and will schedule for ICD as scheduled for November 2    Full note to follow

## 2021-10-28 NOTE — CONSULTS
Electrophysiology Consult Note      Reason for consultation:  ICD    Chief complaint : Dizziness    Referring physician: Justin Tom      Primary care physician: TIKI Desai      History of Present Illness:     Patient is a 69-year-old male with history of COPD, hypertension, obstructive sleep apnea, coronary artery disease s/p PCI, abdominal aortic aneurysm, ischemic cardiomyopathy, history of pancreatitis, severe left ventricular systolic dysfunction presents with complaints of weakness lightheadedness and dizziness. Patient reports that he was at rehab working out when he had a sudden onset of symptoms. He denies elevating factors. Patient was found to be hypotensive. He was started on dopamine drip. He denies chest pain, shortness of breath at rest, palpitations, edema. Has chronic shortness of breath with mild to moderate exertion. He is scheduled for ICD placement next week. EP was consulted for ICD placement during this admission        Pastmedical history:   Past Medical History:   Diagnosis Date    Cancer [facility])     testicular    COPD, moderate ([address] 11/30/2020    Excessive daytime sleepiness 11/30/2020    GERD (gastroesophageal reflux disease)     Hypertension     Obstructive sleep apnea 2/3/2021    Pulmonary emphysema determined by X-ray [facility]) 11/30/2020    Testicular hypofunction     thus on testosterone    Tobacco abuse 11/30/2020       Surgical history :   Past Surgical History:   Procedure Laterality Date    ABDOMINAL AORTIC ANEURYSM REPAIR, ENDOVASCULAR  11/30/2015    endologix aaa device  mri conditional  card scanned into pacs    HERNIA REPAIR      LEG SURGERY  10/2020    camncer removed       Family history: History reviewed. No pertinent family history. Social history :  reports that he quit smoking about a year ago.  He has never used smokeless tobacco. He reports that he does not drink alcohol and does not use drugs. No Known Allergies    No current facility-administered medications on file prior to encounter. Current Outpatient Medications on File Prior to Encounter   Medication Sig Dispense Refill    torsemide (DEMADEX) 20 MG tablet TAKE 1 TABLET BY MOUTH 3 TIMES A WEEK 38 tablet 1    spironolactone (ALDACTONE) 25 MG tablet Take 25 mg by mouth daily      carvedilol (COREG) 25 MG tablet Take 25 mg by mouth 2 times daily (with meals)       omeprazole (PRILOSEC) 20 MG delayed release capsule Take 20 mg by mouth daily      lisinopril (PRINIVIL;ZESTRIL) 2.5 MG tablet Take 2.5 mg by mouth daily      potassium chloride (KLOR-CON M) 10 MEQ extended release tablet Take 1 tablet by mouth 2 times daily (Patient taking differently: Take 10 mEq by mouth daily ) 60 tablet 3    fluticasone (FLONASE) 50 MCG/ACT nasal spray 1 spray by Each Nostril route daily      nitroGLYCERIN (NITROSTAT) 0.4 MG SL tablet up to max of 3 total doses. If no relief after 1 dose, call 911. 25 tablet 3    budesonide-formoterol (SYMBICORT) 80-4.5 MCG/ACT AERO Inhale 2 puffs into the lungs 2 times daily      clopidogrel (PLAVIX) 75 MG tablet Take 75 mg by mouth daily      aspirin 81 MG chewable tablet Take 1 tablet by mouth daily 30 tablet 3    atorvastatin (LIPITOR) 40 MG tablet Take 1 tablet by mouth nightly (Patient taking differently: Take 10 mg by mouth nightly ) 30 tablet 3       Review of Systems:   Review of Systems   Constitutional: Positive for fatigue. Negative for activity change, chills and fever. HENT: Negative for congestion, ear pain and tinnitus. Eyes: Negative for photophobia, pain and visual disturbance. Respiratory: Positive for shortness of breath (chronic with exertion). Negative for cough, chest tightness and wheezing. Cardiovascular: Negative for chest pain, palpitations and leg swelling. Gastrointestinal: Negative for abdominal pain, blood in stool, constipation, diarrhea, nausea and vomiting. Endocrine: Negative for cold intolerance and heat intolerance. Genitourinary: Negative for dysuria, flank pain and hematuria. Musculoskeletal: Positive for arthralgias. Negative for back pain, myalgias and neck stiffness. Skin: Negative for color change and rash. Allergic/Immunologic: Negative for food allergies. Neurological: Positive for dizziness, weakness and light-headedness. Negative for numbness and headaches. Hematological: Does not bruise/bleed easily. Psychiatric/Behavioral: Negative for agitation, behavioral problems and confusion. Examination:      Vitals:    10/28/21 0940 10/28/21 0945 10/28/21 0952 10/28/21 1113   BP: (!) 115/57 (!) 127/59 (!) 122/57 137/89   Pulse: 64 65 62 80   Resp: 16 13 17 11   Temp:       TempSrc:       SpO2: 93% 94% (!) 89% 98%   Weight:       Height:            Body mass index is 39.54 kg/m². Physical Exam  Constitutional:       General: He is not in acute distress. Appearance: He is not ill-appearing or diaphoretic. HENT:      Head: Normocephalic and atraumatic. Right Ear: External ear normal.      Left Ear: External ear normal.      Nose: No congestion. Mouth/Throat:      Mouth: Mucous membranes are moist.   Eyes:      Extraocular Movements: Extraocular movements intact. Conjunctiva/sclera: Conjunctivae normal.      Pupils: Pupils are equal, round, and reactive to light. Cardiovascular:      Rate and Rhythm: Normal rate and regular rhythm. Heart sounds: Murmur (grade 2/6 systolic murmur) heard. Pulmonary:      Effort: Pulmonary effort is normal. No respiratory distress. Breath sounds: Normal breath sounds. No wheezing or rhonchi. Abdominal:      General: Abdomen is flat. Bowel sounds are normal. There is no distension. Palpations: Abdomen is soft. Tenderness: There is no abdominal tenderness. Musculoskeletal:         General: No tenderness. Cervical back: Normal range of motion.  No tenderness. Right lower leg: No edema. Left lower leg: No edema. Skin:     General: Skin is warm. Neurological:      General: No focal deficit present. Mental Status: He is alert and oriented to person, place, and time. Cranial Nerves: No cranial nerve deficit. Psychiatric:         Mood and Affect: Mood normal.               CBC:   Lab Results   Component Value Date    WBC 8.0 10/28/2021    HGB 14.3 10/28/2021    HCT 43.3 10/28/2021     10/28/2021     Lipids:  Lab Results   Component Value Date    CHOL 194 04/20/2021    TRIG 136 04/20/2021    HDL 42 04/20/2021    LDLDIRECT 136 (H) 04/20/2021     PT/INR:   Lab Results   Component Value Date    INR 0.80 10/27/2021        BMP:    Lab Results   Component Value Date     (L) 10/28/2021    K 4.5 10/28/2021    CL 95 (L) 10/28/2021    CO2 22 10/28/2021    BUN 26 (H) 10/28/2021     CMP:   Lab Results   Component Value Date    AST 15 10/28/2021    PROT 6.3 (L) 10/28/2021    BILITOT 0.4 10/28/2021    ALKPHOS 70 10/28/2021     TSH:  No results found for: TSH    EKGINTERPRETATION - EKG Interpretation:        IMPRESSION / RECOMMENDATIONS:     Dizziness probably from hypotension  Chronic left ventricular systolic heart failure  PVCs  Diabetes mellitus  Obesity BMI 39  Chronic kidney disease        Patient appeared to be highly dizzy from hypotension episode. Patient had been noted to have PVCs before 2 and patient is already scheduled for ICD next week. For now adjust the medication to normalize blood pressure and will schedule for ICD as scheduled for November 2         Thanks again for allowing me to participate in care of this patient. Please call me if you have any questions. With best regards.       Fernanda Chavira MD, 10/28/2021 2:54 PM     Please note this report has been partially produced using speech recognition software and may contain errors related to that system including errors in grammar, punctuation, and spelling, as well as words and phrases that may be inappropriate. If there are any questions or concerns please feel free to contact the dictating provider for clarification.

## 2021-10-28 NOTE — H&P
Hospitalist H&P Note:   Date of Service: 10/28/2021   ? CHIEF COMPLAINT:   Weakness and hypotension    HISTORY OF PRESENT ILLNESS (HPI):   Mr. Gemini Zuniga, a 77y.o. year old male who  has a past medical history of Cancer ([facility] [address] COPD, moderate ([facility] [address] Excessive daytime sleepiness, GERD (gastroesophageal reflux disease), Hypertension, Obstructive sleep apnea, Pulmonary emphysema determined by X-ray [facility]), Testicular hypofunction, and Tobacco abuse. Patient was admitted to hospital from ED while he had rapid response in the cardiac rehab unit with generalized weakness and hypotension 80/50. Patient did not pass out but he continued to feel generalized weakness and in the ED EKG showed ventricular bigeminy. During my evaluation patient denied any chest pain, shortness of breath, fever/chills, palpitations, leg swelling. He also denies any nausea/vomiting/abdominal pain, bowel/bladder habit changes. Says his appetite is okay. While in the ED patient was evaluated by cardiology and started on dopamine drip. On presentation, Blood pressure 137/89, pulse 80, temperature 98.2 °F (36.8 °C), temperature source Oral, resp. rate 11, height 5' 6\" (1.676 m), weight 245 lb (111.1 kg), SpO2 98 %.      PAST MEDICAL HISTORY   Past Medical History:   Diagnosis Date    Cancer [facility])     testicular    COPD, moderate ([facility] [address] 11/30/2020    Excessive daytime sleepiness 11/30/2020    GERD (gastroesophageal reflux disease)     Hypertension     Obstructive sleep apnea 2/3/2021    Pulmonary emphysema determined by X-ray [facility]) 11/30/2020    Testicular hypofunction     thus on testosterone    Tobacco abuse 11/30/2020          SURGICAL HISTORY:   Past Surgical History:   Procedure Laterality Date    ABDOMINAL AORTIC ANEURYSM REPAIR, ENDOVASCULAR  11/30/2015    endologix aaa device  mri conditional  card scanned into pacs    HERNIA REPAIR      LEG SURGERY  10/2020    camncer removed        ALLERGIES:   Patient has no known allergies. ?     SOCIAL HISTORY:    reports that he quit smoking about a year ago. He has never used smokeless tobacco. He reports that he does not drink alcohol and does not use drugs. ?     FAMILY HISTORY:   History reviewed. No pertinent family history. ?     MEDICATIONS:   Current Facility-Administered Medications   Medication Dose Route Frequency Provider Last Rate Last Admin    DOPamine (INTROPIN) 400 mg in dextrose 5 % 250 mL infusion  2-20 mcg/kg/min IntraVENous Continuous Randi Lemus MD 20.8 mL/hr at 10/28/21 0931 5 mcg/kg/min at 10/28/21 0931    aspirin chewable tablet 81 mg  81 mg Oral Daily MD Hina        atorvastatin (LIPITOR) tablet 40 mg  40 mg Oral Nightly MD Hina        budesonide-formoterol [facility]) 80-4.5 MCG/ACT inhaler 2 puff  2 puff Inhalation BID MD Hina        carvedilol (COREG) tablet 6.25 mg  6.25 mg Oral BID MD Hina        clopidogrel (PLAVIX) tablet 75 mg  75 mg Oral Daily MD Hina        [START ON 10/29/2021] pantoprazole (PROTONIX) tablet 40 mg  40 mg Oral QAM AC MD Hina        spironolactone (ALDACTONE) tablet 25 mg  25 mg Oral Daily MD Hina        [facility] [place] by provider] torsemide [facility]) tablet 20 mg  20 mg Oral Daily MD Hina        sodium chloride flush 0.9 % injection 5-40 mL  5-40 mL IntraVENous 2 times per day MD Hina        sodium chloride flush 0.9 % injection 5-40 mL  5-40 mL IntraVENous PRN MD Hina        0.9 % sodium chloride infusion  25 mL IntraVENous PRN MD Hina        enoxaparin (LOVENOX) injection 40 mg  40 mg SubCUTAneous Daily MD Hina        ondansetron (ZOFRAN-ODT) disintegrating tablet 4 mg  4 mg Oral Q8H PRN MD Hina        Or    [facility]) injection 4 mg  4 mg IntraVENous Q6H PRN MD Hina        polyethylene glycol [facility]) packet 17 g  17 g Oral Daily PRN Corinne Moors, MD        acetaminophen (TYLENOL) tablet 650 mg  650 mg Oral Q6H PRN Corinne Moors, MD        Or    acetaminophen (TYLENOL) suppository 650 mg  650 mg Rectal Q6H PRN Corinne Moors, MD        potassium chloride 10 mEq/100 mL IVPB (Peripheral Line)  10 mEq IntraVENous PRN Corinne Moors, MD        magnesium sulfate 2000 mg in 50 mL IVPB premix  2,000 mg IntraVENous PRN Corinne Moors, MD        albuterol (PROVENTIL) nebulizer solution 2.5 mg  2.5 mg Nebulization Q4H PRN Corinne Moors, MD        insulin lispro (HUMALOG) injection vial 0-6 Units  0-6 Units SubCUTAneous TID  Corinne Moors, MD        insulin lispro (HUMALOG) injection vial 0-3 Units  0-3 Units SubCUTAneous Nightly Corinne Moors, MD        insulin glargine (LANTUS) injection vial 35 Units  35 Units SubCUTAneous Nightly Corinne Moors, MD        insulin lispro (HUMALOG) injection vial 10 Units  10 Units SubCUTAneous TID  Corinne Moors, MD         Current Outpatient Medications   Medication Sig Dispense Refill    torsemide (DEMADEX) 20 MG tablet TAKE 1 TABLET BY MOUTH 3 TIMES A WEEK 38 tablet 1    spironolactone (ALDACTONE) 25 MG tablet Take 25 mg by mouth daily      carvedilol (COREG) 25 MG tablet Take 25 mg by mouth 2 times daily (with meals)       omeprazole (PRILOSEC) 20 MG delayed release capsule Take 20 mg by mouth daily      lisinopril (PRINIVIL;ZESTRIL) 2.5 MG tablet Take 2.5 mg by mouth daily      potassium chloride (KLOR-CON M) 10 MEQ extended release tablet Take 1 tablet by mouth 2 times daily (Patient taking differently: Take 10 mEq by mouth daily ) 60 tablet 3    fluticasone (FLONASE) 50 MCG/ACT nasal spray 1 spray by Each Nostril route daily      nitroGLYCERIN (NITROSTAT) 0.4 MG SL tablet up to max of 3 total doses.  If no relief after 1 dose, call 911. 25 tablet 3    budesonide-formoterol (SYMBICORT) 80-4.5 MCG/ACT AERO Inhale 2 puffs into the lungs 2 times daily      clopidogrel (L) 24 - 44 %    Monocytes % 6.6 (H) 0 - 4 %    Eosinophils % 9.3 (H) 0 - 3 %    Basophils % 1.3 (H) 0 - 1 %    Segs Absolute 4.8 K/CU MM    Lymphocytes Absolute 1.8 K/CU MM    Monocytes Absolute 0.5 K/CU MM    Eosinophils Absolute 0.7 K/CU MM    Basophils Absolute 0.1 K/CU MM    Nucleated RBC % 0.0 %    Total Nucleated RBC 0.0 K/CU MM    Total Immature Neutrophil 0.04 K/CU MM    Immature Neutrophil % 0.5 (H) 0 - 0.43 %   BMP   Result Value Ref Range    Sodium 129 (L) 135 - 145 MMOL/L    Potassium 4.5 3.5 - 5.1 MMOL/L    Chloride 95 (L) 99 - 110 mMol/L    CO2 22 21 - 32 MMOL/L    Anion Gap 12 4 - 16    BUN 26 (H) 6 - 23 MG/DL    CREATININE 1.5 (H) 0.9 - 1.3 MG/DL    Glucose 412 (HH) 70 - 99 MG/DL    Calcium 9.0 8.3 - 10.6 MG/DL    GFR Non- 47 (L) >60 mL/min/1.73m2    GFR  56 (L) >60 mL/min/1.73m2   Troponin   Result Value Ref Range    Troponin T 0.017 (H) <0.01 NG/ML   Brain Natriuretic Peptide   Result Value Ref Range    Pro-BNP 2,808 (H) <300 PG/ML   Magnesium   Result Value Ref Range    Magnesium 2.0 1.8 - 2.4 mg/dl   Hepatic Function Panel   Result Value Ref Range    Albumin 4.1 3.4 - 5.0 GM/DL    Total Bilirubin 0.4 0.0 - 1.0 MG/DL    Bilirubin, Direct 0.2 0.0 - 0.3 MG/DL    Bilirubin, Indirect 0.2 0 - 0.7 MG/DL    Alkaline Phosphatase 70 40 - 129 IU/L    AST 15 15 - 37 IU/L    ALT 33 10 - 40 U/L    Total Protein 6.3 (L) 6.4 - 8.2 GM/DL   D-dimer, quantitative   Result Value Ref Range    D-Dimer, Quant 553 (H) <230 NG/mL(DDU)   EKG 12 Lead   Result Value Ref Range    Ventricular Rate 73 BPM    Atrial Rate 73 BPM    P-R Interval 156 ms    QRS Duration 96 ms    Q-T Interval 426 ms    QTc Calculation (Bazett) 469 ms    P Axis 56 degrees    R Axis 69 degrees    T Axis 69 degrees    Diagnosis       Sinus rhythm with frequent and consecutive premature ventricular complexes  Possible Inferior infarct (cited on or before 02-JUL-2021)  Abnormal ECG  When compared with ECG of 29-JUL-2021 11:57,  premature ventricular complexes are now present  premature atrial complexes are no longer present  Nonspecific T wave abnormality, worse in Lateral leads       VL DUP CAROTID BILATERAL   Preliminary Result   The right internal carotid artery demonstrates 0-50% stenosis. The left internal carotid artery demonstrates 0-50% stenosis. Bilateral vertebral arteries are patent with flow in the normal direction. Findings suggesting an underlying arrhythmia. Correlate with patient's EKG. XR CHEST PORTABLE   Final Result   No acute process. CT CHEST WO CONTRAST    (Results Pending)   NM LUNG SCAN PERFUSION ONLY    (Results Pending)        ASSESSMENT/IMPRESSION:     -Hypotension/CAD  -Chronic systolic heart failure with EF 15%  -Ventricular bigeminy  -Uncontrolled blood sugars  -Hyperosmolar hyponatremia  -CKD stage III  -Morbid obesity  -COPD  ? Admit to ICU while on dopamine drip. Cut down Coreg dose and hold lisinopril and torsemide. EP was consulted from ED and evaluate for pacemaker. Creatinine 1.5 seem to be around baseline. Patient is currently on aspirin, Plavix and statin. Stat  echo pending. Cardio want VQ scan. Start Lantus, prandial insulin and SSI. Check hemoglobin A1c and consult endocrinology for management. Continue home inhalers and nebulizers. Patient has no diagnosis of sleep apnea at this time. DVT prophylaxis: Lovenox    Old records reviewed. Medications reviewed with patient. Patient is a Full Code-discussed     All questions and concerns addressed at this time, and patient is in agreement with current treatment plan.      MD Hina   Hospitalist at [facility]

## 2021-10-28 NOTE — ED NOTES
Dr Shaina Shi reviewed patient orders new perfect serve message sent to Dr Tamiko Cooper regarding ordering AC/HS accu checks.        Tavia Cheatham RN  10/28/21 8491

## 2021-10-28 NOTE — ED NOTES
Dr Sheng Espinal called wants 10 units for the meal and additional 5 units once for the patient's blood sugar as well as check blood sugar in one hour.        Emma Bennett RN  10/28/21 1177

## 2021-10-28 NOTE — ED PROVIDER NOTES
Emergency Department Encounter    Patient: Gabriel Montague  MRN: [id number]  : 1955  Date of Evaluation: 10/28/2021  ED Provider:  Tariq Enamorado MD    Triage Chief Complaint:   Dizziness (RR from [facility] exercise unit)    [place]:  Gabriel Montague is a 77 y.o. male history of COPD, heart failure with an ejection fraction of 15 to 20%, coronary artery disease with multiple stents in place presenting with shortness of breath and lightheadedness. Patient was at cardiac rehab and a rapid response was called because patient was complaining of increased shortness of breath and lightheadedness. His blood pressure was 80s over 60s on initial evaluation at rapid response. On presentation to the ED initial blood pressure is map of around 65. Patient states he has had baseline shortness of breath for several weeks and this has not changed but usually he does cardiac rehab without difficulty but today began feeling lightheaded and presyncopal.  Patient did not have a syncopal episode. Denied chest pain or increasing shortness of breath from baseline. Denies increasing lower extremity edema. Denies fevers, cough, sputum production or infectious symptoms. Denies headache, blurred vision, focal deficits, motor or sensory changes, abdominal pain, nausea vomiting, diarrhea or constipation. ROS - see HPI, below listed is current ROS at time of my eval:  At least 10 point review of system reviewed, negative other HPI.   Past Medical History:   Diagnosis Date    Cancer [facility])     testicular    COPD, moderate ([address] 2020    Excessive daytime sleepiness 2020    GERD (gastroesophageal reflux disease)     Hypertension     Obstructive sleep apnea 2/3/2021    Pulmonary emphysema determined by X-ray [facility]) 2020    Testicular hypofunction     thus on testosterone    Tobacco abuse 2020     Past Surgical History:   Procedure Laterality Date    ABDOMINAL AORTIC ANEURYSM REPAIR, ENDOVASCULAR 0.9 % sodium chloride infusion   IntraVENous Continuous Audrey East MD 50 mL/hr at 10/28/21 0942 New Bag at 10/28/21 0942     Current Outpatient Medications   Medication Sig Dispense Refill    torsemide (DEMADEX) 20 MG tablet TAKE 1 TABLET BY MOUTH 3 TIMES A WEEK 38 tablet 1    spironolactone (ALDACTONE) 25 MG tablet Take 25 mg by mouth daily      carvedilol (COREG) 25 MG tablet Take 25 mg by mouth 2 times daily (with meals)       omeprazole (PRILOSEC) 20 MG delayed release capsule Take 20 mg by mouth daily      lisinopril (PRINIVIL;ZESTRIL) 2.5 MG tablet Take 2.5 mg by mouth daily      potassium chloride (KLOR-CON M) 10 MEQ extended release tablet Take 1 tablet by mouth 2 times daily (Patient taking differently: Take 10 mEq by mouth daily ) 60 tablet 3    fluticasone (FLONASE) 50 MCG/ACT nasal spray 1 spray by Each Nostril route daily      nitroGLYCERIN (NITROSTAT) 0.4 MG SL tablet up to max of 3 total doses. If no relief after 1 dose, call 911. 25 tablet 3    budesonide-formoterol (SYMBICORT) 80-4.5 MCG/ACT AERO Inhale 2 puffs into the lungs 2 times daily      clopidogrel (PLAVIX) 75 MG tablet Take 75 mg by mouth daily      aspirin 81 MG chewable tablet Take 1 tablet by mouth daily 30 tablet 3    atorvastatin (LIPITOR) 40 MG tablet Take 1 tablet by mouth nightly (Patient taking differently: Take 10 mg by mouth nightly ) 30 tablet 3     No Known Allergies    Nursing Notes Reviewed    Physical Exam:  Triage VS:    ED Triage Vitals [10/28/21 0903]   Enc Vitals Group      BP (!) 88/53      Pulse 62      Resp 14      Temp 98.2 °F (36.8 °C)      Temp Source Oral      SpO2 96 %      Weight 245 lb (111.1 kg)      Height 5' 6\" (1.676 m)      Head Circumference       Peak Flow       Pain Score       Pain Loc       Pain Edu? Excl. in [address]? My pulse ox interpretation is - normal    General appearance:  No acute distress. Skin:  Warm. Dry. Eye:  Extraocular movements intact.      Ears, nose, mouth and throat:  Oral mucosa moist   Neck:  Trachea midline. Extremity:  No swelling. Normal ROM     Heart:  Regular rate and rhythm, normal S1 & S2, no extra heart sounds. Perfusion:  intact  Respiratory:  Lungs clear to auscultation bilaterally. Respirations nonlabored. Abdominal:  Normal bowel sounds. Soft. Nontender. Non distended. Back:  No CVA tenderness to palpation     Neurological:  Alert and oriented times 3. No focal neuro deficits.              Psychiatric:  Appropriate    I have reviewed and interpreted all of the currently available lab results from this visit (if applicable):  Results for orders placed or performed during the hospital encounter of 10/28/21   CBC auto diff   Result Value Ref Range    WBC 8.0 4.0 - 10.5 K/CU MM    RBC 4.99 4.6 - 6.2 M/CU MM    Hemoglobin 14.3 13.5 - 18.0 GM/DL    Hematocrit 43.3 42 - 52 %    MCV 86.8 78 - 100 FL    MCH 28.7 27 - 31 PG    MCHC 33.0 32.0 - 36.0 %    RDW 13.3 11.7 - 14.9 %    Platelets 911 480 - 602 K/CU MM    MPV 10.9 7.5 - 11.1 FL    Differential Type AUTOMATED DIFFERENTIAL     Segs Relative 60.4 36 - 66 %    Lymphocytes % 21.9 (L) 24 - 44 %    Monocytes % 6.6 (H) 0 - 4 %    Eosinophils % 9.3 (H) 0 - 3 %    Basophils % 1.3 (H) 0 - 1 %    Segs Absolute 4.8 K/CU MM    Lymphocytes Absolute 1.8 K/CU MM    Monocytes Absolute 0.5 K/CU MM    Eosinophils Absolute 0.7 K/CU MM    Basophils Absolute 0.1 K/CU MM    Nucleated RBC % 0.0 %    Total Nucleated RBC 0.0 K/CU MM    Total Immature Neutrophil 0.04 K/CU MM    Immature Neutrophil % 0.5 (H) 0 - 0.43 %   BMP   Result Value Ref Range    Sodium 129 (L) 135 - 145 MMOL/L    Potassium 4.5 3.5 - 5.1 MMOL/L    Chloride 95 (L) 99 - 110 mMol/L    CO2 22 21 - 32 MMOL/L    Anion Gap 12 4 - 16    BUN 26 (H) 6 - 23 MG/DL    CREATININE 1.5 (H) 0.9 - 1.3 MG/DL    Glucose 412 (HH) 70 - 99 MG/DL    Calcium 9.0 8.3 - 10.6 MG/DL    GFR Non- 47 (L) >60 mL/min/1.73m2    GFR  56 (L) >60 mL/min/1.73m2   Troponin   Result Value Ref Range    Troponin T 0.017 (H) <0.01 NG/ML   Brain Natriuretic Peptide   Result Value Ref Range    Pro-BNP 2,808 (H) <300 PG/ML   EKG 12 Lead   Result Value Ref Range    Ventricular Rate 73 BPM    Atrial Rate 73 BPM    P-R Interval 156 ms    QRS Duration 96 ms    Q-T Interval 426 ms    QTc Calculation (Bazett) 469 ms    P Axis 56 degrees    R Axis 69 degrees    T Axis 69 degrees    Diagnosis       Sinus rhythm with frequent and consecutive premature ventricular complexes  Possible Inferior infarct (cited on or before 02-JUL-2021)  Abnormal ECG  When compared with ECG of 29-JUL-2021 11:57,  premature ventricular complexes are now present  premature atrial complexes are no longer present  Nonspecific T wave abnormality, worse in Lateral leads        Radiographs (if obtained):  Radiologist's Report Reviewed:  XR CHEST PORTABLE    Result Date: 10/28/2021  EXAMINATION: ONE XRAY VIEW OF THE CHEST 10/28/2021 9:16 am COMPARISON: October 27, 2021 HISTORY: ORDERING SYSTEM PROVIDED HISTORY: sob TECHNOLOGIST PROVIDED HISTORY: Reason for exam:->sob Reason for Exam: SOB FINDINGS: Stable cardiomediastinal silhouette. There is no focal consolidation, pleural effusion, or pneumothorax. The osseous structures are stable. No acute process. EKG (if obtained): (All EKG's are interpreted by myself in the absence of a cardiologist)  Sinus rhythm with frequent PVCs in a pattern of bigeminy, ventricular rate 71, KS interval 162, QRS duration 100, QTc 475, no significant ischemic changes    MDM:    47-year-old male with history see above presenting with lightheadedness cardiac rehab. Presentation patient borderline hypotensive. Patient's EKG shows bigeminy with a ventricular rate of 60s to 70s. There is concern for hypoperfusion and pads were placed, 2 IVs were obtained.   Patient was given 250 cc of fluids but patient has a significant history of cardiac dysfunction so no larger bolus

## 2021-10-28 NOTE — ED NOTES
Spoke with Dr Maxwell Frazier and he verbally ordered AC/HS and 2 am POC glucose tests.        Nadya Ceballos RN  10/28/21 7142

## 2021-10-28 NOTE — PROGRESS NOTES
Discussed with EP  Patient is scheduled for dual chamber ICD  Next week  May be it is due to low BP for now  Adjust meds  Watch over night  V/q negative

## 2021-10-28 NOTE — ED NOTES
Attempted to taper patient off of Dopamine patient at 2 mcg/kg/min now with low blood pressure, unable to taper down any lower.      Emma Bennett RN  10/28/21 7115

## 2021-10-28 NOTE — ED NOTES
Pt daughter, Retie at [phone] , updated on current plan of care at this time with permission from patient. Daughter would like updated when pt is assigned in house room.       Nicole Carlos RN  10/28/21 6148

## 2021-10-29 NOTE — PROGRESS NOTES
Comprehensive Nutrition Assessment    Type and Reason for Visit:  Initial, Positive Nutrition Screen, Patient Education (Diabetic Education)    Nutrition Recommendations/Plan:   Resume Cardiac Rehab Diet with Carb Control Modifier     Nutrition Assessment:  Pt had rapid response in the cardiac rehab unit with generalized weakness and hypotension 80/50. Transferred to ICU. States that he feels better. Pt seen by this RD for diet education. Pt has participated in lifestyle behavior modification through cardiac rehab, but did not know that he had diabetes. Discussed and reviewed handouts on Hemoglobin A1c, Plan Portions, Heart-Healthy Consistent Carbohydrate Nutrition Therapy. Encouraged pt to monitor blood sugars regularly, understand high vs. low BG symptoms, consume carb control with exercise, and follow up with PCP about medications. Pt was appreciative and verbalized understanding of survival skills education. Will continue as low nutrition risk at this time. Estimated Daily Nutrient Needs:  Energy (kcal):  2686-0296 ([place] 1898); Weight Used for Energy Requirements:  Current     Protein (g):  65-78 (1-1.2 g/kg IBW); Weight Used for Protein Requirements:  Ideal        Fluid (ml/day):  fluids per physician; Method Used for Fluid Requirements:  Other (Comment)      Nutrition Related Findings:  Pt has gym membership, denies any n/v/p at visit, beloved daughter at bedside; A1c 12.1%, POCT trend 193, 234, 87, 589      Wounds:  None       Current Nutrition Therapies:    ADULT DIET; Regular; 5 carb choices (75 gm/meal); Low Sodium (2 gm);  Low Fat (less than or equal to 50 gm/day)    Anthropometric Measures:  · Height: 5' 5.98\" (167.6 cm)  · Current Body Weight: 242 lb 4.6 oz (109.9 kg)   · Admission Body Weight:      · Usual Body Weight: 245 lb (111.1 kg) (10/8/21)     · Ideal Body Weight: 142 lbs; % Ideal Body Weight 170.6 %   · BMI: 39.1  · BMI Categories: Obese Class 2 (BMI 35.0 -39.9)       Nutrition Diagnosis:   · Altered nutrition-related lab values related to food and nutrition related knowledge deficit as evidenced by lab values (A1c 12.1%, POCT trend 193, 234, 87, 589 during admission)     Nutrition Interventions:   Food and/or Nutrient Delivery:  Modify Current Diet  Nutrition Education/Counseling:  Education completed   Coordination of Nutrition Care:  Continue to monitor while inpatient    Goals:  Pt will consume at least 75% of new diet with better glucose control       Nutrition Monitoring and Evaluation:   Behavioral-Environmental Outcomes:  Beliefs and Attitutes, Knowledge or Skill, Readiness for Change   Food/Nutrient Intake Outcomes:  Diet Advancement/Tolerance, Food and Nutrient Intake  Physical Signs/Symptoms Outcomes:  Biochemical Data, Meal Time Behavior, Weight     Discharge Planning:    Recommend pursue outpatient diabetes education (Finish cardiac rehab program as able)     Electronically signed by Alix White RD, LD on 10/29/21 at 2:30 PM EDT    Contact: [phone]

## 2021-10-29 NOTE — FLOWSHEET NOTE
Per Cahse Gonzalez in pulmonary, she states patient has a dx of \"[place] so we cannot qualify him with night trending here in the hospital.\" Dr Viktor Jackson aware and he also spoke to Chase Gonzalez. Verbal orders received to proceed with home O2 eval (pt does not qualify at rest) and to discontinue night oximetry trending.      Jose Alfredo Lawrence RN 11:44 AM

## 2021-10-29 NOTE — PLAN OF CARE
Problem: Falls - Risk of:  Goal: Will remain free from falls  Description: Will remain free from falls  Outcome: Ongoing  Goal: Absence of physical injury  Description: Absence of physical injury  Outcome: Ongoing     Problem: Cardiac Output - Decreased:  Goal: Hemodynamic stability will improve  Description: Hemodynamic stability will improve  Outcome: Ongoing     Problem: Infection:  Goal: Will remain free from infection  Description: Will remain free from infection  Outcome: Ongoing     Problem: Safety:  Goal: Free from accidental physical injury  Description: Free from accidental physical injury  Outcome: Ongoing  Goal: Free from intentional harm  Description: Free from intentional harm  Outcome: Ongoing     Problem: Daily Care:  Goal: Daily care needs are met  Description: Daily care needs are met  Outcome: Ongoing     Problem: Pain:  Goal: Patient's pain/discomfort is manageable  Description: Patient's pain/discomfort is manageable  Outcome: Ongoing     Problem: Skin Integrity:  Goal: Skin integrity will stabilize  Description: Skin integrity will stabilize  Outcome: Ongoing     Problem: Discharge Planning:  Goal: Patients continuum of care needs are met  Description: Patients continuum of care needs are met  Outcome: Ongoing

## 2021-10-29 NOTE — PROGRESS NOTES
Name:  Citlali Quintanilla MD  NPI:  [id number]    [x] Patient Qualifies      [] Patient Does NOT qualify

## 2021-10-29 NOTE — PROGRESS NOTES
Hospitalist Progress Note  Lauren Rasmussen MD      Name:  Gabriel Montague /Age/Sex: 1955  (77 y.o. male)   MRN & CSN:  [id number] & [id number] Admission Date/Time: 10/28/2021  9:01 AM   Location:  -A PCP: Salbador Ogden, [facility] Day: 2    Assessment and Plan:   Gabriel Montague is a 77 y.o.  male  who presents with hypotension    -Hypotension/CAD  -Chronic systolic heart failure with EF 15%  -Ventricular bigeminy  -Uncontrolled blood sugars  -Hyperosmolar hyponatremia  -CKD stage III  -Morbid obesity  -COPD       Monitor in the ICU. Of dobutamine drip now. Blood pressure well controlled. Medications being adjusted by cardiology    EP was consulted from ED and evaluate for pacemaker. Creatinine 1.5 seem to be around baseline. Patient is currently on aspirin, Plavix and statin. Management per cardiology  Start Lantus, prandial insulin and SSI. Check hemoglobin A1c. Endocrinology for management. Continue home inhalers and nebulizers. Patient has no diagnosis of sleep apnea at this time. DVT prophylaxis: Lovenox     Body mass index is 39.11 kg/m². Diet ADULT DIET; Regular   DVT Prophylaxis Lovenox    Code Status Full Code   Disposition To cardiac rehab in 2 days     Subjective:     Patient was sitting comfortably in the chair. Denies chest pain or shortness of breath. Blood pressure has been stable    Ten point ROS reviewed negative, unless as noted above    Objective: Intake/Output Summary (Last 24 hours) at 10/29/2021 1200  Last data filed at 10/29/2021 0932  Gross per 24 hour   Intake 646.75 ml   Output 1450 ml   Net -803.25 ml        Vitals: BP (!) 129/103   Pulse 77   Temp 97.9 °F (36.6 °C) (Oral)   Resp 12   Ht 5' 6\" (1.676 m)   Wt 242 lb 4.6 oz (109.9 kg)   SpO2 97%   BMI 39.11 kg/m²     Physical Exam:     GEN Awake male. No apparent distress. Appears given age. EYES No scleral erythema, discharge, or conjunctivitis.   HENT Mucous membranes are moist.  NECK Supple  RESP CTAB, no wheezes, rales or rhonchi. Symmetric chest movement. CVS:    S1/S2 auscultated. RRR. Trace peripheral edema. GI/ S/NT/ND BS+   MSK No gross joint deformities. SKIN Normal coloration, warm, dry. NEURO Cranial nerves appear grossly intact, normal speech, no lateralizing weakness. PSYCH Awake, alert, oriented x 3 . Normal Affect    Recent Results (from the past 24 hour(s))   POCT Glucose    Collection Time: 10/28/21  5:19 PM   Result Value Ref Range    POC Glucose 574 (H) 70 - 99 MG/DL   POCT Glucose    Collection Time: 10/28/21  5:57 PM   Result Value Ref Range    POC Glucose 589 (H) 70 - 99 MG/DL   POC Blood glucose    Collection Time: 10/28/21  6:12 PM   Result Value Ref Range    Glucose 589 mg/dL    QC OK?  yes    Basic metabolic panel    Collection Time: 10/28/21  9:00 PM   Result Value Ref Range    Sodium 132 (L) 135 - 145 MMOL/L    Potassium 4.0 3.5 - 5.1 MMOL/L    Chloride 93 (L) 99 - 110 mMol/L    CO2 25 21 - 32 MMOL/L    Anion Gap 14 4 - 16    BUN 28 (H) 6 - 23 MG/DL    CREATININE 1.5 (H) 0.9 - 1.3 MG/DL    Glucose 324 (H) 70 - 99 MG/DL    Calcium 9.5 8.3 - 10.6 MG/DL    GFR Non- 47 (L) >60 mL/min/1.73m2    GFR  56 (L) >60 mL/min/1.73m2   POCT Glucose    Collection Time: 10/29/21  1:41 AM   Result Value Ref Range    POC Glucose 87 70 - 99 MG/DL   Basic Metabolic Panel w/ Reflex to MG    Collection Time: 10/29/21  6:25 AM   Result Value Ref Range    Sodium 133 (L) 135 - 145 MMOL/L    Potassium 3.6 3.5 - 5.1 MMOL/L    Chloride 95 (L) 99 - 110 mMol/L    CO2 27 21 - 32 MMOL/L    Anion Gap 11 4 - 16    BUN 29 (H) 6 - 23 MG/DL    CREATININE 1.6 (H) 0.9 - 1.3 MG/DL    Glucose 143 (H) 70 - 99 MG/DL    Calcium 9.1 8.3 - 10.6 MG/DL    GFR Non- 43 (L) >60 mL/min/1.73m2    GFR  52 (L) >60 mL/min/1.73m2   CBC    Collection Time: 10/29/21  6:25 AM   Result Value Ref Range    WBC 10.8 (H) 4.0 - 10.5 K/CU MM    RBC 4. 99 4.6 - 6.2 M/CU MM    Hemoglobin 14.4 13.5 - 18.0 GM/DL    Hematocrit 43.3 42 - 52 %    MCV 86.8 78 - 100 FL    MCH 28.9 27 - 31 PG    MCHC 33.3 32.0 - 36.0 %    RDW 13.5 11.7 - 14.9 %    Platelets 899 924 - 864 K/CU MM    MPV 10.8 7.5 - 11.1 FL   Protime-INR    Collection Time: 10/29/21  6:25 AM   Result Value Ref Range    Protime 10.9 (L) 11.7 - 14.5 SECONDS    INR 0.85 INDEX   Magnesium    Collection Time: 10/29/21  6:25 AM   Result Value Ref Range    Magnesium 2.1 1.8 - 2.4 mg/dl   POCT Glucose    Collection Time: 10/29/21  9:40 AM   Result Value Ref Range    POC Glucose 234 (H) 70 - 99 MG/DL       Medications:   Medications:    insulin glargine  20 Units SubCUTAneous Nightly    torsemide  20 mg Oral Daily    potassium chloride  40 mEq Oral Daily    aspirin  81 mg Oral Daily    atorvastatin  40 mg Oral Nightly    budesonide-formoterol  2 puff Inhalation BID    clopidogrel  75 mg Oral Daily    pantoprazole  40 mg Oral QAM AC    spironolactone  25 mg Oral Daily    sodium chloride flush  5-40 mL IntraVENous 2 times per day    enoxaparin  40 mg SubCUTAneous Daily    insulin lispro  10 Units SubCUTAneous TID WC    insulin lispro  0-12 Units SubCUTAneous TID WC    insulin lispro  0-6 Units SubCUTAneous 2 times per day    midodrine  10 mg Oral TID WC    metoprolol succinate  25 mg Oral Daily      Infusions:    sodium chloride       PRN Meds: sodium chloride flush, 5-40 mL, PRN  sodium chloride, 25 mL, PRN  ondansetron, 4 mg, Q8H PRN   Or  ondansetron, 4 mg, Q6H PRN  polyethylene glycol, 17 g, Daily PRN  acetaminophen, 650 mg, Q6H PRN   Or  acetaminophen, 650 mg, Q6H PRN  potassium chloride, 10 mEq, PRN  magnesium sulfate, 2,000 mg, PRN  albuterol, 2.5 mg, Q4H PRN          Electronically signed by Rafael Martinez MD, MD on 10/29/2021 at 12:00 PM

## 2021-10-29 NOTE — FLOWSHEET NOTE
10/29/21 1219   Vitals   Pulse 99   Resp 20   Oxygen Therapy   SpO2 (!) 85 %  (while ambulating )   Pulse Oximeter Device Mode Continuous   Pulse Oximeter Device Location Finger   O2 Device None (Room air)   O2 sat dropped to 85% while ambulating. Patient stopped with RN and encouraged to take deep breaths. O2 sat improved within 1 minute.      Art Villagomez RN 12:25 PM

## 2021-10-29 NOTE — CARE COORDINATION
Patient has visitor at bedside; deferred conversation at this time. Sharyle Seats, RN     Chart reviewed. Patient is from home; appears independent PAT. He has a PCP and insurance that assists with Rx when needed. No needs identified at this time. CM will remain available should needs arise.  Sharyle Seats, RN

## 2021-10-29 NOTE — PROGRESS NOTES
Per respiratory tech  Patient has hx of [place] and has not been using CPAP   Will check for home oxygen  Primary team to arrange for home oxygen  He has hx of CHF he may benefit with oxygen

## 2021-10-29 NOTE — PROGRESS NOTES
Doctor Please copy and paste below in your progress note per DME requirements. D    Patient was seen in hospital for COPD, CAD  . I am prescribing oxygen because the diagnosis and testing requires the patient to have oxygen in the home. Conditions will improve or be benefited by oxygen use. The patient is able to perform good mobility and therefore requires the use of a portable oxygen system for ambulation.

## 2021-10-29 NOTE — PLAN OF CARE
Nutrition Problem #1: Altered nutrition-related lab values  Intervention: Food and/or Nutrient Delivery: Modify Current Diet  Nutritional Goals: Pt will consume at least 75% of new diet with better glucose control

## 2021-10-29 NOTE — FLOWSHEET NOTE
Dr Audrey East may consider discharge later this evening depending on the course of patient's day. Clarified okay to give Plavix at this time, pacer planned for next Tuesday. Notified of K+ 3.6 this morning. Telephone orders received to transfer to Med Sug with tele.  Dr Kingsley Screen in agreement with transfer to Med Surg with tele     Serg Mcguire RN 10:46 AM

## 2021-10-29 NOTE — PROGRESS NOTES
Daily Progress Note     patient is more awake alert feeling better  Remain in sinus with PVC  Plan for dual chamber ICD next week  Believe his symptoms due to low BP  adjusted meds  Feeling better  [id number] for home later this evening if remains stable  Hx of CAD and PCI   Hx of HFrEf unable to max meds due to low BP   cr is 1.6 -unchanged   Check for home oxygen   Change to Demadex from lasix     Echo -- Summary-10/21   This is a limited echo. Definity was utilized to rule out presence of thrombus. Left ventricular systolic function is abnormal.   Ejection fraction is visually estimated at 25-30%. Slightly hypokinetic apical wall segments. No significant valvular disease noted. No evidence of any pericardial effusion.    Multiple PVC's noted throughout the exam.         Objective:   BP (!) 131/92   Pulse 92   Temp 97.9 °F (36.6 °C) (Oral)   Resp 28   Ht 5' 6\" (1.676 m)   Wt 242 lb 4.6 oz (109.9 kg)   SpO2 95%   BMI 39.11 kg/m²     Intake/Output Summary (Last 24 hours) at 10/29/2021 1048  Last data filed at 10/29/2021 0932  Gross per 24 hour   Intake 646.75 ml   Output 1450 ml   Net -803.25 ml       Medications:   Scheduled Meds:   insulin glargine  20 Units SubCUTAneous Nightly    furosemide  20 mg Oral Daily    aspirin  81 mg Oral Daily    atorvastatin  40 mg Oral Nightly    budesonide-formoterol  2 puff Inhalation BID    clopidogrel  75 mg Oral Daily    pantoprazole  40 mg Oral QAM AC    spironolactone  25 mg Oral Daily    sodium chloride flush  5-40 mL IntraVENous 2 times per day    enoxaparin  40 mg SubCUTAneous Daily    insulin lispro  10 Units SubCUTAneous TID WC    insulin lispro  0-12 Units SubCUTAneous TID WC    insulin lispro  0-6 Units SubCUTAneous 2 times per day    midodrine  10 mg Oral TID WC    metoprolol succinate  25 mg Oral Daily      Infusions:   sodium chloride        PRN Meds:  sodium chloride flush, sodium chloride, ondansetron **OR** ondansetron, polyethylene glycol, acetaminophen **OR** acetaminophen, potassium chloride, magnesium sulfate, albuterol       Physical Exam:  Vitals:    10/29/21 1003   BP: (!) 131/92   Pulse: 92   Resp: 28   Temp:    SpO2: 95%        General: awake alert   Chest: Nontender  Cardiac: sinus   Lungs:Clear to auscultation and percussion. Abdomen: Soft, NT, ND, +BS  Extremities:  No edema   Vascular:  Equal 2+ peripheral pulses. Lab Data:  CBC:   Recent Labs     10/27/21  1120 10/28/21  0905 10/29/21  0625   WBC 8.1 8.0 10.8*   HGB 15.6 14.3 14.4   HCT 49.1 43.3 43.3   MCV 89.9 86.8 86.8    193 202     BMP:   Recent Labs     10/27/21  1120 10/27/21  1120 10/28/21  0905 10/28/21  2100 10/29/21  0625   *   < > 129* 132* 133*   K 4.6   < > 4.5 4.0 3.6   CL 95*   < > 95* 93* 95*   CO2 26   < > 22 25 27   PHOS 3.5  --   --   --   --    BUN 21   < > 26* 28* 29*   CREATININE 1.9*   < > 1.5* 1.5* 1.6*    < > = values in this interval not displayed. LIVER PROFILE:   Recent Labs     10/28/21  0905   AST 15   ALT 33   BILIDIR 0.2   BILITOT 0.4   ALKPHOS 70     PT/INR:   Recent Labs     10/27/21  1120 10/29/21  0625   PROTIME 10.3* 10.9*   INR 0.80 0.85     APTT:   Recent Labs     10/27/21  1120   APTT 29.0     BNP:  No results for input(s): BNP in the last 72 hours.       Assessment:  Patient Active Problem List    Diagnosis Date Noted    Hypotension, unspecified 10/28/2021    Globus sensation 10/08/2021    Neck swelling 10/08/2021    COPD exacerbation ([facility] 10/08/2021    Generalized abdominal pain     Splenic lesion     Abdominal distention 07/29/2021    CHF (congestive heart failure), NYHA class I, chronic, systolic ([facility] 28/33/1226    Acute on chronic combined systolic and diastolic congestive heart failure, NYHA class 3 ([facility] 07/04/2021    Chest pain 07/02/2021    Abnormal stress test 05/06/2021    CAD in native artery 05/06/2021    Hypertensive renal disease 02/11/2021    Obstructive sleep apnea 02/03/2021    COPD, moderate ([facility] 11/30/2020    Pulmonary emphysema [facility] 11/30/2020    Tobacco abuse 11/30/2020    Excessive daytime sleepiness 11/30/2020    Stage 3 chronic kidney disease ([facility] 11/20/2020    Dyspnea and respiratory abnormalities 11/20/2020    Chronic kidney disease-mineral and bone disorder 11/20/2020    Essential hypertension 11/20/2020    Hyponatremia 11/20/2020       Diego Orozco MD, MD 10/29/2021 10:48 AM

## 2021-10-30 NOTE — PROGRESS NOTES
Daily Progress Note  Awake and alert, feeling well  Denies any CP or SOB  No dizziness or palpitations   ICD placement Tuesday with Dr. Elvin Hartman on telemetry  Stable to d/c from cardiac standpoint    HFrEF    Planned for ICD on Tuesday with Dr. Damaris Dandy    EF 25-30%,     unable to max medication d/t hypotension    Continue Torsemide, Toprol, and aldactone    Dizziness    BP much better controlled today    On oral medications    Dizziness has greatly improved    Continue midodrine    Hx of CAD    Continue Plavix and Meotprolol    Echo -- Summary-10/21   This is a limited echo.   Definity was utilized to rule out presence of thrombus.   Left ventricular systolic function is abnormal.   Ejection fraction is visually estimated at 25-30%.   Slightly hypokinetic apical wall segments.   No significant valvular disease noted.   No evidence of any pericardial effusion.   Multiple PVC's noted throughout the exam    PAST MEDICAL HISTORY:  History of heart failure and EF had been low,  back in July, his EF was 20% range present. History of coronary  disease, had a heart cath done in 05/2021. Left main was patent. LAD  90% stenosis, diagonal 90% stenosis, ramus had mild disease, circ had  mild disease, RCA 90% stenosis. Underwent angioplasty of the LAD and  the RCA at the [facility] at that time _____ a surgical  candidate. History of hypertension, hyperlipidemia, and testicular  cancer present. He has COPD, GERD, and sleep apnea present.     PAST SURGICAL HISTORY:  He had a AAA stent placement, had hernia repair,  left knee surgery, and angioplasty of LAD and RCA in 06/2021.     SOCIAL HISTORY:  He does not smoke, does not drink.     ALLERGIES:  NKDA.     MEDICATIONS AT HOME:  He is on Demadex, Aldactone, Coreg. I am not sure  if this med list is accurate, so we will concur his medication list is  accurate.   Objective:   BP (!) 155/98   Pulse 85   Temp 98 °F (36.7 °C) (Oral)   Resp 14   Ht 5' 5.98\" (1.676 m)   Wt 242 lb 4.6 oz (109.9 kg)   SpO2 99%   BMI 39.12 kg/m²     Intake/Output Summary (Last 24 hours) at 10/30/2021 1018  Last data filed at 10/30/2021 0607  Gross per 24 hour   Intake 600 ml   Output 1000 ml   Net -400 ml       Medications:   Scheduled Meds:   insulin glargine  30 Units SubCUTAneous Nightly    torsemide  20 mg Oral Daily    potassium chloride  40 mEq Oral Daily    aspirin  81 mg Oral Daily    atorvastatin  40 mg Oral Nightly    budesonide-formoterol  2 puff Inhalation BID    clopidogrel  75 mg Oral Daily    pantoprazole  40 mg Oral QAM AC    spironolactone  25 mg Oral Daily    sodium chloride flush  5-40 mL IntraVENous 2 times per day    enoxaparin  40 mg SubCUTAneous Daily    insulin lispro  10 Units SubCUTAneous TID WC    insulin lispro  0-12 Units SubCUTAneous TID WC    insulin lispro  0-6 Units SubCUTAneous 2 times per day    midodrine  10 mg Oral TID WC    metoprolol succinate  25 mg Oral Daily      Infusions:   dextrose      sodium chloride        PRN Meds:  glucose, dextrose, glucagon (rDNA), dextrose, sodium chloride flush, sodium chloride, ondansetron **OR** ondansetron, polyethylene glycol, acetaminophen **OR** acetaminophen, potassium chloride, magnesium sulfate, albuterol       Physical Exam:  Vitals:    10/30/21 0900   BP:    Pulse: 85   Resp: 14   Temp:    SpO2: 99%        General: AAO, NAD  Chest: Nontender  Cardiac: First and Second Heart Sounds are Normal, No Murmurs or Gallops noted  Lungs:Clear to auscultation and percussion. Abdomen: Soft, NT, ND, +BS  Extremities: No clubbing, no edema  Vascular:  Equal 2+ peripheral pulses.         Lab Data:  CBC:   Recent Labs     10/27/21  1120 10/28/21  0905 10/29/21  0625   WBC 8.1 8.0 10.8*   HGB 15.6 14.3 14.4   HCT 49.1 43.3 43.3   MCV 89.9 86.8 86.8    193 202     BMP:   Recent Labs     10/27/21  1120 10/28/21  0905 10/28/21  2100 10/29/21  0625 10/30/21  0520   *   < > 132* 133* 137 K 4.6   < > 4.0 3.6 3.7   CL 95*   < > 93* 95* 97*   CO2 26   < > 25 27 30   PHOS 3.5  --   --   --   --    BUN 21   < > 28* 29* 30*   CREATININE 1.9*   < > 1.5* 1.6* 1.8*    < > = values in this interval not displayed. LIVER PROFILE:   Recent Labs     10/28/21  0905   AST 15   ALT 33   BILIDIR 0.2   BILITOT 0.4   ALKPHOS 70     PT/INR:   Recent Labs     10/27/21  1120 10/29/21  0625   PROTIME 10.3* 10.9*   INR 0.80 0.85     APTT:   Recent Labs     10/27/21  1120   APTT 29.0     BNP:  No results for input(s): BNP in the last 72 hours.       Assessment:  Patient Active Problem List    Diagnosis Date Noted    Hypotension, unspecified 10/28/2021    Globus sensation 10/08/2021    Neck swelling 10/08/2021    COPD exacerbation ([address] 10/08/2021    Generalized abdominal pain     Splenic lesion     Abdominal distention 07/29/2021    CHF (congestive heart failure), NYHA class I, chronic, systolic ([address] 86/72/3803    Acute on chronic combined systolic and diastolic congestive heart failure, NYHA class 3 ([address] 07/04/2021    Chest pain 07/02/2021    Abnormal stress test 05/06/2021    CAD in native artery 05/06/2021    Hypertensive renal disease 02/11/2021    Obstructive sleep apnea 02/03/2021    COPD, moderate ([address] 11/30/2020    Pulmonary emphysema determined by X-ray ([address] 11/30/2020    Tobacco abuse 11/30/2020    Excessive daytime sleepiness 11/30/2020    Stage 3 chronic kidney disease ([address] 11/20/2020    Dyspnea and respiratory abnormalities 11/20/2020    Chronic kidney disease-mineral and bone disorder 11/20/2020    Essential hypertension 11/20/2020    Hyponatremia 11/20/2020       Electronically signed by MICHAEL Davis CNP on 10/30/2021 at 10:18 AM  Agree with above assessment and plan

## 2021-10-30 NOTE — PLAN OF CARE
Problem: Falls - Risk of:  Goal: Will remain free from falls  Description: Will remain free from falls  10/30/2021 0954 by Jevon Jesus RN  Outcome: Completed  10/29/2021 2245 by Mariela Nichole RN  Outcome: Ongoing  Goal: Absence of physical injury  Description: Absence of physical injury  10/30/2021 0954 by Jevon Jesus RN  Outcome: Completed  10/29/2021 2245 by Mariela Nichole RN  Outcome: Ongoing     Problem: Cardiac Output - Decreased:  Goal: Hemodynamic stability will improve  Description: Hemodynamic stability will improve  10/30/2021 0954 by Jevon Jesus RN  Outcome: Completed  10/29/2021 2245 by Mariela Nichole RN  Outcome: Ongoing     Problem: Infection:  Goal: Will remain free from infection  Description: Will remain free from infection  10/30/2021 0954 by Jevon Jesus RN  Outcome: Completed  10/29/2021 2245 by Mariela Nichole RN  Outcome: Ongoing     Problem: Safety:  Goal: Free from accidental physical injury  Description: Free from accidental physical injury  10/30/2021 0954 by Jevon Jesus RN  Outcome: Completed  10/29/2021 2245 by Mariela Nichole RN  Outcome: Ongoing  Goal: Free from intentional harm  Description: Free from intentional harm  10/30/2021 0954 by Jevon Jesus RN  Outcome: Completed  10/29/2021 2245 by Mariela Nichole RN  Outcome: Ongoing     Problem: Daily Care:  Goal: Daily care needs are met  Description: Daily care needs are met  10/30/2021 0954 by Jevon Jesus RN  Outcome: Completed  10/29/2021 2245 by Mariela Nichole RN  Outcome: Ongoing     Problem: Pain:  Goal: Patient's pain/discomfort is manageable  Description: Patient's pain/discomfort is manageable  10/30/2021 0954 by Jevon Jesus RN  Outcome: Completed  10/29/2021 2245 by Mariela Nichole RN  Outcome: Ongoing     Problem: Skin Integrity:  Goal: Skin integrity will stabilize  Description: Skin integrity will stabilize  10/30/2021 0954 by Jevon Jesus RN  Outcome: Completed  10/29/2021 2245 by Cinthya Dalton RN  Outcome: Ongoing     Problem: Discharge Planning:  Goal: Patients continuum of care needs are met  Description: Patients continuum of care needs are met  10/30/2021 0954 by Alvina Pena RN  Outcome: Completed  10/29/2021 2245 by Cinthya Dalton RN  Outcome: Ongoing     Problem: Nutrition  Goal: Optimal nutrition therapy  10/30/2021 0954 by Alvina Pena RN  Outcome: Completed  10/29/2021 2245 by Cinthya Dalton RN  Outcome: Ongoing

## 2021-10-30 NOTE — PROGRESS NOTES
Progress Note( Dr. Nel Gaston)  10/29/2021  Subjective:   Admit Date: 10/28/2021  PCP: TIKI Metzger    Admitted For :Generalized weakness and hypotension    Consulted For: Diagnosed diabetes mellitus newly diagnosed diabetes mellitus  Has cardiac arrhythmias being seen by cardiology    Interval History: Feels much better    Denies any chest pains,   Denies SOB . Denies nausea or vomiting. No new bowel or bladder symptoms. Intake/Output Summary (Last 24 hours) at 10/29/2021 2150  Last data filed at 10/29/2021 1941  Gross per 24 hour   Intake 840 ml   Output 1025 ml   Net -185 ml       DATA    CBC:   Recent Labs     10/27/21  1120 10/28/21  0905 10/29/21  0625   WBC 8.1 8.0 10.8*   HGB 15.6 14.3 14.4    193 202    CMP:  Recent Labs     10/28/21  0905 10/28/21  2100 10/29/21  0625   * 132* 133*   K 4.5 4.0 3.6   CL 95* 93* 95*   CO2 22 25 27   BUN 26* 28* 29*   CREATININE 1.5* 1.5* 1.6*   CALCIUM 9.0 9.5 9.1   PROT 6.3*  --   --    LABALBU 4.1  --   --    BILITOT 0.4  --   --    ALKPHOS 70  --   --    AST 15  --   --    ALT 33  --   --      Lipids:   Lab Results   Component Value Date    CHOL 194 04/20/2021    HDL 42 04/20/2021    TRIG 136 04/20/2021     Glucose:  Recent Labs     10/29/21  1207 10/29/21  1716 10/29/21  2038   POCGLU 193* 97 387*     WdccctcbrvR3P:  Lab Results   Component Value Date    LABA1C 12.1 10/28/2021     High Sensitivity TSH:   Lab Results   Component Value Date    TSHHS 3.560 04/20/2021     Free T3: No results found for: FT3  Free T4:  Lab Results   Component Value Date    T4FREE 1.33 04/20/2021       NM LUNG VENT/PERFUSION (VQ)   Final Result   Low probability for pulmonary embolus. VL DUP CAROTID BILATERAL   Final Result   The right internal carotid artery demonstrates 0-50% stenosis. The left internal carotid artery demonstrates 0-50% stenosis. Bilateral vertebral arteries are patent with flow in the normal direction.       Findings suggesting an underlying arrhythmia. Correlate with patient's EKG. CT CHEST WO CONTRAST   Final Result   No CT evidence of acute intrathoracic process. Emphysema. XR CHEST PORTABLE   Final Result   No acute process. Scheduled Medicines   Medications:    insulin glargine  20 Units SubCUTAneous Nightly    torsemide  20 mg Oral Daily    potassium chloride  40 mEq Oral Daily    aspirin  81 mg Oral Daily    atorvastatin  40 mg Oral Nightly    budesonide-formoterol  2 puff Inhalation BID    clopidogrel  75 mg Oral Daily    pantoprazole  40 mg Oral QAM AC    spironolactone  25 mg Oral Daily    sodium chloride flush  5-40 mL IntraVENous 2 times per day    enoxaparin  40 mg SubCUTAneous Daily    insulin lispro  10 Units SubCUTAneous TID WC    insulin lispro  0-12 Units SubCUTAneous TID WC    insulin lispro  0-6 Units SubCUTAneous 2 times per day    midodrine  10 mg Oral TID WC    metoprolol succinate  25 mg Oral Daily      Infusions:    sodium chloride           Objective:   Vitals: /73   Pulse 84   Temp 97.8 °F (36.6 °C) (Oral)   Resp 20   Ht 5' 5.98\" (1.676 m)   Wt 242 lb 4.6 oz (109.9 kg)   SpO2 94%   BMI 39.12 kg/m²   General appearance: alert and cooperative with exam  Neck: no JVD or bruit  Thyroid : Normal lobes   Lungs: Has Vesicular Breath sounds   Heart:  regular rate and rhythm  Abdomen: soft, non-tender; bowel sounds normal; no masses,  no organomegaly  Musculoskeletal: Normal  Extremities: extremities normal, , no edema  Neurologic:  Awake, alert, oriented to name, place and time. Cranial nerves II-XII are grossly intact. Motor is  intact. Sensory is intact. ,  and gait is normal.    Assessment:     Patient Active Problem List:     Stage 3 chronic kidney disease (HCC)     Dyspnea and respiratory abnormalities     Chronic kidney disease-mineral and bone disorder     Essential hypertension     Hyponatremia     COPD, moderate (HCC)     Pulmonary emphysema determined by X-ray [facility])     Tobacco abuse     Excessive daytime sleepiness     Obstructive sleep apnea     Hypertensive renal disease     Abnormal stress test     CAD in native artery     Chest pain     CHF (congestive heart failure), NYHA class I, chronic, systolic (HCC)     Acute on chronic combined systolic and diastolic congestive heart failure, NYHA class 3 ([facility])     Abdominal distention     Generalized abdominal pain     Splenic lesion     Globus sensation     Neck swelling     COPD exacerbation ([facility])     Hypotension, unspecified      Plan:     1. Reviewed POC blood glucose . Labs and X ray results   2. Reviewed Current Medicines   3. On meal/ Correction bolus Humalog/ Basal Lantus Insulin regime / and Oral Hypoglycemic drugs   4. Monitor Blood glucose frequently   5. Modified  the dose of Insulin/ other medicines as needed   6. Will follow     .      Humberto Amaya MD, MD

## 2021-10-30 NOTE — PROGRESS NOTES
Reviewed discharge instructions with patient. Pt verbalized understanding of all new medication as well as follow up appointments. All questions answered. PIV removed. PT assisted with getting dressed and taken to main entrance via wheelchair.

## 2021-10-30 NOTE — DISCHARGE SUMMARY
Discharge Summary  Saba Lopez MD, MD     Name:  Piotr Diaz /Age/Sex: 1955  (77 y.o. male)   MRN & CSN:  [id number] & [id number] Admission Date/Time: 10/28/2021  9:01 AM   Attending:  Saba Lopez MD Discharging Physician: Saba Lopez MD, MD     Hospital Course:   Piotr Diaz is a 77 y.o.  male  who presents with hypotension     -Hypotension/CAD  -Chronic systolic heart failure with EF 15%  -Ventricular bigeminy  -Uncontrolled blood sugars newly diagnosed diabetes  -Hyperosmolar hyponatremia  -CKD stage III  -Morbid obesity  -COPD    The patient was started on midodrine and medication adjusted by cardiology. The patient lisinopril was stopped as the patient was anticoagulated. Coreg was switched to Toprol-XL by cardiology. Patient was started on Lantus and sliding scale insulin by endocrinology. Creatinine remained stable. Once cleared by cardiology patient discharge planning on adjusting medications by cardiology. The patient was discharged on spironolactone with his home medications    The patient expressed appropriate understanding of and agreement with the discharge recommendations, medications, and plan.      Consults this admission:  IP CONSULT TO ELECTROPHYSIOLOGY  IP CONSULT TO HOSPITALIST  IP CONSULT TO ENDOCRINOLOGY    Discharge Instruction:     Follow up appointments:     [place] [id number]  [phone]    In 1 week        Primary care physician:  within 2 weeks    Diet:  cardiac diet and diabetic diet     Activity: activity as tolerated    Disposition: Discharged to:   [x]Home, []C, []SNF, []Acute Rehab, []Hospice     Condition on discharge: Stable    Discharge Medications:      Reid Cobia   Home Medication Instructions KATEY:[id number]    Printed on:10/30/21 1007   Medication Information                      aspirin 81 MG chewable tablet  Take 1 tablet by mouth daily             atorvastatin (LIPITOR) 40 MG tablet  Take 1 tablet by mouth nightly             budesonide-formoterol (SYMBICORT) 80-4.5 MCG/ACT AERO  Inhale 2 puffs into the lungs 2 times daily             clopidogrel (PLAVIX) 75 MG tablet  Take 75 mg by mouth daily             fluticasone (FLONASE) 50 MCG/ACT nasal spray  1 spray by Each Nostril route daily             glucose monitoring (FREESTYLE FREEDOM) kit  1 kit by Does not apply route daily             insulin glargine (LANTUS SOLOSTAR) 100 UNIT/ML injection pen  Inject 30-50 Units into the skin nightly             insulin lispro (HUMALOG) 100 UNIT/ML injection vial  Inject 0-12 Units into the skin 3 times daily (with meals)             Insulin Pen Needle (eyesFinderOGER PEN NEEDLES) 31G X 6 MM MISC  1 each by Does not apply route daily             Lancets MISC  1 each by Does not apply route 4 times daily             metoprolol succinate (TOPROL XL) 25 MG extended release tablet  Take 1 tablet by mouth daily             midodrine (PROAMATINE) 10 MG tablet  Take 1 tablet by mouth 3 times daily (with meals)             nitroGLYCERIN (NITROSTAT) 0.4 MG SL tablet  up to max of 3 total doses. If no relief after 1 dose, call 911. omeprazole (PRILOSEC) 20 MG delayed release capsule  Take 20 mg by mouth daily             potassium chloride (KLOR-CON M) 10 MEQ extended release tablet  Take 1 tablet by mouth 2 times daily             spironolactone (ALDACTONE) 25 MG tablet  Take 25 mg by mouth daily             torsemide (DEMADEX) 20 MG tablet  TAKE 1 TABLET BY MOUTH 3 TIMES A WEEK                 Objective Findings at Discharge:     BP (!) 155/98   Pulse 85   Temp 98 °F (36.7 °C) (Oral)   Resp 14   Ht 5' 5.98\" (1.676 m)   Wt 242 lb 4.6 oz (109.9 kg)   SpO2 99%   BMI 39.12 kg/m²            PHYSICAL EXAM     GEN:  Awake male, sitting upright in bed in no apparent distress. RESP:  CTAB, no wheezes, rales or rhonchi. CVS:  RRR. No peripheral edema. GI/:  S/NT/ND BS+   NEURO: No focal defecits.   PSYCH:  Awake, alert, oriented x 3. Affect appropriate. LABS: Reviewd    IMAGING: Reviwed    35 Minutes spent in preparation of discharging this patient including face to face encounter, discussions with the patient/family, medication reconciliation, and transition of care preparation.      Electronically signed by Dav Villegas MD, MD on 10/30/2021 at 10:07 AM

## 2021-10-30 NOTE — PLAN OF CARE
Problem: Falls - Risk of:  Goal: Will remain free from falls  Description: Will remain free from falls  10/29/2021 2245 by Delonte Vidales RN  Outcome: Ongoing  10/29/2021 1024 by Paulo Dai RN  Outcome: Ongoing  Goal: Absence of physical injury  Description: Absence of physical injury  10/29/2021 2245 by Delonte Vidales RN  Outcome: Ongoing  10/29/2021 1024 by Paulo Dai RN  Outcome: Ongoing     Problem: Cardiac Output - Decreased:  Goal: Hemodynamic stability will improve  Description: Hemodynamic stability will improve  10/29/2021 2245 by Delonte Vidales RN  Outcome: Ongoing  10/29/2021 1024 by Paulo Dai RN  Outcome: Ongoing     Problem: Infection:  Goal: Will remain free from infection  Description: Will remain free from infection  10/29/2021 2245 by Delonte Vidales RN  Outcome: Ongoing  10/29/2021 1024 by Paulo Dai RN  Outcome: Ongoing     Problem: Safety:  Goal: Free from accidental physical injury  Description: Free from accidental physical injury  10/29/2021 2245 by Delonte Vidales RN  Outcome: Ongoing  10/29/2021 1024 by Paulo Dai RN  Outcome: Ongoing  Goal: Free from intentional harm  Description: Free from intentional harm  10/29/2021 2245 by Delonte Vidales RN  Outcome: Ongoing  10/29/2021 1024 by Paulo Dai RN  Outcome: Ongoing     Problem: Daily Care:  Goal: Daily care needs are met  Description: Daily care needs are met  10/29/2021 2245 by Delonte Vidales RN  Outcome: Ongoing  10/29/2021 1024 by Paulo Dai RN  Outcome: Ongoing     Problem: Pain:  Goal: Patient's pain/discomfort is manageable  Description: Patient's pain/discomfort is manageable  10/29/2021 2245 by Delonte Vidales RN  Outcome: Ongoing  10/29/2021 1024 by Paulo Dai RN  Outcome: Ongoing     Problem: Skin Integrity:  Goal: Skin integrity will stabilize  Description: Skin integrity will stabilize  10/29/2021 2245 by Delonte Vidales RN  Outcome: Ongoing  10/29/2021 1024 by Paulo Dai, RN  Outcome: Ongoing     Problem: Discharge Planning:  Goal: Patients continuum of care needs are met  Description: Patients continuum of care needs are met  10/29/2021 2245 by Delonte Vidales RN  Outcome: Ongoing  10/29/2021 1024 by Paulo Dai RN  Outcome: Ongoing     Problem: Nutrition  Goal: Optimal nutrition therapy  Outcome: Ongoing

## 2021-11-02 NOTE — OP NOTE
[facility]     Electrophysiology Procedure Note       Date of Procedure: 11/2/2021  Patient's Name: Pirncess Gay  YOB: 1955   Medical Record Number: [id number]  Procedure Performed by: Estefany Wyatt MD     Procedures performed:  · Insertion of right ventricular defibrillator lead under fluoroscopy. · Insertion of right atrial lead under fluoroscopy  · Insertion of a dual chamber ICD. · Electronic analysis of lead and device. · IV sedation. · Selective venography of left upper extremity. Indication of the procedure:       Princess Gay is a 77 y.o. male with NYHA class III symptoms and ischemic cardiomyopathy despite medical therapy (as best opimitized as they can - on coreg, lisinopril (further increase causing dizziness with low BPs), spironolactone)  patient also on echo has apical akinesis suggesting scar so given this we would recommend ICD for primary prevention    Sedation : versed and fentanyl    Antibiotic : Cefazolin    Blood loss : 30 cc    Details of procedure: The patient was brought to the electrophysiology laboratory in stable condition. The patient was in a fasting and non-sedated state. The risks, benefits and alternatives of the procedure were discussed with the patient. The risks including, but not limited to, the risks of vascular injury, bleeding, infection, device malfunction, lead dislodgement, radiation exposure, injury to cardiac and surrounding structures (including pneumothorax), stroke, myocardial infarction and death were discussed in detail. The patient opted to proceed with the device implantation. Written informed consent was signed and placed in the chart. Prophylactic antibiotic was given. Selective venography of the left upper extremity was done to assess vein patency. The patient was prepped and draped in a sterile fashion.  A timeout protocol was completed to identify the patient and the procedure being performed. IV sedation was provided with IV Versed, Fentanyl. An incision was made in the left pectoral area after administration of lidocaine. Using electrocautery and blunt dissection, a pocket was created. Central venous access into the left subclavian vein was obtained using the modified Seldinger technique. After central venous access was obtained, a sheath was placed in the subclavian vein. A right ventricular lead was advanced into position in the apex under fluoroscopic guidance and using a series of curved stylets. The lead was actively fixated. After confirming appropriate function, the sheath was split and removed. The lead was secured to the underlying tissue using suture material. A new sheath was advanced over a second previously placed wire into the vein. The atrial lead was advanced to the right atrial appendage and actively fixated under fluoroscopic guidance. After confirming appropriate function, the sheath was split and removed. The lead was secured to the underlying tissue using suture. The pocket was irrigated with an antibiotic solution. The leads were then connected to the new pulse generator dual chamber ICD which was then placed into the cleaned pocket. A dual chamber ICD was implanted to differentiate between atrial and ventricular arrhythmias to minimize inappropriate shocks and provide potential physiological pacing. The pocket was then closed in two subcutaneous layers using 2-0 & 2-0 Vicryl and subcuticular layer using 4-0 Vicryl. The skin was covered with Steri-Strips and pressure dressing. All sponge and needle counts were reported as correct at the end of the procedure. The patient tolerated the procedure well and there were no complications. Patient was transported to the holding area in stable condition.      Lead and device information:               Plan:     The patient will be have usual post-implant care, including chest x-ray, and antibiotic therapy and interrogation of the device.

## 2021-11-02 NOTE — H&P
Electrophysiology H&p Note      Reason for consultation:  Need for BIVICD    Chief complaint : ICD    Referring physician: Dr. Denise Juárez      Primary care physician: TIKI Bustos      History of Present Illness: Today visit (11/02/21)    Patient here for ICD implantation. BP meds were recently decreased as patient had dizziness and near syncope with low BPs. Now he does not feel dizzy. No change in H&P noted from previous clinic visit. Previous visit (10/22/2021)      Chief Complaint   Patient presents with    Other     here for evaluation for ICD placement. Has some chest tightness but no pain. Denies chest pain, palpations, edema,and dizziness    Shortness of Breath     SOB with walking, doing any mild activity. Previous visit:  Patient is a 27-year-old male with history of COPD, hypertension, obstructive sleep apnea, coronary artery disease s/p PCI, abdominal aortic aneurysm repair, ischemic cardiomyopathy, history of pancreatitis, severe left ventricular systolic dysfunction presents with complaints of abdominal fullness and distention with pain. Patient reports shortness of breath with minimal exertion. Patient reports he cannot lie flat with abdominal fullness and also has shortness of breath with it. Patient reports weight gain of 10 pounds in 1 week. Patient feels tired and weak. Patient reports that he was at cardiac rehab today when he was noted to have heart rate of 150. He was immediately sent to the cardiology office then referred to the emergency room. Patient tells me that last week he has gained 2 pounds a day. He states that he was gradually worsening fatigue. He reports that he was gradually worsening shortness of breath with exertion that will resolve with rest.  He states that he has orthopnea. He states that he props himself up at night to sleep.   He additionally complains of abdominal distention with associated pain.  He states this is the biggest complaint as he is having his abdominal discomfort at this time    He reports that he had a left heart cath in May and was referred to [facility] for intervention. May 7, 2021 patient had a PCI to the RCA at [facility]. Patient had a PCI to LAD and diagonal on June 1, 2021 at [facility]. Patient had a follow-up echo July 2 and noted to have 15 to 20%. Patient was started on Coreg and lisinopril in May. Then in July he was switched to Toprol-XL and Aldactone was added to his medical regimen. Patient reports that he has been diagnosed with diabetes however is not compliant with medication    Patient additionally reports that he has been on medical management for heart failure since May and has been following up with cardiology every 2 weeks to uptitrate his medications. He tells me that he was told that we would try to maximize medication for 3 months and then repeat echo to assess whether or not he will need ICD    Patient denies chest pain, palpitations, lightheadedness, dizziness, edema or syncope    Patient reports he had a bowel movement this morning    Pastmedical history:   Past Medical History:   Diagnosis Date    Cancer [facility])     testicular    COPD, moderate ([address] 11/30/2020    Excessive daytime sleepiness 11/30/2020    GERD (gastroesophageal reflux disease)     Hypertension     Obstructive sleep apnea 2/3/2021    Pulmonary emphysema determined by X-ray ([facility] [address] 11/30/2020    Testicular hypofunction     thus on testosterone    Tobacco abuse 11/30/2020       Surgical history :   Past Surgical History:   Procedure Laterality Date    ABDOMINAL AORTIC ANEURYSM REPAIR, ENDOVASCULAR  11/30/2015    endologix aaa device  mri conditional  card scanned into pacs    HERNIA REPAIR      LEG SURGERY  10/2020    camncer removed       Family history: No family history on file. Social history :  reports that he quit smoking about a year ago.  He has never used smokeless tobacco. He reports that he does not drink alcohol and does not use drugs. No Known Allergies    No current facility-administered medications on file prior to encounter.      Current Outpatient Medications on File Prior to Encounter   Medication Sig Dispense Refill    metoprolol succinate (TOPROL XL) 25 MG extended release tablet Take 1 tablet by mouth daily 30 tablet 3    midodrine (PROAMATINE) 10 MG tablet Take 1 tablet by mouth 3 times daily (with meals) 90 tablet 3    insulin glargine (LANTUS SOLOSTAR) 100 UNIT/ML injection pen Inject 30-50 Units into the skin nightly 5 pen 3    insulin lispro (HUMALOG) 100 UNIT/ML injection vial Inject 0-12 Units into the skin 3 times daily (with meals) 10 mL 3    torsemide (DEMADEX) 20 MG tablet TAKE 1 TABLET BY MOUTH 3 TIMES A WEEK 38 tablet 1    spironolactone (ALDACTONE) 25 MG tablet Take 25 mg by mouth daily      omeprazole (PRILOSEC) 20 MG delayed release capsule Take 20 mg by mouth daily      potassium chloride (KLOR-CON M) 10 MEQ extended release tablet Take 1 tablet by mouth 2 times daily (Patient taking differently: Take 10 mEq by mouth daily ) 60 tablet 3    fluticasone (FLONASE) 50 MCG/ACT nasal spray 1 spray by Each Nostril route daily      budesonide-formoterol (SYMBICORT) 80-4.5 MCG/ACT AERO Inhale 2 puffs into the lungs 2 times daily      clopidogrel (PLAVIX) 75 MG tablet Take 75 mg by mouth daily      aspirin 81 MG chewable tablet Take 1 tablet by mouth daily 30 tablet 3    atorvastatin (LIPITOR) 40 MG tablet Take 1 tablet by mouth nightly (Patient taking differently: Take 10 mg by mouth nightly ) 30 tablet 3    Lancets MISC 1 each by Does not apply route 4 times daily 100 each 1    glucose monitoring (FREESTYLE FREEDOM) kit 1 kit by Does not apply route daily 1 kit 0    Insulin Pen Needle ([employer] PEN NEEDLES) 31G X 6 MM MISC 1 each by Does not apply route daily 100 each 3    nitroGLYCERIN (NITROSTAT) 0.4 MG SL tablet up to max of 3 total doses. If no relief after 1 dose, call 911. 11 tablet 3       Review of Systems:   Review of Systems   Constitutional: Positive for fatigue. Negative for activity change, chills and fever. HENT: Negative for congestion, ear pain and tinnitus. Eyes: Negative for photophobia, pain and visual disturbance. Respiratory: Positive for shortness of breath. Negative for cough, chest tightness and wheezing. Cardiovascular: Negative for chest pain, palpitations and leg swelling. Gastrointestinal: Negative for abdominal distention, abdominal pain, blood in stool, constipation, diarrhea, nausea and vomiting. Endocrine: Negative for cold intolerance and heat intolerance. Genitourinary: Negative for dysuria, flank pain and hematuria. Musculoskeletal: Positive for arthralgias. Negative for back pain, myalgias and neck stiffness. Skin: Negative for color change and rash. Allergic/Immunologic: Negative for food allergies. Neurological: Negative for dizziness, light-headedness, numbness and headaches. Hematological: Does not bruise/bleed easily. Psychiatric/Behavioral: Negative for agitation, behavioral problems and confusion. Examination:      Vitals:    11/02/21 0557   BP: (!) 114/101   Pulse: 97   Resp: 19   Temp: 96.2 °F (35.7 °C)   TempSrc: Temporal   SpO2: 93%   Weight: 242 lb (109.8 kg)   Height: 5' 5\" (1.651 m)        Body mass index is 40.27 kg/m². Physical Exam  Constitutional:       General: He is not in acute distress. Appearance: He is obese. He is not ill-appearing or diaphoretic. HENT:      Head: Normocephalic and atraumatic. Right Ear: External ear normal.      Left Ear: External ear normal.      Nose: No congestion. Mouth/Throat:      Mouth: Mucous membranes are moist.   Eyes:      Extraocular Movements: Extraocular movements intact. Conjunctiva/sclera: Conjunctivae normal.      Pupils: Pupils are equal, round, and reactive to light.    Cardiovascular: Rate and Rhythm: Normal rate and regular rhythm. Heart sounds: Murmur (Grade 2/6 systolic murmur) heard. Pulmonary:      Effort: Pulmonary effort is normal. No respiratory distress. Breath sounds: No wheezing, rhonchi or rales. Abdominal:      General: There is no distension. Tenderness: There is no abdominal tenderness. Musculoskeletal:         General: No tenderness. Cervical back: Normal range of motion. No tenderness. Right lower leg: No edema. Left lower leg: No edema. Skin:     General: Skin is warm. Neurological:      General: No focal deficit present. Mental Status: He is alert and oriented to person, place, and time. Cranial Nerves: No cranial nerve deficit.    Psychiatric:         Mood and Affect: Mood normal.           CBC:   Lab Results   Component Value Date    WBC 10.8 10/29/2021    HGB 14.4 10/29/2021    HCT 43.3 10/29/2021     10/29/2021     Lipids:  Lab Results   Component Value Date    CHOL 194 04/20/2021    TRIG 136 04/20/2021    HDL 42 04/20/2021    LDLDIRECT 136 (H) 04/20/2021     PT/INR:   Lab Results   Component Value Date    INR 0.85 10/29/2021        BMP:    Lab Results   Component Value Date     10/30/2021    K 3.7 10/30/2021    CL 97 (L) 10/30/2021    CO2 30 10/30/2021    BUN 30 (H) 10/30/2021     CMP:   Lab Results   Component Value Date    AST 15 10/28/2021    PROT 6.3 (L) 10/28/2021    BILITOT 0.4 10/28/2021    ALKPHOS 70 10/28/2021     TSH:  No results found for: TSH    EKGINTERPRETATION - EKG Interpretation:  Sinus rhythm PVC      IMPRESSION / RECOMMENDATIONS:     chronic left ventricular systolic and diastolic dysfunction  Ischemic cardiomyopathy LVEF 30-35% - Apical akineses  Coronary artery disease s/p PCI on June 1  CKD  Hypertension  COPD    Patient with NYHA class III symptoms and ischemic cardiomyopathy despite medical therapy (as best opimitized as they can - on coreg, lisinopril (further increase causing

## 2021-11-02 NOTE — PROGRESS NOTES
Device checked per Rexene Coffee. Antibiotic given. Discharge instructions reviewed. Questions answered and patient verbalized understanding. PIV removed. Upper left chest site WNL.

## 2021-11-12 NOTE — PROGRESS NOTES
Patient seen for site check post pacer implant. Dressing removed. No signs of inflammation or infection noted. Edges well approximated. Patient has no complaints of pain or discomfort. Single steri strip applied. Patient instructed to not lift arm higher than shoulder level.

## 2021-12-02 PROBLEM — I48.91 ATRIAL FIBRILLATION WITH RVR (HCC): Status: ACTIVE | Noted: 2021-01-01

## 2021-12-02 NOTE — Clinical Note
Patient Class: Inpatient [101]   REQUIRED: Diagnosis: Atrial fibrillation with RVR ([facility] [address] [[id number]]   Estimated Length of Stay: Estimated stay of more than 2 midnights

## 2021-12-02 NOTE — ED NOTES
Bed: ED-37  Expected date:   Expected time:   Means of arrival:   Comments:  [facility] [address] admission to move to this bed     Darcy Fenton  12/02/21 [id number]

## 2021-12-02 NOTE — ED PROVIDER NOTES
EMERGENCY DEPARTMENT ENCOUNTER      CHIEF COMPLAINT:   Shortness of breath    HPI: Marcio Jay is a 74 y.o. male with a past medical history of COPD and CHF status post ICD placement, who presents to the Emergency Department complaining of shortness of breath. The patient states that the symptoms started several days ago. The shortness of breath is intermittent. He feels as though something is smothering him. He states that he is unable to lie flat or in a recliner and needs to sit up to feel better. He denies dyspnea on exertion. He denies any fevers, chills or cough. He denies hemoptysis or chest pain. There is no known history of DVT or PE. The patient denies leg pain or leg swelling. The patient denies fevers, chills, neck pain, chest pain, hemoptysis, abdominal pain, nausea, vomiting, numbness, tingling, weakness, or any other complaints. REVIEW OF SYSTEMS:  CONSTITUTIONAL:  Denies fever, chills, weight loss or weakness  EYES:  Denies photophobia or discharge  ENT:  Denies sore throat or ear pain  CARDIOVASCULAR: Denies chest pain or palpitations  RESPIRATORY:  Complains of cough and shortness of breath  GI:  Denies abdominal pain, nausea, vomiting, or diarrhea  MUSCULOSKELETAL:  Denies back pain  SKIN:  No rash  NEUROLOGIC:  Denies headache, focal weakness or sensory changes  All systems negative except as marked. \"Remaining review of systems reviewed and negative. I have reviewed the nursing triage documentation and agree unless otherwise noted below. \"      PAST MEDICAL HISTORY:   Past Medical History:   Diagnosis Date    Cancer [facility])     testicular    COPD, moderate ([address] 11/30/2020    Excessive daytime sleepiness 11/30/2020    GERD (gastroesophageal reflux disease)     Hypertension     Obstructive sleep apnea 2/3/2021    Pulmonary emphysema determined by X-ray [facility]) 11/30/2020    Testicular hypofunction     thus on testosterone    Tobacco abuse 11/30/2020       CURRENT MEDICATIONS: Home medications reviewed. SURGICAL HISTORY:   Past Surgical History:   Procedure Laterality Date    ABDOMINAL AORTIC ANEURYSM REPAIR, ENDOVASCULAR  2015    endologix aaa device  mri conditional  card scanned into pacs    CARDIAC DEFIBRILLATOR PLACEMENT Left 2021    [employer] Evera MRI XT DR Pires ICD    HERNIA REPAIR      LEG SURGERY  10/2020    camncer removed       FAMILY HISTORY:   No family history on file. SOCIAL HISTORY:   Social History     Socioeconomic History    Marital status: Single     Spouse name: Not on file    Number of children: Not on file    Years of education: Not on file    Highest education level: Not on file   Occupational History    Not on file   Tobacco Use    Smoking status: Former Smoker     Quit date: 2020     Years since quittin.0    Smokeless tobacco: Never Used   Substance and Sexual Activity    Alcohol use: Never    Drug use: Never    Sexual activity: Not on file   Other Topics Concern    Not on file   Social History Narrative    Not on file     Social Determinants of Health     Financial Resource Strain:     Difficulty of Paying Living Expenses: Not on file   Food Insecurity:     Worried About [address] in the Last Year: Not on file    [address] in the Last Year: Not on file   Transportation Needs:     Lack of Transportation (Medical): Not on file    Lack of Transportation (Non-Medical):  Not on file   Physical Activity:     Days of Exercise per Week: Not on file    Minutes of Exercise per Session: Not on file   Stress:     Feeling of Stress : Not on file   Social Connections:     Frequency of Communication with Friends and Family: Not on file    Frequency of Social Gatherings with Friends and Family: Not on file    Attends Anabaptist Services: Not on file    Active Member of Clubs or Organizations: Not on file    Attends Club or Organization Meetings: Not on file    Marital Status: Not on file   Intimate Partner Violence:     Fear of Current or Ex-Partner: Not on file    Emotionally Abused: Not on file    Physically Abused: Not on file    Sexually Abused: Not on file   Housing Stability:     Unable to Pay for Housing in the Last Year: Not on file    Number of [place] in the Last Year: Not on file    Unstable Housing in the Last Year: Not on file       ALLERGIES: Patient has no known allergies. PHYSICAL EXAM:  VITAL SIGNS:   ED Triage Vitals [12/02/21 1203]   Enc Vitals Group      BP (!) 149/85      Pulse 100      Resp 20      Temp 97.5 °F (36.4 °C)      Temp Source Oral      SpO2 96 %      Weight 241 lb (109.3 kg)      Height       Head Circumference       Peak Flow       Pain Score       Pain Loc       Pain Edu? Excl. in [address]? Constitutional:  Non-toxic appearance  HENT: Normocephalic, Atraumatic, Bilateral external ears normal, Oropharynx moist, No oral exudates, Nose normal.  Eyes:  PERRL, Conjunctiva normal, No discharge. Neck: Normal range of motion, No tenderness, Supple, No stridor, No lymphadenopathy. Cardiovascular:  Normal heart rate, Normal rhythm  Pulmonary/Chest: Lungs are diminished bilaterally, no wheezing, no respiratory distress  Abdomen:   Bowel sounds normal, Soft, No tenderness, No masses, No pulsatile masses  Back:  No tenderness, No CVA tenderness  Extremities:  Normal range of motion, Intact distal pulses, No edema, No tenderness  Skin:  Warm, Dry, No erythema, No rash    EKG Interpretation  Interpreted by me  Compared to 10/28/2021  Rhythm: normal sinus  Rate: normal 98  Axis: normal  Ectopy: Frequent PVCs  Conduction: normal  ST Segments: no acute change  T Waves: no acute change  Clinical Impression: normal sinus rhythm with frequent PVCs    Cardiac Monitor Strip Interpretation  Interpreted by me  Monitor strip interpreted for greater than 10 seconds  Rhythm: normal sinus  Rate: normal  Ectopy: Frequent PVCs  ST Segments: normal.[email or web address]    Radiology / Procedures:  Chest R-3Normal             Comment: Please note this report has been produced using speech recognition software and may contain errors related to that system including errors in grammar, punctuation, and spelling, as well as words and phrases that may be inappropriate. If there are any questions or concerns please feel free to contact the dictating provider for clarification.         Jennifer Camacho MD  12/09/21 7754

## 2021-12-02 NOTE — ED NOTES
Patient has a normal sinus rhythm with frequent multifocal PVC's. Dr Nissa Ferraro is in the room.      Josephine Moise RN  12/02/21 4037

## 2021-12-02 NOTE — H&P
statin  # COPD -not in exacerbation, resume inhalers  # [place] - no longer uses CPAP          Present on Admission:   Atrial fibrillation with RVR (HCC)             Diet No diet orders on file   DVT Prophylaxis [] Lovenox, []  Heparin, [] SCDs, [] Ambulation  [x] Long term AC   GI Prophylaxis [x] PPI,  [] H2 Blocker,  [] Carafate,  [] Diet/Tube Feeds   Code Status Full CODE   Disposition Admit to MED SURG/INPT. Patient plans to return home upon discharge         Chief Complaint: Shortness of Breath      History obtained from patient in E HR  History of Present Illness:   Junior Mendez is a 74 y.o. male  with history of CAD status post recent PCI, cardiomyopathy status post ICD, essential hypertension, diabetes mellitus type 2, hyperlipidemia, COPD, [place], CKD stage III, GERD who presents with shortness of breath. The patient reports chronic shortness of breath however has worsened since Thanksgiving. He reports orthopnea. States while sitting up in the chair shortness of breath resolves. He denies PND or lower extremity swelling. He denies associated chest pain, palpitations, nausea, vomiting or diaphoresis. He denies cough recent infections fever or chills. Reports compliance with cardiac medications including diuretics. He denies any other acute issues. He was evaluated in ED. He presented afebrile, pulse 100, respirations 20, 149/85, O2 sat 96%. CXR was non acute. Chemistry panel showed sodium 130, chloride 96, bun 21, creat 1.6, glucose 350. LFT's wnl. EKG showed showed irregular undetermined rhythm. BNP 3367, Trop .022. CBC unremarkable. Cardiology was consulted in ED. The patients ICD was interrogated in ED and showed P- atrial fib and now with RVR per Cardiology notation. They have magnesium IV, oral metoprolol and amiodarone, 40 mg of IV Lasix. The patient has been admitted for further management. Ten point ROS: reviewed and are negative, unless as noted in above HPI.     Objective: No intake or output data in the 24 hours ending 12/02/21 1714     Vitals:   Vitals:    12/02/21 1430 12/02/21 1434 12/02/21 1519 12/02/21 1552   BP:  (!) 158/109 134/66 (!) 119/94   Pulse: 87 92 85 89   Resp:  16  20   Temp:       TempSrc:       SpO2:  98%  94%   Weight:           Physical Exam: 12/02/21     GEN -Awake alert appearing male, in NAD. Appears given age. EYES -anicteric, conjunctiva pink. HENT -Head is normocephalic, atraumatic. MM are moist. Oral pharynx without exudates, no evidence of thrush. NECK -Supple, no apparent thyromegaly or masses. RESP -CTA, no wheezes, rales or rhonchi. Symmetric chest movement   C/V -S1/S2 auscultated. irregular No JVD. Cap refill <3 sec. No peripheral edema. GI -Abdomen is soft non distended, non tender to palpation. + BS. No masses or guarding.  -No CVA/ flank tenderness. Hart catheter is not present. LYMPH- No petechiae or ecchymoses. MS -No gross joint deformities. SKIN -Normal coloration, warm, dry. NEURO-Cranial nerves appear grossly intact, normal speech, no lateralizing weakness. PSYC-Awake, alert, oriented x 3 . Appropriate affect. Past Medical History:      Past Medical History:   Diagnosis Date    Cancer [facility])     testicular    COPD, moderate ([address] 11/30/2020    Excessive daytime sleepiness 11/30/2020    GERD (gastroesophageal reflux disease)     Hypertension     Obstructive sleep apnea 2/3/2021    Pulmonary emphysema determined by X-ray [facility]) 11/30/2020    Testicular hypofunction     thus on testosterone    Tobacco abuse 11/30/2020       Past Surgical  History:    has a past surgical history that includes hernia repair; AAA repair, endovascular (11/30/2015); Leg Surgery (10/2020); and Cardiac defibrillator placement (Left, 11/02/2021). Family  History:   family history is not on file.         Social History:     Social History     Socioeconomic History    Marital status: Single     Spouse name: Not on file    Number of children: Not on file    Years of education: Not on file    Highest education level: Not on file   Occupational History    Not on file   Tobacco Use    Smoking status: Former Smoker     Quit date: 2020     Years since quittin.0    Smokeless tobacco: Never Used   Substance and Sexual Activity    Alcohol use: Never    Drug use: Never    Sexual activity: Not on file   Other Topics Concern    Not on file   Social History Narrative    Not on file     Social Determinants of Health     Financial Resource Strain:     Difficulty of Paying Living Expenses: Not on file   Food Insecurity:     Worried About [employer] in the Last Year: Not on file    Michael of Food in the Last Year: Not on file   Transportation Needs:     Lack of Transportation (Medical): Not on file    Lack of Transportation (Non-Medical): Not on file   Physical Activity:     Days of Exercise per Week: Not on file    Minutes of Exercise per Session: Not on file   Stress:     Feeling of Stress : Not on file   Social Connections:     Frequency of Communication with Friends and Family: Not on file    Frequency of Social Gatherings with Friends and Family: Not on file    Attends Anabaptism Services: Not on file    Active Member of [employer] or Organizations: Not on file    Attends Club or Organization Meetings: Not on file    Marital Status: Not on file   Intimate Partner Violence:     Fear of Current or Ex-Partner: Not on file    Emotionally Abused: Not on file    Physically Abused: Not on file    Sexually Abused: Not on file   Housing Stability:     Unable to Pay for Housing in the Last Year: Not on file    Number of [place] in the Last Year: Not on file    Unstable Housing in the Last Year: Not on file      reports that he quit smoking about 12 months ago. He has never used smokeless tobacco.   reports no history of alcohol use. reports no history of drug use.     Allergies:   No Known Allergies    Home Medications:     Prior to Admission medications    Medication Sig Start Date End Date Taking? Authorizing Provider   insulin glargine (BASAGLAR KWIKPEN) 100 UNIT/ML injection pen Inject 30 Units into the skin nightly   Yes Historical Provider, MD   metoprolol succinate (TOPROL XL) 25 MG extended release tablet Take 1 tablet by mouth daily 10/31/21   Leidy Davis MD   midodrine (PROAMATINE) 10 MG tablet Take 1 tablet by mouth 3 times daily (with meals) 10/30/21   Leidy Davis MD   insulin glargine (LANTUS SOLOSTAR) 100 UNIT/ML injection pen Inject 30-50 Units into the skin nightly 10/30/21   Leidy Davis MD   Lancets MISC 1 each by Does not apply route 4 times daily 10/30/21   Leidy Davis MD   glucose monitoring (FREESTYLE FREEDOM) kit 1 kit by Does not apply route daily 10/30/21   Leidy Davis MD   Insulin Pen Needle ([employer] PEN NEEDLES) 31G X 6 MM MISC 1 each by Does not apply route daily 10/30/21   Leidy Davis MD   insulin lispro (HUMALOG) 100 UNIT/ML injection vial Inject 0-12 Units into the skin 3 times daily (with meals) 10/30/21   Leidy Davis MD   torsemide (DEMADEX) 20 MG tablet TAKE 1 TABLET BY MOUTH 3 TIMES A WEEK 10/11/21   Andres Ahuja DO   spironolactone (ALDACTONE) 25 MG tablet Take 25 mg by mouth daily    Historical Provider, MD   omeprazole (PRILOSEC) 20 MG delayed release capsule Take 20 mg by mouth daily    Historical Provider, MD   potassium chloride (KLOR-CON M) 10 MEQ extended release tablet Take 1 tablet by mouth 2 times daily  Patient taking differently: Take 10 mEq by mouth daily  8/20/21   Andres Ahuja DO   fluticasone (FLONASE) 50 MCG/ACT nasal spray 1 spray by Each Nostril route daily    Historical Provider, MD   nitroGLYCERIN (NITROSTAT) 0.4 MG SL tablet up to max of 3 total doses.  If no relief after 1 dose, call 911. 7/4/21   Julia Gibson MD   budesonide-formoterol [facility]) 80-4.5 MCG/ACT AERO Inhale 2 puffs into the lungs 2 times daily 5/8/21   Historical Provider, MD   clopidogrel (PLAVIX) 75 MG tablet Take 75 mg by mouth daily    Historical Provider, MD   aspirin 81 MG chewable tablet Take 1 tablet by mouth daily 5/7/21   Yza Toribio MD   atorvastatin (LIPITOR) 40 MG tablet Take 1 tablet by mouth nightly  Patient taking differently: Take 10 mg by mouth nightly  5/6/21   Yaz Toribio MD         Medications:   Medications:    metoprolol succinate  50 mg Oral BID    clopidogrel  75 mg Oral Daily    apixaban  5 mg Oral BID    amiodarone  200 mg Oral Daily    ivabradine  5 mg Oral BID     magnesium sulfate  1,000 mg IntraVENous Once    [START ON 12/3/2021] spironolactone  25 mg Oral Daily    [START ON 12/3/2021] torsemide  20 mg Oral Daily    insulin lispro  0-6 Units SubCUTAneous TID WC    insulin lispro  0-3 Units SubCUTAneous Nightly      Infusions:   PRN Meds:      Data:     Laboratory this visit:  Reviewed  Recent Labs     12/02/21  1415   WBC 7.5   HGB 14.1   HCT 43.6         Recent Labs     12/01/21  1042 12/02/21  1415    130*   K 4.6 4.7   CL 99 96*   CO2 28 24   BUN 23 21   CREATININE 1.6* 1.6*     Recent Labs     12/02/21  1415   AST 18   ALT 24   BILITOT 0.5   ALKPHOS 77     Recent Labs     12/02/21  1415   INR 0.91         Radiology this visit:  Reviewed. XR CHEST (2 VW)    Result Date: 12/1/2021  EXAMINATION: TWO XRAY VIEWS OF THE CHEST 12/1/2021 10:57 am COMPARISON: 10/27/2021 HISTORY: ORDERING SYSTEM PROVIDED HISTORY: Shortness of breath TECHNOLOGIST PROVIDED HISTORY: Acuity: Unknown Type of Exam: Initial Additional signs and symptoms: Shortness of breath FINDINGS: AICD device is in place. The heart and mediastinal structures are stable. The pulmonary vasculature is normal.  The lungs are clear. Vascular stent is seen in the upper abdomen not completely assessed on this exam.     No acute cardiopulmonary disease.            EKG this visit:  Reviewed         Electronically signed by MICHAEL Guzman - SCOT on 12/2/2021 at 5:14 PM

## 2021-12-02 NOTE — CONSULTS
Dictated --[id number]    Hx of CAD and PCI  Hx of HFrEF  S/p dual chamber ICD [employer]  She comes in with dyspnea, unable to lay flat has a cough and chocking       Check labs  Recent echo EF 25%  ICD check shows P-AFib now with afib with RVR --in and out of afib -very fast rate   optivol is normal   Night heart rates elevated   Believe his symptoms are due to afib with RVR  Need better rate control       Echo---10./21-- Summary   This is a limited echo. Definity was utilized to rule out presence of thrombus. Left ventricular systolic function is abnormal.   Ejection fraction is visually estimated at 25-30%. Slightly hypokinetic apical wall segments. No significant valvular disease noted. No evidence of any pericardial effusion.    Multiple PVC's noted throughout the exam.      Cath--5/21--DICTATED --[id number]  LEFT MAIN PATENT  LAD MID 90% AT LARGE DIAGONAL AND DISTAL LAD 99% STENOSIS  RAMUS SMALL AND PATENT  LCX MILD DX  RCA PROXIMAL 90% AND MID 99% STENOSIS WITH AYLA-II FLOW =-RIGHT TO RIGHT AND LEFT TO RIGHT COLLATERAL  LVEDP 28  MODERATELY ELEVATED RIGHT HEART PRESSURES  SEVERE 2 V CAD  HFrEF-PULMONARY HTN      Electronically signed by Diandra Barnes MD on 12/2/21 at 2:24 PM EST      [facility]   [facility] LAD 6/21-3.016mm stent [facility]

## 2021-12-02 NOTE — CONSULTS
renal insufficiency present. PAST MEDICAL HISTORY:  History of coronary artery disease status post  angioplasty done I believe of his LAD and also RCA. This was done at  [facility] earlier in 2021. He is status post ICD placement for  heart failure. EF was less than 35% present. History of testicular  cancer, COPD, GERD, hypertension, hyperlipidemia, and sleep apnea  present. PAST SURGICAL HISTORY:  He has had a AAA repair; I think it was an  endovascular repair. Status post angioplasty done of the LAD and RCA at  [address] [place] [address] [place]:  He does not smoke and does not drink. ALLERGIES:  NKDA. MEDICATIONS:  The patient is on Toprol 25 mg, ProAmatine, Demadex,  Aldactone, Plavix, and aspirin. PHYSICAL EXAMINATION:  GENERAL:  The patient is awake, alert, answers questions, and not in  acute distress. VITAL SIGNS:  Temperature is afebrile, pulse is 75, blood pressure is  135/80. HEENT:  Head is normocephalic, atraumatic. Pupils are equal, round, and  reactive to light. CHEST:  Equal expansion. LUNGS:  Clear to auscultation. No wheezing or rhonchi noted. HEART:  Regular rate and rhythm. ABDOMEN:  Soft and nontender. Bowel sounds are present. No  hepatosplenomegaly or guarding appreciated. EXTREMITIES:  No cyanosis noted. NEUROLOGIC:  Cranial nerves are grossly intact. DIAGNOSTIC DATA:  An EKG shows PVCs present. The patient had a heart cath in 05/2021. Angioplasty was in 06/2021. The patient had an ICD placement by Dr. Diego Vasquez in 11/2021. He had a  right atrial lead and right ventricular lead was placed. LABORATORY DATA:  The labs are all pending at this time. IMPRESSION:  This is a 75-year-old male patient with a history of having  heart failure status post ICD placement, comes in with having shortness  of breath present. He said he is unable to lay down flat. He feels  like he is choking.   The patient had a CT of his neck done recently in  10/2021 which was negative. CT chest was done _____ without contrast  which was also negative. His EKG today shows sinus rhythm with PVCs present. Bigeminy rhythm  present. I will review his labs and we will make further  recommendations based on his hospital course. I am not sure that the  discomfort he is having, if it is upper airway related or it is  secondary to heart failure.         Sergio Mejia MD    D: 12/02/2021 14:15:12       T: 12/02/2021 14:21:39     [email or web address]  Job#: [id number]     Doc#: [id number]    CC:

## 2021-12-03 NOTE — PROGRESS NOTES
Daily Progress Note  Awake and alert; Feeling better  Breathing feels like it has been improving  Cr. Today 1.7. Continue to watch  BNP still elevated today; Continue Diuretics  Replace K  Will stop Corlanor at this time    HFrEF    EF is on most recent echo was 25%    ICD in place    Orthopena    ProBNP 3049    SOB improving per pt. No edema noted on examination     On BB and Aldactone    Afib with RVR; Noted when interrogating ICD    On Eliquis, Amiodarone, and Metoprolol    Will stop Corlanor at this time. Will Titrate metoprolol as BP allows    Hx of CAD    LHC and Stents 6/1/2021    Continue plavix, statin and BB    Troponin 0.035; likely demand ischemia    Has elevated troponin at baseline    Hx of COPD; continue inhalers  Hx of CKD; Cr. 1.7; Continue to trend    Echo---10/28/21-- Summary   This is a limited echo.   Definity was utilized to rule out presence of thrombus.   Left ventricular systolic function is abnormal.   Ejection fraction is visually estimated at 25-30%.   Slightly hypokinetic apical wall segments.   No significant valvular disease noted.   No evidence of any pericardial effusion.   Multiple PVC's noted throughout the exam    PAST MEDICAL HISTORY:  History of coronary artery disease status post  angioplasty done I believe of his LAD and also RCA. This was done at  [facility] earlier in 2021. He is status post ICD placement for  heart failure. EF was less than 35% present. History of testicular  cancer, COPD, GERD, hypertension, hyperlipidemia, and sleep apnea  present.     PAST SURGICAL HISTORY:  He has had a AAA repair; I think it was an  endovascular repair. Status post angioplasty done of the LAD and RCA at  [facility]:  He does not smoke and does not drink.     ALLERGIES:  NKDA.     MEDICATIONS:  The patient is on Toprol 25 mg, ProAmatine, Demadex,  Aldactone, Plavix, and aspirin.     Objective:   /67   Pulse 88   Temp 97.6 °F (36.4 °C) (Oral)   Resp 16   Wt 241 lb (109.3 kg)   SpO2 96%   BMI 40.10 kg/m²     Intake/Output Summary (Last 24 hours) at 12/3/2021 0070  Last data filed at 12/3/2021 0353  Gross per 24 hour   Intake 560 ml   Output 825 ml   Net -265 ml       Medications:   Scheduled Meds:   atorvastatin  10 mg Oral Nightly    budesonide-formoterol  2 puff Inhalation BID    fluticasone  1 spray Each Nostril Daily    insulin glargine  30 Units SubCUTAneous Nightly    midodrine  10 mg Oral TID WC    metoprolol succinate  50 mg Oral BID    clopidogrel  75 mg Oral Daily    apixaban  5 mg Oral BID    amiodarone  200 mg Oral Daily    ivabradine  5 mg Oral BID WC    spironolactone  25 mg Oral Daily    torsemide  20 mg Oral Daily    insulin lispro  0-6 Units SubCUTAneous TID WC    insulin lispro  0-3 Units SubCUTAneous Nightly    sodium chloride flush  5-40 mL IntraVENous 2 times per day      Infusions:   sodium chloride        PRN Meds:  nitroGLYCERIN, sodium chloride flush, sodium chloride, polyethylene glycol, acetaminophen **OR** acetaminophen       Physical Exam:  Vitals:    12/03/21 0345   BP: 128/67   Pulse: 88   Resp: 16   Temp: 97.6 °F (36.4 °C)   SpO2: 96%        General: AAO, NAD  Chest: Nontender  Cardiac: First and Second Heart Sounds are Normal, No Murmurs or Gallops noted  Lungs:Clear to auscultation and percussion. Abdomen: Soft, NT, ND, +BS  Extremities: No clubbing, no edema  Vascular:  Equal 2+ peripheral pulses.         Lab Data:  CBC:   Recent Labs     12/02/21  1415 12/03/21  0624   WBC 7.5 8.0   HGB 14.1 14.0   HCT 43.6 43.3   MCV 88.8 88.5    198     BMP:   Recent Labs     12/01/21  1042 12/02/21  1415 12/03/21  0624    130* 138   K 4.6 4.7 3.6   CL 99 96* 97*   CO2 28 24 30   BUN 23 21 20   CREATININE 1.6* 1.6* 1.7*     LIVER PROFILE:   Recent Labs     12/02/21  1415 12/03/21  0624   AST 18 16   ALT 24 23   BILITOT 0.5 0.8   ALKPHOS 77 76     PT/INR:   Recent Labs 12/02/21  1415   PROTIME 11.7   INR 0.91     APTT:   Recent Labs     12/02/21  1415   APTT 31.5     BNP:  No results for input(s): BNP in the last 72 hours.       Assessment:  Patient Active Problem List    Diagnosis Date Noted    Atrial fibrillation with RVR ([address] 12/02/2021    S/P ICD (internal cardiac defibrillator) procedure     Hypotension, unspecified 10/28/2021    Globus sensation 10/08/2021    Neck swelling 10/08/2021    COPD exacerbation ([address] 10/08/2021    Generalized abdominal pain     Splenic lesion     Abdominal distention 07/29/2021    CHF (congestive heart failure), NYHA class I, chronic, systolic ([address] 39/93/3269    Acute on chronic combined systolic and diastolic congestive heart failure, NYHA class 3 ([address] 07/04/2021    Chest pain 07/02/2021    Abnormal stress test 05/06/2021    CAD in native artery 05/06/2021    Hypertensive renal disease 02/11/2021    Obstructive sleep apnea 02/03/2021    COPD, moderate ([address] 11/30/2020    Pulmonary emphysema determined by X-ray ([address] 11/30/2020    Tobacco abuse 11/30/2020    Excessive daytime sleepiness 11/30/2020    Stage 3 chronic kidney disease ([address] 11/20/2020    Dyspnea and respiratory abnormalities 11/20/2020    Chronic kidney disease-mineral and bone disorder 11/20/2020    Essential hypertension 11/20/2020    Hyponatremia 11/20/2020       Electronically signed by MICHAEL Rivas CNP on 12/3/2021 at 8:22 AM

## 2021-12-03 NOTE — PROGRESS NOTES
Hospitalist Progress Note      Name:  Ladi Núñez /Age/Sex: 1955  (74 y.o. male)   MRN & CSN:  [id number] & [id number] Admission Date/Time: 2021  1:55 PM   Location:  ED[facility]/ED-37 PCP: Balwinder Fraga [facility] Day: 2    Assessment and Plan:   Ladi Núñez is a 74 y.o.  male with past medical history of COPD, [place], GERD, chronic systolic CHF with EF 55% status post dual-chamber ICD, and recently diagnosed paroxysmal atrial fibrillation with RVR, was admitted on 2021 for evaluation of worsening shortness of breath/orthopnea. Patient was seen by cardiology and started on IV diuresis which was then switched to p.o. diuretics. Dyspnea/orthopnea secondary to acute on chronic systolic CHF exacerbation  -IV diuresis discontinued per cardiology  -Continue Aldactone/spironolactone  -Cardiology evaluation appreciated  -BNP still elevated. Continue to monitor.  -Strict I's and O's/daily weights    Paroxysmal A. fibrillation with RVR  -Newly diagnosed upon AICD interrogation  -Continue Eliquis/amiodarone/metoprolol  -Telemetry monitoring    CAD s/p PCI  -Recent stent placements in 2021  -Continue Plavix/statin therapy  -Continue beta-blocker. Not on ACE inhibitor given renal function. CKD stage III  -Baseline creatinine 1.5-1.6  -Avoid nephrotoxins    Hypotension  -By history.  -Patient is maintained on midodrine 10 mg p.o. 3 times daily  -Continue rest of the cardiac meds. COPD  -Not in exacerbation  -Continue Symbicort twice daily    Morbid obesity  -BMI 40.1  -PCP/nutritionist eval    DVT prophylaxis  -Patient on Eliquis          Diet ADULT DIET; Regular; 4 carb choices (60 gm/meal);  Low Fat/Low Chol/High Fiber/2 gm Na   DVT Prophylaxis [] Lovenox, []  Heparin, [] SCDs, [] Ambulation   GI Prophylaxis [] PPI,  [] H2 Blocker,  [] Carafate,  [] Diet/Tube Feeds   Code Status Full Code   Disposition Patient requires continued admission due to continued respiratory symptoms   MDM [] Low, [] Moderate,[x]  High  Patient's risk as above due to CHF exacerbation/new diagnosis A. fib with RVR     History of Present Illness:     Chief Complaint: See above    Subjective    Patient seen and examined at bedside. Still reports of orthopne/exertional dyspnea. Denies any chest pain. Ten point ROS reviewed negative, unless as noted above    Objective: Intake/Output Summary (Last 24 hours) at 12/3/2021 1334  Last data filed at 12/3/2021 0353  Gross per 24 hour   Intake 560 ml   Output 825 ml   Net -265 ml      Vitals:   Vitals:    12/03/21 1210   BP: 117/88   Pulse: 86   Resp: 16   Temp: 97.8 °F (36.6 °C)   SpO2: 94%     Physical Exam:   GEN NAD  RESP bilateral entry fair. Reduced air entry at the bases. CARDIO/VASC S1/S2 auscultated. Irregularly irregular. No peripheral edema. GI Abdomen is soft without significant tenderness, masses, or guarding. Bowel sounds are normoactive. NEURO AAOx3. No gross focal neurological deficit evident at this time.   Medications:   Medications:    atorvastatin  10 mg Oral Nightly    budesonide-formoterol  2 puff Inhalation BID    fluticasone  1 spray Each Nostril Daily    insulin glargine  30 Units SubCUTAneous Nightly    midodrine  10 mg Oral TID WC    metoprolol succinate  50 mg Oral BID    clopidogrel  75 mg Oral Daily    apixaban  5 mg Oral BID    amiodarone  200 mg Oral Daily    spironolactone  25 mg Oral Daily    torsemide  20 mg Oral Daily    insulin lispro  0-6 Units SubCUTAneous TID WC    insulin lispro  0-3 Units SubCUTAneous Nightly    sodium chloride flush  5-40 mL IntraVENous 2 times per day      Infusions:    sodium chloride       PRN Meds: nitroGLYCERIN, 0.4 mg, Q5 Min PRN  sodium chloride flush, 5-40 mL, PRN  sodium chloride, 25 mL, PRN  polyethylene glycol, 17 g, Daily PRN  acetaminophen, 650 mg, Q6H PRN   Or  acetaminophen, 650 mg, Q6H PRN          Electronically signed by Kareen Pike MD on 12/3/2021 at 1:34 PM

## 2021-12-04 NOTE — PROGRESS NOTES
Hospitalist Progress Note      Name:  Patricio Haney /Age/Sex: 1955  (74 y.o. male)   MRN & CSN:  [id number] & [id number] Admission Date/Time: 2021  1:55 PM   [facility]  [facility] PCP: Lorenzo Benedict, [facility] Day: 3    Assessment and Plan:   Patricio Haney is a 74 y.o.  male with past medical history of COPD, [place], GERD, chronic systolic CHF with EF 45% status post dual-chamber ICD, and recently diagnosed paroxysmal atrial fibrillation with RVR, was admitted on 2021 for evaluation of worsening shortness of breath/orthopnea. Patient was seen by cardiology and started on IV diuresis which was then switched to p.o. diuretics. Dyspnea/orthopnea secondary to acute on chronic systolic CHF exacerbation  -IV diuresis discontinued per cardiology  -Continue Aldactone/spironolactone  -Cardiology evaluation appreciated  -BNP still elevated. Continue to monitor.  -Strict I's and O's/daily weights    Paroxysmal A. fibrillation with RVR  -Newly diagnosed upon AICD interrogation  -Continue Eliquis/amiodarone/metoprolol  -Telemetry monitoring    CAD s/p PCI  -Recent stent placements in 2021  -Continue Plavix/statin therapy  -Continue beta-blocker. Not on ACE inhibitor given renal function. CKD stage III  -Baseline creatinine 1.5-1.6  -Avoid nephrotoxins    Hypotension  -By history.  -Patient is maintained on midodrine 10 mg p.o. 3 times daily  -Continue rest of the cardiac meds. COPD  -Not in exacerbation  -Continue Symbicort twice daily    Morbid obesity  -BMI 40.1  -PCP/nutritionist eval    DVT prophylaxis  -Patient on Eliquis          Diet ADULT DIET; Regular; 4 carb choices (60 gm/meal);  Low Fat/Low Chol/High Fiber/2 gm Na   DVT Prophylaxis [] Lovenox, []  Heparin, [] SCDs, [] Ambulation   GI Prophylaxis [] PPI,  [] H2 Blocker,  [] Carafate,  [] Diet/Tube Feeds   Code Status Full Code   Disposition Patient requires continued admission due to continued respiratory 2 times per day      Infusions:    sodium chloride       PRN Meds: nitroGLYCERIN, 0.4 mg, Q5 Min PRN  sodium chloride flush, 5-40 mL, PRN  sodium chloride, 25 mL, PRN  polyethylene glycol, 17 g, Daily PRN  acetaminophen, 650 mg, Q6H PRN   Or  acetaminophen, 650 mg, Q6H PRN          Electronically signed by Bipin Kingston MD on 12/4/2021 at 9:02 AM

## 2021-12-04 NOTE — DISCHARGE SUMMARY
Discharge Summary    Name:  Ladi Núñez /Age/Sex: 1955  (74 y.o. male)   MRN & CSN:  [id number] & [id number] Admission Date/Time: 2021  1:55 PM   Attending:  Amelia Del Toro MD Discharging Physician: Amelia Del Toro MD     Hospital Course:   Ladi Núñez is a 74 y.o.  male  who presents with <principal problem not specified>  Hospital Day: 3     Assessment and Plan:   Ladi Núñez is a 74 y.o.  male with past medical history of COPD, [place], GERD, chronic systolic CHF with EF 64% status post dual-chamber ICD, and recently diagnosed paroxysmal atrial fibrillation with RVR, was admitted on 2021 for evaluation of worsening shortness of breath/orthopnea. Patient was seen by cardiology and started on IV diuresis which was then switched to p.o. diuretics.     Dyspnea/orthopnea secondary to acute on chronic systolic CHF exacerbation  -IV diuresis discontinued per cardiology  -Continue Aldactone/spironolactone  -Cardiology evaluation appreciated  -BNP still elevated. Continue to monitor.  -Strict I's and O's/daily weights     Paroxysmal A. fibrillation with RVR  -Newly diagnosed upon AICD interrogation  -Continue Eliquis/amiodarone/metoprolol  -Telemetry monitoring     CAD s/p PCI  -Recent stent placements in 2021  -Continue Plavix/statin therapy  -Continue beta-blocker. Not on ACE inhibitor given renal function.     CKD stage III  -Baseline creatinine 1.5-1.6  -Avoid nephrotoxins     Hypotension  -By history.  -Patient is maintained on midodrine 10 mg p.o. 3 times daily  -Continue rest of the cardiac meds.     COPD  -Not in exacerbation  -Continue Symbicort twice daily     Morbid obesity  -BMI 40.1  -PCP/nutritionist eval     DVT prophylaxis  -Patient on Eliquis              Diet ADULT DIET; Regular; 4 carb choices (60 gm/meal); Low Fat/Low Chol/High Fiber/2 gm Na   DVT Prophylaxis []? Lovenox, []? Heparin, []? SCDs, []? Ambulation   GI Prophylaxis []?  PPI,  []? H2 Blocker,  []? Carafate,  []? Diet/Tube Feeds   Code Status Full Code   Disposition Patient requires continued admission due to continued respiratory symptoms   MDM []? Low, []? Moderate,[x]? High  Patient's risk as above due to CHF exacerbation/new diagnosis A. fib with RVR      History of Present Illness:      Chief Complaint: See above     Subjective-patient examined at bedside. He is comfortable in bed and laying flat. He is not on any oxygen. He denies any chest pain or shortness of breath. He is eager to go home. I do not see any further indication to keep him in the hospital as his symptoms have resolved. Discussed with his cardiologist and patient could be discharged later today. Defer further changes to his diuretic regimen to his cardiologist.     Patient seen and examined at bedside. Ten point ROS reviewed negative, unless as noted above     Objective:         Intake/Output Summary (Last 24 hours) at 12/4/2021 0902  Last data filed at 12/3/2021 1815      Gross per 24 hour   Intake --   Output 1275 ml   Net -1275 ml      Vitals:   Vitals:     12/04/21 0300   BP: (!) 94/56   Pulse: 78   Resp: 18   Temp: 97.5 °F (36.4 °C)   SpO2: 100%      Physical Exam:   GEN    NAD  RESP  bilateral entry fair. Reduced air entry at the bases. CARDIO/VASC           S1/S2 auscultated. Irregularly irregular. No peripheral edema. GI        Abdomen is soft without significant tenderness, masses, or guarding. Bowel sounds are normoactive. NEURO AAOx3. No gross focal neurological deficit evident at this time.   Medications:   Medications:   Scheduled Medications    atorvastatin  10 mg Oral Nightly    budesonide-formoterol  2 puff Inhalation BID    fluticasone  1 spray Each Nostril Daily    insulin glargine  30 Units SubCUTAneous Nightly    midodrine  10 mg Oral TID WC    metoprolol succinate  50 mg Oral BID    clopidogrel  75 mg Oral Daily    apixaban  5 mg Oral BID    amiodarone  200 mg Oral Daily    spironolactone  25 mg Oral Daily    torsemide  20 mg Oral Daily    insulin lispro  0-6 Units SubCUTAneous TID WC    insulin lispro  0-3 Units SubCUTAneous Nightly    sodium chloride flush  5-40 mL IntraVENous 2 times per day         Infusions:   Infusions Meds    sodium chloride           PRN Meds:   PRN Medications    nitroGLYCERIN, 0.4 mg, Q5 Min PRN  sodium chloride flush, 5-40 mL, PRN  sodium chloride, 25 mL, PRN  polyethylene glycol, 17 g, Daily PRN  acetaminophen, 650 mg, Q6H PRN   Or  acetaminophen, 650 mg, Q6H PRN               Electronically signed by Margret Goldmann, MD on 12/4/2021 at 9:02 AM                           The patient expressed appropriate understanding of and agreement with the discharge recommendations, medications, and plan.      Consults this admission:  IP CONSULT TO [facility] TO HOSPITALIST    Discharge Instruction:   Follow up appointments: As scheduled with cardiology  Primary care physician:  within 2 weeks    Diet:  cardiac diet   Activity: activity as tolerated  Disposition: Discharged to:   [x]Home, [][facility], []SNF, []Acute Rehab, []Hospice    Condition on discharge: Stable    Discharge Medications:        Medication List      START taking these medications    amiodarone 200 MG tablet  Commonly known as: CORDARONE  Take 1 tablet by mouth daily  Start taking on: December 5, 2021     apixaban 5 MG Tabs tablet  Commonly known as: ELIQUIS  Take 1 tablet by mouth 2 times daily     ivabradine 5 MG Tabs tablet  Commonly known as: CORLANOR  Take 1 tablet by mouth 2 times daily (with meals)        CHANGE how you take these medications    atorvastatin 40 MG tablet  Commonly known as: LIPITOR  Take 1 tablet by mouth nightly  What changed: how much to take     metoprolol succinate 50 MG extended release tablet  Commonly known as: TOPROL XL  Take 1 tablet by mouth 2 times daily  What changed:   · medication strength  · how much to take  · when to take this     potassium chloride 10 MEQ extended release tablet  Commonly known as: KLOR-CON M  Take 1 tablet by mouth 2 times daily  What changed: when to take this     torsemide 20 MG tablet  Commonly known as: DEMADEX  Take 1 tablet by mouth daily  Start taking on: December 5, 2021  What changed: See the new instructions. CONTINUE taking these medications    clopidogrel 75 MG tablet  Commonly known as: PLAVIX     fluticasone 50 MCG/ACT nasal spray  Commonly known as: FLONASE     glucose monitoring kit  1 kit by Does not apply route daily     insulin lispro 100 UNIT/ML injection vial  Commonly known as: HUMALOG  Inject 0-12 Units into the skin 3 times daily (with meals)     [employer] Pen Needles 31G X 6 MM Misc  Generic drug: Insulin Pen Needle  1 each by Does not apply route daily     Lancets Misc  1 each by Does not apply route 4 times daily     * Basaglar KwikPen 100 UNIT/ML injection pen  Generic drug: insulin glargine     * Lantus SoloStar 100 UNIT/ML injection pen  Generic drug: insulin glargine  Inject 30-50 Units into the skin nightly     midodrine 10 MG tablet  Commonly known as: PROAMATINE  Take 1 tablet by mouth 3 times daily (with meals)     nitroGLYCERIN 0.4 MG SL tablet  Commonly known as: NITROSTAT  up to max of 3 total doses. If no relief after 1 dose, call 911. omeprazole 20 MG delayed release capsule  Commonly known as: PRILOSEC     spironolactone 25 MG tablet  Commonly known as: ALDACTONE     Symbicort 80-4.5 MCG/ACT Aero  Generic drug: budesonide-formoterol         * This list has 2 medication(s) that are the same as other medications prescribed for you. Read the directions carefully, and ask your doctor or other care provider to review them with you.             STOP taking these medications    aspirin 81 MG chewable tablet           Where to Get Your Medications      These medications were sent to [place] [address] [address] [phone] [address]    Phone: [phone]   · amiodarone 200 MG tablet  · apixaban 5 MG Tabs tablet  · ivabradine 5 MG Tabs tablet  · metoprolol succinate 50 MG extended release tablet  · torsemide 20 MG tablet         Objective Findings at Discharge:   /74   Pulse 89   Temp 97.6 °F (36.4 °C) (Oral)   Resp 15   Ht 5' 5\" (1.651 m)   Wt 233 lb 14.5 oz (106.1 kg)   SpO2 99%   BMI 38.92 kg/m²            PHYSICAL EXAM     As above    BMP/CBC  Recent Labs     12/02/21  1415 12/03/21  0624 12/03/21  2147   * 138  --    K 4.7 3.6 4.1   CL 96* 97*  --    CO2 24 30  --    BUN 21 20  --    CREATININE 1.6* 1.7*  --    WBC 7.5 8.0  --    HCT 43.6 43.3  --     198  --        IMAGING:      Discharge Time of 35 minutes    Electronically signed by Jerad Wiggins MD on 12/4/2021 at 11:06 AM

## 2021-12-04 NOTE — PROGRESS NOTES
Daily Progress Note  Awake and alert, feeling better today  Breathing is much improved  NSR on tele, PVC's present still  Corlanor will be back added to help with HR. Oral torsemide 20mg daily  Continue Eliquis and plavix. Stable to d/c from cardiac standpoint  F/u in office in 1 week  [address] for home  Still have sinus and PVC --keep on current -treatment  F/u next week in office  Believe his symptoms are due to afib with RVR-intermittent afib    HFrEF    EF is on most recent echo was 25%    ICD in place    Orthopena    ProBNP 3049    SOB improving per pt. No edema noted on examination     On BB and Aldactone     Afib with RVR; Noted when interrogating ICD    On Eliquis, Amiodarone, and Metoprolol    On corlanor.      Will Titrate metoprolol as BP allows     Hx of CAD    LHC and Stents 6/1/2021    Continue plavix, statin and BB    Troponin 0.035; likely demand ischemia    Has elevated troponin at baseline     Hx of COPD; continue inhalers  Hx of CKD; Cr. 1.7; Continue to trend     Echo---10/28/21-- Summary   This is a limited echo.   Definity was utilized to rule out presence of thrombus.   Left ventricular systolic function is abnormal.   Ejection fraction is visually estimated at 25-30%.   Slightly hypokinetic apical wall segments.   No significant valvular disease noted.   No evidence of any pericardial effusion.   Multiple PVC's noted throughout the exam     PAST MEDICAL HISTORY:  History of coronary artery disease status post  angioplasty done I believe of his LAD and also RCA.  This was done at  [facility] earlier in 2021. [facility] is status post ICD placement for  heart failure.  EF was less than 35% present.  History of testicular  cancer, COPD, GERD, hypertension, hyperlipidemia, and sleep apnea  present.     PAST SURGICAL HISTORY: [facility] has had a AAA repair; I think it was an  endovascular repair.  Status post angioplasty done of the LAD and RCA [address] does not smoke and does not drink.     ALLERGIES:  NKDA.     MEDICATIONS:  The patient is on Toprol 25 mg, ProAmatine, Demadex,  Aldactone, Plavix, and aspirin. Objective:   /74   Pulse 89   Temp 97.6 °F (36.4 °C) (Oral)   Resp 15   Ht 5' 5\" (1.651 m)   Wt 233 lb 14.5 oz (106.1 kg)   SpO2 99%   BMI 38.92 kg/m²     Intake/Output Summary (Last 24 hours) at 12/4/2021 1116  Last data filed at 12/3/2021 1815  Gross per 24 hour   Intake --   Output 1275 ml   Net -1275 ml       Medications:   Scheduled Meds:   influenza virus vaccine  0.5 mL IntraMUSCular Prior to discharge    ivabradine  5 mg Oral BID WC    atorvastatin  10 mg Oral Nightly    budesonide-formoterol  2 puff Inhalation BID    fluticasone  1 spray Each Nostril Daily    insulin glargine  30 Units SubCUTAneous Nightly    midodrine  10 mg Oral TID WC    metoprolol succinate  50 mg Oral BID    clopidogrel  75 mg Oral Daily    apixaban  5 mg Oral BID    amiodarone  200 mg Oral Daily    spironolactone  25 mg Oral Daily    torsemide  20 mg Oral Daily    insulin lispro  0-6 Units SubCUTAneous TID WC    insulin lispro  0-3 Units SubCUTAneous Nightly    sodium chloride flush  5-40 mL IntraVENous 2 times per day      Infusions:   sodium chloride        PRN Meds:  nitroGLYCERIN, sodium chloride flush, sodium chloride, polyethylene glycol, acetaminophen **OR** acetaminophen       Physical Exam:  Vitals:    12/04/21 1030   BP: 118/74   Pulse: 89   Resp: 15   Temp: 97.6 °F (36.4 °C)   SpO2: 99%        General: AAO, NAD  Chest: Nontender  Cardiac: First and Second Heart Sounds are Normal, No Murmurs or Gallops noted  Lungs:Clear to auscultation and percussion. Abdomen: Soft, NT, ND, +BS  Extremities: No clubbing, no edema  Vascular:  Equal 2+ peripheral pulses.         Lab Data:  CBC:   Recent Labs     12/02/21  1415 12/03/21  0624   WBC 7.5 8.0   HGB 14.1 14.0   HCT 43.6 43.3   MCV 88.8 88.5    198     BMP:   Recent Labs     12/02/21  1415 12/03/21  0624 12/03/21  2147   * 138  --    K 4.7 3.6 4.1   CL 96* 97*  --    CO2 24 30  --    PHOS  --   --  3.6   BUN 21 20  --    CREATININE 1.6* 1.7*  --      LIVER PROFILE:   Recent Labs     12/02/21  1415 12/03/21  0624   AST 18 16   ALT 24 23   BILITOT 0.5 0.8   ALKPHOS 77 76     PT/INR:   Recent Labs     12/02/21  1415   PROTIME 11.7   INR 0.91     APTT:   Recent Labs     12/02/21  1415   APTT 31.5     BNP:  No results for input(s): BNP in the last 72 hours.       Assessment:  Patient Active Problem List    Diagnosis Date Noted    Atrial fibrillation with RVR ([address] 12/02/2021    S/P ICD (internal cardiac defibrillator) procedure     Hypotension, unspecified 10/28/2021    Globus sensation 10/08/2021    Neck swelling 10/08/2021    COPD exacerbation ([address] 10/08/2021    Generalized abdominal pain     Splenic lesion     Abdominal distention 07/29/2021    CHF (congestive heart failure), NYHA class I, chronic, systolic ([address] 98/34/2310    Acute on chronic combined systolic and diastolic congestive heart failure, NYHA class 3 ([address] 07/04/2021    Chest pain 07/02/2021    Abnormal stress test 05/06/2021    CAD in native artery 05/06/2021    Hypertensive renal disease 02/11/2021    Obstructive sleep apnea 02/03/2021    COPD, moderate ([address] 11/30/2020    Pulmonary emphysema determined by X-ray ([address] 11/30/2020    Tobacco abuse 11/30/2020    Excessive daytime sleepiness 11/30/2020    Stage 3 chronic kidney disease ([address] 11/20/2020    Dyspnea and respiratory abnormalities 11/20/2020    Chronic kidney disease-mineral and bone disorder 11/20/2020    Essential hypertension 11/20/2020    Hyponatremia 11/20/2020       Electronically signed by MICHAEL Padilla CNP on 12/4/2021 at 11:16 AM    Electronically signed by Mary Kate Kunz MD on 12/4/21 at 1:42 PM EST

## 2021-12-06 NOTE — PROGRESS NOTES
Patient is here today for 4-week follow-up status post implantation of ICD  Patient follows with Dr. Jacques Gonzales for cardiology needs  Patient reports that he was recently in the hospital for shortness of breath and he had some medication changes  Left upper chest site well approximated no signs of redness swelling or infection. No hematoma  Device interrogated  Device Assessment:     The device is Medtronic ICD - Dual Chamber chamber      MRI Compatible : yes    Device interrogation was performed. Mode :  AAI --- DDD     Sensing is normal. Impedence is normal.  Threshold is normal.     There has not been interval changes. Estimated battery life is 11.0 years    The underlying rhythm is AS, VS.      14.9 % atrial paced; <0.1 % ventricular paced. Atrial Arrhythmia : No    Non sustained VT episodes : No    Sustained VT episodes : No    The patient is occasionally atrial pacemaker dependent. Patient activity reported by the device to be 0.9 hr/ day     HF management still collecting data as device was placed less than 1 month ago  Device is functioning appropriately.   Patient to follow with Dr. Jacques Gonzales

## 2021-12-06 NOTE — PATIENT INSTRUCTIONS
Please be informed that if you contact our office outside of normal business hours the physician on call cannot help with any scheduling or rescheduling issues, procedure instruction questions or any type of medication issue. We advise you for any urgent/emergency that you go to the nearest emergency room! PLEASE CALL OUR OFFICE DURING NORMAL BUSINESS HOURS    Monday - Friday   8 am to 5 pm    [place]: [place] [address]: [phone]    [place]:  [phone]    **It is YOUR responsibilty to bring medication bottles and/or updated medication list to [address] [place] [address].  This will allow us to better serve you and all your healthcare needs**

## 2021-12-16 PROBLEM — I48.0 PAROXYSMAL ATRIAL FIBRILLATION (HCC): Status: ACTIVE | Noted: 2021-01-01

## 2021-12-16 PROBLEM — R06.02 SOB (SHORTNESS OF BREATH): Status: ACTIVE | Noted: 2020-11-20

## 2021-12-16 PROBLEM — I50.9 CHRONIC CONGESTIVE HEART FAILURE (HCC): Status: ACTIVE | Noted: 2021-01-01

## 2021-12-16 NOTE — PROGRESS NOTES
CHF CLINICAL STAFF DOCUMENTATION    Have you had any Chest Pain? - No    Do you had any Shortness of Breath - Yes  If Yes - When at rest and on exertion    Have you had any dizziness - No    Do you have any edema   No    Are you on fluid restrictions - Yes    Amount - 64 Fl. Oz  Are you on sodium restrictions - Yes   Amount - 2 gm    Do you feel fatigued - Yes    Do you have a cough - Yes  When he does cough he cough's for a long time.       Do you have abdominal bloating - Yes    How is your appetite - ok    Do you have difficulty sleeping - Yes  While laying - no      Do you have a history of sleep apnea - Yes    6 min. walk  Pre heart rate 62  Time walked 6:00  Distance 450 ft  Post heart rate 62     Have you had Covid - No    Have you had Vaccine for Covid - No    Have you had Flu Vaccine - No    Have you had Pneumonia Vaccine - No

## 2021-12-16 NOTE — PROGRESS NOTES
[address]      Visit Date: 12/16/2021  Cardiologist:  Dr. Lisa Dukes. [facility]   Primary Care Physician: Dr. Alphonso Aguilar, [facility]    Ollie Heredia is a 74 y.o. male who presents today for:  CC:   1. Essential hypertension    2. Chronic combined systolic and diastolic congestive heart failure ([address]    3. CAD in native artery    4. Paroxysmal atrial fibrillation (HCC)        HPI:   Ollie Heredia is a 74 y.o. male who presents to the office fornew patient visit in the heart failure clinic. Patient has a past medical history of hypertension, COPD, [place], GERD, PAF, and CHF. He presents to the emergency department on 12/2/21 for increased shortness of breath. Patient was in atrial fibrillation at that time which likely exacerbated CHF. Patient reports being diagnosed with atrial fibrillation many years ago. He is currently a patient of Dr. Citlali Quintanilla, is requesting second opinion and evaluation through the Heart failure clinic. In May of 2021Patient was found to be in congestive heart failure. LHC at that time showed 90% stenosis of D1, and distal LAD 99% stenosis. The  Circumflex had mild disease, RCA noted to have 90% stenosis with right to right and left-to-right collateral circulation. Due to the severity of blockages patient was transferred to [facility] and had intervention completed by Dr. Severa Rex. Complex intervention of mid RCA and then proximal RCA. Then in June of 2021 patient had PCI and ELIZABETH of diagonal, mid LAD, and angioplasty of the proximal LAD. Patient has had multiple hospitalizations since intervention for acute decompensated heart failure. Patient was referred from PCP for second opinion and treatment of CHF. After further discussion patient agrees with second opinion through cardiology along with CHF treatment. Patient received BiV-ICD in November of 2021.      Chief Complaint   Patient presents with    Congestive Heart Failure     Patient has:  Last hospital admission related to Heart Failure:  12/2/21   baseline BNP 3000     baseline weight 235   Chest Pain: no  Worsening SOB/orthopnea/PND: yes  Edema: no  Any extra diuretic use: no  Weight gain: no  Compliant checking home weight: yes  Fatigue: yes  Abdominal bloating: yes  Appetite: fair  Difficulty sleeping: yes  [place]: yes  Cough: no  Compliant checking blood pressure: yes  Compliant with medication regimen: yes    Vaccinations:    flu No  pneumonia No  COVID 19 No      Device: yes       Activity: poor  Can you walk 1-2 blocks or do a moderate amount of house/yard work? No    NYHA Class: III   Class I   Patients with no limitation of activities; they suffer no symptoms from ordinary activities. Class II   Patients with slight, mild limitation of activity; they are comfortable with rest or with mild exertion. Class III   Patients with marked limitation of activity; they are comfortable only at rest.   Class IV   Patients who should be at complete rest, confined to bed or chair; any physical activity brings on discomfort and symptoms occur at rest.    Sodium Restrictions: 2g  Fluid Restrictions: 48-64 oz/day  Sodium and fluid restriction compliance: yes      Past Medical History:   Diagnosis Date    Cancer ([address]     testicular    COPD, moderate ([address] 11/30/2020    Excessive daytime sleepiness 11/30/2020    GERD (gastroesophageal reflux disease)     Hypertension     Obstructive sleep apnea 2/3/2021    Pulmonary emphysema determined by X-ray ([address] 11/30/2020    Testicular hypofunction     thus on testosterone    Tobacco abuse 11/30/2020     Past Surgical History:   Procedure Laterality Date    ABDOMINAL AORTIC ANEURYSM REPAIR, ENDOVASCULAR  11/30/2015    endologix aaa device  mri conditional  card scanned into pacs    CARDIAC DEFIBRILLATOR PLACEMENT Left 11/02/2021    [employer] [facility] MRI XT DR Pires ICD    HERNIA REPAIR      LEG SURGERY  10/2020    camncer removed     History reviewed.  No pertinent family history. Social History     Tobacco Use    Smoking status: Former Smoker     Quit date: 2020     Years since quittin.1    Smokeless tobacco: Never Used   Substance Use Topics    Alcohol use: Never     Current Outpatient Medications   Medication Sig Dispense Refill    potassium chloride (KLOR-CON M) 10 MEQ extended release tablet Take 10 mEq by mouth 2 times daily      atorvastatin (LIPITOR) 40 MG tablet Take 40 mg by mouth nightly      dapagliflozin (FARXIGA) 10 MG tablet Take 1 tablet by mouth every morning 90 tablet 1    amiodarone (CORDARONE) 200 MG tablet Take 1 tablet by mouth daily 30 tablet 3    apixaban (ELIQUIS) 5 MG TABS tablet Take 1 tablet by mouth 2 times daily 60 tablet 3    metoprolol succinate (TOPROL XL) 50 MG extended release tablet Take 1 tablet by mouth 2 times daily 30 tablet 3    torsemide (DEMADEX) 20 MG tablet Take 1 tablet by mouth daily 30 tablet 3    insulin glargine (BASAGLAR KWIKPEN) 100 UNIT/ML injection pen Inject 30 Units into the skin nightly      insulin glargine (LANTUS SOLOSTAR) 100 UNIT/ML injection pen Inject 30-50 Units into the skin nightly 5 pen 3    Lancets MISC 1 each by Does not apply route 4 times daily 100 each 1    glucose monitoring (FREESTYLE FREEDOM) kit 1 kit by Does not apply route daily 1 kit 0    Insulin Pen Needle ([employer] PEN NEEDLES) 31G X 6 MM MISC 1 each by Does not apply route daily 100 each 3    spironolactone (ALDACTONE) 25 MG tablet Take 25 mg by mouth daily      fluticasone (FLONASE) 50 MCG/ACT nasal spray 1 spray by Each Nostril route daily      nitroGLYCERIN (NITROSTAT) 0.4 MG SL tablet up to max of 3 total doses. If no relief after 1 dose, call 911. 25 tablet 3    budesonide-formoterol (SYMBICORT) 80-4.5 MCG/ACT AERO Inhale 2 puffs into the lungs 2 times daily       clopidogrel (PLAVIX) 75 MG tablet Take 75 mg by mouth daily       No current facility-administered medications for this visit.      No Known Allergies    SUBJECTIVE:     Review of Systems    OBJECTIVE:   Today's Vitals:  /68 (Site: Right Upper Arm, Position: Sitting, Cuff Size: Large Adult)   Pulse 62   Resp (!) 36   Ht 5' 7\" (1.702 m)   Wt 235 lb 3.2 oz (106.7 kg)   SpO2 98%   BMI 36.84 kg/m²     Wt Readings from Last 3 Encounters:   12/16/21 235 lb 3.2 oz (106.7 kg)   12/10/21 231 lb (104.8 kg)   12/06/21 238 lb 3.2 oz (108 kg)     BP Readings from Last 3 Encounters:   12/16/21 118/68   12/06/21 116/80   12/04/21 118/74     Pulse Readings from Last 3 Encounters:   12/16/21 62   12/06/21 103   12/04/21 89     Body mass index is 36.84 kg/m². Physical Exam     6 minute walk test:  Time walked 00:06:00  Distance walked 450  Required Oxygen No    Most recent ECHO: 10/28/21  This is a limited echo. Definity was utilized to rule out presence of thrombus. Left ventricular systolic function is abnormal.   Ejection fraction is visually estimated at 25-30%. Slightly hypokinetic apical wall segments. No significant valvular disease noted. No evidence of any pericardial effusion.    Multiple PVC's noted throughout the exam.    LHC:6/1/21  Complex 99 % proximal lesion in LAD , Drug Eluting Stent to 20 %  Pre AYLA   flow: 3 Post AYLA flow: 3 Lesion complexity: High/C , bifurcation lesion   Complex 99 % ostial lesion in 1st Diagonal , Drug Eluting Stent to 20 %   Pre AYLA flow: 3 Post AYLA flow: 3 Lesion complexity: High/C , bifurcation   lesion   Complex 99 % proximal lesion in LAD , Drug Eluting Stent to 20 %  Pre AYLA   flow: 3 Post AYLA flow: 3 Lesion complexity: High/C , bifurcation lesion   Complex 99 % ostial lesion in 1st Diagonal , Drug Eluting Stent to 20 %   Pre AYLA flow: 3 Post AYLA flow: 3 Lesion complexity: High/C , bifurcation   lesion       Results reviewed:  BNP:   Lab Results   Component Value Date    PROBNP 2,553 (H) 12/04/2021     CBC:   Lab Results   Component Value Date    WBC 8.0 12/03/2021    RBC 4.89 12/03/2021    HGB 14.0 12/03/2021    HCT 43.3 12/03/2021     12/03/2021     CMP:    Lab Results   Component Value Date     12/04/2021    K 4.4 12/04/2021    CL 95 12/04/2021    CO2 27 12/04/2021    BUN 29 12/04/2021    CREATININE 1.9 12/04/2021    GFRAA 43 12/04/2021    LABGLOM 35 12/04/2021    GLUCOSE 293 12/04/2021    CALCIUM 8.8 12/04/2021     Hepatic Function Panel:    Lab Results   Component Value Date    ALKPHOS 76 12/03/2021    ALT 23 12/03/2021    AST 16 12/03/2021    PROT 6.2 12/03/2021    BILITOT 0.8 12/03/2021    BILIDIR 0.2 10/28/2021    IBILI 0.2 10/28/2021    LABALBU 4.2 12/03/2021     Magnesium:    Lab Results   Component Value Date    MG 1.9 12/03/2021     PT/INR:    Lab Results   Component Value Date    PROTIME 11.7 12/02/2021    INR 0.91 12/02/2021     Lipids:    Lab Results   Component Value Date    TRIG 136 04/20/2021    HDL 42 04/20/2021    LDLDIRECT 136 04/20/2021       Iron Studies:  No components found for: FE,  TIBC,  FERRITIN    Iron Deficiency Anemia:  No IV Iron Therapy:  No  2017 ACC/AHA HF Guidelines:   intravenous iron replacement in patients with New York Heart Association (NYHA) class II and III HF and iron deficiency (ferritin <100 ng/ml or 100-300 ng/ml if transferrin saturation <20%), to improve functional status and QoL. ASSESSMENT AND PLAN:     Chronic congestive heart failure ([facility]   -Cardiomyopathy originally diagnosed and July 2021 after patient had complex intervention of multiple coronary arteries. Patient had been on guideline recommended therapy for 3 months which did not show a significant improvement in ejection fraction. He did have mild improvement but was only 25 to 30%. Patient then proceeded to have BiV-ICD in November 2021 for Dr. Camryn Gallo. Patient then was hospitalized for acute decompensated heart failure at the beginning of December and has had multiple issues with CHF prior. Patient reports shortness of breath has improved hospitalization but is still present. Patient is to continue with Toprol-XL 50 mg daily, torsemide 20 mg daily, Aldactone 25 mg daily. Patient was originally diagnosed with chronic kidney disease stage III  in November 2021. He has single functioning left kidney, previously seen right renal atrophy. Patient follows with Dr. Jimmy Gar and lisinopril was discontinued due to patient complaining of sensation of throat swelling.      -Will start patient on Fargxia 10 mg daily, risks and benefits along with side effects discussed with patient. He is already monitoring blood glucose levels 4 times a day. CAD in native artery   -PCI RCA mid and proximal in May 2021, then PCI of diagonal, mid, and proximal LAD n June 2021. Intervention [facility]. EF in July 2021 15 to 20%. Follow-up echocardiogram after medical therapy and intervention continued to be low and only improved to 25-30%. -New consult sent to Dr. Claudy Nix hypertension   -Stable, continue with Torsemide 20 mg daily and Toprol-XL 50 mg daily. Paroxysmal atrial fibrillation (HCC)   -Lenard-Vas is 3 continue with Eliquis 5 mg twice daily for anticoagulation. Patient is currently in sinus rhythm continue with Toprol-XL 50 mg daily for rate control and 200 mg amiodarone for rhythm control. During last hospitalization patient was started on amiodarone, risks and side effects discussed with patient. Message sent to pulmonologist requesting patient to have PFT. Most recent TSH and liver panel are within normal limits.   Recommend patient get yearly eye exam.      Patient was instructed to call the [employer] for changes in the following symptoms:    Weight gain of 3 pounds in 1 day or 5 pounds in 1 week   Increased shortness of breath   Shortness of breath while laying down   Cough   Chest pain   Swelling in feet, ankles or legs   Tenderness or bloating in the abdomen   Fatigue    Decreased appetite or nausea    Confusion      Return in about 2 weeks (around 12/30/2021). or sooner if needed     -Over 60 minutes spent discussing current medical conditions and plan of care with patient. We discussed the importance of weighing oneself and recording daily. We also discussed the importance of a lowsodium diet, higher sodium foods to avoid and better low sodium food options. Discussed use, benefit, and side effects of prescribed medications. All patient questions answered. Patient verbalizes understanding of plan of care using teach back method, and is agreeable to the treatment plan. Copy of note to be sent to consulting provider and primary cardiologist   Electronicallysigned by Jennifer Hernandes.  MICHAEL Gordon CNP on 12/16/2021 at 8:58 PM

## 2021-12-16 NOTE — ASSESSMENT & PLAN NOTE
-Cardiomyopathy originally diagnosed and July 2021 after patient had complex intervention of multiple coronary arteries. Patient had been on guideline recommended therapy for 3 months which did not show a significant improvement in ejection fraction. He did have mild improvement but was only 25 to 30%. Patient then proceeded to have BiV-ICD in November 2021 for Dr. Temitope Tristan. Patient then was hospitalized for acute decompensated heart failure at the beginning of December and has had multiple issues with CHF prior. Patient reports shortness of breath has improved hospitalization but is still present. Patient is to continue with Toprol-XL 50 mg daily, torsemide 20 mg daily, Aldactone 25 mg daily. Patient was originally diagnosed with chronic kidney disease stage III  in November 2021. He has single functioning left kidney, previously seen right renal atrophy. Patient follows with Dr. David Arias and lisinopril was discontinued due to patient complaining of sensation of throat swelling.      -Will start patient on Fargxia 10 mg daily, risks and benefits along with side effects discussed with patient. He is already monitoring blood glucose levels 4 times a day.

## 2021-12-17 NOTE — ASSESSMENT & PLAN NOTE
-Lenard-Vas is 3 continue with Eliquis 5 mg twice daily for anticoagulation. Patient is currently in sinus rhythm continue with Toprol-XL 50 mg daily for rate control and 200 mg amiodarone for rhythm control. During last hospitalization patient was started on amiodarone, risks and side effects discussed with patient. Message sent to pulmonologist requesting patient to have PFT. Most recent TSH and liver panel are within normal limits.   Recommend patient get yearly eye exam.

## 2021-12-17 NOTE — TELEPHONE ENCOUNTER
----- Message from Jefferson Chávez Raymond, MICHAEL - CNP sent at 12/16/2021  2:58 PM EST -----  Regarding: PFT  Patient had recent hospitalization and was placed on Amiodarone. Can he please get PFT.      Thank you    Jeremiah Olvera

## 2021-12-17 NOTE — ASSESSMENT & PLAN NOTE
-PCI RCA mid and proximal in May 2021, then PCI of diagonal, mid, and proximal LAD n June 2021. Intervention was completed at [facility]. EF in July 2021 15 to 20%. Follow-up echocardiogram after medical therapy and intervention continued to be low and only improved to 25-30%. Home

## 2021-12-17 NOTE — TELEPHONE ENCOUNTER
I did contact  Rajan Montenegro to let him know that I would be ordering a pulmonary function test to follow his diffusing capacity since he has been placed on amiodarone.   He seemed to understand and will complete pulmonary function testing

## 2021-12-28 NOTE — TELEPHONE ENCOUNTER
Per Haile Soria CNP patient is to take torsemide BID and Potassium BID x3 days. If not any better by Monday call us back and schedule OV. He voiced understanding.

## 2022-01-01 ENCOUNTER — APPOINTMENT (OUTPATIENT)
Dept: CT IMAGING | Age: 67
DRG: 193 | End: 2022-01-01
Payer: MEDICARE

## 2022-01-01 ENCOUNTER — ANESTHESIA EVENT (OUTPATIENT)
Dept: CARDIAC CATH/INVASIVE PROCEDURES | Age: 67
End: 2022-01-01
Payer: MEDICARE

## 2022-01-01 ENCOUNTER — APPOINTMENT (OUTPATIENT)
Dept: ULTRASOUND IMAGING | Age: 67
DRG: 193 | End: 2022-01-01
Payer: MEDICARE

## 2022-01-01 ENCOUNTER — HOSPITAL ENCOUNTER (OUTPATIENT)
Age: 67
Discharge: HOME | End: 2022-03-22
Payer: MEDICARE

## 2022-01-01 ENCOUNTER — APPOINTMENT (OUTPATIENT)
Dept: GENERAL RADIOLOGY | Age: 67
DRG: 315 | End: 2022-01-01
Payer: MEDICARE

## 2022-01-01 ENCOUNTER — APPOINTMENT (OUTPATIENT)
Dept: INTERVENTIONAL RADIOLOGY/VASCULAR | Age: 67
DRG: 315 | End: 2022-01-01
Payer: MEDICARE

## 2022-01-01 ENCOUNTER — APPOINTMENT (OUTPATIENT)
Dept: NUCLEAR MEDICINE | Age: 67
DRG: 193 | End: 2022-01-01
Payer: MEDICARE

## 2022-01-01 ENCOUNTER — APPOINTMENT (OUTPATIENT)
Dept: INTERVENTIONAL RADIOLOGY/VASCULAR | Age: 67
DRG: 193 | End: 2022-01-01
Payer: MEDICARE

## 2022-01-01 ENCOUNTER — HOSPITAL ENCOUNTER (OUTPATIENT)
Age: 67
Setting detail: SPECIMEN
Discharge: HOME | End: 2022-03-07
Payer: MEDICARE

## 2022-01-01 ENCOUNTER — APPOINTMENT (OUTPATIENT)
Dept: GENERAL RADIOLOGY | Age: 67
DRG: 193 | End: 2022-01-01
Payer: MEDICARE

## 2022-01-01 ENCOUNTER — OFFICE VISIT (OUTPATIENT)
Dept: PULMONOLOGY | Age: 67
End: 2022-01-01
Payer: MEDICARE

## 2022-01-01 ENCOUNTER — HOSPITAL ENCOUNTER (OUTPATIENT)
Age: 67
Discharge: HOME | End: 2022-03-08
Payer: MEDICARE

## 2022-01-01 ENCOUNTER — OFFICE VISIT (OUTPATIENT)
Dept: CARDIOLOGY CLINIC | Age: 67
End: 2022-01-01
Payer: MEDICARE

## 2022-01-01 ENCOUNTER — APPOINTMENT (OUTPATIENT)
Dept: GENERAL RADIOLOGY | Age: 67
DRG: 287 | End: 2022-01-01
Payer: MEDICARE

## 2022-01-01 ENCOUNTER — NURSE ONLY (OUTPATIENT)
Dept: CARDIOLOGY CLINIC | Age: 67
End: 2022-01-01

## 2022-01-01 ENCOUNTER — HOSPITAL ENCOUNTER (OUTPATIENT)
Age: 67
Discharge: HOME | End: 2022-03-03
Payer: MEDICARE

## 2022-01-01 ENCOUNTER — HOSPITAL ENCOUNTER (OUTPATIENT)
Dept: LAB | Age: 67
Discharge: HOME | End: 2022-01-10
Payer: MEDICARE

## 2022-01-01 ENCOUNTER — APPOINTMENT (OUTPATIENT)
Dept: CT IMAGING | Age: 67
DRG: 315 | End: 2022-01-01
Payer: MEDICARE

## 2022-01-01 ENCOUNTER — ANESTHESIA (OUTPATIENT)
Dept: CARDIAC CATH/INVASIVE PROCEDURES | Age: 67
End: 2022-01-01
Payer: MEDICARE

## 2022-01-01 ENCOUNTER — TELEPHONE (OUTPATIENT)
Dept: CARDIOLOGY CLINIC | Age: 67
End: 2022-01-01

## 2022-01-01 ENCOUNTER — ANESTHESIA EVENT (OUTPATIENT)
Dept: ICU | Age: 67
DRG: 193 | End: 2022-01-01
Payer: MEDICARE

## 2022-01-01 ENCOUNTER — HOSPITAL ENCOUNTER (INPATIENT)
Age: 67
LOS: 2 days | Discharge: HOME | DRG: 287 | End: 2022-03-18
Attending: EMERGENCY MEDICINE | Admitting: INTERNAL MEDICINE
Payer: MEDICARE

## 2022-01-01 ENCOUNTER — TELEPHONE (OUTPATIENT)
Dept: PULMONOLOGY | Age: 67
End: 2022-01-01

## 2022-01-01 ENCOUNTER — PROCEDURE VISIT (OUTPATIENT)
Dept: CARDIOLOGY CLINIC | Age: 67
End: 2022-01-01
Payer: MEDICARE

## 2022-01-01 ENCOUNTER — HOSPITAL ENCOUNTER (OUTPATIENT)
Dept: CARDIAC CATH/INVASIVE PROCEDURES | Age: 67
Discharge: HOME | End: 2022-03-11
Attending: INTERNAL MEDICINE | Admitting: INTERNAL MEDICINE
Payer: MEDICARE

## 2022-01-01 ENCOUNTER — HOSPITAL ENCOUNTER (OUTPATIENT)
Dept: PULMONOLOGY | Age: 67
Discharge: HOME | End: 2022-01-13
Payer: MEDICARE

## 2022-01-01 ENCOUNTER — HOSPITAL ENCOUNTER (INPATIENT)
Age: 67
LOS: 6 days | Discharge: HOME | DRG: 193 | End: 2022-02-25
Attending: STUDENT IN AN ORGANIZED HEALTH CARE EDUCATION/TRAINING PROGRAM | Admitting: INTERNAL MEDICINE
Payer: MEDICARE

## 2022-01-01 ENCOUNTER — HOSPITAL ENCOUNTER (OUTPATIENT)
Dept: GENERAL RADIOLOGY | Age: 67
Discharge: HOME | End: 2022-03-08
Payer: MEDICARE

## 2022-01-01 ENCOUNTER — HOSPITAL ENCOUNTER (INPATIENT)
Age: 67
LOS: 1 days | DRG: 315 | End: 2022-03-30
Attending: EMERGENCY MEDICINE | Admitting: INTERNAL MEDICINE
Payer: MEDICARE

## 2022-01-01 ENCOUNTER — ANESTHESIA (OUTPATIENT)
Dept: ICU | Age: 67
DRG: 193 | End: 2022-01-01
Payer: MEDICARE

## 2022-01-01 VITALS
HEIGHT: 67 IN | TEMPERATURE: 96.2 F | RESPIRATION RATE: 27 BRPM | WEIGHT: 228 LBS | BODY MASS INDEX: 35.79 KG/M2 | SYSTOLIC BLOOD PRESSURE: 77 MMHG | HEART RATE: 125 BPM | OXYGEN SATURATION: 98 % | DIASTOLIC BLOOD PRESSURE: 62 MMHG

## 2022-01-01 VITALS
WEIGHT: 239 LBS | RESPIRATION RATE: 12 BRPM | TEMPERATURE: 97.8 F | BODY MASS INDEX: 37.51 KG/M2 | SYSTOLIC BLOOD PRESSURE: 124 MMHG | HEART RATE: 60 BPM | DIASTOLIC BLOOD PRESSURE: 71 MMHG | HEIGHT: 67 IN | OXYGEN SATURATION: 94 %

## 2022-01-01 VITALS
HEIGHT: 67 IN | OXYGEN SATURATION: 97 % | WEIGHT: 239 LBS | SYSTOLIC BLOOD PRESSURE: 93 MMHG | TEMPERATURE: 98 F | BODY MASS INDEX: 37.51 KG/M2 | DIASTOLIC BLOOD PRESSURE: 78 MMHG | HEART RATE: 70 BPM | RESPIRATION RATE: 20 BRPM

## 2022-01-01 VITALS
DIASTOLIC BLOOD PRESSURE: 72 MMHG | OXYGEN SATURATION: 97 % | HEART RATE: 63 BPM | WEIGHT: 234 LBS | SYSTOLIC BLOOD PRESSURE: 104 MMHG | HEIGHT: 67 IN | BODY MASS INDEX: 36.73 KG/M2

## 2022-01-01 VITALS
DIASTOLIC BLOOD PRESSURE: 52 MMHG | BODY MASS INDEX: 37.35 KG/M2 | SYSTOLIC BLOOD PRESSURE: 88 MMHG | WEIGHT: 238 LBS | HEIGHT: 67 IN | HEART RATE: 96 BPM

## 2022-01-01 VITALS
DIASTOLIC BLOOD PRESSURE: 68 MMHG | OXYGEN SATURATION: 99 % | RESPIRATION RATE: 7 BRPM | TEMPERATURE: 97.6 F | SYSTOLIC BLOOD PRESSURE: 116 MMHG

## 2022-01-01 VITALS
RESPIRATION RATE: 20 BRPM | SYSTOLIC BLOOD PRESSURE: 154 MMHG | DIASTOLIC BLOOD PRESSURE: 98 MMHG | OXYGEN SATURATION: 95 % | HEART RATE: 68 BPM | TEMPERATURE: 97.6 F | HEIGHT: 67 IN | BODY MASS INDEX: 39.93 KG/M2 | WEIGHT: 254.4 LBS

## 2022-01-01 VITALS
HEART RATE: 72 BPM | DIASTOLIC BLOOD PRESSURE: 70 MMHG | SYSTOLIC BLOOD PRESSURE: 126 MMHG | BODY MASS INDEX: 38.04 KG/M2 | WEIGHT: 242.4 LBS | HEIGHT: 67 IN

## 2022-01-01 VITALS
DIASTOLIC BLOOD PRESSURE: 78 MMHG | WEIGHT: 239.6 LBS | BODY MASS INDEX: 37.61 KG/M2 | SYSTOLIC BLOOD PRESSURE: 126 MMHG | HEIGHT: 67 IN | HEART RATE: 79 BPM

## 2022-01-01 DIAGNOSIS — J43.9 PULMONARY EMPHYSEMA DETERMINED BY X-RAY (HCC): ICD-10-CM

## 2022-01-01 DIAGNOSIS — I10 ESSENTIAL HYPERTENSION: ICD-10-CM

## 2022-01-01 DIAGNOSIS — Z01.811 PREOP PULMONARY/RESPIRATORY EXAM: Primary | ICD-10-CM

## 2022-01-01 DIAGNOSIS — Z95.810 ICD (IMPLANTABLE CARDIOVERTER-DEFIBRILLATOR) IN PLACE: Primary | ICD-10-CM

## 2022-01-01 DIAGNOSIS — M25.569 KNEE PAIN, UNSPECIFIED CHRONICITY, UNSPECIFIED LATERALITY: ICD-10-CM

## 2022-01-01 DIAGNOSIS — R07.9 CHEST PAIN, UNSPECIFIED TYPE: Primary | ICD-10-CM

## 2022-01-01 DIAGNOSIS — Z72.0 TOBACCO ABUSE: ICD-10-CM

## 2022-01-01 DIAGNOSIS — I25.5 ISCHEMIC CARDIOMYOPATHY: ICD-10-CM

## 2022-01-01 DIAGNOSIS — I25.10 CORONARY ARTERY DISEASE INVOLVING NATIVE CORONARY ARTERY OF NATIVE HEART WITHOUT ANGINA PECTORIS: ICD-10-CM

## 2022-01-01 DIAGNOSIS — I48.91 ATRIAL FIBRILLATION, UNSPECIFIED TYPE (HCC): ICD-10-CM

## 2022-01-01 DIAGNOSIS — I48.0 PAF (PAROXYSMAL ATRIAL FIBRILLATION) (HCC): Primary | ICD-10-CM

## 2022-01-01 DIAGNOSIS — J44.9 COPD, MODERATE (HCC): Primary | ICD-10-CM

## 2022-01-01 DIAGNOSIS — I48.92 ATRIAL FLUTTER, UNSPECIFIED TYPE (HCC): ICD-10-CM

## 2022-01-01 DIAGNOSIS — R07.1 CHEST PAIN ON BREATHING: ICD-10-CM

## 2022-01-01 DIAGNOSIS — R79.89 ELEVATED D-DIMER: ICD-10-CM

## 2022-01-01 DIAGNOSIS — R06.02 SOB (SHORTNESS OF BREATH): ICD-10-CM

## 2022-01-01 DIAGNOSIS — E78.5 DYSLIPIDEMIA: ICD-10-CM

## 2022-01-01 DIAGNOSIS — Z95.810 S/P ICD (INTERNAL CARDIAC DEFIBRILLATOR) PROCEDURE: ICD-10-CM

## 2022-01-01 DIAGNOSIS — R07.9 ACUTE CHEST PAIN: Primary | ICD-10-CM

## 2022-01-01 DIAGNOSIS — I31.39 PERICARDIAL EFFUSION: Primary | ICD-10-CM

## 2022-01-01 DIAGNOSIS — R77.8 TROPONIN LEVEL ELEVATED: ICD-10-CM

## 2022-01-01 DIAGNOSIS — N18.30 STAGE 3 CHRONIC KIDNEY DISEASE, UNSPECIFIED WHETHER STAGE 3A OR 3B CKD (HCC): ICD-10-CM

## 2022-01-01 DIAGNOSIS — R79.89 ELEVATED BRAIN NATRIURETIC PEPTIDE (BNP) LEVEL: ICD-10-CM

## 2022-01-01 DIAGNOSIS — I48.0 PAROXYSMAL ATRIAL FIBRILLATION (HCC): ICD-10-CM

## 2022-01-01 DIAGNOSIS — Z01.810 PRE-OPERATIVE CARDIOVASCULAR EXAMINATION: ICD-10-CM

## 2022-01-01 DIAGNOSIS — R79.89 SERUM CREATININE RAISED: ICD-10-CM

## 2022-01-01 DIAGNOSIS — Z79.01 LONG TERM (CURRENT) USE OF ANTICOAGULANTS: ICD-10-CM

## 2022-01-01 DIAGNOSIS — G47.33 OBSTRUCTIVE SLEEP APNEA: ICD-10-CM

## 2022-01-01 DIAGNOSIS — R06.00 DYSPNEA, UNSPECIFIED TYPE: Primary | ICD-10-CM

## 2022-01-01 DIAGNOSIS — J44.9 COPD, MODERATE (HCC): ICD-10-CM

## 2022-01-01 LAB
ABO/RH: NORMAL
ACTIVATED CLOTTING TIME, LOW RANGE: 136 SEC
ACTIVATED CLOTTING TIME, LOW RANGE: 184 SEC
ACTIVATED CLOTTING TIME, LOW RANGE: 228 SEC
ACTIVATED CLOTTING TIME, LOW RANGE: 239 SEC
ACTIVATED CLOTTING TIME, LOW RANGE: 281 SEC
ACTIVATED CLOTTING TIME, LOW RANGE: 307 SEC
ACTIVATED CLOTTING TIME, LOW RANGE: 333 SEC
ACTIVATED CLOTTING TIME, LOW RANGE: 335 SEC
ALBUMIN SERPL-MCNC: 3.4 GM/DL (ref 3.4–5)
ALBUMIN SERPL-MCNC: 3.5 GM/DL (ref 3.4–5)
ALBUMIN SERPL-MCNC: 3.6 GM/DL (ref 3.4–5)
ALBUMIN SERPL-MCNC: 3.6 GM/DL (ref 3.4–5)
ALBUMIN SERPL-MCNC: 3.7 GM/DL (ref 3.4–5)
ALBUMIN SERPL-MCNC: 3.7 GM/DL (ref 3.4–5)
ALBUMIN SERPL-MCNC: 4.1 GM/DL (ref 3.4–5)
ALP BLD-CCNC: 111 IU/L (ref 40–129)
ALP BLD-CCNC: 44 IU/L (ref 40–128)
ALP BLD-CCNC: 45 IU/L (ref 40–128)
ALP BLD-CCNC: 46 IU/L (ref 40–129)
ALP BLD-CCNC: 49 IU/L (ref 40–128)
ALP BLD-CCNC: 49 IU/L (ref 40–128)
ALP BLD-CCNC: 50 IU/L (ref 40–129)
ALP BLD-CCNC: 51 IU/L (ref 40–128)
ALP BLD-CCNC: 54 IU/L (ref 40–128)
ALP BLD-CCNC: 64 IU/L (ref 40–129)
ALT SERPL-CCNC: 11 U/L (ref 10–40)
ALT SERPL-CCNC: 11 U/L (ref 10–40)
ALT SERPL-CCNC: 12 U/L (ref 10–40)
ALT SERPL-CCNC: 12 U/L (ref 10–40)
ALT SERPL-CCNC: 16 U/L (ref 10–40)
ALT SERPL-CCNC: 17 U/L (ref 10–40)
ALT SERPL-CCNC: 18 U/L (ref 10–40)
ALT SERPL-CCNC: 19 U/L (ref 10–40)
ALT SERPL-CCNC: 20 U/L (ref 10–40)
ALT SERPL-CCNC: 385 U/L (ref 10–40)
ANION GAP SERPL CALCULATED.3IONS-SCNC: 10 MMOL/L (ref 4–16)
ANION GAP SERPL CALCULATED.3IONS-SCNC: 11 MMOL/L (ref 4–16)
ANION GAP SERPL CALCULATED.3IONS-SCNC: 12 MMOL/L (ref 4–16)
ANION GAP SERPL CALCULATED.3IONS-SCNC: 13 MMOL/L (ref 4–16)
ANION GAP SERPL CALCULATED.3IONS-SCNC: 14 MMOL/L (ref 4–16)
ANION GAP SERPL CALCULATED.3IONS-SCNC: 15 MMOL/L (ref 4–16)
ANION GAP SERPL CALCULATED.3IONS-SCNC: 15 MMOL/L (ref 4–16)
ANION GAP SERPL CALCULATED.3IONS-SCNC: 16 MMOL/L (ref 4–16)
ANION GAP SERPL CALCULATED.3IONS-SCNC: 20 MMOL/L (ref 4–16)
ANTIBODY SCREEN: NEGATIVE
APTT: 34.5 SECONDS (ref 25.1–37.1)
APTT: 41 SECONDS (ref 25.1–37.1)
APTT: 48.4 SECONDS (ref 25.1–37.1)
AST SERPL-CCNC: 10 IU/L (ref 15–37)
AST SERPL-CCNC: 10 IU/L (ref 15–37)
AST SERPL-CCNC: 11 IU/L (ref 15–37)
AST SERPL-CCNC: 11 IU/L (ref 15–37)
AST SERPL-CCNC: 12 IU/L (ref 15–37)
AST SERPL-CCNC: 13 IU/L (ref 15–37)
AST SERPL-CCNC: 14 IU/L (ref 15–37)
AST SERPL-CCNC: 18 IU/L (ref 15–37)
AST SERPL-CCNC: 501 IU/L (ref 15–37)
AST SERPL-CCNC: 9 IU/L (ref 15–37)
BASE EXCESS MIXED: 3.3 (ref 0–1.2)
BASE EXCESS MIXED: 4.2 (ref 0–1.2)
BASE EXCESS: ABNORMAL (ref 0–3.3)
BASOPHILS ABSOLUTE: 0 K/CU MM
BASOPHILS ABSOLUTE: 0.1 K/CU MM
BASOPHILS RELATIVE PERCENT: 0.1 % (ref 0–1)
BASOPHILS RELATIVE PERCENT: 0.2 % (ref 0–1)
BASOPHILS RELATIVE PERCENT: 0.2 % (ref 0–1)
BASOPHILS RELATIVE PERCENT: 0.4 % (ref 0–1)
BASOPHILS RELATIVE PERCENT: 0.5 % (ref 0–1)
BILIRUB SERPL-MCNC: 0.2 MG/DL (ref 0–1)
BILIRUB SERPL-MCNC: 0.3 MG/DL (ref 0–1)
BILIRUB SERPL-MCNC: 0.4 MG/DL (ref 0–1)
BILIRUB SERPL-MCNC: 0.5 MG/DL (ref 0–1)
BILIRUB SERPL-MCNC: 0.5 MG/DL (ref 0–1)
BILIRUB SERPL-MCNC: 0.8 MG/DL (ref 0–1)
BILIRUB SERPL-MCNC: 0.9 MG/DL (ref 0–1)
BUN BLDV-MCNC: 22 MG/DL (ref 6–23)
BUN BLDV-MCNC: 22 MG/DL (ref 6–23)
BUN BLDV-MCNC: 26 MG/DL (ref 6–23)
BUN BLDV-MCNC: 26 MG/DL (ref 6–23)
BUN BLDV-MCNC: 27 MG/DL (ref 6–23)
BUN BLDV-MCNC: 28 MG/DL (ref 6–23)
BUN BLDV-MCNC: 28 MG/DL (ref 6–23)
BUN BLDV-MCNC: 38 MG/DL (ref 6–23)
BUN BLDV-MCNC: 48 MG/DL (ref 6–23)
BUN BLDV-MCNC: 49 MG/DL (ref 6–23)
BUN BLDV-MCNC: 50 MG/DL (ref 6–23)
BUN BLDV-MCNC: 55 MG/DL (ref 6–23)
BUN BLDV-MCNC: 56 MG/DL (ref 6–23)
BUN BLDV-MCNC: 57 MG/DL (ref 6–23)
BUN BLDV-MCNC: 58 MG/DL (ref 6–23)
CALCIUM SERPL-MCNC: 8.3 MG/DL (ref 8.3–10.6)
CALCIUM SERPL-MCNC: 8.4 MG/DL (ref 8.3–10.6)
CALCIUM SERPL-MCNC: 8.6 MG/DL (ref 8.3–10.6)
CALCIUM SERPL-MCNC: 8.7 MG/DL (ref 8.3–10.6)
CALCIUM SERPL-MCNC: 8.8 MG/DL (ref 8.3–10.6)
CALCIUM SERPL-MCNC: 8.9 MG/DL (ref 8.3–10.6)
CALCIUM SERPL-MCNC: 9 MG/DL (ref 8.3–10.6)
CALCIUM SERPL-MCNC: 9.5 MG/DL (ref 8.3–10.6)
CALCIUM SERPL-MCNC: 9.7 MG/DL (ref 8.3–10.6)
CHLORIDE BLD-SCNC: 100 MMOL/L (ref 99–110)
CHLORIDE BLD-SCNC: 100 MMOL/L (ref 99–110)
CHLORIDE BLD-SCNC: 102 MMOL/L (ref 99–110)
CHLORIDE BLD-SCNC: 102 MMOL/L (ref 99–110)
CHLORIDE BLD-SCNC: 104 MMOL/L (ref 99–110)
CHLORIDE BLD-SCNC: 90 MMOL/L (ref 99–110)
CHLORIDE BLD-SCNC: 91 MMOL/L (ref 99–110)
CHLORIDE BLD-SCNC: 92 MMOL/L (ref 99–110)
CHLORIDE BLD-SCNC: 95 MMOL/L (ref 99–110)
CHLORIDE BLD-SCNC: 95 MMOL/L (ref 99–110)
CHLORIDE BLD-SCNC: 97 MMOL/L (ref 99–110)
CHLORIDE BLD-SCNC: 98 MMOL/L (ref 99–110)
CHLORIDE BLD-SCNC: 99 MMOL/L (ref 99–110)
CO2 CONTENT: 28.5 MMOL/L (ref 19–24)
CO2: 20 MMOL/L (ref 21–32)
CO2: 21 MMOL/L (ref 21–32)
CO2: 22 MMOL/L (ref 21–32)
CO2: 22 MMOL/L (ref 21–32)
CO2: 23 MMOL/L (ref 21–32)
CO2: 23 MMOL/L (ref 21–32)
CO2: 24 MMOL/L (ref 21–32)
CO2: 25 MMOL/L (ref 21–32)
CO2: 25 MMOL/L (ref 21–32)
CO2: 26 MMOL/L (ref 21–32)
CO2: 27 MMOL/L (ref 21–32)
CO2: 27 MMOL/L (ref 21–32)
CO2: 28 MMOL/L (ref 21–32)
COMMENT: ABNORMAL
CREAT SERPL-MCNC: 1.9 MG/DL (ref 0.9–1.3)
CREAT SERPL-MCNC: 2.1 MG/DL (ref 0.9–1.3)
CREAT SERPL-MCNC: 2.1 MG/DL (ref 0.9–1.3)
CREAT SERPL-MCNC: 2.2 MG/DL (ref 0.9–1.3)
CREAT SERPL-MCNC: 2.3 MG/DL (ref 0.9–1.3)
CREAT SERPL-MCNC: 2.4 MG/DL (ref 0.9–1.3)
CREAT SERPL-MCNC: 2.5 MG/DL (ref 0.9–1.3)
CREAT SERPL-MCNC: 3.2 MG/DL (ref 0.9–1.3)
CREAT SERPL-MCNC: 3.2 MG/DL (ref 0.9–1.3)
CREAT SERPL-MCNC: 3.9 MG/DL (ref 0.9–1.3)
CREAT SERPL-MCNC: 4 MG/DL (ref 0.9–1.3)
CREAT SERPL-MCNC: 4.7 MG/DL (ref 0.9–1.3)
CULTURE: NORMAL
D DIMER: 1649 NG/ML(DDU)
D DIMER: 810 NG/ML(DDU)
DIFFERENTIAL TYPE: ABNORMAL
DLCO %PRED: 62 %
DLCO PRED: NORMAL
DLCO/VA %PRED: NORMAL
DLCO/VA PRED: NORMAL
DLCO/VA: NORMAL
DLCO: NORMAL
EKG ATRIAL RATE: 116 BPM
EKG ATRIAL RATE: 119 BPM
EKG ATRIAL RATE: 133 BPM
EKG ATRIAL RATE: 138 BPM
EKG ATRIAL RATE: 166 BPM
EKG ATRIAL RATE: 286 BPM
EKG ATRIAL RATE: 60 BPM
EKG ATRIAL RATE: 60 BPM
EKG ATRIAL RATE: 74 BPM
EKG ATRIAL RATE: 78 BPM
EKG ATRIAL RATE: 80 BPM
EKG ATRIAL RATE: 86 BPM
EKG ATRIAL RATE: 86 BPM
EKG DIAGNOSIS: NORMAL
EKG P AXIS: 22 DEGREES
EKG P AXIS: 44 DEGREES
EKG P AXIS: 46 DEGREES
EKG P AXIS: 50 DEGREES
EKG P AXIS: 50 DEGREES
EKG P-R INTERVAL: 178 MS
EKG P-R INTERVAL: 182 MS
EKG P-R INTERVAL: 186 MS
EKG P-R INTERVAL: 190 MS
EKG P-R INTERVAL: 190 MS
EKG P-R INTERVAL: 200 MS
EKG P-R INTERVAL: 252 MS
EKG Q-T INTERVAL: 302 MS
EKG Q-T INTERVAL: 330 MS
EKG Q-T INTERVAL: 334 MS
EKG Q-T INTERVAL: 346 MS
EKG Q-T INTERVAL: 348 MS
EKG Q-T INTERVAL: 356 MS
EKG Q-T INTERVAL: 370 MS
EKG Q-T INTERVAL: 386 MS
EKG Q-T INTERVAL: 392 MS
EKG Q-T INTERVAL: 420 MS
EKG Q-T INTERVAL: 446 MS
EKG Q-T INTERVAL: 454 MS
EKG Q-T INTERVAL: 538 MS
EKG QRS DURATION: 100 MS
EKG QRS DURATION: 100 MS
EKG QRS DURATION: 102 MS
EKG QRS DURATION: 104 MS
EKG QRS DURATION: 106 MS
EKG QRS DURATION: 92 MS
EKG QRS DURATION: 96 MS
EKG QRS DURATION: 98 MS
EKG QRS DURATION: 98 MS
EKG QTC CALCULATION (BAZETT): 417 MS
EKG QTC CALCULATION (BAZETT): 454 MS
EKG QTC CALCULATION (BAZETT): 461 MS
EKG QTC CALCULATION (BAZETT): 462 MS
EKG QTC CALCULATION (BAZETT): 464 MS
EKG QTC CALCULATION (BAZETT): 469 MS
EKG QTC CALCULATION (BAZETT): 478 MS
EKG QTC CALCULATION (BAZETT): 482 MS
EKG QTC CALCULATION (BAZETT): 487 MS
EKG QTC CALCULATION (BAZETT): 494 MS
EKG QTC CALCULATION (BAZETT): 514 MS
EKG QTC CALCULATION (BAZETT): 538 MS
EKG QTC CALCULATION (BAZETT): 544 MS
EKG R AXIS: -6 DEGREES
EKG R AXIS: 104 DEGREES
EKG R AXIS: 64 DEGREES
EKG R AXIS: 68 DEGREES
EKG R AXIS: 71 DEGREES
EKG R AXIS: 72 DEGREES
EKG R AXIS: 73 DEGREES
EKG R AXIS: 78 DEGREES
EKG R AXIS: 78 DEGREES
EKG R AXIS: 80 DEGREES
EKG R AXIS: 85 DEGREES
EKG R AXIS: 85 DEGREES
EKG R AXIS: 87 DEGREES
EKG T AXIS: -1 DEGREES
EKG T AXIS: -18 DEGREES
EKG T AXIS: -30 DEGREES
EKG T AXIS: -8 DEGREES
EKG T AXIS: -9 DEGREES
EKG T AXIS: 11 DEGREES
EKG T AXIS: 13 DEGREES
EKG T AXIS: 32 DEGREES
EKG T AXIS: 38 DEGREES
EKG T AXIS: 40 DEGREES
EKG T AXIS: 55 DEGREES
EKG T AXIS: 61 DEGREES
EKG T AXIS: 67 DEGREES
EKG VENTRICULAR RATE: 115 BPM
EKG VENTRICULAR RATE: 116 BPM
EKG VENTRICULAR RATE: 116 BPM
EKG VENTRICULAR RATE: 117 BPM
EKG VENTRICULAR RATE: 118 BPM
EKG VENTRICULAR RATE: 118 BPM
EKG VENTRICULAR RATE: 130 BPM
EKG VENTRICULAR RATE: 60 BPM
EKG VENTRICULAR RATE: 60 BPM
EKG VENTRICULAR RATE: 78 BPM
EKG VENTRICULAR RATE: 80 BPM
EKG VENTRICULAR RATE: 86 BPM
EKG VENTRICULAR RATE: 86 BPM
EOSINOPHILS ABSOLUTE: 0 K/CU MM
EOSINOPHILS ABSOLUTE: 0.1 K/CU MM
EOSINOPHILS ABSOLUTE: 0.1 K/CU MM
EOSINOPHILS ABSOLUTE: 0.4 K/CU MM
EOSINOPHILS ABSOLUTE: 0.4 K/CU MM
EOSINOPHILS RELATIVE PERCENT: 0 % (ref 0–3)
EOSINOPHILS RELATIVE PERCENT: 0.1 % (ref 0–3)
EOSINOPHILS RELATIVE PERCENT: 0.7 % (ref 0–3)
EOSINOPHILS RELATIVE PERCENT: 1.1 % (ref 0–3)
EOSINOPHILS RELATIVE PERCENT: 2.5 % (ref 0–3)
EOSINOPHILS RELATIVE PERCENT: 3.7 % (ref 0–3)
ERYTHROCYTE SEDIMENTATION RATE: 36 MM/HR (ref 0–20)
ERYTHROCYTE SEDIMENTATION RATE: 37 MM/HR (ref 0–20)
ESTIMATED AVERAGE GLUCOSE: 157 MG/DL
EXPIRATORY TIME-POST: NORMAL
EXPIRATORY TIME: NORMAL
FEF 25-75% %CHNG: NORMAL
FEF 25-75% %PRED-POST: NORMAL
FEF 25-75% %PRED-PRE: NORMAL
FEF 25-75% PRED: NORMAL
FEF 25-75%-POST: NORMAL
FEF 25-75%-PRE: NORMAL
FEV1 %PRED-POST: 70 %
FEV1 %PRED-PRE: 68 %
FEV1 PRED: NORMAL
FEV1-POST: NORMAL
FEV1-PRE: NORMAL
FEV1/FVC %PRED-POST: NORMAL
FEV1/FVC %PRED-PRE: NORMAL
FEV1/FVC PRED: NORMAL
FEV1/FVC-POST: 84 %
FEV1/FVC-PRE: 89 %
FVC %PRED-POST: NORMAL
FVC %PRED-PRE: NORMAL
FVC PRED: 76 L
FVC-POST: 83 L
FVC-PRE: NORMAL
GAW %PRED: NORMAL
GAW PRED: NORMAL
GAW: NORMAL
GFR AFRICAN AMERICAN: 15 ML/MIN/1.73M2
GFR AFRICAN AMERICAN: 18 ML/MIN/1.73M2
GFR AFRICAN AMERICAN: 19 ML/MIN/1.73M2
GFR AFRICAN AMERICAN: 23 ML/MIN/1.73M2
GFR AFRICAN AMERICAN: 23 ML/MIN/1.73M2
GFR AFRICAN AMERICAN: 31 ML/MIN/1.73M2
GFR AFRICAN AMERICAN: 33 ML/MIN/1.73M2
GFR AFRICAN AMERICAN: 34 ML/MIN/1.73M2
GFR AFRICAN AMERICAN: 36 ML/MIN/1.73M2
GFR AFRICAN AMERICAN: 38 ML/MIN/1.73M2
GFR AFRICAN AMERICAN: 38 ML/MIN/1.73M2
GFR AFRICAN AMERICAN: 43 ML/MIN/1.73M2
GFR NON-AFRICAN AMERICAN: 12 ML/MIN/1.73M2
GFR NON-AFRICAN AMERICAN: 15 ML/MIN/1.73M2
GFR NON-AFRICAN AMERICAN: 15 ML/MIN/1.73M2
GFR NON-AFRICAN AMERICAN: 19 ML/MIN/1.73M2
GFR NON-AFRICAN AMERICAN: 19 ML/MIN/1.73M2
GFR NON-AFRICAN AMERICAN: 26 ML/MIN/1.73M2
GFR NON-AFRICAN AMERICAN: 27 ML/MIN/1.73M2
GFR NON-AFRICAN AMERICAN: 28 ML/MIN/1.73M2
GFR NON-AFRICAN AMERICAN: 30 ML/MIN/1.73M2
GFR NON-AFRICAN AMERICAN: 32 ML/MIN/1.73M2
GFR NON-AFRICAN AMERICAN: 32 ML/MIN/1.73M2
GFR NON-AFRICAN AMERICAN: 35 ML/MIN/1.73M2
GLUCOSE BLD-MCNC: 101 MG/DL (ref 70–99)
GLUCOSE BLD-MCNC: 114 MG/DL (ref 70–99)
GLUCOSE BLD-MCNC: 114 MG/DL (ref 70–99)
GLUCOSE BLD-MCNC: 115 MG/DL (ref 70–99)
GLUCOSE BLD-MCNC: 122 MG/DL (ref 70–99)
GLUCOSE BLD-MCNC: 122 MG/DL (ref 70–99)
GLUCOSE BLD-MCNC: 130 MG/DL (ref 70–99)
GLUCOSE BLD-MCNC: 131 MG/DL (ref 70–99)
GLUCOSE BLD-MCNC: 132 MG/DL (ref 70–99)
GLUCOSE BLD-MCNC: 134 MG/DL (ref 70–99)
GLUCOSE BLD-MCNC: 134 MG/DL (ref 70–99)
GLUCOSE BLD-MCNC: 135 MG/DL (ref 70–99)
GLUCOSE BLD-MCNC: 136 MG/DL (ref 70–99)
GLUCOSE BLD-MCNC: 136 MG/DL (ref 70–99)
GLUCOSE BLD-MCNC: 138 MG/DL (ref 70–99)
GLUCOSE BLD-MCNC: 140 MG/DL (ref 70–99)
GLUCOSE BLD-MCNC: 140 MG/DL (ref 70–99)
GLUCOSE BLD-MCNC: 143 MG/DL (ref 70–99)
GLUCOSE BLD-MCNC: 144 MG/DL (ref 70–99)
GLUCOSE BLD-MCNC: 146 MG/DL (ref 70–99)
GLUCOSE BLD-MCNC: 150 MG/DL (ref 70–99)
GLUCOSE BLD-MCNC: 151 MG/DL (ref 70–99)
GLUCOSE BLD-MCNC: 153 MG/DL (ref 70–99)
GLUCOSE BLD-MCNC: 154 MG/DL (ref 70–99)
GLUCOSE BLD-MCNC: 163 MG/DL (ref 70–99)
GLUCOSE BLD-MCNC: 175 MG/DL (ref 70–99)
GLUCOSE BLD-MCNC: 185 MG/DL (ref 70–99)
GLUCOSE BLD-MCNC: 189 MG/DL (ref 70–99)
GLUCOSE BLD-MCNC: 191 MG/DL (ref 70–99)
GLUCOSE BLD-MCNC: 194 MG/DL (ref 70–99)
GLUCOSE BLD-MCNC: 194 MG/DL (ref 70–99)
GLUCOSE BLD-MCNC: 196 MG/DL (ref 70–99)
GLUCOSE BLD-MCNC: 197 MG/DL (ref 70–99)
GLUCOSE BLD-MCNC: 199 MG/DL (ref 70–99)
GLUCOSE BLD-MCNC: 203 MG/DL (ref 70–99)
GLUCOSE BLD-MCNC: 209 MG/DL (ref 70–99)
GLUCOSE BLD-MCNC: 210 MG/DL (ref 70–99)
GLUCOSE BLD-MCNC: 215 MG/DL (ref 70–99)
GLUCOSE BLD-MCNC: 225 MG/DL (ref 70–99)
GLUCOSE BLD-MCNC: 226 MG/DL (ref 70–99)
GLUCOSE BLD-MCNC: 235 MG/DL (ref 70–99)
GLUCOSE BLD-MCNC: 242 MG/DL (ref 70–99)
GLUCOSE BLD-MCNC: 245 MG/DL (ref 70–99)
GLUCOSE BLD-MCNC: 250 MG/DL (ref 70–99)
GLUCOSE BLD-MCNC: 258 MG/DL (ref 70–99)
GLUCOSE BLD-MCNC: 267 MG/DL (ref 70–99)
GLUCOSE BLD-MCNC: 277 MG/DL (ref 70–99)
GLUCOSE BLD-MCNC: 279 MG/DL (ref 70–99)
GLUCOSE BLD-MCNC: 281 MG/DL (ref 70–99)
GLUCOSE BLD-MCNC: 282 MG/DL (ref 70–99)
GLUCOSE BLD-MCNC: 286 MG/DL (ref 70–99)
GLUCOSE BLD-MCNC: 287 MG/DL (ref 70–99)
GLUCOSE BLD-MCNC: 294 MG/DL (ref 70–99)
GLUCOSE BLD-MCNC: 318 MG/DL (ref 70–99)
GLUCOSE BLD-MCNC: 348 MG/DL (ref 70–99)
GLUCOSE BLD-MCNC: 47 MG/DL (ref 70–99)
GLUCOSE BLD-MCNC: 91 MG/DL (ref 70–99)
GLUCOSE BLD-MCNC: 92 MG/DL (ref 70–99)
GLUCOSE BLD-MCNC: 96 MG/DL (ref 70–99)
GLUCOSE BLD-MCNC: 97 MG/DL (ref 70–99)
HBA1C MFR BLD: 7.1 % (ref 4.2–6.3)
HCO3 ARTERIAL: 27.4 MMOL/L (ref 18–23)
HCO3 VENOUS: 27.8 MMOL/L (ref 19–25)
HCT VFR BLD CALC: 28.4 % (ref 42–52)
HCT VFR BLD CALC: 31.1 % (ref 42–52)
HCT VFR BLD CALC: 32 % (ref 42–52)
HCT VFR BLD CALC: 32.8 % (ref 42–52)
HCT VFR BLD CALC: 35.5 % (ref 42–52)
HCT VFR BLD CALC: 35.5 % (ref 42–52)
HCT VFR BLD CALC: 35.8 % (ref 42–52)
HCT VFR BLD CALC: 36.5 % (ref 42–52)
HCT VFR BLD CALC: 38.4 % (ref 42–52)
HCT VFR BLD CALC: 38.5 % (ref 42–52)
HCT VFR BLD CALC: 40.1 % (ref 42–52)
HCT VFR BLD CALC: 44.3 % (ref 42–52)
HEMOGLOBIN: 10 GM/DL (ref 13.5–18)
HEMOGLOBIN: 10.7 GM/DL (ref 13.5–18)
HEMOGLOBIN: 11 GM/DL (ref 13.5–18)
HEMOGLOBIN: 11.4 GM/DL (ref 13.5–18)
HEMOGLOBIN: 11.5 GM/DL (ref 13.5–18)
HEMOGLOBIN: 11.6 GM/DL (ref 13.5–18)
HEMOGLOBIN: 11.8 GM/DL (ref 13.5–18)
HEMOGLOBIN: 11.8 GM/DL (ref 13.5–18)
HEMOGLOBIN: 12.3 GM/DL (ref 13.5–18)
HEMOGLOBIN: 12.5 GM/DL (ref 13.5–18)
HEMOGLOBIN: 14.5 GM/DL (ref 13.5–18)
HEMOGLOBIN: 8.9 GM/DL (ref 13.5–18)
HIGH SENSITIVE C-REACTIVE PROTEIN: 234.3 MG/L
HIGH SENSITIVE C-REACTIVE PROTEIN: 9.5 MG/L
IC %PRED: NORMAL
IC PRED: NORMAL
IC: NORMAL
IMMATURE NEUTROPHIL %: 0.3 % (ref 0–0.43)
IMMATURE NEUTROPHIL %: 0.3 % (ref 0–0.43)
IMMATURE NEUTROPHIL %: 0.5 % (ref 0–0.43)
IMMATURE NEUTROPHIL %: 0.7 % (ref 0–0.43)
IMMATURE NEUTROPHIL %: 0.7 % (ref 0–0.43)
IMMATURE NEUTROPHIL %: 0.8 % (ref 0–0.43)
IMMATURE NEUTROPHIL %: 1.1 % (ref 0–0.43)
IMMATURE NEUTROPHIL %: 1.2 % (ref 0–0.43)
IMMATURE NEUTROPHIL %: 1.5 % (ref 0–0.43)
INR BLD: 1.02 INDEX
INR BLD: 1.8 INDEX
INR BLD: 2.81 INDEX
LACTATE: 0.8 MMOL/L (ref 0.4–2)
LACTATE: 2.9 MMOL/L (ref 0.4–2)
LACTATE: 4.3 MMOL/L (ref 0.4–2)
LIPASE: 18 IU/L (ref 13–60)
LIPASE: 8 IU/L (ref 13–60)
LV EF: 28 %
LVEF MODALITY: NORMAL
LYMPHOCYTES ABSOLUTE: 0.3 K/CU MM
LYMPHOCYTES ABSOLUTE: 0.5 K/CU MM
LYMPHOCYTES ABSOLUTE: 0.5 K/CU MM
LYMPHOCYTES ABSOLUTE: 0.6 K/CU MM
LYMPHOCYTES ABSOLUTE: 0.9 K/CU MM
LYMPHOCYTES ABSOLUTE: 1.5 K/CU MM
LYMPHOCYTES ABSOLUTE: 1.7 K/CU MM
LYMPHOCYTES ABSOLUTE: 1.8 K/CU MM
LYMPHOCYTES ABSOLUTE: 2.2 K/CU MM
LYMPHOCYTES ABSOLUTE: 2.4 K/CU MM
LYMPHOCYTES RELATIVE PERCENT: 12.3 % (ref 24–44)
LYMPHOCYTES RELATIVE PERCENT: 14.6 % (ref 24–44)
LYMPHOCYTES RELATIVE PERCENT: 15.5 % (ref 24–44)
LYMPHOCYTES RELATIVE PERCENT: 17.3 % (ref 24–44)
LYMPHOCYTES RELATIVE PERCENT: 2.6 % (ref 24–44)
LYMPHOCYTES RELATIVE PERCENT: 24 % (ref 24–44)
LYMPHOCYTES RELATIVE PERCENT: 3.9 % (ref 24–44)
LYMPHOCYTES RELATIVE PERCENT: 4 % (ref 24–44)
LYMPHOCYTES RELATIVE PERCENT: 5 % (ref 24–44)
LYMPHOCYTES RELATIVE PERCENT: 5.2 % (ref 24–44)
Lab: NORMAL
MAGNESIUM: 2 MG/DL (ref 1.8–2.4)
MAGNESIUM: 2.1 MG/DL (ref 1.8–2.4)
MAGNESIUM: 2.1 MG/DL (ref 1.8–2.4)
MAGNESIUM: 2.2 MG/DL (ref 1.8–2.4)
MCH RBC QN AUTO: 28 PG (ref 27–31)
MCH RBC QN AUTO: 28.8 PG (ref 27–31)
MCH RBC QN AUTO: 29.1 PG (ref 27–31)
MCH RBC QN AUTO: 29.1 PG (ref 27–31)
MCH RBC QN AUTO: 29.2 PG (ref 27–31)
MCH RBC QN AUTO: 29.2 PG (ref 27–31)
MCH RBC QN AUTO: 29.3 PG (ref 27–31)
MCH RBC QN AUTO: 29.3 PG (ref 27–31)
MCH RBC QN AUTO: 29.4 PG (ref 27–31)
MCH RBC QN AUTO: 29.5 PG (ref 27–31)
MCH RBC QN AUTO: 29.7 PG (ref 27–31)
MCHC RBC AUTO-ENTMCNC: 29.9 % (ref 32–36)
MCHC RBC AUTO-ENTMCNC: 31.2 % (ref 32–36)
MCHC RBC AUTO-ENTMCNC: 31.3 % (ref 32–36)
MCHC RBC AUTO-ENTMCNC: 31.9 % (ref 32–36)
MCHC RBC AUTO-ENTMCNC: 32.1 % (ref 32–36)
MCHC RBC AUTO-ENTMCNC: 32.2 % (ref 32–36)
MCHC RBC AUTO-ENTMCNC: 32.3 % (ref 32–36)
MCHC RBC AUTO-ENTMCNC: 32.4 % (ref 32–36)
MCHC RBC AUTO-ENTMCNC: 32.6 % (ref 32–36)
MCHC RBC AUTO-ENTMCNC: 32.7 % (ref 32–36)
MCHC RBC AUTO-ENTMCNC: 33.2 % (ref 32–36)
MCV RBC AUTO: 88.8 FL (ref 78–100)
MCV RBC AUTO: 89.1 FL (ref 78–100)
MCV RBC AUTO: 89.3 FL (ref 78–100)
MCV RBC AUTO: 89.9 FL (ref 78–100)
MCV RBC AUTO: 90.8 FL (ref 78–100)
MCV RBC AUTO: 91 FL (ref 78–100)
MCV RBC AUTO: 91.1 FL (ref 78–100)
MCV RBC AUTO: 91.2 FL (ref 78–100)
MCV RBC AUTO: 91.7 FL (ref 78–100)
MCV RBC AUTO: 93.5 FL (ref 78–100)
MCV RBC AUTO: 96 FL (ref 78–100)
MEP: NORMAL
MIP: NORMAL
MONOCYTES ABSOLUTE: 0.3 K/CU MM
MONOCYTES ABSOLUTE: 0.4 K/CU MM
MONOCYTES ABSOLUTE: 0.4 K/CU MM
MONOCYTES ABSOLUTE: 0.5 K/CU MM
MONOCYTES ABSOLUTE: 0.5 K/CU MM
MONOCYTES ABSOLUTE: 0.7 K/CU MM
MONOCYTES ABSOLUTE: 0.9 K/CU MM
MONOCYTES ABSOLUTE: 1 K/CU MM
MONOCYTES RELATIVE PERCENT: 2.5 % (ref 0–4)
MONOCYTES RELATIVE PERCENT: 2.7 % (ref 0–4)
MONOCYTES RELATIVE PERCENT: 3 % (ref 0–4)
MONOCYTES RELATIVE PERCENT: 3.4 % (ref 0–4)
MONOCYTES RELATIVE PERCENT: 5 % (ref 0–4)
MONOCYTES RELATIVE PERCENT: 5.8 % (ref 0–4)
MONOCYTES RELATIVE PERCENT: 6.5 % (ref 0–4)
MONOCYTES RELATIVE PERCENT: 6.7 % (ref 0–4)
MONOCYTES RELATIVE PERCENT: 9 % (ref 0–4)
MONOCYTES RELATIVE PERCENT: 9.1 % (ref 0–4)
MVV %PRED-PRE: NORMAL
MVV PRED: NORMAL
MVV-PRE: NORMAL
NUCLEATED RBC %: 0 %
O2 SAT, VEN: 53.5 % (ref 50–70)
O2 SATURATION: 99.9 % (ref 96–97)
PCO2 ARTERIAL: 35.2 MMHG (ref 32–45)
PCO2, VEN: 41 MMHG (ref 38–52)
PDW BLD-RTO: 13.2 % (ref 11.7–14.9)
PDW BLD-RTO: 13.3 % (ref 11.7–14.9)
PDW BLD-RTO: 13.3 % (ref 11.7–14.9)
PDW BLD-RTO: 13.6 % (ref 11.7–14.9)
PDW BLD-RTO: 13.7 % (ref 11.7–14.9)
PDW BLD-RTO: 14.1 % (ref 11.7–14.9)
PDW BLD-RTO: 14.3 % (ref 11.7–14.9)
PDW BLD-RTO: 14.5 % (ref 11.7–14.9)
PDW BLD-RTO: 14.9 % (ref 11.7–14.9)
PEF %PRED-POST: NORMAL
PEF %PRED-PRE: NORMAL
PEF PRED: NORMAL
PEF%CHNG: NORMAL
PEF-POST: NORMAL
PEF-PRE: NORMAL
PH BLOOD: 7.5 (ref 7.34–7.45)
PH VENOUS: 7.44 (ref 7.32–7.42)
PHOSPHORUS: 4.3 MG/DL (ref 2.5–4.9)
PHOSPHORUS: 4.4 MG/DL (ref 2.5–4.9)
PLATELET # BLD: 209 K/CU MM (ref 140–440)
PLATELET # BLD: 221 K/CU MM (ref 140–440)
PLATELET # BLD: 221 K/CU MM (ref 140–440)
PLATELET # BLD: 225 K/CU MM (ref 140–440)
PLATELET # BLD: 227 K/CU MM (ref 140–440)
PLATELET # BLD: 250 K/CU MM (ref 140–440)
PLATELET # BLD: 272 K/CU MM (ref 140–440)
PLATELET # BLD: 272 K/CU MM (ref 140–440)
PLATELET # BLD: 283 K/CU MM (ref 140–440)
PLATELET # BLD: 286 K/CU MM (ref 140–440)
PLATELET # BLD: 469 K/CU MM (ref 140–440)
PMV BLD AUTO: 10.1 FL (ref 7.5–11.1)
PMV BLD AUTO: 10.2 FL (ref 7.5–11.1)
PMV BLD AUTO: 10.2 FL (ref 7.5–11.1)
PMV BLD AUTO: 10.3 FL (ref 7.5–11.1)
PMV BLD AUTO: 10.3 FL (ref 7.5–11.1)
PMV BLD AUTO: 10.6 FL (ref 7.5–11.1)
PMV BLD AUTO: 10.7 FL (ref 7.5–11.1)
PMV BLD AUTO: 10.9 FL (ref 7.5–11.1)
PMV BLD AUTO: 10.9 FL (ref 7.5–11.1)
PO2 ARTERIAL: 249.3 MMHG (ref 75–100)
PO2, VEN: 31 MMHG (ref 28–48)
POC CALCIUM: 1.17 MMOL/L (ref 1.12–1.32)
POTASSIUM SERPL-SCNC: 4 MMOL/L (ref 3.5–5.1)
POTASSIUM SERPL-SCNC: 4.1 MMOL/L (ref 3.5–5.1)
POTASSIUM SERPL-SCNC: 4.1 MMOL/L (ref 3.5–5.1)
POTASSIUM SERPL-SCNC: 4.3 MMOL/L (ref 3.5–5.1)
POTASSIUM SERPL-SCNC: 4.4 MMOL/L (ref 3.5–4.5)
POTASSIUM SERPL-SCNC: 4.4 MMOL/L (ref 3.5–5.1)
POTASSIUM SERPL-SCNC: 4.7 MMOL/L (ref 3.5–5.1)
POTASSIUM SERPL-SCNC: 4.8 MMOL/L (ref 3.5–5.1)
POTASSIUM SERPL-SCNC: 4.9 MMOL/L (ref 3.5–5.1)
POTASSIUM SERPL-SCNC: 5 MMOL/L (ref 3.5–5.1)
POTASSIUM SERPL-SCNC: 5.2 MMOL/L (ref 3.5–5.1)
POTASSIUM SERPL-SCNC: 5.3 MMOL/L (ref 3.5–5.1)
POTASSIUM SERPL-SCNC: 5.4 MMOL/L (ref 3.5–5.1)
POTASSIUM SERPL-SCNC: 5.5 MMOL/L (ref 3.5–5.1)
PRO-BNP: 4217 PG/ML
PRO-BNP: 5636 PG/ML
PRO-BNP: 9169 PG/ML
PRO-BNP: 9422 PG/ML
PROCALCITONIN: 1.45
PROTHROMBIN TIME: 13.2 SECONDS (ref 11.7–14.5)
PROTHROMBIN TIME: 23.4 SECONDS (ref 11.7–14.5)
PROTHROMBIN TIME: 36.6 SECONDS (ref 11.7–14.5)
RAW %PRED: NORMAL
RAW PRED: NORMAL
RAW: NORMAL
RBC # BLD: 3.18 M/CU MM (ref 4.6–6.2)
RBC # BLD: 3.41 M/CU MM (ref 4.6–6.2)
RBC # BLD: 3.6 M/CU MM (ref 4.6–6.2)
RBC # BLD: 3.9 M/CU MM (ref 4.6–6.2)
RBC # BLD: 3.98 M/CU MM (ref 4.6–6.2)
RBC # BLD: 4 M/CU MM (ref 4.6–6.2)
RBC # BLD: 4 M/CU MM (ref 4.6–6.2)
RBC # BLD: 4.02 M/CU MM (ref 4.6–6.2)
RBC # BLD: 4.2 M/CU MM (ref 4.6–6.2)
RBC # BLD: 4.29 M/CU MM (ref 4.6–6.2)
RBC # BLD: 4.97 M/CU MM (ref 4.6–6.2)
RV %PRED: NORMAL
RV PRED: NORMAL
RV: NORMAL
SARS-COV-2, NAAT: NOT DETECTED
SARS-COV-2: NOT DETECTED
SEGMENTED NEUTROPHILS ABSOLUTE COUNT: 11.2 K/CU MM
SEGMENTED NEUTROPHILS ABSOLUTE COUNT: 11.5 K/CU MM
SEGMENTED NEUTROPHILS ABSOLUTE COUNT: 13.6 K/CU MM
SEGMENTED NEUTROPHILS ABSOLUTE COUNT: 15.7 K/CU MM
SEGMENTED NEUTROPHILS ABSOLUTE COUNT: 18.5 K/CU MM
SEGMENTED NEUTROPHILS ABSOLUTE COUNT: 6.5 K/CU MM
SEGMENTED NEUTROPHILS ABSOLUTE COUNT: 7 K/CU MM
SEGMENTED NEUTROPHILS ABSOLUTE COUNT: 7.2 K/CU MM
SEGMENTED NEUTROPHILS ABSOLUTE COUNT: 8 K/CU MM
SEGMENTED NEUTROPHILS ABSOLUTE COUNT: 8.6 K/CU MM
SEGMENTED NEUTROPHILS RELATIVE PERCENT: 64.6 % (ref 36–66)
SEGMENTED NEUTROPHILS RELATIVE PERCENT: 72.4 % (ref 36–66)
SEGMENTED NEUTROPHILS RELATIVE PERCENT: 73.9 % (ref 36–66)
SEGMENTED NEUTROPHILS RELATIVE PERCENT: 76.1 % (ref 36–66)
SEGMENTED NEUTROPHILS RELATIVE PERCENT: 79.3 % (ref 36–66)
SEGMENTED NEUTROPHILS RELATIVE PERCENT: 90.3 % (ref 36–66)
SEGMENTED NEUTROPHILS RELATIVE PERCENT: 91 % (ref 36–66)
SEGMENTED NEUTROPHILS RELATIVE PERCENT: 91.3 % (ref 36–66)
SEGMENTED NEUTROPHILS RELATIVE PERCENT: 92.3 % (ref 36–66)
SEGMENTED NEUTROPHILS RELATIVE PERCENT: 93.7 % (ref 36–66)
SODIUM BLD-SCNC: 126 MMOL/L (ref 135–145)
SODIUM BLD-SCNC: 126 MMOL/L (ref 135–145)
SODIUM BLD-SCNC: 128 MMOL/L (ref 135–145)
SODIUM BLD-SCNC: 131 MMOL/L (ref 135–145)
SODIUM BLD-SCNC: 133 MMOL/L (ref 135–145)
SODIUM BLD-SCNC: 134 MMOL/L (ref 135–145)
SODIUM BLD-SCNC: 135 MMOL/L (ref 135–145)
SODIUM BLD-SCNC: 137 MMOL/L (ref 135–145)
SODIUM BLD-SCNC: 140 MMOL/L (ref 135–145)
SODIUM BLD-SCNC: 141 MMOL/L (ref 138–146)
SODIUM BLD-SCNC: 142 MMOL/L (ref 135–145)
SODIUM BLD-SCNC: 144 MMOL/L (ref 135–145)
SOURCE, BLOOD GAS: ABNORMAL
SOURCE: NORMAL
SPECIMEN: NORMAL
SVC %PRED: NORMAL
SVC PRED: NORMAL
SVC: NORMAL
T4 FREE: 1.44 NG/DL (ref 0.9–1.8)
TLC %PRED: 84 %
TLC PRED: NORMAL
TLC: NORMAL
TOTAL IMMATURE NEUTOROPHIL: 0.03 K/CU MM
TOTAL IMMATURE NEUTOROPHIL: 0.03 K/CU MM
TOTAL IMMATURE NEUTOROPHIL: 0.08 K/CU MM
TOTAL IMMATURE NEUTOROPHIL: 0.08 K/CU MM
TOTAL IMMATURE NEUTOROPHIL: 0.1 K/CU MM
TOTAL IMMATURE NEUTOROPHIL: 0.11 K/CU MM
TOTAL IMMATURE NEUTOROPHIL: 0.12 K/CU MM
TOTAL IMMATURE NEUTOROPHIL: 0.21 K/CU MM
TOTAL IMMATURE NEUTOROPHIL: 0.22 K/CU MM
TOTAL NUCLEATED RBC: 0 K/CU MM
TOTAL PROTEIN: 5.5 GM/DL (ref 6.4–8.2)
TOTAL PROTEIN: 5.7 GM/DL (ref 6.4–8.2)
TOTAL PROTEIN: 5.8 GM/DL (ref 6.4–8.2)
TOTAL PROTEIN: 5.9 GM/DL (ref 6.4–8.2)
TOTAL PROTEIN: 6 GM/DL (ref 6.4–8.2)
TOTAL PROTEIN: 6 GM/DL (ref 6.4–8.2)
TOTAL PROTEIN: 6.9 GM/DL (ref 6.4–8.2)
TOTAL PROTEIN: 7.1 GM/DL (ref 6.4–8.2)
TROPONIN T: 0.02 NG/ML
TROPONIN T: 0.02 NG/ML
TROPONIN T: 0.03 NG/ML
TROPONIN T: 0.03 NG/ML
TROPONIN T: 0.1 NG/ML
TROPONIN T: 0.11 NG/ML
TROPONIN T: 0.14 NG/ML
TROPONIN T: <0.01 NG/ML
TROPONIN T: <0.01 NG/ML
TSH HIGH SENSITIVITY: 6.05 UIU/ML (ref 0.27–4.2)
VA %PRED: NORMAL
VA PRED: NORMAL
VA: NORMAL
VTG %PRED: NORMAL
VTG PRED: NORMAL
VTG: NORMAL
WBC # BLD: 10 K/CU MM (ref 4–10.5)
WBC # BLD: 12.1 K/CU MM (ref 4–10.5)
WBC # BLD: 14.5 K/CU MM (ref 4–10.5)
WBC # BLD: 14.7 K/CU MM (ref 4–10.5)
WBC # BLD: 14.8 K/CU MM (ref 4–10.5)
WBC # BLD: 17.2 K/CU MM (ref 4–10.5)
WBC # BLD: 19.8 K/CU MM (ref 4–10.5)
WBC # BLD: 8.7 K/CU MM (ref 4–10.5)
WBC # BLD: 9.5 K/CU MM (ref 4–10.5)
WBC # BLD: 9.5 K/CU MM (ref 4–10.5)
WBC # BLD: 9.9 K/CU MM (ref 4–10.5)

## 2022-01-01 PROCEDURE — 2580000003 HC RX 258: Performed by: INTERNAL MEDICINE

## 2022-01-01 PROCEDURE — 6370000000 HC RX 637 (ALT 250 FOR IP): Performed by: INTERNAL MEDICINE

## 2022-01-01 PROCEDURE — 93296 REM INTERROG EVL PM/IDS: CPT | Performed by: INTERNAL MEDICINE

## 2022-01-01 PROCEDURE — 85025 COMPLETE CBC W/AUTO DIFF WBC: CPT

## 2022-01-01 PROCEDURE — 4A023N7 MEASUREMENT OF CARDIAC SAMPLING AND PRESSURE, LEFT HEART, PERCUTANEOUS APPROACH: ICD-10-PCS | Performed by: INTERNAL MEDICINE

## 2022-01-01 PROCEDURE — 2580000003 HC RX 258: Performed by: PHYSICIAN ASSISTANT

## 2022-01-01 PROCEDURE — 80053 COMPREHEN METABOLIC PANEL: CPT

## 2022-01-01 PROCEDURE — 84132 ASSAY OF SERUM POTASSIUM: CPT

## 2022-01-01 PROCEDURE — G8427 DOCREV CUR MEDS BY ELIG CLIN: HCPCS | Performed by: NURSE PRACTITIONER

## 2022-01-01 PROCEDURE — 82803 BLOOD GASES ANY COMBINATION: CPT

## 2022-01-01 PROCEDURE — 6370000000 HC RX 637 (ALT 250 FOR IP): Performed by: NURSE PRACTITIONER

## 2022-01-01 PROCEDURE — 83036 HEMOGLOBIN GLYCOSYLATED A1C: CPT

## 2022-01-01 PROCEDURE — 94640 AIRWAY INHALATION TREATMENT: CPT

## 2022-01-01 PROCEDURE — 83605 ASSAY OF LACTIC ACID: CPT

## 2022-01-01 PROCEDURE — 2580000003 HC RX 258: Performed by: FAMILY MEDICINE

## 2022-01-01 PROCEDURE — 83690 ASSAY OF LIPASE: CPT

## 2022-01-01 PROCEDURE — 6360000002 HC RX W HCPCS: Performed by: HOSPITALIST

## 2022-01-01 PROCEDURE — 93306 TTE W/DOPPLER COMPLETE: CPT

## 2022-01-01 PROCEDURE — 74176 CT ABD & PELVIS W/O CONTRAST: CPT

## 2022-01-01 PROCEDURE — 36415 COLL VENOUS BLD VENIPUNCTURE: CPT

## 2022-01-01 PROCEDURE — 94761 N-INVAS EAR/PLS OXIMETRY MLT: CPT

## 2022-01-01 PROCEDURE — APPNB60 APP NON BILLABLE TIME 46-60 MINS: Performed by: NURSE PRACTITIONER

## 2022-01-01 PROCEDURE — 2720000010 HC SURG SUPPLY STERILE

## 2022-01-01 PROCEDURE — 73562 X-RAY EXAM OF KNEE 3: CPT

## 2022-01-01 PROCEDURE — 2700000000 HC OXYGEN THERAPY PER DAY

## 2022-01-01 PROCEDURE — 92960 CARDIOVERSION ELECTRIC EXT: CPT

## 2022-01-01 PROCEDURE — 86141 C-REACTIVE PROTEIN HS: CPT

## 2022-01-01 PROCEDURE — 82962 GLUCOSE BLOOD TEST: CPT

## 2022-01-01 PROCEDURE — 99231 SBSQ HOSP IP/OBS SF/LOW 25: CPT | Performed by: INTERNAL MEDICINE

## 2022-01-01 PROCEDURE — 84484 ASSAY OF TROPONIN QUANT: CPT

## 2022-01-01 PROCEDURE — 94664 DEMO&/EVAL PT USE INHALER: CPT

## 2022-01-01 PROCEDURE — 6360000002 HC RX W HCPCS: Performed by: STUDENT IN AN ORGANIZED HEALTH CARE EDUCATION/TRAINING PROGRAM

## 2022-01-01 PROCEDURE — 99214 OFFICE O/P EST MOD 30 MIN: CPT | Performed by: INTERNAL MEDICINE

## 2022-01-01 PROCEDURE — 85610 PROTHROMBIN TIME: CPT

## 2022-01-01 PROCEDURE — 6360000002 HC RX W HCPCS: Performed by: NURSE PRACTITIONER

## 2022-01-01 PROCEDURE — C1751 CATH, INF, PER/CENT/MIDLINE: HCPCS

## 2022-01-01 PROCEDURE — 93005 ELECTROCARDIOGRAM TRACING: CPT | Performed by: STUDENT IN AN ORGANIZED HEALTH CARE EDUCATION/TRAINING PROGRAM

## 2022-01-01 PROCEDURE — 5A2204Z RESTORATION OF CARDIAC RHYTHM, SINGLE: ICD-10-PCS | Performed by: STUDENT IN AN ORGANIZED HEALTH CARE EDUCATION/TRAINING PROGRAM

## 2022-01-01 PROCEDURE — 2580000003 HC RX 258: Performed by: EMERGENCY MEDICINE

## 2022-01-01 PROCEDURE — 99233 SBSQ HOSP IP/OBS HIGH 50: CPT | Performed by: INTERNAL MEDICINE

## 2022-01-01 PROCEDURE — 99232 SBSQ HOSP IP/OBS MODERATE 35: CPT | Performed by: INTERNAL MEDICINE

## 2022-01-01 PROCEDURE — 93312 ECHO TRANSESOPHAGEAL: CPT | Performed by: INTERNAL MEDICINE

## 2022-01-01 PROCEDURE — 2500000003 HC RX 250 WO HCPCS: Performed by: HOSPITALIST

## 2022-01-01 PROCEDURE — 3017F COLORECTAL CA SCREEN DOC REV: CPT | Performed by: INTERNAL MEDICINE

## 2022-01-01 PROCEDURE — 3700000000 HC ANESTHESIA ATTENDED CARE

## 2022-01-01 PROCEDURE — 93325 DOPPLER ECHO COLOR FLOW MAPG: CPT | Performed by: INTERNAL MEDICINE

## 2022-01-01 PROCEDURE — 93010 ELECTROCARDIOGRAM REPORT: CPT | Performed by: INTERNAL MEDICINE

## 2022-01-01 PROCEDURE — C1732 CATH, EP, DIAG/ABL, 3D/VECT: HCPCS

## 2022-01-01 PROCEDURE — 87186 SC STD MICRODIL/AGAR DIL: CPT

## 2022-01-01 PROCEDURE — 99285 EMERGENCY DEPT VISIT HI MDM: CPT

## 2022-01-01 PROCEDURE — U0005 INFEC AGEN DETEC AMPLI PROBE: HCPCS

## 2022-01-01 PROCEDURE — 71250 CT THORAX DX C-: CPT

## 2022-01-01 PROCEDURE — 6370000000 HC RX 637 (ALT 250 FOR IP): Performed by: EMERGENCY MEDICINE

## 2022-01-01 PROCEDURE — 80048 BASIC METABOLIC PNL TOTAL CA: CPT

## 2022-01-01 PROCEDURE — 92960 CARDIOVERSION ELECTRIC EXT: CPT | Performed by: INTERNAL MEDICINE

## 2022-01-01 PROCEDURE — 99222 1ST HOSP IP/OBS MODERATE 55: CPT | Performed by: INTERNAL MEDICINE

## 2022-01-01 PROCEDURE — 81001 URINALYSIS AUTO W/SCOPE: CPT

## 2022-01-01 PROCEDURE — C1759 CATH, INTRA ECHOCARDIOGRAPHY: HCPCS

## 2022-01-01 PROCEDURE — 96375 TX/PRO/DX INJ NEW DRUG ADDON: CPT

## 2022-01-01 PROCEDURE — B2151ZZ FLUOROSCOPY OF LEFT HEART USING LOW OSMOLAR CONTRAST: ICD-10-PCS | Performed by: INTERNAL MEDICINE

## 2022-01-01 PROCEDURE — 85018 HEMOGLOBIN: CPT

## 2022-01-01 PROCEDURE — 1111F DSCHRG MED/CURRENT MED MERGE: CPT | Performed by: INTERNAL MEDICINE

## 2022-01-01 PROCEDURE — 6360000002 HC RX W HCPCS: Performed by: INTERNAL MEDICINE

## 2022-01-01 PROCEDURE — 2000000000 HC ICU R&B

## 2022-01-01 PROCEDURE — 94727 GAS DIL/WSHOT DETER LNG VOL: CPT

## 2022-01-01 PROCEDURE — 85347 COAGULATION TIME ACTIVATED: CPT

## 2022-01-01 PROCEDURE — 1036F TOBACCO NON-USER: CPT | Performed by: INTERNAL MEDICINE

## 2022-01-01 PROCEDURE — G8417 CALC BMI ABV UP PARAM F/U: HCPCS | Performed by: INTERNAL MEDICINE

## 2022-01-01 PROCEDURE — 83880 ASSAY OF NATRIURETIC PEPTIDE: CPT

## 2022-01-01 PROCEDURE — 87150 DNA/RNA AMPLIFIED PROBE: CPT

## 2022-01-01 PROCEDURE — 6370000000 HC RX 637 (ALT 250 FOR IP): Performed by: FAMILY MEDICINE

## 2022-01-01 PROCEDURE — G8427 DOCREV CUR MEDS BY ELIG CLIN: HCPCS | Performed by: INTERNAL MEDICINE

## 2022-01-01 PROCEDURE — 82330 ASSAY OF CALCIUM: CPT

## 2022-01-01 PROCEDURE — 83735 ASSAY OF MAGNESIUM: CPT

## 2022-01-01 PROCEDURE — 93308 TTE F-UP OR LMTD: CPT

## 2022-01-01 PROCEDURE — 99291 CRITICAL CARE FIRST HOUR: CPT | Performed by: INTERNAL MEDICINE

## 2022-01-01 PROCEDURE — 94729 DIFFUSING CAPACITY: CPT

## 2022-01-01 PROCEDURE — 85652 RBC SED RATE AUTOMATED: CPT

## 2022-01-01 PROCEDURE — 4040F PNEUMOC VAC/ADMIN/RCVD: CPT | Performed by: NURSE PRACTITIONER

## 2022-01-01 PROCEDURE — 6370000000 HC RX 637 (ALT 250 FOR IP): Performed by: PHYSICIAN ASSISTANT

## 2022-01-01 PROCEDURE — 3017F COLORECTAL CA SCREEN DOC REV: CPT | Performed by: NURSE PRACTITIONER

## 2022-01-01 PROCEDURE — 2580000003 HC RX 258: Performed by: NURSE PRACTITIONER

## 2022-01-01 PROCEDURE — 1123F ACP DISCUSS/DSCN MKR DOCD: CPT | Performed by: INTERNAL MEDICINE

## 2022-01-01 PROCEDURE — 1111F DSCHRG MED/CURRENT MED MERGE: CPT | Performed by: NURSE PRACTITIONER

## 2022-01-01 PROCEDURE — 99213 OFFICE O/P EST LOW 20 MIN: CPT | Performed by: INTERNAL MEDICINE

## 2022-01-01 PROCEDURE — 4040F PNEUMOC VAC/ADMIN/RCVD: CPT | Performed by: INTERNAL MEDICINE

## 2022-01-01 PROCEDURE — 2500000003 HC RX 250 WO HCPCS: Performed by: INTERNAL MEDICINE

## 2022-01-01 PROCEDURE — 6360000002 HC RX W HCPCS

## 2022-01-01 PROCEDURE — 02HV33Z INSERTION OF INFUSION DEVICE INTO SUPERIOR VENA CAVA, PERCUTANEOUS APPROACH: ICD-10-PCS | Performed by: RADIOLOGY

## 2022-01-01 PROCEDURE — 94060 EVALUATION OF WHEEZING: CPT

## 2022-01-01 PROCEDURE — 31500 INSERT EMERGENCY AIRWAY: CPT

## 2022-01-01 PROCEDURE — 84145 PROCALCITONIN (PCT): CPT

## 2022-01-01 PROCEDURE — G8484 FLU IMMUNIZE NO ADMIN: HCPCS | Performed by: NURSE PRACTITIONER

## 2022-01-01 PROCEDURE — 93623 PRGRMD STIMJ&PACG IV RX NFS: CPT | Performed by: INTERNAL MEDICINE

## 2022-01-01 PROCEDURE — 93005 ELECTROCARDIOGRAM TRACING: CPT | Performed by: EMERGENCY MEDICINE

## 2022-01-01 PROCEDURE — 1200000000 HC SEMI PRIVATE

## 2022-01-01 PROCEDURE — 93005 ELECTROCARDIOGRAM TRACING: CPT | Performed by: INTERNAL MEDICINE

## 2022-01-01 PROCEDURE — 36620 INSERTION CATHETER ARTERY: CPT | Performed by: NURSE ANESTHETIST, CERTIFIED REGISTERED

## 2022-01-01 PROCEDURE — 85027 COMPLETE CBC AUTOMATED: CPT

## 2022-01-01 PROCEDURE — 5A1935Z RESPIRATORY VENTILATION, LESS THAN 24 CONSECUTIVE HOURS: ICD-10-PCS | Performed by: STUDENT IN AN ORGANIZED HEALTH CARE EDUCATION/TRAINING PROGRAM

## 2022-01-01 PROCEDURE — 3023F SPIROM DOC REV: CPT | Performed by: INTERNAL MEDICINE

## 2022-01-01 PROCEDURE — 2709999900 HC NON-CHARGEABLE SUPPLY

## 2022-01-01 PROCEDURE — 84295 ASSAY OF SERUM SODIUM: CPT

## 2022-01-01 PROCEDURE — 71046 X-RAY EXAM CHEST 2 VIEWS: CPT

## 2022-01-01 PROCEDURE — 2580000003 HC RX 258: Performed by: NURSE ANESTHETIST, CERTIFIED REGISTERED

## 2022-01-01 PROCEDURE — 36556 INSERT NON-TUNNEL CV CATH: CPT

## 2022-01-01 PROCEDURE — 2500000003 HC RX 250 WO HCPCS: Performed by: NURSE ANESTHETIST, CERTIFIED REGISTERED

## 2022-01-01 PROCEDURE — 99217 PR OBSERVATION CARE DISCHARGE MANAGEMENT: CPT | Performed by: NURSE PRACTITIONER

## 2022-01-01 PROCEDURE — 93656 COMPRE EP EVAL ABLTJ ATR FIB: CPT | Performed by: INTERNAL MEDICINE

## 2022-01-01 PROCEDURE — 85007 BL SMEAR W/DIFF WBC COUNT: CPT

## 2022-01-01 PROCEDURE — 71045 X-RAY EXAM CHEST 1 VIEW: CPT

## 2022-01-01 PROCEDURE — 0BH18EZ INSERTION OF ENDOTRACHEAL AIRWAY INTO TRACHEA, VIA NATURAL OR ARTIFICIAL OPENING ENDOSCOPIC: ICD-10-PCS | Performed by: STUDENT IN AN ORGANIZED HEALTH CARE EDUCATION/TRAINING PROGRAM

## 2022-01-01 PROCEDURE — C9113 INJ PANTOPRAZOLE SODIUM, VIA: HCPCS | Performed by: INTERNAL MEDICINE

## 2022-01-01 PROCEDURE — 86900 BLOOD TYPING SEROLOGIC ABO: CPT

## 2022-01-01 PROCEDURE — 7100000001 HC PACU RECOVERY - ADDTL 15 MIN

## 2022-01-01 PROCEDURE — 76937 US GUIDE VASCULAR ACCESS: CPT

## 2022-01-01 PROCEDURE — 96374 THER/PROPH/DIAG INJ IV PUSH: CPT

## 2022-01-01 PROCEDURE — APPSS60 APP SPLIT SHARED TIME 46-60 MINUTES: Performed by: NURSE PRACTITIONER

## 2022-01-01 PROCEDURE — C1894 INTRO/SHEATH, NON-LASER: HCPCS

## 2022-01-01 PROCEDURE — 99284 EMERGENCY DEPT VISIT MOD MDM: CPT

## 2022-01-01 PROCEDURE — B2141ZZ FLUOROSCOPY OF RIGHT HEART USING LOW OSMOLAR CONTRAST: ICD-10-PCS | Performed by: INTERNAL MEDICINE

## 2022-01-01 PROCEDURE — G8484 FLU IMMUNIZE NO ADMIN: HCPCS | Performed by: INTERNAL MEDICINE

## 2022-01-01 PROCEDURE — C1769 GUIDE WIRE: HCPCS

## 2022-01-01 PROCEDURE — 84100 ASSAY OF PHOSPHORUS: CPT

## 2022-01-01 PROCEDURE — 93312 ECHO TRANSESOPHAGEAL: CPT

## 2022-01-01 PROCEDURE — 93297 REM INTERROG DEV EVAL ICPMS: CPT | Performed by: INTERNAL MEDICINE

## 2022-01-01 PROCEDURE — 80061 LIPID PANEL: CPT

## 2022-01-01 PROCEDURE — 2500000003 HC RX 250 WO HCPCS

## 2022-01-01 PROCEDURE — 86850 RBC ANTIBODY SCREEN: CPT

## 2022-01-01 PROCEDURE — 51702 INSERT TEMP BLADDER CATH: CPT

## 2022-01-01 PROCEDURE — 93000 ELECTROCARDIOGRAM COMPLETE: CPT | Performed by: INTERNAL MEDICINE

## 2022-01-01 PROCEDURE — 85379 FIBRIN DEGRADATION QUANT: CPT

## 2022-01-01 PROCEDURE — 99214 OFFICE O/P EST MOD 30 MIN: CPT | Performed by: NURSE PRACTITIONER

## 2022-01-01 PROCEDURE — 93289 INTERROG DEVICE EVAL HEART: CPT | Performed by: NURSE PRACTITIONER

## 2022-01-01 PROCEDURE — 6360000002 HC RX W HCPCS: Performed by: NURSE ANESTHETIST, CERTIFIED REGISTERED

## 2022-01-01 PROCEDURE — U0003 INFECTIOUS AGENT DETECTION BY NUCLEIC ACID (DNA OR RNA); SEVERE ACUTE RESPIRATORY SYNDROME CORONAVIRUS 2 (SARS-COV-2) (CORONAVIRUS DISEASE [COVID-19]), AMPLIFIED PROBE TECHNIQUE, MAKING USE OF HIGH THROUGHPUT TECHNOLOGIES AS DESCRIBED BY CMS-2020-01-R: HCPCS

## 2022-01-01 PROCEDURE — 6370000000 HC RX 637 (ALT 250 FOR IP): Performed by: STUDENT IN AN ORGANIZED HEALTH CARE EDUCATION/TRAINING PROGRAM

## 2022-01-01 PROCEDURE — 93458 L HRT ARTERY/VENTRICLE ANGIO: CPT

## 2022-01-01 PROCEDURE — 87635 SARS-COV-2 COVID-19 AMP PRB: CPT

## 2022-01-01 PROCEDURE — C1730 CATH, EP, 19 OR FEW ELECT: HCPCS

## 2022-01-01 PROCEDURE — 84439 ASSAY OF FREE THYROXINE: CPT

## 2022-01-01 PROCEDURE — 84443 ASSAY THYROID STIM HORMONE: CPT

## 2022-01-01 PROCEDURE — 1036F TOBACCO NON-USER: CPT | Performed by: NURSE PRACTITIONER

## 2022-01-01 PROCEDURE — 85730 THROMBOPLASTIN TIME PARTIAL: CPT

## 2022-01-01 PROCEDURE — 86901 BLOOD TYPING SEROLOGIC RH(D): CPT

## 2022-01-01 PROCEDURE — 6360000004 HC RX CONTRAST MEDICATION

## 2022-01-01 PROCEDURE — C1893 INTRO/SHEATH, FIXED,NON-PEEL: HCPCS

## 2022-01-01 PROCEDURE — 7100000000 HC PACU RECOVERY - FIRST 15 MIN

## 2022-01-01 PROCEDURE — 93970 EXTREMITY STUDY: CPT

## 2022-01-01 PROCEDURE — 96361 HYDRATE IV INFUSION ADD-ON: CPT

## 2022-01-01 PROCEDURE — 5A2204Z RESTORATION OF CARDIAC RHYTHM, SINGLE: ICD-10-PCS | Performed by: INTERNAL MEDICINE

## 2022-01-01 PROCEDURE — A9540 TC99M MAA: HCPCS | Performed by: INTERNAL MEDICINE

## 2022-01-01 PROCEDURE — 87081 CULTURE SCREEN ONLY: CPT

## 2022-01-01 PROCEDURE — 2500000003 HC RX 250 WO HCPCS: Performed by: NURSE PRACTITIONER

## 2022-01-01 PROCEDURE — 94618 PULMONARY STRESS TESTING: CPT

## 2022-01-01 PROCEDURE — 85014 HEMATOCRIT: CPT

## 2022-01-01 PROCEDURE — 87040 BLOOD CULTURE FOR BACTERIA: CPT

## 2022-01-01 PROCEDURE — 93656 COMPRE EP EVAL ABLTJ ATR FIB: CPT

## 2022-01-01 PROCEDURE — 82805 BLOOD GASES W/O2 SATURATION: CPT

## 2022-01-01 PROCEDURE — C1733 CATH, EP, OTHR THAN COOL-TIP: HCPCS

## 2022-01-01 PROCEDURE — 93295 DEV INTERROG REMOTE 1/2/MLT: CPT | Performed by: INTERNAL MEDICINE

## 2022-01-01 PROCEDURE — 36620 INSERTION CATHETER ARTERY: CPT

## 2022-01-01 PROCEDURE — 94150 VITAL CAPACITY TEST: CPT

## 2022-01-01 PROCEDURE — 1123F ACP DISCUSS/DSCN MKR DOCD: CPT | Performed by: NURSE PRACTITIONER

## 2022-01-01 PROCEDURE — 2060000000 HC ICU INTERMEDIATE R&B

## 2022-01-01 PROCEDURE — 93657 TX L/R ATRIAL FIB ADDL: CPT

## 2022-01-01 PROCEDURE — 3700000001 HC ADD 15 MINUTES (ANESTHESIA)

## 2022-01-01 PROCEDURE — 6360000002 HC RX W HCPCS: Performed by: EMERGENCY MEDICINE

## 2022-01-01 PROCEDURE — C1887 CATHETER, GUIDING: HCPCS

## 2022-01-01 PROCEDURE — 2140000000 HC CCU INTERMEDIATE R&B

## 2022-01-01 PROCEDURE — 78580 LUNG PERFUSION IMAGING: CPT

## 2022-01-01 PROCEDURE — G8417 CALC BMI ABV UP PARAM F/U: HCPCS | Performed by: NURSE PRACTITIONER

## 2022-01-01 PROCEDURE — 93005 ELECTROCARDIOGRAM TRACING: CPT | Performed by: HOSPITALIST

## 2022-01-01 PROCEDURE — 99223 1ST HOSP IP/OBS HIGH 75: CPT | Performed by: INTERNAL MEDICINE

## 2022-01-01 PROCEDURE — 3430000000 HC RX DIAGNOSTIC RADIOPHARMACEUTICAL: Performed by: INTERNAL MEDICINE

## 2022-01-01 RX ORDER — SPIRONOLACTONE 25 MG/1
25 TABLET ORAL DAILY
Status: DISCONTINUED | OUTPATIENT
Start: 2022-01-01 | End: 2022-01-01 | Stop reason: SDUPTHER

## 2022-01-01 RX ORDER — DEXTROSE MONOHYDRATE 50 MG/ML
100 INJECTION, SOLUTION INTRAVENOUS PRN
Status: DISCONTINUED | OUTPATIENT
Start: 2022-01-01 | End: 2022-01-01 | Stop reason: HOSPADM

## 2022-01-01 RX ORDER — CLOPIDOGREL BISULFATE 75 MG/1
75 TABLET ORAL DAILY
Status: DISCONTINUED | OUTPATIENT
Start: 2022-01-01 | End: 2022-01-01 | Stop reason: HOSPADM

## 2022-01-01 RX ORDER — ONDANSETRON 2 MG/ML
4 INJECTION INTRAMUSCULAR; INTRAVENOUS EVERY 30 MIN PRN
Status: DISCONTINUED | OUTPATIENT
Start: 2022-01-01 | End: 2022-01-01 | Stop reason: HOSPADM

## 2022-01-01 RX ORDER — INSULIN GLARGINE 100 [IU]/ML
35 INJECTION, SOLUTION SUBCUTANEOUS NIGHTLY
Qty: 10 ML | Refills: 3 | Status: SHIPPED | OUTPATIENT
Start: 2022-01-01

## 2022-01-01 RX ORDER — ALBUTEROL SULFATE 90 UG/1
2 AEROSOL, METERED RESPIRATORY (INHALATION) 2 TIMES DAILY
Status: DISCONTINUED | OUTPATIENT
Start: 2022-01-01 | End: 2022-01-01 | Stop reason: HOSPADM

## 2022-01-01 RX ORDER — SODIUM CHLORIDE 0.9 % (FLUSH) 0.9 %
5-40 SYRINGE (ML) INJECTION PRN
Status: DISCONTINUED | OUTPATIENT
Start: 2022-01-01 | End: 2022-01-01 | Stop reason: HOSPADM

## 2022-01-01 RX ORDER — SODIUM CHLORIDE 0.9 % (FLUSH) 0.9 %
10 SYRINGE (ML) INJECTION PRN
Status: DISCONTINUED | OUTPATIENT
Start: 2022-01-01 | End: 2022-01-01 | Stop reason: HOSPADM

## 2022-01-01 RX ORDER — ASPIRIN 81 MG/1
81 TABLET, CHEWABLE ORAL DAILY
Status: DISCONTINUED | OUTPATIENT
Start: 2022-01-01 | End: 2022-01-01 | Stop reason: HOSPADM

## 2022-01-01 RX ORDER — ONDANSETRON 4 MG/1
4 TABLET, ORALLY DISINTEGRATING ORAL EVERY 8 HOURS PRN
Status: DISCONTINUED | OUTPATIENT
Start: 2022-01-01 | End: 2022-01-01 | Stop reason: HOSPADM

## 2022-01-01 RX ORDER — ACETAMINOPHEN 325 MG/1
650 TABLET ORAL EVERY 4 HOURS PRN
Status: DISCONTINUED | OUTPATIENT
Start: 2022-01-01 | End: 2022-01-01 | Stop reason: HOSPADM

## 2022-01-01 RX ORDER — SODIUM CHLORIDE 0.9 % (FLUSH) 0.9 %
10 SYRINGE (ML) INJECTION EVERY 12 HOURS SCHEDULED
Status: DISCONTINUED | OUTPATIENT
Start: 2022-01-01 | End: 2022-01-01 | Stop reason: HOSPADM

## 2022-01-01 RX ORDER — MIDODRINE HYDROCHLORIDE 10 MG/1
10 TABLET ORAL
Qty: 90 TABLET | Refills: 3 | Status: SHIPPED | OUTPATIENT
Start: 2022-01-01

## 2022-01-01 RX ORDER — PANTOPRAZOLE SODIUM 40 MG/1
40 TABLET, DELAYED RELEASE ORAL
Status: DISCONTINUED | OUTPATIENT
Start: 2022-01-01 | End: 2022-01-01

## 2022-01-01 RX ORDER — PANTOPRAZOLE SODIUM 40 MG/10ML
40 INJECTION, POWDER, LYOPHILIZED, FOR SOLUTION INTRAVENOUS 2 TIMES DAILY
Status: DISCONTINUED | OUTPATIENT
Start: 2022-01-01 | End: 2022-01-01 | Stop reason: HOSPADM

## 2022-01-01 RX ORDER — ATORVASTATIN CALCIUM 40 MG/1
40 TABLET, FILM COATED ORAL NIGHTLY
Status: DISCONTINUED | OUTPATIENT
Start: 2022-01-01 | End: 2022-01-01 | Stop reason: SDUPTHER

## 2022-01-01 RX ORDER — SODIUM CHLORIDE 0.9 % (FLUSH) 0.9 %
5-40 SYRINGE (ML) INJECTION PRN
Status: DISCONTINUED | OUTPATIENT
Start: 2022-01-01 | End: 2022-01-01 | Stop reason: SDUPTHER

## 2022-01-01 RX ORDER — ONDANSETRON 2 MG/ML
INJECTION INTRAMUSCULAR; INTRAVENOUS PRN
Status: DISCONTINUED | OUTPATIENT
Start: 2022-01-01 | End: 2022-01-01 | Stop reason: SDUPTHER

## 2022-01-01 RX ORDER — ACETYLCYSTEINE 200 MG/ML
1200 SOLUTION ORAL; RESPIRATORY (INHALATION) 2 TIMES DAILY
Status: DISCONTINUED | OUTPATIENT
Start: 2022-01-01 | End: 2022-01-01

## 2022-01-01 RX ORDER — METOPROLOL SUCCINATE 50 MG/1
100 TABLET, EXTENDED RELEASE ORAL 3 TIMES DAILY
Status: DISCONTINUED | OUTPATIENT
Start: 2022-01-01 | End: 2022-01-01

## 2022-01-01 RX ORDER — BLOOD-GLUCOSE METER
1 KIT MISCELLANEOUS DAILY
Status: DISCONTINUED | OUTPATIENT
Start: 2022-01-01 | End: 2022-01-01

## 2022-01-01 RX ORDER — ACETAMINOPHEN 650 MG/1
650 SUPPOSITORY RECTAL EVERY 6 HOURS PRN
Status: DISCONTINUED | OUTPATIENT
Start: 2022-01-01 | End: 2022-01-01 | Stop reason: HOSPADM

## 2022-01-01 RX ORDER — NICOTINE POLACRILEX 4 MG
15 LOZENGE BUCCAL PRN
Status: DISCONTINUED | OUTPATIENT
Start: 2022-01-01 | End: 2022-01-01 | Stop reason: HOSPADM

## 2022-01-01 RX ORDER — MORPHINE SULFATE 4 MG/ML
4 INJECTION, SOLUTION INTRAMUSCULAR; INTRAVENOUS EVERY 4 HOURS PRN
Status: DISCONTINUED | OUTPATIENT
Start: 2022-01-01 | End: 2022-01-01 | Stop reason: HOSPADM

## 2022-01-01 RX ORDER — METOCLOPRAMIDE HYDROCHLORIDE 5 MG/ML
10 INJECTION INTRAMUSCULAR; INTRAVENOUS
Status: DISCONTINUED | OUTPATIENT
Start: 2022-01-01 | End: 2022-01-01

## 2022-01-01 RX ORDER — SODIUM CHLORIDE, SODIUM LACTATE, POTASSIUM CHLORIDE, CALCIUM CHLORIDE 600; 310; 30; 20 MG/100ML; MG/100ML; MG/100ML; MG/100ML
INJECTION, SOLUTION INTRAVENOUS CONTINUOUS PRN
Status: DISCONTINUED | OUTPATIENT
Start: 2022-01-01 | End: 2022-01-01 | Stop reason: SDUPTHER

## 2022-01-01 RX ORDER — ONDANSETRON 2 MG/ML
4 INJECTION INTRAMUSCULAR; INTRAVENOUS EVERY 6 HOURS PRN
Status: DISCONTINUED | OUTPATIENT
Start: 2022-01-01 | End: 2022-01-01 | Stop reason: HOSPADM

## 2022-01-01 RX ORDER — LISINOPRIL 5 MG/1
2.5 TABLET ORAL DAILY
Status: DISCONTINUED | OUTPATIENT
Start: 2022-01-01 | End: 2022-01-01

## 2022-01-01 RX ORDER — NITROGLYCERIN 0.4 MG/1
0.4 TABLET SUBLINGUAL EVERY 5 MIN PRN
Status: DISCONTINUED | OUTPATIENT
Start: 2022-01-01 | End: 2022-01-01 | Stop reason: HOSPADM

## 2022-01-01 RX ORDER — IPRATROPIUM BROMIDE AND ALBUTEROL SULFATE 2.5; .5 MG/3ML; MG/3ML
1 SOLUTION RESPIRATORY (INHALATION)
Status: DISCONTINUED | OUTPATIENT
Start: 2022-01-01 | End: 2022-01-01

## 2022-01-01 RX ORDER — LIDOCAINE HYDROCHLORIDE 10 MG/ML
5 INJECTION, SOLUTION EPIDURAL; INFILTRATION; INTRACAUDAL; PERINEURAL ONCE
Status: DISCONTINUED | OUTPATIENT
Start: 2022-01-01 | End: 2022-01-01 | Stop reason: HOSPADM

## 2022-01-01 RX ORDER — ATORVASTATIN CALCIUM 40 MG/1
80 TABLET, FILM COATED ORAL NIGHTLY
Status: DISCONTINUED | OUTPATIENT
Start: 2022-01-01 | End: 2022-01-01

## 2022-01-01 RX ORDER — ASPIRIN 81 MG/1
81 TABLET, CHEWABLE ORAL DAILY
Qty: 30 TABLET | Refills: 0 | Status: SHIPPED | OUTPATIENT
Start: 2022-01-01

## 2022-01-01 RX ORDER — OMEPRAZOLE 20 MG/1
20 CAPSULE, DELAYED RELEASE ORAL DAILY
Status: DISCONTINUED | OUTPATIENT
Start: 2022-01-01 | End: 2022-01-01 | Stop reason: CLARIF

## 2022-01-01 RX ORDER — COLCHICINE 0.6 MG/1
0.6 TABLET ORAL DAILY
Status: DISCONTINUED | OUTPATIENT
Start: 2022-01-01 | End: 2022-01-01

## 2022-01-01 RX ORDER — DEXAMETHASONE SODIUM PHOSPHATE 4 MG/ML
INJECTION, SOLUTION INTRA-ARTICULAR; INTRALESIONAL; INTRAMUSCULAR; INTRAVENOUS; SOFT TISSUE PRN
Status: DISCONTINUED | OUTPATIENT
Start: 2022-01-01 | End: 2022-01-01 | Stop reason: SDUPTHER

## 2022-01-01 RX ORDER — SODIUM CHLORIDE 9 MG/ML
25 INJECTION, SOLUTION INTRAVENOUS PRN
Status: DISCONTINUED | OUTPATIENT
Start: 2022-01-01 | End: 2022-01-01 | Stop reason: HOSPADM

## 2022-01-01 RX ORDER — NOREPINEPHRINE BIT/0.9 % NACL 16MG/250ML
1-100 INFUSION BOTTLE (ML) INTRAVENOUS CONTINUOUS
Status: DISCONTINUED | OUTPATIENT
Start: 2022-01-01 | End: 2022-01-01 | Stop reason: HOSPADM

## 2022-01-01 RX ORDER — ROCURONIUM BROMIDE 10 MG/ML
INJECTION, SOLUTION INTRAVENOUS PRN
Status: DISCONTINUED | OUTPATIENT
Start: 2022-01-01 | End: 2022-01-01 | Stop reason: SDUPTHER

## 2022-01-01 RX ORDER — AMIODARONE HYDROCHLORIDE 200 MG/1
200 TABLET ORAL DAILY
Status: DISCONTINUED | OUTPATIENT
Start: 2022-01-01 | End: 2022-01-01

## 2022-01-01 RX ORDER — NITROGLYCERIN 0.4 MG/1
0.4 TABLET SUBLINGUAL ONCE
Status: COMPLETED | OUTPATIENT
Start: 2022-01-01 | End: 2022-01-01

## 2022-01-01 RX ORDER — METOPROLOL SUCCINATE 100 MG/1
100 TABLET, EXTENDED RELEASE ORAL 3 TIMES DAILY
Qty: 60 TABLET | Refills: 1 | Status: SHIPPED | OUTPATIENT
Start: 2022-01-01 | End: 2022-01-01 | Stop reason: SDUPTHER

## 2022-01-01 RX ORDER — IPRATROPIUM BROMIDE AND ALBUTEROL SULFATE 2.5; .5 MG/3ML; MG/3ML
1 SOLUTION RESPIRATORY (INHALATION) EVERY 4 HOURS PRN
Status: DISCONTINUED | OUTPATIENT
Start: 2022-01-01 | End: 2022-01-01 | Stop reason: HOSPADM

## 2022-01-01 RX ORDER — FUROSEMIDE 10 MG/ML
INJECTION INTRAMUSCULAR; INTRAVENOUS PRN
Status: DISCONTINUED | OUTPATIENT
Start: 2022-01-01 | End: 2022-01-01 | Stop reason: SDUPTHER

## 2022-01-01 RX ORDER — AMIODARONE HYDROCHLORIDE 200 MG/1
200 TABLET ORAL 2 TIMES DAILY
Status: DISCONTINUED | OUTPATIENT
Start: 2022-01-01 | End: 2022-01-01 | Stop reason: HOSPADM

## 2022-01-01 RX ORDER — METOPROLOL SUCCINATE 100 MG/1
100 TABLET, EXTENDED RELEASE ORAL 2 TIMES DAILY
Qty: 60 TABLET | Refills: 1 | Status: ON HOLD | OUTPATIENT
Start: 2022-01-01 | End: 2022-01-01 | Stop reason: SDUPTHER

## 2022-01-01 RX ORDER — AMIODARONE HYDROCHLORIDE 200 MG/1
200 TABLET ORAL DAILY
Status: DISCONTINUED | OUTPATIENT
Start: 2022-01-01 | End: 2022-01-01 | Stop reason: SDUPTHER

## 2022-01-01 RX ORDER — AMIODARONE HYDROCHLORIDE 200 MG/1
200 TABLET ORAL DAILY
Qty: 90 TABLET | Refills: 0 | Status: SHIPPED | OUTPATIENT
Start: 2022-01-01

## 2022-01-01 RX ORDER — INSULIN GLARGINE 100 [IU]/ML
35 INJECTION, SOLUTION SUBCUTANEOUS NIGHTLY
Status: DISCONTINUED | OUTPATIENT
Start: 2022-01-01 | End: 2022-01-01 | Stop reason: HOSPADM

## 2022-01-01 RX ORDER — AMIODARONE HYDROCHLORIDE 400 MG/1
400 TABLET ORAL 2 TIMES DAILY
Qty: 12 TABLET | Refills: 0 | Status: ON HOLD | OUTPATIENT
Start: 2022-01-01 | End: 2022-01-01 | Stop reason: HOSPADM

## 2022-01-01 RX ORDER — DIPHENHYDRAMINE HYDROCHLORIDE 50 MG/ML
12.5 INJECTION INTRAMUSCULAR; INTRAVENOUS
Status: DISCONTINUED | OUTPATIENT
Start: 2022-01-01 | End: 2022-01-01

## 2022-01-01 RX ORDER — MIDODRINE HYDROCHLORIDE 5 MG/1
10 TABLET ORAL
Status: DISCONTINUED | OUTPATIENT
Start: 2022-01-01 | End: 2022-01-01 | Stop reason: HOSPADM

## 2022-01-01 RX ORDER — HYDRALAZINE HYDROCHLORIDE 20 MG/ML
10 INJECTION INTRAMUSCULAR; INTRAVENOUS
Status: DISCONTINUED | OUTPATIENT
Start: 2022-01-01 | End: 2022-01-01

## 2022-01-01 RX ORDER — GUAIFENESIN 100 MG/5ML
200 SOLUTION ORAL ONCE
Status: COMPLETED | OUTPATIENT
Start: 2022-01-01 | End: 2022-01-01

## 2022-01-01 RX ORDER — AMIODARONE HYDROCHLORIDE 200 MG/1
200 TABLET ORAL 2 TIMES DAILY
Qty: 60 TABLET | Refills: 0 | Status: ON HOLD | OUTPATIENT
Start: 2022-01-01 | End: 2022-01-01 | Stop reason: SDUPTHER

## 2022-01-01 RX ORDER — SODIUM CHLORIDE 0.9 % (FLUSH) 0.9 %
5-40 SYRINGE (ML) INJECTION EVERY 12 HOURS SCHEDULED
Status: DISCONTINUED | OUTPATIENT
Start: 2022-01-01 | End: 2022-01-01 | Stop reason: HOSPADM

## 2022-01-01 RX ORDER — DEXTROSE MONOHYDRATE 25 G/50ML
12.5 INJECTION, SOLUTION INTRAVENOUS PRN
Status: DISCONTINUED | OUTPATIENT
Start: 2022-01-01 | End: 2022-01-01

## 2022-01-01 RX ORDER — ATORVASTATIN CALCIUM 40 MG/1
40 TABLET, FILM COATED ORAL NIGHTLY
Status: DISCONTINUED | OUTPATIENT
Start: 2022-01-01 | End: 2022-01-01 | Stop reason: HOSPADM

## 2022-01-01 RX ORDER — ACETAMINOPHEN 325 MG/1
650 TABLET ORAL EVERY 6 HOURS PRN
Status: DISCONTINUED | OUTPATIENT
Start: 2022-01-01 | End: 2022-01-01 | Stop reason: HOSPADM

## 2022-01-01 RX ORDER — 0.9 % SODIUM CHLORIDE 0.9 %
250 INTRAVENOUS SOLUTION INTRAVENOUS ONCE
Status: COMPLETED | OUTPATIENT
Start: 2022-01-01 | End: 2022-01-01

## 2022-01-01 RX ORDER — AMIODARONE HYDROCHLORIDE 200 MG/1
200 TABLET ORAL DAILY
Status: DISCONTINUED | OUTPATIENT
Start: 2022-01-01 | End: 2022-01-01 | Stop reason: HOSPADM

## 2022-01-01 RX ORDER — METOPROLOL SUCCINATE 50 MG/1
50 TABLET, EXTENDED RELEASE ORAL DAILY
Status: DISCONTINUED | OUTPATIENT
Start: 2022-01-01 | End: 2022-01-01 | Stop reason: HOSPADM

## 2022-01-01 RX ORDER — LIDOCAINE 4 G/G
1 PATCH TOPICAL DAILY
Status: DISCONTINUED | OUTPATIENT
Start: 2022-01-01 | End: 2022-01-01 | Stop reason: HOSPADM

## 2022-01-01 RX ORDER — FLUTICASONE PROPIONATE 50 MCG
1 SPRAY, SUSPENSION (ML) NASAL DAILY
Status: DISCONTINUED | OUTPATIENT
Start: 2022-01-01 | End: 2022-01-01 | Stop reason: HOSPADM

## 2022-01-01 RX ORDER — POTASSIUM CHLORIDE 20 MEQ/1
10 TABLET, EXTENDED RELEASE ORAL 2 TIMES DAILY
Status: DISCONTINUED | OUTPATIENT
Start: 2022-01-01 | End: 2022-01-01

## 2022-01-01 RX ORDER — LISINOPRIL 5 MG/1
2.5 TABLET ORAL DAILY
Status: DISCONTINUED | OUTPATIENT
Start: 2022-01-01 | End: 2022-01-01 | Stop reason: SDUPTHER

## 2022-01-01 RX ORDER — ACETAMINOPHEN 325 MG/1
650 TABLET ORAL EVERY 6 HOURS PRN
Status: DISCONTINUED | OUTPATIENT
Start: 2022-01-01 | End: 2022-01-01 | Stop reason: SDUPTHER

## 2022-01-01 RX ORDER — METOPROLOL SUCCINATE 50 MG/1
100 TABLET, EXTENDED RELEASE ORAL 2 TIMES DAILY
Status: DISCONTINUED | OUTPATIENT
Start: 2022-01-01 | End: 2022-01-01 | Stop reason: HOSPADM

## 2022-01-01 RX ORDER — MEPERIDINE HYDROCHLORIDE 25 MG/ML
12.5 INJECTION INTRAMUSCULAR; INTRAVENOUS; SUBCUTANEOUS EVERY 5 MIN PRN
Status: DISCONTINUED | OUTPATIENT
Start: 2022-01-01 | End: 2022-01-01

## 2022-01-01 RX ORDER — FENTANYL CITRATE 50 UG/ML
50 INJECTION, SOLUTION INTRAMUSCULAR; INTRAVENOUS ONCE
Status: COMPLETED | OUTPATIENT
Start: 2022-01-01 | End: 2022-01-01

## 2022-01-01 RX ORDER — FENTANYL CITRATE 50 UG/ML
50 INJECTION, SOLUTION INTRAMUSCULAR; INTRAVENOUS EVERY 5 MIN PRN
Status: DISCONTINUED | OUTPATIENT
Start: 2022-01-01 | End: 2022-01-01

## 2022-01-01 RX ORDER — BUDESONIDE AND FORMOTEROL FUMARATE DIHYDRATE 80; 4.5 UG/1; UG/1
2 AEROSOL RESPIRATORY (INHALATION) 2 TIMES DAILY
Status: DISCONTINUED | OUTPATIENT
Start: 2022-01-01 | End: 2022-01-01 | Stop reason: HOSPADM

## 2022-01-01 RX ORDER — LANCETS 30 GAUGE
1 EACH MISCELLANEOUS 4 TIMES DAILY
Status: DISCONTINUED | OUTPATIENT
Start: 2022-01-01 | End: 2022-01-01

## 2022-01-01 RX ORDER — IPRATROPIUM BROMIDE AND ALBUTEROL SULFATE 2.5; .5 MG/3ML; MG/3ML
1 SOLUTION RESPIRATORY (INHALATION) ONCE
Status: COMPLETED | OUTPATIENT
Start: 2022-01-01 | End: 2022-01-01

## 2022-01-01 RX ORDER — TORSEMIDE 20 MG/1
20 TABLET ORAL DAILY
Status: DISCONTINUED | OUTPATIENT
Start: 2022-01-01 | End: 2022-01-01 | Stop reason: HOSPADM

## 2022-01-01 RX ORDER — DEXTROSE MONOHYDRATE 25 G/50ML
12.5 INJECTION, SOLUTION INTRAVENOUS PRN
Status: DISCONTINUED | OUTPATIENT
Start: 2022-01-01 | End: 2022-01-01 | Stop reason: CLARIF

## 2022-01-01 RX ORDER — SODIUM CHLORIDE 9 MG/ML
INJECTION, SOLUTION INTRAVENOUS CONTINUOUS
Status: DISCONTINUED | OUTPATIENT
Start: 2022-01-01 | End: 2022-01-01 | Stop reason: HOSPADM

## 2022-01-01 RX ORDER — ALBUTEROL SULFATE 90 UG/1
2 AEROSOL, METERED RESPIRATORY (INHALATION) ONCE
Status: COMPLETED | OUTPATIENT
Start: 2022-01-01 | End: 2022-01-01

## 2022-01-01 RX ORDER — DILTIAZEM HYDROCHLORIDE 5 MG/ML
5 INJECTION INTRAVENOUS ONCE
Status: COMPLETED | OUTPATIENT
Start: 2022-01-01 | End: 2022-01-01

## 2022-01-01 RX ORDER — METHYLPREDNISOLONE SODIUM SUCCINATE 40 MG/ML
40 INJECTION, POWDER, LYOPHILIZED, FOR SOLUTION INTRAMUSCULAR; INTRAVENOUS EVERY 12 HOURS
Status: DISCONTINUED | OUTPATIENT
Start: 2022-01-01 | End: 2022-01-01 | Stop reason: HOSPADM

## 2022-01-01 RX ORDER — OXYCODONE HYDROCHLORIDE 5 MG/1
5 TABLET ORAL EVERY 4 HOURS PRN
Status: DISCONTINUED | OUTPATIENT
Start: 2022-01-01 | End: 2022-01-01 | Stop reason: HOSPADM

## 2022-01-01 RX ORDER — ASPIRIN 81 MG/1
324 TABLET, CHEWABLE ORAL ONCE
Status: COMPLETED | OUTPATIENT
Start: 2022-01-01 | End: 2022-01-01

## 2022-01-01 RX ORDER — ATROPINE SULFATE 0.4 MG/ML
0.5 AMPUL (ML) INJECTION
Status: ACTIVE | OUTPATIENT
Start: 2022-01-01 | End: 2022-01-01

## 2022-01-01 RX ORDER — PROMETHAZINE HYDROCHLORIDE 25 MG/1
12.5 TABLET ORAL EVERY 6 HOURS PRN
Status: DISCONTINUED | OUTPATIENT
Start: 2022-01-01 | End: 2022-01-01

## 2022-01-01 RX ORDER — LISINOPRIL 2.5 MG/1
2.5 TABLET ORAL DAILY
Status: ON HOLD | COMMUNITY
End: 2022-01-01 | Stop reason: HOSPADM

## 2022-01-01 RX ORDER — FENTANYL CITRATE 50 UG/ML
INJECTION, SOLUTION INTRAMUSCULAR; INTRAVENOUS PRN
Status: DISCONTINUED | OUTPATIENT
Start: 2022-01-01 | End: 2022-01-01 | Stop reason: SDUPTHER

## 2022-01-01 RX ORDER — PANTOPRAZOLE SODIUM 40 MG/1
40 TABLET, DELAYED RELEASE ORAL
Status: DISCONTINUED | OUTPATIENT
Start: 2022-01-01 | End: 2022-01-01 | Stop reason: HOSPADM

## 2022-01-01 RX ORDER — METOPROLOL SUCCINATE 50 MG/1
50 TABLET, EXTENDED RELEASE ORAL 2 TIMES DAILY
Status: DISCONTINUED | OUTPATIENT
Start: 2022-01-01 | End: 2022-01-01

## 2022-01-01 RX ORDER — SODIUM CHLORIDE 0.9 % (FLUSH) 0.9 %
10 SYRINGE (ML) INJECTION PRN
Status: DISCONTINUED | OUTPATIENT
Start: 2022-01-01 | End: 2022-01-01 | Stop reason: SDUPTHER

## 2022-01-01 RX ORDER — TORSEMIDE 20 MG/1
20 TABLET ORAL DAILY
Status: DISCONTINUED | OUTPATIENT
Start: 2022-01-01 | End: 2022-01-01

## 2022-01-01 RX ORDER — AMIODARONE HYDROCHLORIDE 200 MG/1
400 TABLET ORAL 2 TIMES DAILY
Status: DISCONTINUED | OUTPATIENT
Start: 2022-01-01 | End: 2022-01-01

## 2022-01-01 RX ORDER — ACETYLCYSTEINE 200 MG/ML
1200 SOLUTION ORAL; RESPIRATORY (INHALATION) 2 TIMES DAILY
Status: DISCONTINUED | OUTPATIENT
Start: 2022-01-01 | End: 2022-01-01 | Stop reason: HOSPADM

## 2022-01-01 RX ORDER — DIGOXIN 125 MCG
125 TABLET ORAL EVERY OTHER DAY
Status: DISCONTINUED | OUTPATIENT
Start: 2022-01-01 | End: 2022-01-01 | Stop reason: HOSPADM

## 2022-01-01 RX ORDER — PHENYLEPHRINE HCL IN 0.9% NACL 1 MG/10 ML
SYRINGE (ML) INTRAVENOUS PRN
Status: DISCONTINUED | OUTPATIENT
Start: 2022-01-01 | End: 2022-01-01 | Stop reason: SDUPTHER

## 2022-01-01 RX ORDER — PROTAMINE SULFATE 10 MG/ML
INJECTION, SOLUTION INTRAVENOUS PRN
Status: DISCONTINUED | OUTPATIENT
Start: 2022-01-01 | End: 2022-01-01 | Stop reason: SDUPTHER

## 2022-01-01 RX ORDER — ASPIRIN 81 MG/1
81 TABLET, CHEWABLE ORAL DAILY
Qty: 30 TABLET | Refills: 3 | Status: SHIPPED | OUTPATIENT
Start: 2022-01-01 | End: 2022-01-01 | Stop reason: ALTCHOICE

## 2022-01-01 RX ORDER — LANOLIN ALCOHOL/MO/W.PET/CERES
3 CREAM (GRAM) TOPICAL NIGHTLY PRN
Status: DISCONTINUED | OUTPATIENT
Start: 2022-01-01 | End: 2022-01-01 | Stop reason: HOSPADM

## 2022-01-01 RX ORDER — LABETALOL HYDROCHLORIDE 5 MG/ML
10 INJECTION, SOLUTION INTRAVENOUS
Status: DISCONTINUED | OUTPATIENT
Start: 2022-01-01 | End: 2022-01-01

## 2022-01-01 RX ORDER — TORSEMIDE 20 MG/1
20 TABLET ORAL DAILY
Qty: 30 TABLET | Refills: 3
Start: 2022-01-01

## 2022-01-01 RX ORDER — ASPIRIN 81 MG/1
81 TABLET, CHEWABLE ORAL DAILY
Status: DISCONTINUED | OUTPATIENT
Start: 2022-01-01 | End: 2022-01-01

## 2022-01-01 RX ORDER — PROPOFOL 10 MG/ML
INJECTION, EMULSION INTRAVENOUS PRN
Status: DISCONTINUED | OUTPATIENT
Start: 2022-01-01 | End: 2022-01-01 | Stop reason: SDUPTHER

## 2022-01-01 RX ORDER — BENZONATATE 100 MG/1
100 CAPSULE ORAL ONCE
Status: COMPLETED | OUTPATIENT
Start: 2022-01-01 | End: 2022-01-01

## 2022-01-01 RX ORDER — ONDANSETRON 4 MG/1
4 TABLET, ORALLY DISINTEGRATING ORAL ONCE
Status: COMPLETED | OUTPATIENT
Start: 2022-01-01 | End: 2022-01-01

## 2022-01-01 RX ORDER — SODIUM CHLORIDE 0.9 % (FLUSH) 0.9 %
10 SYRINGE (ML) INJECTION EVERY 12 HOURS SCHEDULED
Status: DISCONTINUED | OUTPATIENT
Start: 2022-01-01 | End: 2022-01-01 | Stop reason: SDUPTHER

## 2022-01-01 RX ORDER — FUROSEMIDE 10 MG/ML
40 INJECTION INTRAMUSCULAR; INTRAVENOUS 2 TIMES DAILY
Status: DISCONTINUED | OUTPATIENT
Start: 2022-01-01 | End: 2022-01-01

## 2022-01-01 RX ORDER — 0.9 % SODIUM CHLORIDE 0.9 %
500 INTRAVENOUS SOLUTION INTRAVENOUS ONCE
Status: COMPLETED | OUTPATIENT
Start: 2022-01-01 | End: 2022-01-01

## 2022-01-01 RX ORDER — SODIUM CHLORIDE 0.9 % (FLUSH) 0.9 %
5-40 SYRINGE (ML) INJECTION EVERY 12 HOURS SCHEDULED
Status: DISCONTINUED | OUTPATIENT
Start: 2022-01-01 | End: 2022-01-01 | Stop reason: SDUPTHER

## 2022-01-01 RX ORDER — CLOPIDOGREL BISULFATE 75 MG/1
75 TABLET ORAL DAILY
Status: DISCONTINUED | OUTPATIENT
Start: 2022-01-01 | End: 2022-01-01 | Stop reason: SDUPTHER

## 2022-01-01 RX ORDER — OXYCODONE HYDROCHLORIDE AND ACETAMINOPHEN 5; 325 MG/1; MG/1
1 TABLET ORAL EVERY 6 HOURS PRN
Status: COMPLETED | OUTPATIENT
Start: 2022-01-01 | End: 2022-01-01

## 2022-01-01 RX ORDER — SODIUM CHLORIDE 9 MG/ML
25 INJECTION, SOLUTION INTRAVENOUS PRN
Status: DISCONTINUED | OUTPATIENT
Start: 2022-01-01 | End: 2022-01-01 | Stop reason: SDUPTHER

## 2022-01-01 RX ORDER — OMEPRAZOLE 20 MG/1
20 CAPSULE, DELAYED RELEASE ORAL DAILY
COMMUNITY

## 2022-01-01 RX ORDER — DEXTROSE MONOHYDRATE 25 G/50ML
12.5 INJECTION, SOLUTION INTRAVENOUS ONCE
Status: DISCONTINUED | OUTPATIENT
Start: 2022-01-01 | End: 2022-01-01 | Stop reason: CLARIF

## 2022-01-01 RX ORDER — POLYETHYLENE GLYCOL 3350 17 G/17G
17 POWDER, FOR SOLUTION ORAL DAILY PRN
Status: DISCONTINUED | OUTPATIENT
Start: 2022-01-01 | End: 2022-01-01 | Stop reason: HOSPADM

## 2022-01-01 RX ORDER — POLYETHYLENE GLYCOL 3350 17 G/17G
17 POWDER, FOR SOLUTION ORAL 2 TIMES DAILY
Status: DISPENSED | OUTPATIENT
Start: 2022-01-01 | End: 2022-01-01

## 2022-01-01 RX ORDER — SODIUM CHLORIDE 9 MG/ML
INJECTION, SOLUTION INTRAVENOUS CONTINUOUS
Status: DISCONTINUED | OUTPATIENT
Start: 2022-01-01 | End: 2022-01-01

## 2022-01-01 RX ORDER — ATORVASTATIN CALCIUM 40 MG/1
40 TABLET, FILM COATED ORAL NIGHTLY
Status: DISCONTINUED | OUTPATIENT
Start: 2022-01-01 | End: 2022-01-01

## 2022-01-01 RX ORDER — METOPROLOL SUCCINATE 50 MG/1
50 TABLET, EXTENDED RELEASE ORAL DAILY
Status: DISCONTINUED | OUTPATIENT
Start: 2022-01-01 | End: 2022-01-01

## 2022-01-01 RX ORDER — METHYLPREDNISOLONE 4 MG/1
TABLET ORAL
Qty: 1 KIT | Refills: 0 | Status: SHIPPED | OUTPATIENT
Start: 2022-01-01 | End: 2022-01-01

## 2022-01-01 RX ORDER — INSULIN GLARGINE 100 [IU]/ML
30 INJECTION, SOLUTION SUBCUTANEOUS NIGHTLY
Status: DISCONTINUED | OUTPATIENT
Start: 2022-01-01 | End: 2022-01-01

## 2022-01-01 RX ORDER — LORAZEPAM 2 MG/ML
0.5 INJECTION INTRAMUSCULAR ONCE
Status: DISCONTINUED | OUTPATIENT
Start: 2022-01-01 | End: 2022-01-01 | Stop reason: HOSPADM

## 2022-01-01 RX ORDER — METHYLPREDNISOLONE SODIUM SUCCINATE 40 MG/ML
40 INJECTION, POWDER, LYOPHILIZED, FOR SOLUTION INTRAMUSCULAR; INTRAVENOUS DAILY
Status: DISCONTINUED | OUTPATIENT
Start: 2022-01-01 | End: 2022-01-01

## 2022-01-01 RX ORDER — NOREPINEPHRINE BIT/0.9 % NACL 16MG/250ML
1-100 INFUSION BOTTLE (ML) INTRAVENOUS CONTINUOUS
Status: DISCONTINUED | OUTPATIENT
Start: 2022-01-01 | End: 2022-01-01

## 2022-01-01 RX ORDER — ALBUTEROL SULFATE 90 UG/1
2 AEROSOL, METERED RESPIRATORY (INHALATION) 4 TIMES DAILY
Status: DISCONTINUED | OUTPATIENT
Start: 2022-01-01 | End: 2022-01-01

## 2022-01-01 RX ORDER — SODIUM CHLORIDE 9 MG/ML
INJECTION, SOLUTION INTRAVENOUS PRN
Status: DISCONTINUED | OUTPATIENT
Start: 2022-01-01 | End: 2022-01-01 | Stop reason: HOSPADM

## 2022-01-01 RX ORDER — ONDANSETRON 2 MG/ML
4 INJECTION INTRAMUSCULAR; INTRAVENOUS EVERY 6 HOURS PRN
Status: DISCONTINUED | OUTPATIENT
Start: 2022-01-01 | End: 2022-01-01

## 2022-01-01 RX ORDER — ALBUTEROL SULFATE 90 UG/1
2 AEROSOL, METERED RESPIRATORY (INHALATION) 2 TIMES DAILY
Qty: 18 G | Refills: 3 | Status: SHIPPED | OUTPATIENT
Start: 2022-01-01

## 2022-01-01 RX ORDER — GABAPENTIN 300 MG/1
300 CAPSULE ORAL NIGHTLY
Status: DISCONTINUED | OUTPATIENT
Start: 2022-01-01 | End: 2022-01-01 | Stop reason: HOSPADM

## 2022-01-01 RX ORDER — SUCCINYLCHOLINE CHLORIDE 20 MG/ML
INJECTION INTRAMUSCULAR; INTRAVENOUS PRN
Status: DISCONTINUED | OUTPATIENT
Start: 2022-01-01 | End: 2022-01-01 | Stop reason: SDUPTHER

## 2022-01-01 RX ORDER — SODIUM CHLORIDE, SODIUM LACTATE, POTASSIUM CHLORIDE, AND CALCIUM CHLORIDE .6; .31; .03; .02 G/100ML; G/100ML; G/100ML; G/100ML
1000 INJECTION, SOLUTION INTRAVENOUS ONCE
Status: COMPLETED | OUTPATIENT
Start: 2022-01-01 | End: 2022-01-01

## 2022-01-01 RX ORDER — LIDOCAINE HYDROCHLORIDE 20 MG/ML
INJECTION, SOLUTION EPIDURAL; INFILTRATION; INTRACAUDAL; PERINEURAL PRN
Status: DISCONTINUED | OUTPATIENT
Start: 2022-01-01 | End: 2022-01-01 | Stop reason: SDUPTHER

## 2022-01-01 RX ORDER — MORPHINE SULFATE 2 MG/ML
2 INJECTION, SOLUTION INTRAMUSCULAR; INTRAVENOUS EVERY 4 HOURS PRN
Status: DISCONTINUED | OUTPATIENT
Start: 2022-01-01 | End: 2022-01-01

## 2022-01-01 RX ORDER — AMIODARONE HYDROCHLORIDE 200 MG/1
400 TABLET ORAL 2 TIMES DAILY
Status: DISCONTINUED | OUTPATIENT
Start: 2022-01-01 | End: 2022-01-01 | Stop reason: HOSPADM

## 2022-01-01 RX ORDER — DIGOXIN 125 MCG
125 TABLET ORAL EVERY OTHER DAY
Qty: 30 TABLET | Refills: 3 | Status: SHIPPED | OUTPATIENT
Start: 2022-01-01 | End: 2022-01-01 | Stop reason: ALTCHOICE

## 2022-01-01 RX ORDER — PROCHLORPERAZINE EDISYLATE 5 MG/ML
5 INJECTION INTRAMUSCULAR; INTRAVENOUS
Status: DISCONTINUED | OUTPATIENT
Start: 2022-01-01 | End: 2022-01-01

## 2022-01-01 RX ORDER — FUROSEMIDE 10 MG/ML
40 INJECTION INTRAMUSCULAR; INTRAVENOUS ONCE
Status: COMPLETED | OUTPATIENT
Start: 2022-01-01 | End: 2022-01-01

## 2022-01-01 RX ORDER — MORPHINE SULFATE/0.9% NACL/PF 1 MG/ML
1 SYRINGE (ML) INJECTION
Status: ACTIVE | OUTPATIENT
Start: 2022-01-01 | End: 2022-01-01

## 2022-01-01 RX ORDER — 0.9 % SODIUM CHLORIDE 0.9 %
1000 INTRAVENOUS SOLUTION INTRAVENOUS ONCE
Status: COMPLETED | OUTPATIENT
Start: 2022-01-01 | End: 2022-01-01

## 2022-01-01 RX ORDER — SPIRONOLACTONE 25 MG/1
25 TABLET ORAL DAILY
Status: DISCONTINUED | OUTPATIENT
Start: 2022-01-01 | End: 2022-01-01

## 2022-01-01 RX ADMIN — GUAIFENESIN 200 MG: 200 SOLUTION ORAL at 06:19

## 2022-01-01 RX ADMIN — SODIUM CHLORIDE: 9 INJECTION, SOLUTION INTRAVENOUS at 22:02

## 2022-01-01 RX ADMIN — CLOPIDOGREL BISULFATE 75 MG: 75 TABLET ORAL at 15:48

## 2022-01-01 RX ADMIN — TORSEMIDE 20 MG: 20 TABLET ORAL at 18:25

## 2022-01-01 RX ADMIN — MIDODRINE HYDROCHLORIDE 10 MG: 5 TABLET ORAL at 08:29

## 2022-01-01 RX ADMIN — MIDODRINE HYDROCHLORIDE 10 MG: 5 TABLET ORAL at 16:13

## 2022-01-01 RX ADMIN — METHYLPREDNISOLONE SODIUM SUCCINATE 40 MG: 40 INJECTION, POWDER, LYOPHILIZED, FOR SOLUTION INTRAMUSCULAR; INTRAVENOUS at 08:29

## 2022-01-01 RX ADMIN — APIXABAN 5 MG: 5 TABLET, FILM COATED ORAL at 07:50

## 2022-01-01 RX ADMIN — APIXABAN 5 MG: 5 TABLET, FILM COATED ORAL at 20:19

## 2022-01-01 RX ADMIN — ATORVASTATIN CALCIUM 40 MG: 40 TABLET, FILM COATED ORAL at 20:23

## 2022-01-01 RX ADMIN — MORPHINE SULFATE 4 MG: 4 INJECTION INTRAVENOUS at 21:38

## 2022-01-01 RX ADMIN — AMIODARONE HYDROCHLORIDE 1 MG/MIN: 50 INJECTION, SOLUTION INTRAVENOUS at 07:34

## 2022-01-01 RX ADMIN — ASPIRIN 81 MG 81 MG: 81 TABLET ORAL at 10:13

## 2022-01-01 RX ADMIN — ONDANSETRON 4 MG: 2 INJECTION INTRAMUSCULAR; INTRAVENOUS at 07:58

## 2022-01-01 RX ADMIN — SPIRONOLACTONE 25 MG: 25 TABLET ORAL at 18:29

## 2022-01-01 RX ADMIN — FLUTICASONE PROPIONATE 1 SPRAY: 50 SPRAY, METERED NASAL at 07:51

## 2022-01-01 RX ADMIN — AMIODARONE HYDROCHLORIDE 200 MG: 200 TABLET ORAL at 08:29

## 2022-01-01 RX ADMIN — ONDANSETRON 4 MG: 2 INJECTION INTRAMUSCULAR; INTRAVENOUS at 06:18

## 2022-01-01 RX ADMIN — ASPIRIN 81 MG 81 MG: 81 TABLET ORAL at 08:29

## 2022-01-01 RX ADMIN — PANTOPRAZOLE SODIUM 40 MG: 40 TABLET, DELAYED RELEASE ORAL at 06:55

## 2022-01-01 RX ADMIN — BUDESONIDE AND FORMOTEROL FUMARATE DIHYDRATE 2 PUFF: 80; 4.5 AEROSOL RESPIRATORY (INHALATION) at 07:38

## 2022-01-01 RX ADMIN — AMIODARONE HYDROCHLORIDE 200 MG: 200 TABLET ORAL at 08:58

## 2022-01-01 RX ADMIN — MIDODRINE HYDROCHLORIDE 10 MG: 5 TABLET ORAL at 17:19

## 2022-01-01 RX ADMIN — FENTANYL CITRATE 50 MCG: 50 INJECTION, SOLUTION INTRAMUSCULAR; INTRAVENOUS at 12:19

## 2022-01-01 RX ADMIN — ALBUTEROL SULFATE 2 PUFF: 90 AEROSOL, METERED RESPIRATORY (INHALATION) at 12:36

## 2022-01-01 RX ADMIN — SODIUM CHLORIDE, PRESERVATIVE FREE 10 ML: 5 INJECTION INTRAVENOUS at 21:48

## 2022-01-01 RX ADMIN — PROPOFOL 100 MG: 10 INJECTION, EMULSION INTRAVENOUS at 07:50

## 2022-01-01 RX ADMIN — LISINOPRIL 2.5 MG: 5 TABLET ORAL at 18:28

## 2022-01-01 RX ADMIN — INSULIN GLARGINE 35 UNITS: 100 INJECTION, SOLUTION SUBCUTANEOUS at 20:26

## 2022-01-01 RX ADMIN — CLOPIDOGREL BISULFATE 75 MG: 75 TABLET ORAL at 08:57

## 2022-01-01 RX ADMIN — SODIUM CHLORIDE, PRESERVATIVE FREE 10 ML: 5 INJECTION INTRAVENOUS at 08:37

## 2022-01-01 RX ADMIN — DIGOXIN 125 MCG: 125 TABLET ORAL at 08:06

## 2022-01-01 RX ADMIN — AMIODARONE HYDROCHLORIDE 400 MG: 200 TABLET ORAL at 21:13

## 2022-01-01 RX ADMIN — METHYLPREDNISOLONE SODIUM SUCCINATE 40 MG: 40 INJECTION, POWDER, LYOPHILIZED, FOR SOLUTION INTRAMUSCULAR; INTRAVENOUS at 22:02

## 2022-01-01 RX ADMIN — MORPHINE SULFATE 4 MG: 4 INJECTION INTRAVENOUS at 16:06

## 2022-01-01 RX ADMIN — INSULIN HUMAN 10 UNITS: 100 INJECTION, SOLUTION PARENTERAL at 08:55

## 2022-01-01 RX ADMIN — AMIODARONE HYDROCHLORIDE 200 MG: 200 TABLET ORAL at 18:28

## 2022-01-01 RX ADMIN — DEXTROSE MONOHYDRATE 250 ML: 100 INJECTION, SOLUTION INTRAVENOUS at 08:48

## 2022-01-01 RX ADMIN — SODIUM CHLORIDE, POTASSIUM CHLORIDE, SODIUM LACTATE AND CALCIUM CHLORIDE: 600; 310; 30; 20 INJECTION, SOLUTION INTRAVENOUS at 07:30

## 2022-01-01 RX ADMIN — APIXABAN 5 MG: 5 TABLET, FILM COATED ORAL at 08:06

## 2022-01-01 RX ADMIN — CLOPIDOGREL BISULFATE 75 MG: 75 TABLET ORAL at 08:29

## 2022-01-01 RX ADMIN — APIXABAN 5 MG: 5 TABLET, FILM COATED ORAL at 13:30

## 2022-01-01 RX ADMIN — SODIUM CHLORIDE, PRESERVATIVE FREE 10 ML: 5 INJECTION INTRAVENOUS at 08:15

## 2022-01-01 RX ADMIN — SODIUM CHLORIDE, PRESERVATIVE FREE 10 ML: 5 INJECTION INTRAVENOUS at 09:03

## 2022-01-01 RX ADMIN — APIXABAN 5 MG: 5 TABLET, FILM COATED ORAL at 21:13

## 2022-01-01 RX ADMIN — SODIUM CHLORIDE, PRESERVATIVE FREE 10 ML: 5 INJECTION INTRAVENOUS at 21:24

## 2022-01-01 RX ADMIN — NITROGLYCERIN 0.4 MG: 0.4 TABLET, ORALLY DISINTEGRATING SUBLINGUAL at 16:19

## 2022-01-01 RX ADMIN — SODIUM CHLORIDE, PRESERVATIVE FREE 10 ML: 5 INJECTION INTRAVENOUS at 21:13

## 2022-01-01 RX ADMIN — PANTOPRAZOLE SODIUM 40 MG: 40 TABLET, DELAYED RELEASE ORAL at 09:02

## 2022-01-01 RX ADMIN — SODIUM CHLORIDE 250 ML: 9 INJECTION, SOLUTION INTRAVENOUS at 02:15

## 2022-01-01 RX ADMIN — METOPROLOL SUCCINATE 50 MG: 50 TABLET, FILM COATED, EXTENDED RELEASE ORAL at 08:05

## 2022-01-01 RX ADMIN — CLOPIDOGREL BISULFATE 75 MG: 75 TABLET ORAL at 09:02

## 2022-01-01 RX ADMIN — OXYCODONE HYDROCHLORIDE 5 MG: 5 TABLET ORAL at 21:00

## 2022-01-01 RX ADMIN — AMIODARONE HYDROCHLORIDE 400 MG: 200 TABLET ORAL at 08:04

## 2022-01-01 RX ADMIN — METHYLPREDNISOLONE SODIUM SUCCINATE 40 MG: 40 INJECTION, POWDER, LYOPHILIZED, FOR SOLUTION INTRAMUSCULAR; INTRAVENOUS at 09:00

## 2022-01-01 RX ADMIN — ATORVASTATIN CALCIUM 40 MG: 40 TABLET, FILM COATED ORAL at 21:24

## 2022-01-01 RX ADMIN — CLOPIDOGREL BISULFATE 75 MG: 75 TABLET ORAL at 10:46

## 2022-01-01 RX ADMIN — ONDANSETRON 4 MG: 2 INJECTION INTRAMUSCULAR; INTRAVENOUS at 11:15

## 2022-01-01 RX ADMIN — Medication 2 PUFF: at 06:11

## 2022-01-01 RX ADMIN — METOPROLOL SUCCINATE 100 MG: 50 TABLET, EXTENDED RELEASE ORAL at 20:23

## 2022-01-01 RX ADMIN — ALBUTEROL SULFATE 2 PUFF: 90 AEROSOL, METERED RESPIRATORY (INHALATION) at 07:51

## 2022-01-01 RX ADMIN — INSULIN GLARGINE 30 UNITS: 100 INJECTION, SOLUTION SUBCUTANEOUS at 20:54

## 2022-01-01 RX ADMIN — METHYLPREDNISOLONE SODIUM SUCCINATE 40 MG: 40 INJECTION, POWDER, FOR SOLUTION INTRAMUSCULAR; INTRAVENOUS at 10:13

## 2022-01-01 RX ADMIN — MORPHINE SULFATE 2 MG: 2 INJECTION, SOLUTION INTRAMUSCULAR; INTRAVENOUS at 03:30

## 2022-01-01 RX ADMIN — PANTOPRAZOLE SODIUM 40 MG: 40 TABLET, DELAYED RELEASE ORAL at 08:41

## 2022-01-01 RX ADMIN — MORPHINE SULFATE 2 MG: 2 INJECTION, SOLUTION INTRAMUSCULAR; INTRAVENOUS at 17:08

## 2022-01-01 RX ADMIN — SODIUM CHLORIDE, PRESERVATIVE FREE 10 ML: 5 INJECTION INTRAVENOUS at 21:38

## 2022-01-01 RX ADMIN — PHENYLEPHRINE HYDROCHLORIDE 50 MCG/MIN: 10 INJECTION INTRAVENOUS at 07:53

## 2022-01-01 RX ADMIN — CLOPIDOGREL BISULFATE 75 MG: 75 TABLET ORAL at 10:13

## 2022-01-01 RX ADMIN — SODIUM CHLORIDE: 9 INJECTION, SOLUTION INTRAVENOUS at 10:29

## 2022-01-01 RX ADMIN — SODIUM CHLORIDE, PRESERVATIVE FREE 10 ML: 5 INJECTION INTRAVENOUS at 10:15

## 2022-01-01 RX ADMIN — ATORVASTATIN CALCIUM 40 MG: 40 TABLET, FILM COATED ORAL at 21:20

## 2022-01-01 RX ADMIN — PANTOPRAZOLE SODIUM 40 MG: 40 INJECTION, POWDER, FOR SOLUTION INTRAVENOUS at 21:02

## 2022-01-01 RX ADMIN — SODIUM CHLORIDE, POTASSIUM CHLORIDE, SODIUM LACTATE AND CALCIUM CHLORIDE 1000 ML: 600; 310; 30; 20 INJECTION, SOLUTION INTRAVENOUS at 18:48

## 2022-01-01 RX ADMIN — PANTOPRAZOLE SODIUM 40 MG: 40 TABLET, DELAYED RELEASE ORAL at 05:55

## 2022-01-01 RX ADMIN — IPRATROPIUM BROMIDE AND ALBUTEROL SULFATE 1 AMPULE: .5; 2.5 SOLUTION RESPIRATORY (INHALATION) at 04:27

## 2022-01-01 RX ADMIN — ALBUTEROL SULFATE 2 PUFF: 90 AEROSOL, METERED RESPIRATORY (INHALATION) at 11:11

## 2022-01-01 RX ADMIN — MIDODRINE HYDROCHLORIDE 10 MG: 5 TABLET ORAL at 11:03

## 2022-01-01 RX ADMIN — AMIODARONE HYDROCHLORIDE 150 MG: 50 INJECTION, SOLUTION INTRAVENOUS at 07:07

## 2022-01-01 RX ADMIN — DILTIAZEM HYDROCHLORIDE 5 MG: 5 INJECTION INTRAVENOUS at 06:09

## 2022-01-01 RX ADMIN — PIPERACILLIN AND TAZOBACTAM 3375 MG: 3; .375 INJECTION, POWDER, LYOPHILIZED, FOR SOLUTION INTRAVENOUS at 21:20

## 2022-01-01 RX ADMIN — SODIUM CHLORIDE, PRESERVATIVE FREE 10 ML: 5 INJECTION INTRAVENOUS at 21:19

## 2022-01-01 RX ADMIN — PROTAMINE SULFATE 30 MG: 10 INJECTION, SOLUTION INTRAVENOUS at 10:39

## 2022-01-01 RX ADMIN — ASPIRIN 81 MG 81 MG: 81 TABLET ORAL at 08:04

## 2022-01-01 RX ADMIN — ALBUTEROL SULFATE 2 PUFF: 90 AEROSOL, METERED RESPIRATORY (INHALATION) at 06:10

## 2022-01-01 RX ADMIN — Medication 2 PUFF: at 12:35

## 2022-01-01 RX ADMIN — ROCURONIUM BROMIDE 10 MG: 10 INJECTION INTRAVENOUS at 10:11

## 2022-01-01 RX ADMIN — Medication 2 PUFF: at 20:47

## 2022-01-01 RX ADMIN — METHYLPREDNISOLONE SODIUM SUCCINATE 40 MG: 40 INJECTION, POWDER, LYOPHILIZED, FOR SOLUTION INTRAMUSCULAR; INTRAVENOUS at 21:15

## 2022-01-01 RX ADMIN — SODIUM CHLORIDE: 9 INJECTION, SOLUTION INTRAVENOUS at 11:25

## 2022-01-01 RX ADMIN — APIXABAN 5 MG: 5 TABLET, FILM COATED ORAL at 21:20

## 2022-01-01 RX ADMIN — METHYLPREDNISOLONE SODIUM SUCCINATE 40 MG: 40 INJECTION, POWDER, LYOPHILIZED, FOR SOLUTION INTRAMUSCULAR; INTRAVENOUS at 13:32

## 2022-01-01 RX ADMIN — SODIUM CHLORIDE 250 ML/HR: 9 INJECTION, SOLUTION INTRAVENOUS at 10:38

## 2022-01-01 RX ADMIN — SODIUM CHLORIDE, PRESERVATIVE FREE 10 ML: 5 INJECTION INTRAVENOUS at 10:29

## 2022-01-01 RX ADMIN — BUDESONIDE AND FORMOTEROL FUMARATE DIHYDRATE 2 PUFF: 80; 4.5 AEROSOL RESPIRATORY (INHALATION) at 11:12

## 2022-01-01 RX ADMIN — BUDESONIDE AND FORMOTEROL FUMARATE DIHYDRATE 2 PUFF: 80; 4.5 AEROSOL RESPIRATORY (INHALATION) at 08:44

## 2022-01-01 RX ADMIN — ACETAMINOPHEN 650 MG: 325 TABLET ORAL at 15:48

## 2022-01-01 RX ADMIN — COLCHICINE 0.6 MG: 0.6 TABLET ORAL at 08:30

## 2022-01-01 RX ADMIN — METOPROLOL SUCCINATE 50 MG: 50 TABLET, FILM COATED, EXTENDED RELEASE ORAL at 08:29

## 2022-01-01 RX ADMIN — DEXAMETHASONE SODIUM PHOSPHATE 4 MG: 4 INJECTION, SOLUTION INTRAMUSCULAR; INTRAVENOUS at 07:58

## 2022-01-01 RX ADMIN — BUDESONIDE AND FORMOTEROL FUMARATE DIHYDRATE 2 PUFF: 80; 4.5 AEROSOL RESPIRATORY (INHALATION) at 07:21

## 2022-01-01 RX ADMIN — SODIUM CHLORIDE 250 ML: 9 INJECTION, SOLUTION INTRAVENOUS at 06:04

## 2022-01-01 RX ADMIN — FLUTICASONE PROPIONATE 1 SPRAY: 50 SPRAY, METERED NASAL at 23:34

## 2022-01-01 RX ADMIN — ACETAMINOPHEN 650 MG: 325 TABLET ORAL at 17:39

## 2022-01-01 RX ADMIN — SUGAMMADEX 400 MG: 100 INJECTION, SOLUTION INTRAVENOUS at 10:45

## 2022-01-01 RX ADMIN — ASPIRIN 81 MG 81 MG: 81 TABLET ORAL at 18:29

## 2022-01-01 RX ADMIN — METHYLPREDNISOLONE SODIUM SUCCINATE 40 MG: 40 INJECTION, POWDER, LYOPHILIZED, FOR SOLUTION INTRAMUSCULAR; INTRAVENOUS at 21:49

## 2022-01-01 RX ADMIN — MORPHINE SULFATE 2 MG: 2 INJECTION, SOLUTION INTRAMUSCULAR; INTRAVENOUS at 19:00

## 2022-01-01 RX ADMIN — AMIODARONE HYDROCHLORIDE 200 MG: 200 TABLET ORAL at 10:46

## 2022-01-01 RX ADMIN — MORPHINE SULFATE 2 MG: 2 INJECTION, SOLUTION INTRAMUSCULAR; INTRAVENOUS at 10:13

## 2022-01-01 RX ADMIN — ACETAMINOPHEN 650 MG: 325 TABLET ORAL at 14:00

## 2022-01-01 RX ADMIN — SODIUM CHLORIDE, PRESERVATIVE FREE 10 ML: 5 INJECTION INTRAVENOUS at 20:20

## 2022-01-01 RX ADMIN — TORSEMIDE 20 MG: 20 TABLET ORAL at 10:15

## 2022-01-01 RX ADMIN — ALBUTEROL SULFATE 2 PUFF: 90 AEROSOL, METERED RESPIRATORY (INHALATION) at 20:47

## 2022-01-01 RX ADMIN — APIXABAN 5 MG: 5 TABLET, FILM COATED ORAL at 23:33

## 2022-01-01 RX ADMIN — AMIODARONE HYDROCHLORIDE 0.5 MG/MIN: 50 INJECTION, SOLUTION INTRAVENOUS at 15:43

## 2022-01-01 RX ADMIN — AMIODARONE HYDROCHLORIDE 200 MG: 200 TABLET ORAL at 09:02

## 2022-01-01 RX ADMIN — PANTOPRAZOLE SODIUM 40 MG: 40 TABLET, DELAYED RELEASE ORAL at 05:37

## 2022-01-01 RX ADMIN — SODIUM ZIRCONIUM CYCLOSILICATE 10 G: 5 POWDER, FOR SUSPENSION ORAL at 08:29

## 2022-01-01 RX ADMIN — FLUTICASONE PROPIONATE 1 SPRAY: 50 SPRAY, METERED NASAL at 09:04

## 2022-01-01 RX ADMIN — APIXABAN 5 MG: 5 TABLET, FILM COATED ORAL at 20:57

## 2022-01-01 RX ADMIN — ATORVASTATIN CALCIUM 40 MG: 40 TABLET, FILM COATED ORAL at 21:13

## 2022-01-01 RX ADMIN — PANTOPRAZOLE SODIUM 40 MG: 40 TABLET, DELAYED RELEASE ORAL at 08:58

## 2022-01-01 RX ADMIN — METOPROLOL SUCCINATE 100 MG: 50 TABLET, EXTENDED RELEASE ORAL at 20:19

## 2022-01-01 RX ADMIN — ROCURONIUM BROMIDE 10 MG: 10 INJECTION INTRAVENOUS at 09:33

## 2022-01-01 RX ADMIN — SUCCINYLCHOLINE CHLORIDE 100 MG: 20 INJECTION, SOLUTION INTRAMUSCULAR; INTRAVENOUS at 07:50

## 2022-01-01 RX ADMIN — ALBUTEROL SULFATE 2 PUFF: 90 AEROSOL, METERED RESPIRATORY (INHALATION) at 07:21

## 2022-01-01 RX ADMIN — ALBUTEROL SULFATE 2 PUFF: 90 AEROSOL, METERED RESPIRATORY (INHALATION) at 20:32

## 2022-01-01 RX ADMIN — TORSEMIDE 20 MG: 20 TABLET ORAL at 09:02

## 2022-01-01 RX ADMIN — ROCURONIUM BROMIDE 40 MG: 10 INJECTION INTRAVENOUS at 08:02

## 2022-01-01 RX ADMIN — APIXABAN 5 MG: 5 TABLET, FILM COATED ORAL at 08:58

## 2022-01-01 RX ADMIN — MORPHINE SULFATE 2 MG: 2 INJECTION, SOLUTION INTRAMUSCULAR; INTRAVENOUS at 12:07

## 2022-01-01 RX ADMIN — BUDESONIDE AND FORMOTEROL FUMARATE DIHYDRATE 2 PUFF: 80; 4.5 AEROSOL RESPIRATORY (INHALATION) at 20:02

## 2022-01-01 RX ADMIN — Medication 2 PUFF: at 07:21

## 2022-01-01 RX ADMIN — PERFLUTREN 2.2 MG: 6.52 INJECTION, SUSPENSION INTRAVENOUS at 09:33

## 2022-01-01 RX ADMIN — OXYCODONE AND ACETAMINOPHEN 1 TABLET: 5; 325 TABLET ORAL at 14:43

## 2022-01-01 RX ADMIN — TORSEMIDE 20 MG: 20 TABLET ORAL at 23:33

## 2022-01-01 RX ADMIN — ATORVASTATIN CALCIUM 40 MG: 40 TABLET, FILM COATED ORAL at 21:38

## 2022-01-01 RX ADMIN — AMIODARONE HYDROCHLORIDE 200 MG: 200 TABLET ORAL at 21:24

## 2022-01-01 RX ADMIN — MIDODRINE HYDROCHLORIDE 10 MG: 5 TABLET ORAL at 08:57

## 2022-01-01 RX ADMIN — BUDESONIDE AND FORMOTEROL FUMARATE DIHYDRATE 2 PUFF: 80; 4.5 AEROSOL RESPIRATORY (INHALATION) at 07:52

## 2022-01-01 RX ADMIN — AMIODARONE HYDROCHLORIDE 150 MG: 50 INJECTION, SOLUTION INTRAVENOUS at 10:37

## 2022-01-01 RX ADMIN — METOPROLOL SUCCINATE 100 MG: 50 TABLET, FILM COATED, EXTENDED RELEASE ORAL at 21:24

## 2022-01-01 RX ADMIN — MIDODRINE HYDROCHLORIDE 10 MG: 5 TABLET ORAL at 11:15

## 2022-01-01 RX ADMIN — FENTANYL CITRATE 100 MCG: 50 INJECTION, SOLUTION INTRAMUSCULAR; INTRAVENOUS at 07:50

## 2022-01-01 RX ADMIN — OXYCODONE HYDROCHLORIDE 5 MG: 5 TABLET ORAL at 16:15

## 2022-01-01 RX ADMIN — METOPROLOL SUCCINATE 50 MG: 50 TABLET, FILM COATED, EXTENDED RELEASE ORAL at 07:50

## 2022-01-01 RX ADMIN — SODIUM CHLORIDE, PRESERVATIVE FREE 10 ML: 5 INJECTION INTRAVENOUS at 23:34

## 2022-01-01 RX ADMIN — INSULIN GLARGINE 35 UNITS: 100 INJECTION, SOLUTION SUBCUTANEOUS at 21:14

## 2022-01-01 RX ADMIN — ONDANSETRON 4 MG: 2 INJECTION INTRAMUSCULAR; INTRAVENOUS at 01:11

## 2022-01-01 RX ADMIN — MORPHINE SULFATE 2 MG: 2 INJECTION, SOLUTION INTRAMUSCULAR; INTRAVENOUS at 16:35

## 2022-01-01 RX ADMIN — MORPHINE SULFATE 2 MG: 2 INJECTION, SOLUTION INTRAMUSCULAR; INTRAVENOUS at 01:53

## 2022-01-01 RX ADMIN — ALBUTEROL SULFATE 2 PUFF: 90 AEROSOL, METERED RESPIRATORY (INHALATION) at 20:01

## 2022-01-01 RX ADMIN — LIDOCAINE HYDROCHLORIDE 60 MG: 20 INJECTION, SOLUTION EPIDURAL; INFILTRATION; INTRACAUDAL; PERINEURAL at 07:50

## 2022-01-01 RX ADMIN — MIDODRINE HYDROCHLORIDE 10 MG: 5 TABLET ORAL at 16:15

## 2022-01-01 RX ADMIN — FLUTICASONE PROPIONATE 1 SPRAY: 50 SPRAY, METERED NASAL at 11:01

## 2022-01-01 RX ADMIN — SODIUM CHLORIDE, PRESERVATIVE FREE 10 ML: 5 INJECTION INTRAVENOUS at 20:22

## 2022-01-01 RX ADMIN — INSULIN GLARGINE 35 UNITS: 100 INJECTION, SOLUTION SUBCUTANEOUS at 21:46

## 2022-01-01 RX ADMIN — CLOPIDOGREL BISULFATE 75 MG: 75 TABLET ORAL at 07:50

## 2022-01-01 RX ADMIN — Medication 100 MCG: at 07:53

## 2022-01-01 RX ADMIN — FLUTICASONE PROPIONATE 1 SPRAY: 50 SPRAY, METERED NASAL at 14:02

## 2022-01-01 RX ADMIN — DIGOXIN 125 MCG: 125 TABLET ORAL at 12:47

## 2022-01-01 RX ADMIN — APIXABAN 5 MG: 5 TABLET, FILM COATED ORAL at 15:49

## 2022-01-01 RX ADMIN — DILTIAZEM HYDROCHLORIDE 5 MG: 5 INJECTION INTRAVENOUS at 05:36

## 2022-01-01 RX ADMIN — FENTANYL CITRATE 50 MCG: 50 INJECTION INTRAMUSCULAR; INTRAVENOUS at 06:18

## 2022-01-01 RX ADMIN — MIDODRINE HYDROCHLORIDE 10 MG: 5 TABLET ORAL at 15:48

## 2022-01-01 RX ADMIN — FUROSEMIDE 10 MG: 10 INJECTION, SOLUTION INTRAVENOUS at 09:42

## 2022-01-01 RX ADMIN — AMIODARONE HYDROCHLORIDE 200 MG: 200 TABLET ORAL at 14:43

## 2022-01-01 RX ADMIN — SODIUM CHLORIDE, PRESERVATIVE FREE 10 ML: 5 INJECTION INTRAVENOUS at 08:31

## 2022-01-01 RX ADMIN — INSULIN GLARGINE 35 UNITS: 100 INJECTION, SOLUTION SUBCUTANEOUS at 21:25

## 2022-01-01 RX ADMIN — DILTIAZEM HYDROCHLORIDE 5 MG: 5 INJECTION INTRAVENOUS at 09:13

## 2022-01-01 RX ADMIN — OXYCODONE AND ACETAMINOPHEN 1 TABLET: 5; 325 TABLET ORAL at 22:22

## 2022-01-01 RX ADMIN — ASPIRIN 81 MG 324 MG: 81 TABLET ORAL at 07:44

## 2022-01-01 RX ADMIN — SODIUM CHLORIDE, PRESERVATIVE FREE 10 ML: 5 INJECTION INTRAVENOUS at 20:58

## 2022-01-01 RX ADMIN — APIXABAN 5 MG: 5 TABLET, FILM COATED ORAL at 10:13

## 2022-01-01 RX ADMIN — SODIUM ZIRCONIUM CYCLOSILICATE 10 G: 5 POWDER, FOR SUSPENSION ORAL at 21:30

## 2022-01-01 RX ADMIN — SODIUM CHLORIDE 1000 ML: 9 INJECTION, SOLUTION INTRAVENOUS at 06:19

## 2022-01-01 RX ADMIN — BUDESONIDE AND FORMOTEROL FUMARATE DIHYDRATE 2 PUFF: 80; 4.5 AEROSOL RESPIRATORY (INHALATION) at 20:47

## 2022-01-01 RX ADMIN — ALBUTEROL SULFATE 2 PUFF: 90 AEROSOL, METERED RESPIRATORY (INHALATION) at 19:39

## 2022-01-01 RX ADMIN — SODIUM CHLORIDE: 9 INJECTION, SOLUTION INTRAVENOUS at 13:29

## 2022-01-01 RX ADMIN — MORPHINE SULFATE 4 MG: 4 INJECTION INTRAVENOUS at 09:13

## 2022-01-01 RX ADMIN — SODIUM CHLORIDE 500 ML: 9 INJECTION, SOLUTION INTRAVENOUS at 08:09

## 2022-01-01 RX ADMIN — FLUTICASONE PROPIONATE 1 SPRAY: 50 SPRAY, METERED NASAL at 08:14

## 2022-01-01 RX ADMIN — MORPHINE SULFATE 4 MG: 4 INJECTION INTRAVENOUS at 19:20

## 2022-01-01 RX ADMIN — BUDESONIDE AND FORMOTEROL FUMARATE DIHYDRATE 2 PUFF: 80; 4.5 AEROSOL RESPIRATORY (INHALATION) at 19:40

## 2022-01-01 RX ADMIN — POTASSIUM CHLORIDE 10 MEQ: 1500 TABLET, EXTENDED RELEASE ORAL at 23:33

## 2022-01-01 RX ADMIN — BUDESONIDE AND FORMOTEROL FUMARATE DIHYDRATE 2 PUFF: 80; 4.5 AEROSOL RESPIRATORY (INHALATION) at 21:30

## 2022-01-01 RX ADMIN — MIDODRINE HYDROCHLORIDE 10 MG: 5 TABLET ORAL at 12:00

## 2022-01-01 RX ADMIN — NITROGLYCERIN 0.4 MG: 0.4 TABLET, ORALLY DISINTEGRATING SUBLINGUAL at 16:25

## 2022-01-01 RX ADMIN — ONDANSETRON 4 MG: 2 INJECTION INTRAMUSCULAR; INTRAVENOUS at 10:45

## 2022-01-01 RX ADMIN — METHYLPREDNISOLONE SODIUM SUCCINATE 40 MG: 40 INJECTION, POWDER, LYOPHILIZED, FOR SOLUTION INTRAMUSCULAR; INTRAVENOUS at 21:38

## 2022-01-01 RX ADMIN — METOPROLOL SUCCINATE 50 MG: 50 TABLET, FILM COATED, EXTENDED RELEASE ORAL at 13:30

## 2022-01-01 RX ADMIN — POLYETHYLENE GLYCOL (3350) 17 G: 17 POWDER, FOR SOLUTION ORAL at 10:13

## 2022-01-01 RX ADMIN — ASPIRIN 81 MG 81 MG: 81 TABLET ORAL at 07:50

## 2022-01-01 RX ADMIN — ROCURONIUM BROMIDE 10 MG: 10 INJECTION INTRAVENOUS at 07:50

## 2022-01-01 RX ADMIN — Medication 2 MCG/MIN: at 15:47

## 2022-01-01 RX ADMIN — ATORVASTATIN CALCIUM 40 MG: 40 TABLET, FILM COATED ORAL at 23:33

## 2022-01-01 RX ADMIN — ASPIRIN 81 MG 81 MG: 81 TABLET ORAL at 09:02

## 2022-01-01 RX ADMIN — INSULIN LISPRO 3 UNITS: 100 INJECTION, SOLUTION INTRAVENOUS; SUBCUTANEOUS at 21:15

## 2022-01-01 RX ADMIN — PROTAMINE SULFATE 10 MG: 10 INJECTION, SOLUTION INTRAVENOUS at 10:52

## 2022-01-01 RX ADMIN — POLYETHYLENE GLYCOL (3350) 17 G: 17 POWDER, FOR SOLUTION ORAL at 11:02

## 2022-01-01 RX ADMIN — CEFEPIME HYDROCHLORIDE 1000 MG: 1 INJECTION, POWDER, FOR SOLUTION INTRAMUSCULAR; INTRAVENOUS at 16:45

## 2022-01-01 RX ADMIN — SODIUM CHLORIDE, PRESERVATIVE FREE 10 ML: 5 INJECTION INTRAVENOUS at 21:02

## 2022-01-01 RX ADMIN — METOPROLOL SUCCINATE 50 MG: 50 TABLET, EXTENDED RELEASE ORAL at 18:28

## 2022-01-01 RX ADMIN — INSULIN LISPRO 5 UNITS: 100 INJECTION, SOLUTION INTRAVENOUS; SUBCUTANEOUS at 21:46

## 2022-01-01 RX ADMIN — PANTOPRAZOLE SODIUM 40 MG: 40 TABLET, DELAYED RELEASE ORAL at 15:49

## 2022-01-01 RX ADMIN — INSULIN LISPRO 2 UNITS: 100 INJECTION, SOLUTION INTRAVENOUS; SUBCUTANEOUS at 21:39

## 2022-01-01 RX ADMIN — CLOPIDOGREL BISULFATE 75 MG: 75 TABLET ORAL at 08:05

## 2022-01-01 RX ADMIN — AMIODARONE HYDROCHLORIDE 400 MG: 200 TABLET ORAL at 13:29

## 2022-01-01 RX ADMIN — SODIUM CHLORIDE: 9 INJECTION, SOLUTION INTRAVENOUS at 02:34

## 2022-01-01 RX ADMIN — Medication 1.07 MCG/MIN: at 12:23

## 2022-01-01 RX ADMIN — MORPHINE SULFATE 2 MG: 2 INJECTION, SOLUTION INTRAMUSCULAR; INTRAVENOUS at 22:02

## 2022-01-01 RX ADMIN — FLUTICASONE PROPIONATE 1 SPRAY: 50 SPRAY, METERED NASAL at 09:02

## 2022-01-01 RX ADMIN — MORPHINE SULFATE 2 MG: 2 INJECTION, SOLUTION INTRAMUSCULAR; INTRAVENOUS at 05:33

## 2022-01-01 RX ADMIN — METOPROLOL SUCCINATE 100 MG: 50 TABLET, FILM COATED, EXTENDED RELEASE ORAL at 08:57

## 2022-01-01 RX ADMIN — MIDODRINE HYDROCHLORIDE 10 MG: 5 TABLET ORAL at 13:31

## 2022-01-01 RX ADMIN — ACETAMINOPHEN 650 MG: 325 TABLET ORAL at 20:19

## 2022-01-01 RX ADMIN — SODIUM CHLORIDE: 9 INJECTION, SOLUTION INTRAVENOUS at 12:06

## 2022-01-01 RX ADMIN — Medication 6 MILLICURIE: at 16:10

## 2022-01-01 RX ADMIN — PANTOPRAZOLE SODIUM 40 MG: 40 TABLET, DELAYED RELEASE ORAL at 06:18

## 2022-01-01 RX ADMIN — BUDESONIDE AND FORMOTEROL FUMARATE DIHYDRATE 2 PUFF: 80; 4.5 AEROSOL RESPIRATORY (INHALATION) at 20:33

## 2022-01-01 RX ADMIN — ACETAMINOPHEN 650 MG: 325 TABLET ORAL at 07:51

## 2022-01-01 RX ADMIN — SODIUM ZIRCONIUM CYCLOSILICATE 10 G: 5 POWDER, FOR SUSPENSION ORAL at 09:47

## 2022-01-01 RX ADMIN — AMIODARONE HYDROCHLORIDE 1 MG/MIN: 50 INJECTION, SOLUTION INTRAVENOUS at 10:49

## 2022-01-01 RX ADMIN — SODIUM CHLORIDE, PRESERVATIVE FREE 10 ML: 5 INJECTION INTRAVENOUS at 10:11

## 2022-01-01 RX ADMIN — METHYLPREDNISOLONE SODIUM SUCCINATE 40 MG: 40 INJECTION, POWDER, LYOPHILIZED, FOR SOLUTION INTRAMUSCULAR; INTRAVENOUS at 10:10

## 2022-01-01 RX ADMIN — METOPROLOL SUCCINATE 100 MG: 50 TABLET, EXTENDED RELEASE ORAL at 09:02

## 2022-01-01 RX ADMIN — ALBUTEROL SULFATE 2 PUFF: 90 AEROSOL, METERED RESPIRATORY (INHALATION) at 21:30

## 2022-01-01 RX ADMIN — SODIUM CHLORIDE, PRESERVATIVE FREE 10 ML: 5 INJECTION INTRAVENOUS at 11:13

## 2022-01-01 RX ADMIN — BUDESONIDE AND FORMOTEROL FUMARATE DIHYDRATE 2 PUFF: 80; 4.5 AEROSOL RESPIRATORY (INHALATION) at 20:26

## 2022-01-01 RX ADMIN — APIXABAN 5 MG: 5 TABLET, FILM COATED ORAL at 10:46

## 2022-01-01 RX ADMIN — Medication 2 PUFF: at 20:26

## 2022-01-01 RX ADMIN — PANTOPRAZOLE SODIUM 40 MG: 40 TABLET, DELAYED RELEASE ORAL at 06:38

## 2022-01-01 RX ADMIN — APIXABAN 5 MG: 5 TABLET, FILM COATED ORAL at 09:02

## 2022-01-01 RX ADMIN — CLOPIDOGREL BISULFATE 75 MG: 75 TABLET ORAL at 13:31

## 2022-01-01 RX ADMIN — ATORVASTATIN CALCIUM 40 MG: 40 TABLET, FILM COATED ORAL at 20:58

## 2022-01-01 RX ADMIN — METHYLPREDNISOLONE SODIUM SUCCINATE 40 MG: 40 INJECTION, POWDER, LYOPHILIZED, FOR SOLUTION INTRAMUSCULAR; INTRAVENOUS at 22:03

## 2022-01-01 RX ADMIN — ASPIRIN 81 MG 81 MG: 81 TABLET ORAL at 14:43

## 2022-01-01 RX ADMIN — ALBUTEROL SULFATE 2 PUFF: 90 AEROSOL, METERED RESPIRATORY (INHALATION) at 20:26

## 2022-01-01 RX ADMIN — AMIODARONE HYDROCHLORIDE 0.5 MG/MIN: 50 INJECTION, SOLUTION INTRAVENOUS at 20:10

## 2022-01-01 RX ADMIN — OXYCODONE HYDROCHLORIDE 5 MG: 5 TABLET ORAL at 01:01

## 2022-01-01 RX ADMIN — APIXABAN 5 MG: 5 TABLET, FILM COATED ORAL at 08:29

## 2022-01-01 RX ADMIN — MIDODRINE HYDROCHLORIDE 10 MG: 5 TABLET ORAL at 10:15

## 2022-01-01 RX ADMIN — COLCHICINE 0.6 MG: 0.6 TABLET ORAL at 19:06

## 2022-01-01 RX ADMIN — ALBUTEROL SULFATE 2 PUFF: 90 AEROSOL, METERED RESPIRATORY (INHALATION) at 08:43

## 2022-01-01 RX ADMIN — BENZONATATE 100 MG: 100 CAPSULE ORAL at 06:19

## 2022-01-01 RX ADMIN — MIDODRINE HYDROCHLORIDE 10 MG: 5 TABLET ORAL at 16:27

## 2022-01-01 RX ADMIN — NITROGLYCERIN 0.4 MG: 0.4 TABLET, ORALLY DISINTEGRATING SUBLINGUAL at 16:29

## 2022-01-01 RX ADMIN — ONDANSETRON 4 MG: 4 TABLET, ORALLY DISINTEGRATING ORAL at 12:19

## 2022-01-01 RX ADMIN — ASPIRIN 81 MG 81 MG: 81 TABLET ORAL at 13:30

## 2022-01-01 RX ADMIN — MORPHINE SULFATE 4 MG: 4 INJECTION INTRAVENOUS at 03:59

## 2022-01-01 RX ADMIN — INSULIN LISPRO 4 UNITS: 100 INJECTION, SOLUTION INTRAVENOUS; SUBCUTANEOUS at 18:23

## 2022-01-01 RX ADMIN — COLCHICINE 0.6 MG: 0.6 TABLET ORAL at 09:00

## 2022-01-01 RX ADMIN — INSULIN LISPRO 2 UNITS: 100 INJECTION, SOLUTION INTRAVENOUS; SUBCUTANEOUS at 12:30

## 2022-01-01 RX ADMIN — INSULIN GLARGINE 35 UNITS: 100 INJECTION, SOLUTION SUBCUTANEOUS at 00:07

## 2022-01-01 RX ADMIN — CLOPIDOGREL BISULFATE 75 MG: 75 TABLET ORAL at 18:28

## 2022-01-01 RX ADMIN — FUROSEMIDE 40 MG: 10 INJECTION, SOLUTION INTRAMUSCULAR; INTRAVENOUS at 08:29

## 2022-01-01 RX ADMIN — SODIUM ZIRCONIUM CYCLOSILICATE 10 G: 5 POWDER, FOR SUSPENSION ORAL at 21:38

## 2022-01-01 RX ADMIN — ATORVASTATIN CALCIUM 40 MG: 40 TABLET, FILM COATED ORAL at 20:19

## 2022-01-01 RX ADMIN — METHYLPREDNISOLONE SODIUM SUCCINATE 40 MG: 40 INJECTION, POWDER, LYOPHILIZED, FOR SOLUTION INTRAMUSCULAR; INTRAVENOUS at 08:30

## 2022-01-01 RX ADMIN — AMIODARONE HYDROCHLORIDE 0.5 MG/MIN: 50 INJECTION, SOLUTION INTRAVENOUS at 23:28

## 2022-01-01 RX ADMIN — MIDODRINE HYDROCHLORIDE 10 MG: 5 TABLET ORAL at 17:01

## 2022-01-01 RX ADMIN — APIXABAN 5 MG: 5 TABLET, FILM COATED ORAL at 08:30

## 2022-01-01 RX ADMIN — ALBUTEROL SULFATE 2 PUFF: 90 AEROSOL, METERED RESPIRATORY (INHALATION) at 07:37

## 2022-01-01 RX ADMIN — MIDODRINE HYDROCHLORIDE 10 MG: 5 TABLET ORAL at 07:50

## 2022-01-01 RX ADMIN — APIXABAN 5 MG: 5 TABLET, FILM COATED ORAL at 21:38

## 2022-01-01 RX ADMIN — INSULIN LISPRO 6 UNITS: 100 INJECTION, SOLUTION INTRAVENOUS; SUBCUTANEOUS at 20:55

## 2022-01-01 RX ADMIN — SODIUM CHLORIDE: 9 INJECTION, SOLUTION INTRAVENOUS at 20:21

## 2022-01-01 ASSESSMENT — PULMONARY FUNCTION TESTS
PIF_VALUE: 1
PIF_VALUE: 25
PIF_VALUE: 26
FVC_POST: 83
PIF_VALUE: 5
PIF_VALUE: 27
PIF_VALUE: 25
PIF_VALUE: 25
PIF_VALUE: 24
PIF_VALUE: 29
PIF_VALUE: 24
PIF_VALUE: 25
FEV1_PERCENT_PREDICTED_POST: 70
PIF_VALUE: 24
PIF_VALUE: 12
PIF_VALUE: 2
PIF_VALUE: 35
PIF_VALUE: 25
PIF_VALUE: 25
PIF_VALUE: 27
PIF_VALUE: 26
PIF_VALUE: 26
PIF_VALUE: 24
PIF_VALUE: 26
PIF_VALUE: 25
PIF_VALUE: 15
PIF_VALUE: 25
PIF_VALUE: 27
PIF_VALUE: 12
PIF_VALUE: 25
PIF_VALUE: 26
PIF_VALUE: 26
PIF_VALUE: 24
PIF_VALUE: 26
PIF_VALUE: 25
PIF_VALUE: 25
PIF_VALUE: 24
PIF_VALUE: 25
PIF_VALUE: 25
PIF_VALUE: 24
PIF_VALUE: 27
PIF_VALUE: 24
PIF_VALUE: 25
PIF_VALUE: 24
PIF_VALUE: 24
PIF_VALUE: 2
PIF_VALUE: 26
PIF_VALUE: 26
PIF_VALUE: 27
PIF_VALUE: 24
PIF_VALUE: 27
PIF_VALUE: 24
PIF_VALUE: 26
PIF_VALUE: 27
PIF_VALUE: 27
PIF_VALUE: 24
PIF_VALUE: 2
PIF_VALUE: 24
PIF_VALUE: 26
PIF_VALUE: 4
PIF_VALUE: 26
PIF_VALUE: 24
PIF_VALUE: 27
PIF_VALUE: 1
PIF_VALUE: 27
PIF_VALUE: 1
PIF_VALUE: 0
PIF_VALUE: 2
PIF_VALUE: 25
PIF_VALUE: 0
PIF_VALUE: 24
PIF_VALUE: 7
PIF_VALUE: 26
PIF_VALUE: 25
PIF_VALUE: 1
PIF_VALUE: 24
PIF_VALUE: 19
PIF_VALUE: 25
PIF_VALUE: 24
PIF_VALUE: 1
PIF_VALUE: 26
PIF_VALUE: 24
PIF_VALUE: 26
PIF_VALUE: 27
PIF_VALUE: 26
PIF_VALUE: 25
PIF_VALUE: 25
PIF_VALUE: 1
PIF_VALUE: 0
FEV1_PERCENT_PREDICTED_PRE: 68
PIF_VALUE: 1
PIF_VALUE: 28
PIF_VALUE: 24
PIF_VALUE: 29
PIF_VALUE: 26
PIF_VALUE: 25
PIF_VALUE: 15
PIF_VALUE: 26
PIF_VALUE: 24
PIF_VALUE: 2
PIF_VALUE: 29
PIF_VALUE: 27
PIF_VALUE: 24
PIF_VALUE: 26
PIF_VALUE: 24
PIF_VALUE: 26
PIF_VALUE: 2
PIF_VALUE: 0
PIF_VALUE: 1
PIF_VALUE: 27
PIF_VALUE: 19
PIF_VALUE: 1
PIF_VALUE: 26
PIF_VALUE: 25
PIF_VALUE: 26
PIF_VALUE: 18
PIF_VALUE: 25
PIF_VALUE: 25
PIF_VALUE: 26
PIF_VALUE: 27
PIF_VALUE: 12
FVC_PREDICTED: 76
PIF_VALUE: 26
PIF_VALUE: 24
PIF_VALUE: 25
PIF_VALUE: 25
PIF_VALUE: 24
PIF_VALUE: 24
PIF_VALUE: 25
PIF_VALUE: 24
PIF_VALUE: 26
PIF_VALUE: 26
PIF_VALUE: 27
PIF_VALUE: 14
PIF_VALUE: 25
PIF_VALUE: 27
PIF_VALUE: 26
PIF_VALUE: 26
PIF_VALUE: 21
PIF_VALUE: 25
PIF_VALUE: 16
PIF_VALUE: 17
PIF_VALUE: 1
PIF_VALUE: 27
PIF_VALUE: 26
PIF_VALUE: 26
PIF_VALUE: 27
PIF_VALUE: 27
PIF_VALUE: 24
PIF_VALUE: 2
PIF_VALUE: 25
FEV1/FVC_POST: 84
PIF_VALUE: 26
PIF_VALUE: 24
PIF_VALUE: 24
PIF_VALUE: 17
PIF_VALUE: 25
PIF_VALUE: 27
PIF_VALUE: 24
PIF_VALUE: 25
PIF_VALUE: 24
PIF_VALUE: 26
PIF_VALUE: 24
PIF_VALUE: 10
PIF_VALUE: 26
PIF_VALUE: 25
PIF_VALUE: 1
PIF_VALUE: 28
PIF_VALUE: 24
PIF_VALUE: 12
PIF_VALUE: 26
PIF_VALUE: 26
PIF_VALUE: 25
PIF_VALUE: 26
PIF_VALUE: 25
PIF_VALUE: 4
PIF_VALUE: 11
PIF_VALUE: 25
PIF_VALUE: 27
PIF_VALUE: 26
PIF_VALUE: 26
PIF_VALUE: 27
PIF_VALUE: 25
PIF_VALUE: 24
PIF_VALUE: 27
PIF_VALUE: 19
PIF_VALUE: 25
PIF_VALUE: 25
PIF_VALUE: 28
PIF_VALUE: 9
FEV1/FVC_PRE: 89
PIF_VALUE: 3
PIF_VALUE: 2
PIF_VALUE: 26
PIF_VALUE: 24
PIF_VALUE: 24
PIF_VALUE: 2
PIF_VALUE: 24
PIF_VALUE: 27
PIF_VALUE: 2
PIF_VALUE: 25
PIF_VALUE: 1
PIF_VALUE: 27
PIF_VALUE: 24
PIF_VALUE: 1
PIF_VALUE: 25
PIF_VALUE: 19
PIF_VALUE: 26
PIF_VALUE: 26
PIF_VALUE: 24
PIF_VALUE: 27
PIF_VALUE: 26
PIF_VALUE: 25
PIF_VALUE: 27

## 2022-01-01 ASSESSMENT — ENCOUNTER SYMPTOMS
COUGH: 0
GASTROINTESTINAL NEGATIVE: 1
VOMITING: 0
EYES NEGATIVE: 1
BACK PAIN: 0
SHORTNESS OF BREATH: 0
DIARRHEA: 0
DIARRHEA: 0
COUGH: 0
ALLERGIC/IMMUNOLOGIC NEGATIVE: 1
COUGH: 1
SINUS PAIN: 0
ALLERGIC/IMMUNOLOGIC NEGATIVE: 1
NAUSEA: 0
ABDOMINAL DISTENTION: 0
RESPIRATORY NEGATIVE: 1
SHORTNESS OF BREATH: 1
EYE REDNESS: 0
BACK PAIN: 0
ABDOMINAL PAIN: 0
COLOR CHANGE: 0
SINUS PRESSURE: 0
SINUS PAIN: 0
COUGH: 0
SHORTNESS OF BREATH: 0
EYES NEGATIVE: 1
EYE PAIN: 0
DIARRHEA: 0
WHEEZING: 0
ALLERGIC/IMMUNOLOGIC NEGATIVE: 1
BACK PAIN: 0
CONSTIPATION: 0
PHOTOPHOBIA: 0
RESPIRATORY NEGATIVE: 1
BLOOD IN STOOL: 0
SHORTNESS OF BREATH: 0
BLOOD IN STOOL: 0
CONSTIPATION: 0
BLOOD IN STOOL: 0
GASTROINTESTINAL NEGATIVE: 1
ABDOMINAL PAIN: 0
EYES NEGATIVE: 1
EYE REDNESS: 0
CHEST TIGHTNESS: 0
VOMITING: 0
ABDOMINAL PAIN: 0
NAUSEA: 0
COLOR CHANGE: 0
GASTROINTESTINAL NEGATIVE: 1
EYE ITCHING: 0
COLOR CHANGE: 0
SINUS PRESSURE: 0
ABDOMINAL DISTENTION: 0
NAUSEA: 0
CONSTIPATION: 0
EYE ITCHING: 0
VOMITING: 0

## 2022-01-01 ASSESSMENT — PAIN DESCRIPTION - DESCRIPTORS
DESCRIPTORS: ACHING;PRESSURE
DESCRIPTORS: ACHING;CONSTANT
DESCRIPTORS: ACHING;CONSTANT;THROBBING
DESCRIPTORS: HEADACHE
DESCRIPTORS: DULL
DESCRIPTORS_3: PINS AND NEEDLES;TINGLING;NUMBNESS
DESCRIPTORS_2: ACHING
DESCRIPTORS: ACHING
DESCRIPTORS: ACHING;PRESSURE
DESCRIPTORS_3: PINS AND NEEDLES;TINGLING;NUMBNESS
DESCRIPTORS: SPASM;DISCOMFORT
DESCRIPTORS_2: ACHING
DESCRIPTORS: ACHING;PRESSURE
DESCRIPTORS: ACHING
DESCRIPTORS_3: PINS AND NEEDLES;TINGLING;NUMBNESS
DESCRIPTORS_2: ACHING
DESCRIPTORS: ACHING;THROBBING

## 2022-01-01 ASSESSMENT — PAIN DESCRIPTION - PROGRESSION
CLINICAL_PROGRESSION: NOT CHANGED
CLINICAL_PROGRESSION_2: NOT CHANGED
CLINICAL_PROGRESSION: GRADUALLY WORSENING
CLINICAL_PROGRESSION: NOT CHANGED
CLINICAL_PROGRESSION: GRADUALLY WORSENING
CLINICAL_PROGRESSION: NOT CHANGED
CLINICAL_PROGRESSION_3: NOT CHANGED
CLINICAL_PROGRESSION: GRADUALLY WORSENING
CLINICAL_PROGRESSION: NOT CHANGED
CLINICAL_PROGRESSION: GRADUALLY WORSENING
CLINICAL_PROGRESSION: NOT CHANGED
CLINICAL_PROGRESSION: GRADUALLY IMPROVING
CLINICAL_PROGRESSION: GRADUALLY WORSENING
CLINICAL_PROGRESSION_3: NOT CHANGED
CLINICAL_PROGRESSION_3: NOT CHANGED
CLINICAL_PROGRESSION: GRADUALLY IMPROVING
CLINICAL_PROGRESSION: NOT CHANGED
CLINICAL_PROGRESSION_2: NOT CHANGED
CLINICAL_PROGRESSION_2: NOT CHANGED
CLINICAL_PROGRESSION: GRADUALLY IMPROVING
CLINICAL_PROGRESSION: GRADUALLY IMPROVING

## 2022-01-01 ASSESSMENT — PAIN DESCRIPTION - ORIENTATION
ORIENTATION: MID
ORIENTATION: MID;LOWER
ORIENTATION: ANTERIOR
ORIENTATION_3: RIGHT
ORIENTATION: ANTERIOR
ORIENTATION_3: RIGHT
ORIENTATION_3: RIGHT
ORIENTATION: RIGHT;MID
ORIENTATION_2: LOWER
ORIENTATION_2: LOWER
ORIENTATION: MID
ORIENTATION_2: LOWER
ORIENTATION: RIGHT
ORIENTATION: OTHER (COMMENT)
ORIENTATION: ANTERIOR
ORIENTATION_3: RIGHT
ORIENTATION: MID;LOWER

## 2022-01-01 ASSESSMENT — PAIN DESCRIPTION - FREQUENCY
FREQUENCY: CONTINUOUS

## 2022-01-01 ASSESSMENT — PAIN SCALES - GENERAL
PAINLEVEL_OUTOF10: 0
PAINLEVEL_OUTOF10: 6
PAINLEVEL_OUTOF10: 0
PAINLEVEL_OUTOF10: 6
PAINLEVEL_OUTOF10: 4
PAINLEVEL_OUTOF10: 7
PAINLEVEL_OUTOF10: 10
PAINLEVEL_OUTOF10: 0
PAINLEVEL_OUTOF10: 8
PAINLEVEL_OUTOF10: 0
PAINLEVEL_OUTOF10: 0
PAINLEVEL_OUTOF10: 9
PAINLEVEL_OUTOF10: 10
PAINLEVEL_OUTOF10: 0
PAINLEVEL_OUTOF10: 6
PAINLEVEL_OUTOF10: 5
PAINLEVEL_OUTOF10: 0
PAINLEVEL_OUTOF10: 6
PAINLEVEL_OUTOF10: 10
PAINLEVEL_OUTOF10: 0
PAINLEVEL_OUTOF10: 10
PAINLEVEL_OUTOF10: 7
PAINLEVEL_OUTOF10: 0
PAINLEVEL_OUTOF10: 6
PAINLEVEL_OUTOF10: 0
PAINLEVEL_OUTOF10: 2
PAINLEVEL_OUTOF10: 9
PAINLEVEL_OUTOF10: 3
PAINLEVEL_OUTOF10: 3
PAINLEVEL_OUTOF10: 7
PAINLEVEL_OUTOF10: 6
PAINLEVEL_OUTOF10: 5
PAINLEVEL_OUTOF10: 0
PAINLEVEL_OUTOF10: 4
PAINLEVEL_OUTOF10: 8
PAINLEVEL_OUTOF10: 5
PAINLEVEL_OUTOF10: 0
PAINLEVEL_OUTOF10: 10
PAINLEVEL_OUTOF10: 4
PAINLEVEL_OUTOF10: 7
PAINLEVEL_OUTOF10: 4
PAINLEVEL_OUTOF10: 0
PAINLEVEL_OUTOF10: 8
PAINLEVEL_OUTOF10: 9
PAINLEVEL_OUTOF10: 6
PAINLEVEL_OUTOF10: 10
PAINLEVEL_OUTOF10: 3
PAINLEVEL_OUTOF10: 8
PAINLEVEL_OUTOF10: 8
PAINLEVEL_OUTOF10: 5
PAINLEVEL_OUTOF10: 0
PAINLEVEL_OUTOF10: 6
PAINLEVEL_OUTOF10: 0
PAINLEVEL_OUTOF10: 5
PAINLEVEL_OUTOF10: 6
PAINLEVEL_OUTOF10: 6
PAINLEVEL_OUTOF10: 7
PAINLEVEL_OUTOF10: 4
PAINLEVEL_OUTOF10: 0
PAINLEVEL_OUTOF10: 8
PAINLEVEL_OUTOF10: 0
PAINLEVEL_OUTOF10: 3
PAINLEVEL_OUTOF10: 10
PAINLEVEL_OUTOF10: 5
PAINLEVEL_OUTOF10: 7
PAINLEVEL_OUTOF10: 0
PAINLEVEL_OUTOF10: 8
PAINLEVEL_OUTOF10: 8
PAINLEVEL_OUTOF10: 0
PAINLEVEL_OUTOF10: 10
PAINLEVEL_OUTOF10: 5
PAINLEVEL_OUTOF10: 0
PAINLEVEL_OUTOF10: 6
PAINLEVEL_OUTOF10: 0
PAINLEVEL_OUTOF10: 4
PAINLEVEL_OUTOF10: 9
PAINLEVEL_OUTOF10: 2

## 2022-01-01 ASSESSMENT — PAIN DESCRIPTION - ONSET
ONSET: ON-GOING
ONSET_3: ON-GOING
ONSET_2: ON-GOING
ONSET_3: ON-GOING
ONSET: ON-GOING
ONSET: AWAKENED FROM SLEEP
ONSET: ON-GOING
ONSET_3: ON-GOING
ONSET: AWAKENED FROM SLEEP
ONSET_2: ON-GOING
ONSET_2: GRADUAL
ONSET: ON-GOING
ONSET: ON-GOING

## 2022-01-01 ASSESSMENT — PAIN DESCRIPTION - DURATION
DURATION_3: CONTINUOUS
DURATION_2: CONTINUOUS
DURATION_3: CONTINUOUS
DURATION_2: CONTINUOUS
DURATION_3: CONTINUOUS
DURATION_2: CONTINUOUS

## 2022-01-01 ASSESSMENT — PAIN DESCRIPTION - PAIN TYPE
TYPE: ACUTE PAIN
TYPE: CHRONIC PAIN
TYPE: ACUTE PAIN
TYPE_3: NEUROPATHIC PAIN
TYPE_2: CHRONIC PAIN
TYPE: ACUTE PAIN
TYPE_3: NEUROPATHIC PAIN
TYPE: CHRONIC PAIN
TYPE_2: CHRONIC PAIN
TYPE_3: NEUROPATHIC PAIN
TYPE: ACUTE PAIN
TYPE_2: CHRONIC PAIN
TYPE_2: CHRONIC PAIN
TYPE_3: NEUROPATHIC PAIN
TYPE: CHRONIC PAIN

## 2022-01-01 ASSESSMENT — PAIN DESCRIPTION - LOCATION
LOCATION: FOOT
LOCATION: BACK
LOCATION_3: FOOT
LOCATION_2: BACK
LOCATION: CHEST
LOCATION: HEAD
LOCATION: CHEST
LOCATION_3: FOOT
LOCATION: CHEST
LOCATION_2: BACK
LOCATION_3: FOOT
LOCATION: CHEST
LOCATION_3: FOOT
LOCATION: CHEST
LOCATION: FOOT;BACK
LOCATION: HEAD
LOCATION_2: BACK
LOCATION: CHEST
LOCATION_2: BACK
LOCATION: BACK
LOCATION: CHEST;GENERALIZED
LOCATION: BACK

## 2022-01-01 ASSESSMENT — PAIN - FUNCTIONAL ASSESSMENT
PAIN_FUNCTIONAL_ASSESSMENT: ACTIVITIES ARE NOT PREVENTED
PAIN_FUNCTIONAL_ASSESSMENT: 0-10
PAIN_FUNCTIONAL_ASSESSMENT: INTOLERABLE, UNABLE TO DO ANY ACTIVE OR PASSIVE ACTIVITIES
PAIN_FUNCTIONAL_ASSESSMENT: PREVENTS OR INTERFERES SOME ACTIVE ACTIVITIES AND ADLS
PAIN_FUNCTIONAL_ASSESSMENT: 0-10
PAIN_FUNCTIONAL_ASSESSMENT: ACTIVITIES ARE NOT PREVENTED

## 2022-01-01 ASSESSMENT — HEART SCORE: ECG: 1

## 2022-01-01 ASSESSMENT — LIFESTYLE VARIABLES: SMOKING_STATUS: 1

## 2022-01-05 PROBLEM — E78.5 DYSLIPIDEMIA: Status: ACTIVE | Noted: 2022-01-01

## 2022-01-05 PROBLEM — I25.5 ISCHEMIC CARDIOMYOPATHY: Status: ACTIVE | Noted: 2022-01-01

## 2022-01-05 NOTE — LETTER
Pierre Taylor  1955  [id number]    Have you had any Chest Pain that is not new? -yes  If Yes DO EKG - How does it feel - Tightness   How long does the pain last -  minutes    How long have you been having the pain - Weeks   Did you take a    And did it relieve the pain -      DO EKG IF: Patient has a Heart Rate above 100 or below 40     CAD (Coronary Artery Disease) patient should have one on file every 6 months        Have you had any Shortness of Breath - Yes  If Yes - When at rest and on exertion    Have you had any dizziness - No  If Yes DO ORTHOSTATIC BP - when do you feel dizzy    How long does it last .        Sitting wait 5 minutes do supine (laying down) wait 5 minutes then do standing - log each in \"vitals\" area in Epic   Be sure to ask what symptoms they are having if they get dizzy while completing ortho stats such as: room spinning, nausea, etc.    Have you had any palpitations that are not new? - No  If Yes DO EKG - Do you feel your heart   How long does it last - . Is the patient on any of the following medications -   If Yes DO EKG - Needs done every 6 months    Do you have any edema - swelling in No    If Yes - CHECK TO SEE IF THE EDEMA IS PITTING  How long have they been having edema -   If Yes - Have they worn compression stockings   If they have worn compression stockings      Vein \"LEG PROBLEM Questionnaire\"  1. Do you have prominent leg veins? No   2. Do you have any skin discoloration? No  3. Do you have any healed or active sores? No  4. Do you have swelling of the legs? No  5. Do you have a family history of varicose veins? No  6. Does your profession involve pro-longed        standing or heavy lifting? No  7. Have you been fighting overweight problems? Yes  8. Do you have restless legs? Yes  9. Do you have any night time cramps? No  10.  Do you have any of the following in your legs:        Aching     When did you have your last labs drawn   Where did you have them done   What doctor ordered     If we do not have these labs you are retrieve these labs for these providers! Do you have a surgery or procedure scheduled in the near future - No  If Yes- DO EKG  If Yes - Who is the surgery with?    Phone number of physician   Fax number of physician   Type of surgery   GIVE THIS INFORMATION TO HONG BOTELLO     Ask patient if they want to sign up for [employer] if they are not already signed up     Check to see if we have an E-MAIL on file for the patient     Check medication list thoroughly!!! AND RECONCILE OUTSIDE MEDICATIONS  If dose has changed change the entire order not just the [place] At check out add to every patient's \"wrap up\" the following dot phrase AFTERHOURSEDUCATION and ensure we explain this to our patients

## 2022-01-05 NOTE — PROGRESS NOTES
Toya Palmer MD                                  CARDIOLOGY  NOTE      Chief Complaint:    Chief Complaint   Patient presents with    New Patient     Pt states when he gets bloated in his stomach he gets SOB when bloating goes away so does the SOB, Pt states his chest fells tight once in awhile, it last minutes, he does not take anything for it it does not radiate No surgeries or procedures scheduled that he is aware of    Shortness of Breath    Chest Pain        HPI:     Alec Carson is a 74y.o. year old male who presents to the clinic to establish care. Patient has previously followed up with Dr. Jeanette Troncoso -he also follows up with Dr. Catherine Medina      Patient is a 69-year-old gentleman with prior medical history significant for COPD, CKD, hypertension hyperlipidemia coronary artery disease status post PCI, obstructive sleep apnea, abdominal aortic aneurysm s/p repair, ischemic cardiomyopathy, severe LV dysfunction. Patient was recently admitted to the hospital with congestive heart failure and was treated with IV diuresis. May 7, 2021 patient had PCI to RCA at [facility].  Patient also had another PCI to LAD and diagonal in June 2021 @ [facility]   He is known to have cardiomyopathy with LVEF of around 15 to 20%. Patient previously was on Coreg however was later switched to Toprol-XL. He also is on lisinopril and Aldactone. Pt is sp ICD for primary prevention     Echo 10/28/2021     Definity was utilized to rule out presence of thrombus. Left ventricular systolic function is abnormal.   Ejection fraction is visually estimated at 25-30%. Slightly hypokinetic apical wall segments. No significant valvular disease noted. No evidence of any pericardial effusion.    Multiple PVC's noted throughout the exam.          Current Outpatient Medications   Medication Sig Dispense Refill    Insulin Aspart Prot & Aspart (NOVOLOG 70/30 FLEXPEN RELION SC) Inject into the skin      lisinopril (PRINIVIL;ZESTRIL) 2.5 MG tablet Take 2.5 mg by mouth daily      omeprazole (PRILOSEC) 20 MG delayed release capsule Take 20 mg by mouth daily      potassium chloride (KLOR-CON M) 10 MEQ extended release tablet Take 10 mEq by mouth 2 times daily      atorvastatin (LIPITOR) 40 MG tablet Take 40 mg by mouth nightly      amiodarone (CORDARONE) 200 MG tablet Take 1 tablet by mouth daily 30 tablet 3    apixaban (ELIQUIS) 5 MG TABS tablet Take 1 tablet by mouth 2 times daily 60 tablet 3    metoprolol succinate (TOPROL XL) 50 MG extended release tablet Take 1 tablet by mouth 2 times daily 30 tablet 3    torsemide (DEMADEX) 20 MG tablet Take 1 tablet by mouth daily 30 tablet 3    insulin glargine (BASAGLAR KWIKPEN) 100 UNIT/ML injection pen Inject 30 Units into the skin nightly      Lancets MISC 1 each by Does not apply route 4 times daily 100 each 1    glucose monitoring (FREESTYLE FREEDOM) kit 1 kit by Does not apply route daily 1 kit 0    Insulin Pen Needle ([employer] PEN NEEDLES) 31G X 6 MM MISC 1 each by Does not apply route daily 100 each 3    spironolactone (ALDACTONE) 25 MG tablet Take 25 mg by mouth daily      fluticasone (FLONASE) 50 MCG/ACT nasal spray 1 spray by Each Nostril route daily      nitroGLYCERIN (NITROSTAT) 0.4 MG SL tablet up to max of 3 total doses. If no relief after 1 dose, call 911. 25 tablet 3    budesonide-formoterol (SYMBICORT) 80-4.5 MCG/ACT AERO Inhale 2 puffs into the lungs 2 times daily       clopidogrel (PLAVIX) 75 MG tablet Take 75 mg by mouth daily      dapagliflozin (FARXIGA) 10 MG tablet Take 1 tablet by mouth every morning 90 tablet 1    insulin glargine (LANTUS SOLOSTAR) 100 UNIT/ML injection pen Inject 30-50 Units into the skin nightly (Patient not taking: Reported on 1/5/2022) 5 pen 3     No current facility-administered medications for this visit. Allergies:     Patient has no known allergies.     Patient History:    Past Medical History:   Diagnosis Date    Cancer ([address]     testicular    COPD, moderate ([address] 2020    Excessive daytime sleepiness 2020    GERD (gastroesophageal reflux disease)     Hypertension     Obstructive sleep apnea 2/3/2021    Pulmonary emphysema determined by X-ray [facility]) 2020    Testicular hypofunction     thus on testosterone    Tobacco abuse 2020     Past Surgical History:   Procedure Laterality Date    ABDOMINAL AORTIC ANEURYSM REPAIR, ENDOVASCULAR  2015    endologix aaa device  mri conditional  card scanned into pacs    CARDIAC DEFIBRILLATOR PLACEMENT Left 2021    [employer] Evera MRI XT DR Pires ICD    HERNIA REPAIR      LEG SURGERY  10/2020    camncer removed     History reviewed. No pertinent family history. Social History     Tobacco Use    Smoking status: Former Smoker     Quit date: 2020     Years since quittin.1    Smokeless tobacco: Never Used   Substance Use Topics    Alcohol use: Never     Comment: 6-9 cups of coffee a week        Review of Systems:     · Constitutional:  No Fever or Weight Loss   · Eyes: No Decreased Vision  · ENT: No Headaches, Hearing Loss or Vertigo  · Cardiovascular: No Chest Pain,  No Shortness of breath, No Palpitations. No Edema   · Respiratory: No cough or wheezing .  No Respiratory distress   · Gastrointestinal: No abdominal pain, appetite loss, blood in stools, constipation, diarrhea or heartburn  · Genitourinary: No dysuria, trouble voiding, or hematuria  · Musculoskeletal:  denies any new  joint aches , or pain   · Integumentary: No rash or pruritis  · Neurological: No TIA or stroke symptoms  · Psychiatric: No anxiety or depression  · Endocrine: No malaise, fatigue or temperature intolerance  · Hematologic/Lymphatic: No bleeding problems, blood clots or swollen lymph nodes  · Allergic/Immunologic: No nasal congestion or hives        Objective:      Physical Exam:    BP (!) 88/52   Pulse 96   Ht 5' 7\" (1.702 m)   Wt 238 lb (108 kg)   BMI 37.28 kg/m²   Wt Readings from Last 3 Encounters:   01/05/22 238 lb (108 kg)   12/16/21 235 lb 3.2 oz (106.7 kg)   12/10/21 231 lb (104.8 kg)     Body mass index is 37.28 kg/m². Vitals:    01/05/22 1511   BP: (!) 88/52   Pulse: 96        General Appearance and Constitutional: Conversant, Well developed, Well nourished, No acute distress, Non-toxic appearance. HEENT:  Normocephalic, Atraumatic, Bilateral external ears normal, Oropharynx moist, No oral exudates,   Nose normal.   Neck- Normal range of motion, No tenderness, Supple  Eyes:  EOMI, Conjunctiva normal, No discharge. Respiratory:  Normal breath sounds, No respiratory distress, No wheezing, No Rales, No Ronchi. No chest tenderness. Cardiovascular: S1-S2, no added heart sounds, No Mumurs appreciated. No gallops, rubs. No Pedal Edema   GI:  Bowel sounds normal, Soft, No tenderness,  :  No costovertebral angle tenderness   Musculoskeletal:  No gross deformities.  Back- No tenderness  Integument:  Well hydrated, no rash   Lymphatic:  No lymphadenopathy noted   Neurologic:  Alert & oriented x 3, Normal motor function, normal sensory function, no focal deficits noted   Psychiatric:  Speech and behavior appropriate       Medical decision making and Data review:    DATA:    Lab Results   Component Value Date    TROPONINT 0.035 (H) 12/03/2021     BNP:    Lab Results   Component Value Date    PROBNP 2,553 (H) 12/04/2021     PT/INR:  No results found for: PTINR  Lab Results   Component Value Date    LABA1C 12.1 (H) 10/28/2021    LABA1C 6.9 (H) 07/02/2021     Lab Results   Component Value Date    CHOL 194 04/20/2021    TRIG 136 04/20/2021    HDL 42 04/20/2021    LDLDIRECT 136 (H) 04/20/2021     Lab Results   Component Value Date    WBC 8.0 12/03/2021    HGB 14.0 12/03/2021    HCT 43.3 12/03/2021    MCV 88.5 12/03/2021     12/03/2021     TSH: No results found for: TSH  Lab Results   Component Value Date    AST 16 12/03/2021    ALT 23 12/03/2021    BILIDIR 0.2 10/28/2021    BILITOT 0.8 12/03/2021    ALKPHOS 76 12/03/2021         All labs, medications and tests reviewed by myself including data and history from outside source , patient and available family . 1. Chest pain, unspecified type    2. Coronary artery disease involving native coronary artery of native heart without angina pectoris    3. Essential hypertension    4. Dyslipidemia    5. Atrial fibrillation, unspecified type ([address]    6. Ischemic cardiomyopathy         Impression and Plan:      1. CAD s/p PCI to RCA/LAD at [facility] May/June 2021: Cont ASA/Plavix. Continue statins beta-blocker sublingual nitroglycerin    2. For severe ischemic cardiomyopathy, NYHA III symptoms s/p AICD. Continue with guideline directed medical therapy including Toprol-XL 50 mg twice daily, lisinopril 2.5 mg daily, Aldactone. 3. CHF with reduced ejection fraction: On torsemide 20 mg and Aldactone 25 mg daily. Currently compensated    4. Essential hypertension: Blood pressure log from home reviewed. Blood pressure usually runs around 120s range. The office today is 90 systolic. Patient is asymptomatic. Continue with current therapy including Toprol-XL lisinopril Aldactone    5. Hyperlipidemia: Continue with Lipitor 40 mg daily    6. Hx of PAF , hx of PVCs : On Amiodarone / Eliquis, EP Follow up - Recent device interrogation did not show sustained arrhythmias - EKG in office today shows NSR with 1 PVC    7. ? Cost of Eliquis: Patient mentions that his Eliquis price is recently tripled. He was given names of other oral anticoagulations. He will call pharmacy and check as to which one is covered. Return in about 3 months (around 4/5/2022). Counseled extensively and medication compliance urged. We discussed that for the  prevention of ASCVD our  goal is aggressive risk modification. Patient is encouraged to exercise even a brisk walk for 30 minutes  at least 3 to 4 times a week   Various goals were discussed and questions answered. Continue current medications. Appropriate prescriptions are addressed and refills ordered. Questions answered and patient verbalizes understanding. Call for any problems, questions, or concerns.

## 2022-01-12 NOTE — LETTER
Cardiology [address] [facility] [place] [address]  Phone: [phone]  Fax: [phone]    1/12/2022        [address] [facility] [address]            Dear Aline Griffiths:    This is your [facility] schedule. You can sil your calendar with these dates. Remember that your device is wireless and should automatically do these checks while you are sleeping. If for any reason I do not get your transmission then I will call you and ask that you send a manual transmission. If you have any questions or concerns, please call and ask for Ernesto Martinez at [phone]. Thank you.

## 2022-01-20 NOTE — TELEPHONE ENCOUNTER
----- Message from Rianna Torres MD sent at 1/17/2022  2:42 PM EST -----  Could you please print out Mr. Nany Morrow complete PFT        ----- Message -----  From: Jessica Allison RCP  Sent: 1/13/2022   4:23 PM EST  To: Rianna Torres MD

## 2022-02-09 NOTE — PROGRESS NOTES
SUBJECTIVE:  Chief Complaint: Moderate COPD, shortness of breath, pulmonary edema, tobacco abuse, paroxysmal atrial fibrillation on Cordarone  10 states that he cannot afford Symbicort and has not been on any long-acting bronchodilator besides his short acting albuterol rescue inhaler. Fortunately he has not had any recent bronchitic infections or worsening chest congestion although he states he does not breathe as well as he did when he smoked. He was able to stop smoking 10 months ago. He has not had any known COVID-19 exposure or infection but continues to opt not to get COVID-19 vaccine. ROS:  Constitution:  HEENT: Negative for ear, throat pain  Cardiovascular: Negative for chest pain, syncope, edema  Pulmonary: See HPI  Musculoskeletal: Negative for DVT, myalgias, arthralgias    OBJECTIVE:  /72   Pulse 63   Ht 5' 7\" (1.702 m)   Wt 234 lb (106.1 kg)   SpO2 97%   BMI 36.65 kg/m²      Physical Exam:  Constitutional:  He appears well developed and well-nourished. Not in any respiratory distress at rest  Neck:  Supple, No palpable lymphadenopathy, No JVD  Cardiovascular: Rhythm appears to be fairly regular, no murmurs or gallops, No pericardial  rubs. Pulmonary: Breath sounds are diminished bilaterally without wheezing or rhonchi  Abdomen: Not examined  Extremities: no edema, No DVT  Neurologic: Oriented x3, No focal deficits    Radiology: Chest x-ray 12/1/2021 showed no acute cardiopulmonary disease  PFT: [facility]pirom[facility] at [facility] on 1/13/2022 demonstrated a moderate obstructive defect with a mild response to bronchodilators in his small airways. A moderate diffusion defect was noted at 62% of predicted      Echocardiogram: 10/28/2021  This is a limited echo. Definity was utilized to rule out presence of thrombus. Left ventricular systolic function is abnormal.   Ejection fraction is visually estimated at 25-30%. Slightly hypokinetic apical wall segments. No significant valvular disease noted. No evidence of any pericardial effusion. Multiple PVC's noted throughout the exam  ASSESSMENT:    1. COPD, moderate ([address]    2. Pulmonary emphysema determined by X-ray ([address]    3. SOB (shortness of breath)    4. Obstructive sleep apnea    5. Tobacco abuse    6. Atrial fibrillation, unspecified type ([address]          PLAN:   I did give him a list of medications that could substitute for Symbicort. Because we have extra samples of Spiriva I did start him on Spiriva Respimat 2 inhalations once a day and he can continue his albuterol rescue inhaler. I did urged him to continue to be smoke-free. I also recommend he get the COVID-19 vaccination as soon as possible but he is resistant to doing so. Because he has a mildly abnormal diffusion capacity I would recommend repeating his full pulmonary function tests in 6 months. If the diffusing capacity continues to drop then we would need to consider stopping his Cordarone. I will continue to follow him    We have discussed the need to maintain yearly flu immunization, pneumococcal vaccination. We have discussed Coronavirus precaution including handwashing practice, wiping items touched in public such as gas pumps, door handles, shopping carts, etc. Self monitoring for infection - fever, chills, cough, SOB. Should they develop symptoms they should call office for further instructions. Return in about 6 months (around 8/9/2022) for Recheck for COPD, Recheck for Shortness of Breath, Recheck for emphysema. This dictation was performed with a verbal recognition program and it was checked for errors. It is possible that there are still dictated errors within this office note. Any errors should be brought immediately to my attention for correction. All efforts were made to ensure that this office note is accurate.

## 2022-02-19 PROBLEM — I20.0 UNSTABLE ANGINA (HCC): Status: ACTIVE | Noted: 2022-01-01

## 2022-02-19 NOTE — FLOWSHEET NOTE
Pt states chest pain started 0300 in the morning when it woke him up but \"not that bad\". Pt went back to sleep woke up at 0800 and stated pain was worse, he took his BG at that time which he says was 122, states he took his morning meds. Pt states he tried to lay back down but the pain was too bad. Pt states nothing was helping the pain so he took a 50mg Tramadol which also did not help and that is when he decided to come to the ED today. Hx of kidney and heart disease.

## 2022-02-19 NOTE — ED PROVIDER NOTES
Emergency [address] EMERGENCY DEPARTMENT    Patient: Radames Aaron  MRN: [id number]  : 1955  Date of Evaluation: 2022  ED Provider: Arun Roca DO    Chief Complaint       Chief Complaint   Patient presents with    Chest Pain     JAVIER Aaron is a 74 y.o. male who presents to the emergency department with sudden onset of , sharp chest pain since waking this morning. States pain is provoked with deep inspiration. Pain is nonradiating in nature. Has had nausea and vomiting with this. He does note that earlier this week he did have URI symptoms as well as nausea and vomiting however, states that he did feel that he got over this. He did not get tested for COVID and has not been vaccinated. He does have significant past medical history of multiple stents. He states he is taking his Plavix daily. He does have CKD as well which he follows closely with nephrology. Does have a history of paroxysmal A. fib which she is supposed to be on Eliquis for. However, states he has not taken this in over the month as it got significantly more expensive. Denies any increased pain or swelling to the bilateral lower extremities. Denies any history of blood clots in the past.  States he is taking all of his other medications as prescribed. [place] any hemoptysis, cough or congestion at this time. No fevers. No difficulty urinating. No abdominal pain at this time. ROS:     At least 10 systems reviewed and otherwise acutely negative except as in the [address]     Past History     Past Medical History:   Diagnosis Date    Cancer [facility])     testicular    COPD, moderate ([address] 2020    Diabetes mellitus ([address]     Excessive daytime sleepiness 2020    GERD (gastroesophageal reflux disease)     Hypertension     Obstructive sleep apnea 2/3/2021    Pulmonary emphysema determined by X-ray [facility]) 2020    Testicular Organization Meetings: Not on file    Marital Status: Not on file   Intimate Partner Violence:     Fear of Current or Ex-Partner: Not on file    Emotionally Abused: Not on file    Physically Abused: Not on file    Sexually Abused: Not on file   Housing Stability:     Unable to Pay for Housing in the Last Year: Not on file    Number of [place] in the Last Year: Not on file    Unstable Housing in the Last Year: Not on file       Medications/Allergies     Previous Medications    AMIODARONE (CORDARONE) 200 MG TABLET    Take 1 tablet by mouth daily    APIXABAN (ELIQUIS) 5 MG TABS TABLET    Take 1 tablet by mouth 2 times daily    ATORVASTATIN (LIPITOR) 40 MG TABLET    Take 40 mg by mouth nightly    BUDESONIDE-FORMOTEROL (SYMBICORT) 80-4.5 MCG/ACT AERO    Inhale 2 puffs into the lungs 2 times daily     CLOPIDOGREL (PLAVIX) 75 MG TABLET    Take 75 mg by mouth daily    DAPAGLIFLOZIN (FARXIGA) 10 MG TABLET    Take 1 tablet by mouth every morning    FLUTICASONE (FLONASE) 50 MCG/ACT NASAL SPRAY    1 spray by Each Nostril route daily    GLUCOSE MONITORING (FREESTYLE FREEDOM) KIT    1 kit by Does not apply route daily    INSULIN ASPART PROT & ASPART (NOVOLOG 70/30 FLEXPEN RELION SC)    Inject into the skin    INSULIN GLARGINE (BASAGLAR KWIKPEN) 100 UNIT/ML INJECTION PEN    Inject 30 Units into the skin nightly    INSULIN GLARGINE (LANTUS SOLOSTAR) 100 UNIT/ML INJECTION PEN    Inject 30-50 Units into the skin nightly    INSULIN PEN NEEDLE ([employer] PEN NEEDLES) 31G X 6 MM MISC    1 each by Does not apply route daily    LANCETS MISC    1 each by Does not apply route 4 times daily    LISINOPRIL (PRINIVIL;ZESTRIL) 2.5 MG TABLET    Take 2.5 mg by mouth daily    METOPROLOL SUCCINATE (TOPROL XL) 50 MG EXTENDED RELEASE TABLET    Take 1 tablet by mouth 2 times daily    NITROGLYCERIN (NITROSTAT) 0.4 MG SL TABLET    up to max of 3 total doses. If no relief after 1 dose, call 911.     OMEPRAZOLE (PRILOSEC) 20 MG DELAYED RELEASE CAPSULE    Take 20 mg by mouth daily    POTASSIUM CHLORIDE (KLOR-CON M) 10 MEQ EXTENDED RELEASE TABLET    Take 10 mEq by mouth 2 times daily    SPIRONOLACTONE (ALDACTONE) 25 MG TABLET    Take 25 mg by mouth daily    TORSEMIDE (DEMADEX) 20 MG TABLET    Take 1 tablet by mouth daily     No Known Allergies     Physical Exam       ED Triage Vitals [02/19/22 1118]   BP Temp Temp Source Pulse Resp SpO2 Height Weight   (!) 152/94 98.2 °F (36.8 °C) Oral 116 21 100 % 5' 7\" (1.702 m) 235 lb (106.6 kg)     GENERAL APPEARANCE: Awake and alert. Cooperative. Appears in acute discomfort. HEAD: Normocephalic. Atraumatic. EYES: Sclera anicteric. ENT: Tolerates saliva. No trismus. NECK: Supple. Trachea midline. CARDIO: Tachycardic, regular rhythm. radial pulse 2+. LUNGS: Respirations unlabored. CTAB. ABDOMEN: Soft. Non-distended. Non-tender. EXTREMITIES: No acute deformities. SKIN: Warm and dry. NEUROLOGICAL: No gross facial drooping. Moves all 4 extremities spontaneously. PSYCHIATRIC: Normal mood.      Diagnostics   Labs:  Results for orders placed or performed during the hospital encounter of 02/19/22   CBC with Auto Differential   Result Value Ref Range    WBC 14.8 (H) 4.0 - 10.5 K/CU MM    RBC 4.97 4.6 - 6.2 M/CU MM    Hemoglobin 14.5 13.5 - 18.0 GM/DL    Hematocrit 44.3 42 - 52 %    MCV 89.1 78 - 100 FL    MCH 29.2 27 - 31 PG    MCHC 32.7 32.0 - 36.0 %    RDW 13.3 11.7 - 14.9 %    Platelets 341 198 - 790 K/CU MM    MPV 10.3 7.5 - 11.1 FL    Differential Type AUTOMATED DIFFERENTIAL     Segs Relative 76.1 (H) 36 - 66 %    Lymphocytes % 14.6 (L) 24 - 44 %    Monocytes % 5.8 (H) 0 - 4 %    Eosinophils % 2.5 0 - 3 %    Basophils % 0.5 0 - 1 %    Segs Absolute 11.2 K/CU MM    Lymphocytes Absolute 2.2 K/CU MM    Monocytes Absolute 0.9 K/CU MM    Eosinophils Absolute 0.4 K/CU MM    Basophils Absolute 0.1 K/CU MM    Nucleated RBC % 0.0 %    Total Nucleated RBC 0.0 K/CU MM    Total Immature Neutrophil 0.08 K/CU MM Immature Neutrophil % 0.5 (H) 0 - 0.43 %   Comprehensive Metabolic Panel w/ Reflex to MG   Result Value Ref Range    Sodium 134 (L) 135 - 145 MMOL/L    Potassium 4.7 3.5 - 5.1 MMOL/L    Chloride 97 (L) 99 - 110 mMol/L    CO2 21 21 - 32 MMOL/L    BUN 22 6 - 23 MG/DL    CREATININE 1.9 (H) 0.9 - 1.3 MG/DL    Glucose 196 (H) 70 - 99 MG/DL    Calcium 9.7 8.3 - 10.6 MG/DL    Albumin 4.1 3.4 - 5.0 GM/DL    Total Protein 7.1 6.4 - 8.2 GM/DL    Total Bilirubin 0.5 0.0 - 1.0 MG/DL    ALT 20 10 - 40 U/L    AST 18 15 - 37 IU/L    Alkaline Phosphatase 64 40 - 129 IU/L    GFR Non- 35 (L) >60 mL/min/1.73m2    GFR  43 (L) >60 mL/min/1.73m2    Anion Gap 16 4 - 16   Troponin   Result Value Ref Range    Troponin T <0.010 <0.01 NG/ML   Brain Natriuretic Peptide   Result Value Ref Range    Pro-BNP 4,217 (H) <300 PG/ML   D-Dimer, Quantitative   Result Value Ref Range    D-Dimer, Quant 810 (H) <230 NG/mL(DDU)   EKG 12 Lead   Result Value Ref Range    Ventricular Rate 116 BPM    Atrial Rate 116 BPM    P-R Interval 186 ms    QRS Duration 92 ms    Q-T Interval 334 ms    QTc Calculation (Bazett) 464 ms    P Axis 50 degrees    R Axis 68 degrees    T Axis 40 degrees    Diagnosis       Sinus tachycardia  Possible Inferior infarct (cited on or before 02-JUL-2021)  Abnormal ECG  When compared with ECG of 02-DEC-2021 12:08,  Previous ECG has undetermined rhythm, needs review  ST elevation now present in Inferior leads  Inverted T waves have replaced nonspecific T wave abnormality in Inferior leads  Confirmed by Chavez Urbano ([id number]) on 2/19/2022 5:04:32 PM     EKG 12 Lead   Result Value Ref Range    Ventricular Rate 86 BPM    Atrial Rate 86 BPM    P-R Interval 178 ms    QRS Duration 98 ms    Q-T Interval 392 ms    QTc Calculation (Bazett) 469 ms    P Axis 46 degrees    R Axis 104 degrees    T Axis -8 degrees    Diagnosis       Normal sinus rhythm  Rightward axis  Inferior infarct (cited on or before 02-JUL-2021)  Abnormal ECG  When compared with ECG of 19-FEB-2022 11:20,  ST no longer elevated in Inferior leads       Radiographs:  XR CHEST (2 VW)    Result Date: 2/19/2022  EXAMINATION: TWO XRAY VIEWS OF THE CHEST 2/19/2022 11:36 am COMPARISON: 12/01/2021. HISTORY: ORDERING SYSTEM PROVIDED HISTORY: CP TECHNOLOGIST PROVIDED HISTORY: Reason for exam:->CP Reason for Exam: chest pain when breathing FINDINGS: The cardiomediastinal silhouette is unremarkable. The lungs are clear. No infiltrate, pleural fluid or evidence of overt failure. Left sided AICD devise. Partially visualized aortic endograft in the mid abdomen. No acute osseous findings. No acute cardiopulmonary disease. EKG: (All EKG's are interpreted by myself in the absence of a cardiologist)      ED Course and MDM   In brief, Zena Mitchell is a 74 y.o. male who presented to the emergency department sudden onset of sharp substernal chest pain that is provoked with deep inspiration. Denies any history of blood clots in the past.  Is supposed to be on Eliquis for paroxysmal A. fib which she has not taken in the past month due to cost.  Does note recent illness earlier in the week which he states he felt he was getting over up until today with the sudden onset of pain, nausea and vomiting. Review of records, he recently saw cardiology in early January of this year. Recent PCI to LAD and diagonal done in June 2021 and he states that he has been compliant with his medications other than Xarelto. EKG does not show acute signs of ischemia and initial troponin is negative. However, I do have some concern for unstable angina based on initial presentation. Due to pain with deep inspiration and pleuritic nature of pain did attempt to do a CT PE study, however, he is refusing a contrasted study at this time secondary to his underlying CKD. D-dimer is elevated and duplex venous ultrasound of bilateral lower extremities will be obtained.   He is not hypoxic or hypotensive at this time and decision to start heparin drip with held and discuss further with hospitalist.  She is recommending therapeutic Lovenox at this time. During his stay in the emergency department he did have recurrence of chest pain, repeat EKG without any dynamic EKG changes. Cardiology consulted. Do not feel that his therapeutic anticoagulation needs to be managed inpatient as he is not even in A. fib currently. They are agreeable with hospitalization and will follow along with plan for likely stress test.  Patient states that nitroglycerin is helping his chest pain.     ED Medication Orders (From admission, onward)    Start Ordered     Status Ordering Provider    02/19/22 2100 02/19/22 1624  enoxaparin (LOVENOX) injection 105 mg  2 TIMES DAILY         Acknowledged Maximilian Shelley    02/19/22 2100 02/19/22 1744  atorvastatin (LIPITOR) tablet 40 mg  NIGHTLY         Acknowledged LISBET CHAVEZ    02/19/22 1830 02/19/22 1744  metoprolol succinate (TOPROL XL) extended release tablet 50 mg  DAILY         Acknowledged LISBET CHAVEZ    02/19/22 1830 02/19/22 1744  aspirin chewable tablet 81 mg  DAILY         Acknowledged LISBET CHAVEZ    02/19/22 1830 02/19/22 1744  clopidogrel (PLAVIX) tablet 75 mg  DAILY         Acknowledged LISBET CHAVEZ    02/19/22 1830 02/19/22 1744  spironolactone (ALDACTONE) tablet 25 mg  DAILY         Acknowledged LISBET CHAVEZ    02/19/22 1830 02/19/22 1744  amiodarone (CORDARONE) tablet 200 mg  DAILY         Acknowledged LISBET CHAVEZ    02/19/22 1830 02/19/22 1744  lisinopril (PRINIVIL;ZESTRIL) tablet 2.5 mg  DAILY         Acknowledged LISBET CHAVEZ    02/19/22 1830 02/19/22 1745  colchicine (COLCRYS) tablet 0.6 mg  DAILY         Acknowledged LISBET CHAVEZ    02/19/22 1615 02/19/22 1608  nitroGLYCERIN (NITROSTAT) SL tablet 0.4 mg  ONCE         Last MAR action: Given - by Janny Cuevas on 02/19/22 at [address] [facility] [place]    02/19/22 1145 02/19/22 1141  fentaNYL (SUBLIMAZE) injection 50 mcg  ONCE         Last MAR action: Given - by Vidya Dragonplay on 02/19/22 at [address]    02/19/22 1145 02/19/22 1141  ondansetron (ZOFRAN-ODT) disintegrating tablet 4 mg  ONCE         Last MAR action: Given - by VidyaOptixConnect on 02/19/22 at [address]          Final Impression      1. Acute chest pain    2.  Elevated d-dimer        DISPOSITION       (Please note that portions of this note may have been completed with a voice recognition program. Efforts were made to edit the dictations but occasionally words are mis-transcribed.)    Maryuri Chavarria, [address], DO  02/19/22 2487

## 2022-02-19 NOTE — ED NOTES
Pt states he would like some water. This RN consults with DO Karena Trinidad who states he may eat and drink. Pt diabetic and this RN noting this for diet.       Bebe Lam RN  02/19/22 0717

## 2022-02-19 NOTE — ED PROVIDER NOTES
I saw this patient in conjunction with [facility] physician who was on boarding. He is agreeable plan of care, his chest pain has improved after fentanyl, we will obtain DVT ultrasounds as he is unable to have CT PE secondary to EDMAR to further restratify for blood clot risk. He denies any history of previous DVT or PE. He is agreeable to hospitalization. He is resting comfortably, hemodynamically stable, speaking in full sentences.     EKG Interpretation    Interpreted by emergency department physician from February 19 at 1616    Rhythm: normal sinus   Rate: normal  Axis: normal  Ectopy: none  Conduction: normal  ST Segments: no acute change  T Waves: no acute change  Q Waves: none    Clinical Impression: Normal sinus rhythm with a rate of 86, no ischemia or ectopy    Rodrigo Tran MD      Electronically signed by:    MD Rodrigo Braden MD  02/19/22 [id number]  [address] MD [address]  02/19/22 9085

## 2022-02-19 NOTE — CONSULTS
INPATIENT CARDIOLOGY CONSULT NOTE       Reason for consultation:  Chest Pain        Primary care physician: Durene Goodell, CNNP       Chief Complaint   Patient presents with    Chest Pain       History of present illness:Peter is a 74 y. o.year old who  presents with  Chief Complaint   Patient presents with    Chest Pain     Patient is a 69-year-old gentleman who follows up with me as outpatient with history of coronary disease and ischemic cardiomyopathy presents to the hospital with chief complaint of epigastric/upper chest pain    Patient previously has followed up with Dr. Fernanda Cuellar, currently follows up with [facility] cardiology    Patient has prior medical history significant for COPD CKD hypertension, COPD, CKD, hyperlipidemia, CAD s/p PCI to LAD and RCA, sleep apnea, abdominal aortic aneurysm s/p repair, ischemic cardiomyopathy s/p ICD    Patient mentions that he experiences nausea vomiting for the past 3 to 4 days. Currently complains of chest pain with deep inspirations. Denies any pain with exertion or expiration. Pain is focal, epigastric/lower chest, 5 out of 10. EKG shows  Sinus rhythm, Q waves in inferior leads no ischemic changes    Past medical history:    has a past medical history of Cancer ([facility] [address] COPD, moderate ([facility] [address] Diabetes mellitus ([facility] [address] Excessive daytime sleepiness, GERD (gastroesophageal reflux disease), Hypertension, Obstructive sleep apnea, Pulmonary emphysema determined by X-ray [facility]), Testicular hypofunction, and Tobacco abuse. Past surgical history:   has a past surgical history that includes hernia repair; AAA repair, endovascular (11/30/2015); Leg Surgery (10/2020); Cardiac defibrillator placement (Left, 11/02/2021); Arm Surgery; Toe Surgery; and knee surgery. Social History:   reports that he quit smoking about 10 months ago. His smoking use included cigarettes. He smoked 1.50 packs per day.  He has never used smokeless tobacco. He reports that he does not drink alcohol and does not use drugs.   Family history:   no family history of CAD, STROKE of DM    No Known Allergies    enoxaparin (LOVENOX) injection 105 mg, BID        Current Facility-Administered Medications   Medication Dose Route Frequency Provider Last Rate Last Admin    enoxaparin (LOVENOX) injection 105 mg  1 mg/kg SubCUTAneous BID Elena Alia, DO         Current Outpatient Medications   Medication Sig Dispense Refill    Insulin Aspart Prot & Aspart (NOVOLOG 70/30 FLEXPEN RELION SC) Inject into the skin      lisinopril (PRINIVIL;ZESTRIL) 2.5 MG tablet Take 2.5 mg by mouth daily      omeprazole (PRILOSEC) 20 MG delayed release capsule Take 20 mg by mouth daily      potassium chloride (KLOR-CON M) 10 MEQ extended release tablet Take 10 mEq by mouth 2 times daily      atorvastatin (LIPITOR) 40 MG tablet Take 40 mg by mouth nightly      dapagliflozin (FARXIGA) 10 MG tablet Take 1 tablet by mouth every morning 90 tablet 1    amiodarone (CORDARONE) 200 MG tablet Take 1 tablet by mouth daily 30 tablet 3    apixaban (ELIQUIS) 5 MG TABS tablet Take 1 tablet by mouth 2 times daily 60 tablet 3    metoprolol succinate (TOPROL XL) 50 MG extended release tablet Take 1 tablet by mouth 2 times daily 30 tablet 3    torsemide (DEMADEX) 20 MG tablet Take 1 tablet by mouth daily 30 tablet 3    insulin glargine (BASAGLAR KWIKPEN) 100 UNIT/ML injection pen Inject 30 Units into the skin nightly      insulin glargine (LANTUS SOLOSTAR) 100 UNIT/ML injection pen Inject 30-50 Units into the skin nightly (Patient not taking: Reported on 1/5/2022) 5 pen 3    Lancets MISC 1 each by Does not apply route 4 times daily 100 each 1    glucose monitoring (FREESTYLE FREEDOM) kit 1 kit by Does not apply route daily 1 kit 0    Insulin Pen Needle ([employer] PEN NEEDLES) 31G X 6 MM MISC 1 each by Does not apply route daily 100 each 3    spironolactone (ALDACTONE) 25 MG tablet Take 25 mg by mouth daily      fluticasone (FLONASE) 50 MCG/ACT nasal spray 1 spray by Each Nostril route daily      nitroGLYCERIN (NITROSTAT) 0.4 MG SL tablet up to max of 3 total doses. If no relief after 1 dose, call 911. 25 tablet 3    budesonide-formoterol (SYMBICORT) 80-4.5 MCG/ACT AERO Inhale 2 puffs into the lungs 2 times daily  (Patient not taking: Reported on 2/9/2022)      clopidogrel (PLAVIX) 75 MG tablet Take 75 mg by mouth daily           Review of Systems:     · Constitutional: No Fever or Weight Loss   · Eyes: No Decreased Vision  · ENT: No Headaches, Hearing Loss or Vertigo  · Cardiovascular: + chest pain,  + dyspnea on exertion, no palpitations or loss of consciousness  · Respiratory: No cough or wheezing    · Gastrointestinal: No abdominal pain, appetite loss, blood in stools, constipation, diarrhea or heartburn  · Genitourinary: No dysuria, trouble voiding, or hematuria  · Musculoskeletal:  No gait disturbance, weakness or joint complaints  · Integumentary: No rash or pruritis  · Neurological: No TIA or stroke symptoms  · Psychiatric: No anxiety or depression  · Endocrine: No malaise, fatigue or temperature intolerance  · Hematologic/Lymphatic: No bleeding problems, blood clots or swollen lymph nodes  · Allergic/Immunologic: No nasal congestion or hives    All other systems were reviewed and were negative otherwise. Physical Examination:      Vitals:    02/19/22 1720   BP: 106/70   Pulse: 88   Resp: 20   Temp:    SpO2: 97%      Wt Readings from Last 3 Encounters:   02/19/22 235 lb (106.6 kg)   02/09/22 234 lb (106.1 kg)   01/05/22 238 lb (108 kg)     Body mass index is 36.81 kg/m². General Appearance:  No distress, conversant  Constitutional:  Well developed, Well nourished  HEENT:  Normocephalic, Atraumatic, Oropharynx moist   Nose normal. Neck Supple Carotid: no carotid bruit  Eyes:  Conjunctiva normal, No discharge.    Respiratory:    Normal breath sounds, No respiratory distress, No wheezing, no use of accessory muscles, diaphragm movement is normal  No chest Tenderness  Cardiovascular: S1-S2 No murmurs auscultated. No rubs, thrills or gallops. Normal  rhythm. Pedal pulses are normal. No pedal edema  GI:  Soft Non tender, non distended. Musculoskeletal:   No tenderness, No cyanosis, No clubbing. Integument:  Warm, Dry, No erythema, No rash. Lymphatic:  No lymphadenopathy noted. Neurologic:  Alert & oriented x 3  No focal deficits noted. Psychiatric:  Affect normal, Judgment normal, Mood normal.       Lab Review     Recent Labs     02/19/22  1134   WBC 14.8*   HGB 14.5   HCT 44.3         Recent Labs     02/19/22  1134   *   K 4.7   CL 97*   CO2 21   BUN 22   CREATININE 1.9*     Recent Labs     02/19/22  1134   AST 18   ALT 20   BILITOT 0.5   ALKPHOS 64     No results for input(s): TROPONINI in the last 72 hours. No results found for: BNP  Lab Results   Component Value Date    INR 0.91 12/02/2021    PROTIME 11.7 12/02/2021         All labs, images, EKGs were personally reviewed      Assessment: 74 y. o.year old with PMH of  has a past medical history of Cancer ([facility] [address] COPD, moderate ([facility] [address] Diabetes mellitus ([facility] [address] Excessive daytime sleepiness, GERD (gastroesophageal reflux disease), Hypertension, Obstructive sleep apnea, Pulmonary emphysema determined by X-ray [facility]), Testicular hypofunction, and Tobacco abuse. Recommendations:      1. Noncardiac chest Pain /exacerbated with inspiration  2. Likely pleurisy    Check CRP/ESR  Start patient on colchicine  Consider steroids if no relief  Obtain echocardiogram to rule out pericardial effusion    3. History of coronary disease s/p complex PCI to RCA and LAD at [facility] last year. Continue with DAPT beta-blocker statins. 4. Ischemic cardiomyopathy s/p AICD. Continue with Toprol-XL lisinopril and Aldactone. Continue with Farxiga  5. Essential hypertension: Blood pressure stable. Continue with Toprol-XL lisinopril Aldactone  6.  Hyperlipidemia: Continue with Lipitor 40 mg daily  7. History of paroxysmal atrial fibrillation and PVCs: Currently in sinus rhythm. Continue with amiodarone. Patient did not take NOACs due to cost prohibition. Declined warfarin.   Currently off anticoagulation will rediscuss in outpatient setting      Thank you for the consult    Dr. Susanna Heredia  2/19/2022 5:38 PM

## 2022-02-20 NOTE — H&P
History and Physical      Name:  Gustavo Ferris /Age/Sex: 1955  (74 y.o. male)   MRN & CSN:  [id number] & [id number] Admission Date/Time: 2022 11:15 AM   Location:  ED16/ED-16 PCP: Georges [facility] Day: 1    Assessment and Plan:   Gustavo Ferris is a 74 y.o.  male  who presents with Unstable angina ([facility] [address]    1. Chest pain, mostly retrosternal.  Will trend troponins to rule out acute coronary syndrome. Sublingual nitro as needed. Aspirin 81 mg daily. Cardiology consultation in the a.m. pain has only been relieved by morphine. Pain is atypical and is pleuritic. Patient is not hypoxic, but cannot rule out PE. Will check D-dimer  2. Coronary disease status post recent stent placement. We will continue  Plavix and  3. farxiga  4. Essential hypertension, blood pressure serviceable well controlled. We will continue to monitor and adjust medications accordingly. 5. Paroxysmal atrial fibrillation, rate controlled. Resume amiodarone. Patient has not been compliant with Eliquis due to cost.  6. COPD, continue bronchodilator treatment  7. Possible obstructive sleep apnea daytime sleepiness. Will likely benefit from a formal sleep study as an outpatient. 8. CKD stage IIIb, stable. Avoid nephrotoxic agents. 9. Leukocytosis of unclear etiology  10. Diabetes type 2, sliding scale insulin coverage. Lantus insulin 30 units nightly. Diet ADULT DIET; Clear Liquid;  No Caffeine   DVT Prophylaxis [x] Lovenox, []  Heparin, [] SCDs, [] Ambulation   GI Prophylaxis [x] PPI,  [] H2 Blocker,  [] Carafate,  [] Diet/Tube Feeds   Code Status Full Code   Disposition Patient requires continued admission due to chest pain in a patient with recent cardiac intervention   MDM [] Low, [] Moderate,[x]  High       History of Present Illness:     Chief Complaint: Unstable angina ([facility] [address]  Gustavo Ferris is a 74 y.o.  male  Who has a past medical history of COPD, chronic atrial fibrillation, essential hypertension, recent cardiac intervention presents to the emergency room with sudden onset of retrosternal chest pain. Patient states pain started about 4 in the morning and woke him up from sleep. Prior to the onset of pain, patient states that he had been active without any complaints. He states the pain is not worse with exertion however he has felt fatigued since onset of pain. He states he has nitro at home which did not help his pain. Patient reports that he stopped taking his Eliquis about a month ago because he is no longer able to afford it. He has no associated cough or shortness of breath. He has no fever. He did not receive any vaccines. He states that he has been tested 4 or 5 times and have been negative on all tests. Ten point ROS reviewed negative, unless as noted above    Objective:   No intake or output data in the 24 hours ending 02/19/22 2041   Vitals:   Vitals:    02/19/22 1857   BP: (!) 120/100   Pulse: 84   Resp: 20   Temp:    SpO2: 97%     Physical Exam:   GEN Awake male, sitting upright in bed in no apparent distress. Appears given age. EYES Pupils are equally round. No scleral erythema, discharge, or conjunctivitis. HENT Mucous membranes are moist. Oral pharynx without exudates, no evidence of thrush. NECK Supple, no apparent thyromegaly or masses. RESP Clear to auscultation, no wheezes, rales or rhonchi. Symmetric chest movement while on room air. CARDIO/VASC S1/S2 auscultated. Regular rate without appreciable murmurs, rubs, or gallops. No JVD or carotid bruits. Peripheral pulses equal bilaterally and palpable. No peripheral edema. GI Abdomen is soft without significant tenderness, masses, or guarding. Bowel sounds are normoactive. Rectal exam deferred.  No costovertebral angle tenderness. Normal appearing external genitalia. Hart catheter is not present. HEME/LYMPH No palpable cervical lymphadenopathy and no hepatosplenomegaly.  No petechiae or ecchymoses. MSK No gross joint deformities. SKIN Normal coloration, warm, dry. NEURO Cranial nerves appear grossly intact, normal speech, no lateralizing weakness. PSYCH Awake, alert, oriented x 4. Affect appropriate. Past Medical History:      Past Medical History:   Diagnosis Date    Cancer ([facility] [address]     testicular    COPD, moderate ([facility] [address] 2020    Diabetes mellitus ([facility] [address]     Excessive daytime sleepiness 2020    GERD (gastroesophageal reflux disease)     Hypertension     Obstructive sleep apnea 2/3/2021    Pulmonary emphysema determined by X-ray [facility]) 2020    Testicular hypofunction     thus on testosterone    Tobacco abuse 2020     PSHX:  has a past surgical history that includes hernia repair; AAA repair, endovascular (2015); Leg Surgery (10/2020); Cardiac defibrillator placement (Left, 2021); Arm Surgery; Toe Surgery; and knee surgery. Allergies: No Known Allergies    FAM HX: family history is not on file.   Soc HX:   Social History     Socioeconomic History    Marital status: Single     Spouse name: None    Number of children: None    Years of education: None    Highest education level: None   Occupational History    None   Tobacco Use    Smoking status: Former Smoker     Packs/day: 1.50     Types: Cigarettes     Quit date: 2021     Years since quittin.8    Smokeless tobacco: Never Used   Vaping Use    Vaping Use: Never used   Substance and Sexual Activity    Alcohol use: Never     Comment: 6-9 cups of coffee a week    Drug use: Never    Sexual activity: None   Other Topics Concern    None   Social History Narrative    None     Social Determinants of Health     Financial Resource Strain:     Difficulty of Paying Living Expenses: Not on file   Food Insecurity:     Worried About Running Out of Food in the Last Year: Not on file    Michael of Food in the Last Year: Not on file   Transportation Needs:     Lack of Transportation (Medical): Not on file    Lack of Transportation (Non-Medical):  Not on file   Physical Activity:     Days of Exercise per Week: Not on file    Minutes of Exercise per Session: Not on file   Stress:     Feeling of Stress : Not on file   Social Connections:     Frequency of Communication with Friends and Family: Not on file    Frequency of Social Gatherings with Friends and Family: Not on file    Attends Spiritism Services: Not on file    Active Member of Clubs or Organizations: Not on file    Attends Club or Organization Meetings: Not on file    Marital Status: Not on file   Intimate Partner Violence:     Fear of Current or Ex-Partner: Not on file    Emotionally Abused: Not on file    Physically Abused: Not on file    Sexually Abused: Not on file   Housing Stability:     Unable to Pay for Housing in the Last Year: Not on file    Number of Places Lived in the Last Year: Not on file    Unstable Housing in the Last Year: Not on file       Medications:   Medications:    enoxaparin  1 mg/kg SubCUTAneous BID    metoprolol succinate  50 mg Oral Daily    aspirin  81 mg Oral Daily    clopidogrel  75 mg Oral Daily    atorvastatin  40 mg Oral Nightly    spironolactone  25 mg Oral Daily    amiodarone  200 mg Oral Daily    lisinopril  2.5 mg Oral Daily    colchicine  0.6 mg Oral Daily    clopidogrel  75 mg Oral Daily    budesonide-formoterol  2 puff Inhalation BID    fluticasone  1 spray Each Nostril Daily    spironolactone  25 mg Oral Daily    insulin glargine  30-50 Units SubCUTAneous Nightly    Lancets  1 each Does not apply 4x Daily    glucose monitoring  1 kit Does not apply Daily    Insulin Pen Needle  1 each Does not apply Daily    insulin glargine  30 Units SubCUTAneous Nightly    amiodarone  200 mg Oral Daily    apixaban  5 mg Oral BID    metoprolol succinate  50 mg Oral BID    torsemide  20 mg Oral Daily    potassium chloride  10 mEq Oral BID    atorvastatin  40 mg Oral Nightly    [START ON 2/20/2022] dapagliflozin  10 mg Oral QAM    lisinopril  2.5 mg Oral Daily    [START ON 2/20/2022] pantoprazole  40 mg Oral QAM AC    sodium chloride flush  5-40 mL IntraVENous 2 times per day    [START ON 2/20/2022] aspirin  81 mg Oral Daily    atorvastatin  80 mg Oral Nightly    enoxaparin  40 mg SubCUTAneous Daily      Infusions:    sodium chloride       PRN Meds: nitroGLYCERIN, 0.4 mg, Q5 Min PRN  sodium chloride flush, 5-40 mL, PRN  sodium chloride, 25 mL, PRN  ondansetron, 4 mg, Q8H PRN   Or  ondansetron, 4 mg, Q6H PRN  acetaminophen, 650 mg, Q6H PRN   Or  acetaminophen, 650 mg, Q6H PRN  polyethylene glycol, 17 g, Daily PRN  morphine, 2 mg, Q4H PRN          Electronically signed by Andrae Loza MD on 2/19/2022 at 8:41 PM

## 2022-02-20 NOTE — PROGRESS NOTES
Hospitalist Progress Note      Name:  Rajesh Cooper /Age/Sex: 1955  (74 y.o. male)   MRN & CSN:  [id number] & [id number] Admission Date/Time: 2022 11:15 AM   Location:  -A PCP: Amadeo Bonilla, [facility] Day: 2    Assessment and Plan:   Rajesh Cooper is a 74 y.o.  male  who presents with Pleuritic chest pain. Seen by cardiology for possible pericarditis. Pt moved to ICU due to Persistent chest pain, EDMAR, CHF exacerbation. Rule out PE.      Chest pain, Pleuritic  Rule out PE vs pericarditis. - Aspirin 81 mg daily.    -Cardiology consulted appreciate recommendations. -pain has only been relieved by morphine. -VQ scan ordered Can't get CTA due to renal failure. Elevated d-dimers  -Cont Eliquis for now pt has not been compliant with it due to cost.   -Start steroids and Colchicine for possible pericarditis. PE is also high on differential      Acute Renal failure with CKD 3  -Torsemide and aldactone held. -Hold lisinopril  -Nephrology consulted. Acute on Chronic systolic Congestive heart failure. -ECHO done yesterday pending read  -Last ECHO on 10/28 with LVEF of 25-30%  -Diuresis per nephrology. Limited by hypotension. Hyponatremia  -126 this AM  -Labs every 6 hours. -Nephrology on board    Coronary disease status post recent stent placement.    -Continue  Plavix and ASA  -Cont statin    Essential hypertension  -Now hypotension  -BP improved with IV fluid bolus.   -Initiate pressors if does not improve improvement.     -Avoid further boluses due to poor cardiac and renal functions. Paroxysmal atrial fibrillation with RVR. -Patient has not been compliant with Eliquis due to cost.  -Amio drip per cardiology  -Metoprolol if BP tolerates. COPD  -continue bronchodilator treatment  -Duonebs  -Pt on Solumedrol for possible pericarditis.      Diabetes type 2,   -sliding scale insulin coverage change to high grade due to steroids.    -Lantus insulin 30 units nightly.  -Hold Farxiga    Possible obstructive sleep apnea daytime sleepiness. Will likely benefit from a formal sleep study as an outpatient. Cont Protonix    Move to ICU    Diet ADULT DIET; Clear Liquid; No Caffeine   DVT Prophylaxis [] Lovenox, []  Heparin, [] SCDs, [] Ambulation   GI Prophylaxis [x] PPI,  [] H2 Blocker,  [] Carafate,  [] Diet/Tube Feeds   Code Status Full Code   Disposition Patient requires continued admission due to pericarditis, rule out PE, shock. MDM [] Low, [] Moderate,[x]  High  Patient's risk as above due to pericarditis, rule out PE     History of Present Illness:     Chief Complaint:     The pt seen with active chest pain that is worst with breathing. His blood pressure was low, patient was given IV fluid bolus. Blood pressure did improve but remained low. Patient moved to ICU. Blood pressure improved  Patient unable to get a CTA scan due to renal failure. VQ scan ordered. Patient is not compliant with Eliquis at home due to cost.        Ten point ROS reviewed negative, unless as noted above    Objective:   No intake or output data in the 24 hours ending 02/20/22 1035   Vitals:   Vitals:    02/20/22 1016   BP: (!) 121/99   Pulse: 123   Resp: 18   Temp:    SpO2:      Physical Exam:   GEN Awake male, sitting upright in bed in mild apparent distress. Appears given age. EYES  No scleral erythema, discharge, or conjunctivitis. HENT Mucous membranes are moist. Oral pharynx without exudates, no evidence of thrush. RESP Diminished at Bases. Decreased air movement. CARDIO/VASC S1/S2 auscultated. Regular rate without appreciable murmurs, rubs, or gallops. No JVD or carotid bruits. Peripheral pulses equal bilaterally and palpable. 1+ peripheral edema. GI Abdomen is soft without significant tenderness, masses, or guarding. Bowel sounds are normoactive. Rectal exam deferred. MSK No gross joint deformities. SKIN Normal coloration, warm, dry.   NEURO Cranial nerves appear grossly intact, normal speech, no lateralizing weakness. PSYCH Awake, alert, oriented x 4. Affect appropriate. Medications:   Medications:    methylPREDNISolone  40 mg IntraVENous Daily    amiodarone  150 mg IntraVENous Once    [Held by provider] enoxaparin  1 mg/kg SubCUTAneous BID    aspirin  81 mg Oral Daily    clopidogrel  75 mg Oral Daily    [Held by provider] spironolactone  25 mg Oral Daily    amiodarone  200 mg Oral Daily    [Held by provider] lisinopril  2.5 mg Oral Daily    colchicine  0.6 mg Oral Daily    budesonide-formoterol  2 puff Inhalation BID    fluticasone  1 spray Each Nostril Daily    insulin glargine  30 Units SubCUTAneous Nightly    [Held by provider] torsemide  20 mg Oral Daily    [Held by provider] potassium chloride  10 mEq Oral BID    dapagliflozin  10 mg Oral QAM    pantoprazole  40 mg Oral QAM AC    sodium chloride flush  5-40 mL IntraVENous 2 times per day    insulin lispro  0-6 Units SubCUTAneous TID WC    insulin lispro  0-3 Units SubCUTAneous Nightly    apixaban  5 mg Oral BID    metoprolol succinate  50 mg Oral Daily    atorvastatin  40 mg Oral Nightly      Infusions:    amiodarone      Followed by   [employer] amiodarone      sodium chloride 100 mL/hr at 02/20/22 1029    sodium chloride      dextrose       PRN Meds: nitroGLYCERIN, 0.4 mg, Q5 Min PRN  sodium chloride flush, 5-40 mL, PRN  sodium chloride, 25 mL, PRN  ondansetron, 4 mg, Q8H PRN   Or  ondansetron, 4 mg, Q6H PRN  acetaminophen, 650 mg, Q6H PRN   Or  acetaminophen, 650 mg, Q6H PRN  polyethylene glycol, 17 g, Daily PRN  morphine, 2 mg, Q4H PRN  glucose, 15 g, PRN  glucagon (rDNA), 1 mg, PRN  dextrose, 100 mL/hr, PRN  dextrose bolus (hypoglycemia), 125 mL, PRN   Or  dextrose bolus (hypoglycemia), 250 mL, PRN          Patient is still admitted because Pericarditis, PE. The anticipated discharge is in greater than 24 hours.      Electronically signed by Benn Blizzard, MD on 2/20/2022 at 10:35 AM

## 2022-02-20 NOTE — PLAN OF CARE
Dr Genoveva Cano at bedside. EKG performed and shown to Doctor. Nurses attempting to obtain labs, unsuccessful x 2. Bolus in process.

## 2022-02-20 NOTE — PROGRESS NOTES
Patient has no skin issues. Four eyes completed with Karishma Huddleston the charge nurse. Wallet in shoe. Safe is broken. Paged Cardiology. Waiting for orders.

## 2022-02-20 NOTE — PROGRESS NOTES
CARDIOLOGY PROGRESS NOTE                                                  Name:  Josefina Zurita /Age/Sex: 1955  (74 y.o. male)   MRN & CSN:  [id number] & [id number] Admission Date/Time: 2022 11:15 AM   Location:  -A PCP: TIKI Morrison         Admit Date:  2022  Hospital Day: 2      SUBJECTIVE:   Seen patient as follow up as consultation for CP AFIB    ++ chest pain. No shortness of breath  No palpations    TELEMETRY: Atrial fibrillation     No intake or output data in the 24 hours ending 22 1005    Assessment/Plan:      1. Noncardiac chest Pain /exacerbated with inspiration  2. Likely pleurisy      CRP/ESR - elevated   Start patient on colchicine  Start steriods  Morphine for pain control  Obtain echocardiogram to rule out pericardial effusion      Elevated ddimer = check VQ scan - pt already on Eliquis ( not on [facility] at home)       3. History of coronary disease s/p complex PCI to RCA and LAD at [facility] last year. Continue with DAPT beta-blocker statins. 4. Ischemic cardiomyopathy s/p AICD. Continue with Toprol-XL hold lisinopril and Aldactone. Continue with Farxiga    5. Essential hypertension: Blood pressure stable. Continue with Toprol-XL lisinopril Aldactone    6. Hyperlipidemia: Continue with Lipitor 40 mg daily    7. History of paroxysmal atrial fibrillation and PVCs: Currently in AFIB RVR . Start IV amiodarone. On Elquis     8. EDMAR =  Start Pt on IVF - consult nephorlogy hold ACEI and aldactone              Past medical history:    has a past medical history of Atrial fibrillation ([address] Cancer ([facility] [address] COPD, moderate ([facility] [address] Diabetes mellitus ([facility] [address] Excessive daytime sleepiness, GERD (gastroesophageal reflux disease), Hypertension, Obstructive sleep apnea, Pulmonary emphysema determined by X-ray [facility]), Testicular hypofunction, and Tobacco abuse.   Past surgical history:   has a past surgical history that includes hernia repair; AAA repair, endovascular (11/30/2015); Leg Surgery (10/2020); Cardiac defibrillator placement (Left, 11/02/2021); Arm Surgery; Toe Surgery; and knee surgery. Social History:   reports that he quit smoking about 10 months ago. His smoking use included cigarettes. He smoked 1.50 packs per day. He has never used smokeless tobacco. He reports that he does not drink alcohol and does not use drugs. Family history:  family history is not on file. OBJECTIVE:     BP 83/72   Pulse 120   Temp 98.7 °F (37.1 °C) (Oral)   Resp 24   Ht 5' 7\" (1.702 m)   Wt 240 lb 12.8 oz (109.2 kg)   SpO2 98%   BMI 37.71 kg/m²   No intake or output data in the 24 hours ending 02/20/22 1005    Physical Exam:    Constitutional:  Well developed, Well nourished, No acute distress, Non-toxic appearance. HENT:  Normocephalic, Atraumatic, Bilateral external ears normal, Oropharynx moist, No oral exudates, Nose normal. Neck- Normal range of motion, No tenderness, Supple, No stridor. Eyes:  EOMI, Conjunctiva normal, No discharge. Respiratory:  Normal breath sounds, No respiratory distress, No wheezing, No chest tenderness. ,no use of accessory muscles, diaphragm movement is normal  Cardiovascular S1-S2 No Murmurs, added sounds. Normal rate rhythm. No rubs gallops. Carotid pulses and amplitude are normal no bruit noted. Pedal pulses normal femoral pulses normal.  No pedal edema  GI:  Bowel sounds normal, Soft, No tenderness  : No CVA tenderness. Musculoskeletal: No edema, No tenderness, No cyanosis, No clubbing. Back- No tenderness. Integument:  Warm, Dry, No erythema, No rash. Lymphatic:  No lymphadenopathy noted. Neurologic:  Alert & oriented x 3, No focal deficits noted.    Psychiatric:  Affect normal, Judgment normal, Mood normal.           MEDICATIONS:     methylPREDNISolone  40 mg IntraVENous Daily    amiodarone  150 mg IntraVENous Once    [Held by provider] enoxaparin  1 mg/kg SubCUTAneous BID    aspirin  81 mg Oral Daily    clopidogrel  75 mg Oral Daily    spironolactone  25 mg Oral Daily    amiodarone  200 mg Oral Daily    [Held by provider] lisinopril  2.5 mg Oral Daily    colchicine  0.6 mg Oral Daily    budesonide-formoterol  2 puff Inhalation BID    fluticasone  1 spray Each Nostril Daily    insulin glargine  30 Units SubCUTAneous Nightly    [Held by provider] torsemide  20 mg Oral Daily    [Held by provider] potassium chloride  10 mEq Oral BID    dapagliflozin  10 mg Oral QAM    pantoprazole  40 mg Oral QAM AC    sodium chloride flush  5-40 mL IntraVENous 2 times per day    insulin lispro  0-6 Units SubCUTAneous TID WC    insulin lispro  0-3 Units SubCUTAneous Nightly    apixaban  5 mg Oral BID    metoprolol succinate  50 mg Oral Daily    atorvastatin  40 mg Oral Nightly      amiodarone      Followed by   [employer] amiodarone      sodium chloride      dextrose       nitroGLYCERIN, sodium chloride flush, sodium chloride, ondansetron **OR** ondansetron, acetaminophen **OR** acetaminophen, polyethylene glycol, morphine, glucose, glucagon (rDNA), dextrose, dextrose bolus (hypoglycemia) **OR** dextrose bolus (hypoglycemia)  No Known Allergies    Lab Data:  CBC:   Recent Labs     02/19/22  1134 02/20/22  0310   WBC 14.8* 12.1*   HGB 14.5 12.5*   HCT 44.3 40.1*   MCV 89.1 93.5    221     BMP:   Recent Labs     02/19/22  1134 02/20/22  0826   * 126*   K 4.7 4.9   CL 97* 90*   CO2 21 23   PHOS  --  4.4   BUN 22 38*   CREATININE 1.9* 4.0*     LIVER PROFILE:   Recent Labs     02/19/22  1134 02/20/22  0826   AST 18 11*   ALT 20 12   BILITOT 0.5 0.8   ALKPHOS 64 46      Critical time is performed by myself in  35 minutes exclusive of separately billable procedures. This time includes bedside physical exams and repeat evaluation, close medical management,  titration of  IV meds drip, discussion with consultants, review of diagnostic testing results and monitoring for potential decompensation.      Soy Carpenter MD, MD 2/20/2022 10:05 AM

## 2022-02-20 NOTE — ED NOTES
3013 ready bed      Kassandra Coulter  02/19/22 9773 I would recommend a visit. We have several openings today. Thank you.

## 2022-02-20 NOTE — ED NOTES
Phone report called to Jennifer Mckeon RN for transfer to unit and assumption of care.        Lay Vera RN  02/19/22 0187

## 2022-02-20 NOTE — PROGRESS NOTES
Surgery is unable to place central line today. Doctor Pretty Wilson said it was ok to run levo through a peripheral line for less than 24 hours. Patient blood pressure is improved with just 2mg of levo. Art line will be placed when marlon is available. Bladder scanned patient and he had 83ml of urine in his bladder will continue to monitor output as it has been low. Place IV nurse consult for another line.

## 2022-02-20 NOTE — ANESTHESIA PROCEDURE NOTES
Arterial Line:    An arterial line was placed in the ICU for the following indication(s): Russell Lever A 20 gauge (size), 1 and 3/4 inch (length), (type) catheter was placed, Seldinger technique used, into the left radial artery, secured by tape and Tegaderm.   Anesthesia type: Local  Local infiltration: Injection    Events:  patient tolerated procedure well with no complications and EBL < 5mL.  2/20/2022 6:40 PM2/20/2022 6:47 PM  Resident/CRNA: MICHAEL Alexis - CRNA  Performed: Resident/CRNA   Preanesthetic Checklist  Completed: patient identified, IV checked, site marked, risks and benefits discussed, surgical consent, monitors and equipment checked, pre-op evaluation, timeout performed, anesthesia consent given, oxygen available and patient being monitored

## 2022-02-21 NOTE — CARE COORDINATION
Chart reviewed. Patient is from home; appears independent PTAQ. He has a PCP and insurance that assists with Rx when needed. No needs identified at this time. Will remain available should needs. arise.  Genie Lynn RN

## 2022-02-21 NOTE — PROGRESS NOTES
V2.0  [facility] Hospitalist Progress Note      Name:  Jake Vicente /Age/Sex: 1955  (74 y.o. male)   MRN & CSN:  [id number] & [id number] Encounter Date/Time: 2022 10:14 AM EST    Location:  -A PCP: Alicia Pond [facility] Day: 3    Assessment and Plan:   Jake Vicente is a 74 y.o. male with pmh of PAF, COPD, DM, and HTN who presents with CP. Plan:  1. Pleuritic chest pain: Unlikely cardiac in nature and cardiology is following. Echo pending. Probable pleurisy. Patient on colchicine and plan on changing IV steroids to Medrol Dosepak on DC. 2. Paroxysmal atrial fibrillation: Converted to sinus rhythm with amnio drip and converted to p.o. amnio. Continue Eliquis. Cardiology following. 3. Acute on chronic kidney disease stage III: Holding home lisinopril and diuretics. Nephrology following. Good urine output. Creatinine improving to 3.9 . On IVF. Will monitor and avoid nephrotoxic medications. 4. Acute on chronic systolic heart failure: Previous echo on 10/28/2021 showed EF of 25 to 30% repeat echo pending. Diuresis as able/needed per nephro  5. Hyponatremia: Secondary to EDMAR and improving. Nephrology following. 6. Hyperkalemia: Secondary to EDMAR. Holding ACE/ARB as well as potassium sparing diuretics. Will monitor and treat as needed. 7. CAD: Per history, continue home Plavix and aspirin. Continue statin. 8. Hypertension: History of hypertension. Transition off pressors  and initiating midodrine. Titrate medications as needed. Cardiology following. 9. COPD: Per history no evidence of exacerbation. Bronchodilators as needed. 10. Type 2 diabetes with hyperglycemia: Suspect steroid use contributing. Increase Lantus. Continue SSI. Holding [place]. Transfer to stepdown    Diet ADULT DIET; Clear Liquid;  No Caffeine   DVT Prophylaxis [] Lovenox, []  Heparin, [] SCDs, [] Ambulation,  [x] Eliquis, [] Xarelto   Code Status Full Code Disposition Patient requires continued admission due to EDMAR    Surrogate Decision Maker/ POBERKLEY Kassandra Myers, child     Subjective:     Chief Complaint: Chest Pain       Abdulaziz Snow is a 74 y.o. male who presents with pleuritic CP, EDMAR, A. Fib. SR currently. BP improved. Pain improved some. Review of Systems:    Review of Systems   Constitutional: Negative. HENT: Negative. Eyes: Negative. Respiratory: Negative. Cardiovascular: Positive for chest pain. Gastrointestinal: Negative. Endocrine: Negative. Genitourinary: Negative. Musculoskeletal: Negative. Skin: Negative. Allergic/Immunologic: Negative. Neurological: Negative. Hematological: Negative. Psychiatric/Behavioral: Negative. Objective: Intake/Output Summary (Last 24 hours) at 2/21/2022 1014  Last data filed at 2/21/2022 9822  Gross per 24 hour   Intake 2445.85 ml   Output 1000 ml   Net 1445.85 ml        Vitals:   Vitals:    02/21/22 1012   BP:    Pulse: 77   Resp: 17   Temp:    SpO2: 93%       Physical Exam:     General: NAD  Eyes: EOMI  ENT: neck supple  Cardiovascular: Regular rate and rhythm, no murmurs  Respiratory: Clear to auscultation BL, on RA  Gastrointestinal: Soft, non tender  Genitourinary: no suprapubic tenderness  Musculoskeletal: No edema  Skin: warm, dry  Neuro: Alert. Psych: Mood appropriate.      Medications:   Medications:    insulin glargine  35 Units SubCUTAneous Nightly    ipratropium  2 puff Inhalation BID    albuterol sulfate HFA  2 puff Inhalation BID    midodrine  10 mg Oral TID     [START ON 2/22/2022] amiodarone  200 mg Oral Daily    methylPREDNISolone  40 mg IntraVENous Q12H    insulin lispro  0-18 Units SubCUTAneous TID     insulin lispro  0-9 Units SubCUTAneous Nightly    lidocaine PF  5 mL IntraDERmal Once    sodium chloride flush  5-40 mL IntraVENous 2 times per day    aspirin  81 mg Oral Daily    clopidogrel  75 mg Oral Daily    colchicine 0.6 mg Oral Daily    budesonide-formoterol  2 puff Inhalation BID    fluticasone  1 spray Each Nostril Daily    pantoprazole  40 mg Oral QAM AC    apixaban  5 mg Oral BID    metoprolol succinate  50 mg Oral Daily    atorvastatin  40 mg Oral Nightly      Infusions:    sodium chloride 100 mL/hr at 02/21/22 7844    sodium chloride      dextrose       PRN Meds: ipratropium-albuterol, 1 ampule, Q4H PRN  sodium chloride flush, 5-40 mL, PRN  sodium chloride, 25 mL, PRN  nitroGLYCERIN, 0.4 mg, Q5 Min PRN  ondansetron, 4 mg, Q8H PRN   Or  ondansetron, 4 mg, Q6H PRN  acetaminophen, 650 mg, Q6H PRN   Or  acetaminophen, 650 mg, Q6H PRN  polyethylene glycol, 17 g, Daily PRN  morphine, 2 mg, Q4H PRN  glucose, 15 g, PRN  glucagon (rDNA), 1 mg, PRN  dextrose, 100 mL/hr, PRN  dextrose bolus (hypoglycemia), 125 mL, PRN   Or  dextrose bolus (hypoglycemia), 250 mL, PRN        Labs      Recent Results (from the past 24 hour(s))   POCT Glucose    Collection Time: 02/20/22 12:24 PM   Result Value Ref Range    POC Glucose 279 (H) 70 - 99 MG/DL   POCT Glucose    Collection Time: 02/20/22  4:51 PM   Result Value Ref Range    POC Glucose 235 (H) 70 - 99 MG/DL   POCT Glucose    Collection Time: 02/20/22  8:28 PM   Result Value Ref Range    POC Glucose 318 (H) 70 - 99 MG/DL   Troponin    Collection Time: 02/20/22  9:05 PM   Result Value Ref Range    Troponin T 0.019 (H) <0.01 NG/ML   Procalcitonin    Collection Time: 02/20/22  9:05 PM   Result Value Ref Range    Procalcitonin 0.12    Basic Metabolic Panel    Collection Time: 02/20/22  9:05 PM   Result Value Ref Range    Sodium 126 (L) 135 - 145 MMOL/L    Potassium 5.4 (H) 3.5 - 5.1 MMOL/L    Chloride 92 (L) 99 - 110 mMol/L    CO2 20 (L) 21 - 32 MMOL/L    Anion Gap 14 4 - 16    BUN 48 (H) 6 - 23 MG/DL    CREATININE 4.7 (H) 0.9 - 1.3 MG/DL    Glucose 277 (H) 70 - 99 MG/DL    Calcium 8.3 8.3 - 10.6 MG/DL    GFR Non- 12 (L) >60 mL/min/1.73m2    GFR  15 (L) >60 mL/min/1.73m2   C-Reactive Protein    Collection Time: 02/20/22  9:05 PM   Result Value Ref Range    CRP, High Sensitivity 234.3 mg/L   CBC with Auto Differential    Collection Time: 02/21/22  4:50 AM   Result Value Ref Range    WBC 17.2 (H) 4.0 - 10.5 K/CU MM    RBC 3.98 (L) 4.6 - 6.2 M/CU MM    Hemoglobin 11.6 (L) 13.5 - 18.0 GM/DL    Hematocrit 35.8 (L) 42 - 52 %    MCV 89.9 78 - 100 FL    MCH 29.1 27 - 31 PG    MCHC 32.4 32.0 - 36.0 %    RDW 13.2 11.7 - 14.9 %    Platelets 025 855 - 616 K/CU MM    MPV 10.9 7.5 - 11.1 FL    Immature Neutrophil % 0.7 (H) 0 - 0.43 %    Segs Relative 91.3 (H) 36 - 66 %    Eosinophils % 0.0 0 - 3 %    Basophils % 0.1 0 - 1 %    Lymphocytes % 5.2 (L) 24 - 44 %    Monocytes % 2.7 0 - 4 %    Total Immature Neutrophil 0.12 K/CU MM    Segs Absolute 15.7 K/CU MM    Eosinophils Absolute 0.0 K/CU MM    Basophils Absolute 0.0 K/CU MM    Lymphocytes Absolute 0.9 K/CU MM    Monocytes Absolute 0.5 K/CU MM    Differential Type       AUTOMATED DIFF RESULTS CONFIRMED BY SMEAR REVIEW  AUTOMATED DIFFERENTIAL     Comprehensive Metabolic Panel    Collection Time: 02/21/22  4:50 AM   Result Value Ref Range    Sodium 128 (L) 135 - 145 MMOL/L    Potassium 5.3 (H) 3.5 - 5.1 MMOL/L    Chloride 95 (L) 99 - 110 mMol/L    CO2 20 (L) 21 - 32 MMOL/L    BUN 50 (H) 6 - 23 MG/DL    CREATININE 3.9 (H) 0.9 - 1.3 MG/DL    Glucose 294 (H) 70 - 99 MG/DL    Calcium 8.8 8.3 - 10.6 MG/DL    Albumin 3.7 3.4 - 5.0 GM/DL    Total Protein 5.9 (L) 6.4 - 8.2 GM/DL    Total Bilirubin 0.4 0.0 - 1.0 MG/DL    ALT 11 10 - 40 U/L    AST 10 (L) 15 - 37 IU/L    Alkaline Phosphatase 44 40 - 128 IU/L    GFR Non-African American 15 (L) >60 mL/min/1.73m2    GFR  19 (L) >60 mL/min/1.73m2    Anion Gap 13 4 - 16   POCT Glucose    Collection Time: 02/21/22  7:49 AM   Result Value Ref Range    POC Glucose 282 (H) 70 - 99 MG/DL        Imaging/Diagnostics Last 24 Hours   XR CHEST (2 VW)    Result Date: 2/19/2022  EXAMINATION: TWO XRAY VIEWS OF THE CHEST 2/19/2022 11:36 am COMPARISON: 12/01/2021. HISTORY: ORDERING SYSTEM PROVIDED HISTORY: CP TECHNOLOGIST PROVIDED HISTORY: Reason for exam:->CP Reason for Exam: chest pain when breathing FINDINGS: The cardiomediastinal silhouette is unremarkable. The lungs are clear. No infiltrate, pleural fluid or evidence of overt failure. Left sided AICD devise. Partially visualized aortic endograft in the mid abdomen. No acute osseous findings. No acute cardiopulmonary disease. NM LUNG SCAN PERFUSION ONLY    Result Date: 2/20/2022  EXAMINATION: NUCLEAR MEDICINE PERFUSION SCAN. 2/20/2022 TECHNIQUE: Ventilation not performed as part of COVID-19 safety precautions. 5 millicuries of Tc 61Q MAA was administered intravenously prior to planar imaging of the lungs in multiple projections. COMPARISON: Chest radiograph 02/19/2022. Perfusion study 10/28/2021. HISTORY: ORDERING SYSTEM PROVIDED HISTORY: Elevated d-dimers TECHNOLOGIST PROVIDED HISTORY: Reason for exam:->Elevated d-dimers Reason for Exam: elevated D-dimer FINDINGS: PERFUSION: Distribution of radiotracer is homogeneous. No segmental defects identified. CHEST RADIOGRAPH: No focal areas of consolidation or significant effusions on recent chest radiograph. Low Probability for Pulmonary Embolus. VL DUP LOWER EXTREMITY VENOUS BILATERAL    Result Date: 2/19/2022  EXAMINATION: DUPLEX VENOUS ULTRASOUND OF THE BILATERAL LOWER EXTREMITIES2/19/2022 6:14 pm TECHNIQUE: Duplex ultrasound using B-mode/gray scaled imaging, Doppler spectral analysis and color flow Doppler was obtained of the deep venous structures of the lower bilateral extremities. COMPARISON: None.  HISTORY: ORDERING SYSTEM PROVIDED HISTORY: concern for possible PE, refusing CT PE study due to history of CKD TECHNOLOGIST PROVIDED HISTORY: Reason for exam:->concern for possible PE, refusing CT PE study due to history of CKD Reason for Exam: chest pain FINDINGS: The visualized veins of the bilateral lower extremities are patent and free of echogenic thrombus. The veins demonstrate good compressibility with normal color flow study and spectral analysis. No evidence of DVT in either lower extremity.        Electronically signed by Cyn Norman MD on 2/21/2022 at 10:14 AM

## 2022-02-21 NOTE — PROGRESS NOTES
CARDIOLOGY PROGRESS NOTE                                                  Name:  Jud Barragan /Age/Sex: 1955  (74 y.o. male)   MRN & CSN:  [id number] & [id number] Admission Date/Time: 2022 11:15 AM   Location:  -A PCP: TIKI Saunders         Admit Date:  2022  Hospital Day: 3      SUBJECTIVE:   Seen patient as follow up as consultation for CP AFIB    + chest pain. No shortness of breath  No palpations    TELEMETRY: Atrial fibrillation       Intake/Output Summary (Last 24 hours) at 2022 0905  Last data filed at 2022 0714  Gross per 24 hour   Intake 2445.85 ml   Output 1000 ml   Net 1445.85 ml       Assessment/Plan:      1. Noncardiac chest Pain /exacerbated with inspiration  2. Likely pleurisy     CRP/ESR - elevated   Continue with steroids and colchicine  Can be switched to Medrol Dosepak    Pain has improved dramatically  Morphine for pain control  Obtain echocardiogram to rule out pericardial effusion    Elevated D-dimer  VQ scan is low probability for PE  No evidence of DVT in lower extremities on vascular study     3. History of coronary disease s/p complex PCI to RCA and LAD at [facility] last year. Continue with DAPT beta-blocker statins. 4. Ischemic cardiomyopathy s/p AICD. Continue with Toprol-XL hold lisinopril and Aldactone. Continue with Farxiga    5. Essential hypertension: Blood pressure stable. Continue with Toprol-XL lisinopril Aldactone    6. Hyperlipidemia: Continue with Lipitor 40 mg daily    7. History of paroxysmal atrial fibrillation and PVCs: Patient converted back to sinus rhythm. . Stop IV amiodarone and start on oral amiodarone. On Elquis and beta-blocker    8.  EDMAR =  Start Pt on IVF - consult nephorlogy hold ACEI and aldactone              Past medical history:    has a past medical history of Atrial fibrillation ([facility] [address] Cancer ([facility] [address] COPD, moderate ([facility] [address] Diabetes mellitus ([facility] [address] Excessive daytime sleepiness, GERD (gastroesophageal reflux disease), Hypertension, Obstructive sleep apnea, Pulmonary emphysema determined by X-ray [facility]), Testicular hypofunction, and Tobacco abuse. Past surgical history:   has a past surgical history that includes hernia repair; AAA repair, endovascular (11/30/2015); Leg Surgery (10/2020); Cardiac defibrillator placement (Left, 11/02/2021); Arm Surgery; Toe Surgery; and knee surgery. Social History:   reports that he quit smoking about 10 months ago. His smoking use included cigarettes. He smoked 1.50 packs per day. He has never used smokeless tobacco. He reports that he does not drink alcohol and does not use drugs. Family history:  family history is not on file. OBJECTIVE:     BP 96/79   Pulse 78   Temp 98.1 °F (36.7 °C) (Axillary)   Resp 14   Ht 5' 7\" (1.702 m)   Wt 240 lb 12.8 oz (109.2 kg)   SpO2 93%   BMI 37.71 kg/m²       Intake/Output Summary (Last 24 hours) at 2/21/2022 8126  Last data filed at 2/21/2022 2438  Gross per 24 hour   Intake 2445.85 ml   Output 1000 ml   Net 1445.85 ml       Physical Exam:    Constitutional:  Well developed, Well nourished, No acute distress, Non-toxic appearance. HENT:  Normocephalic, Atraumatic, Bilateral external ears normal, Oropharynx moist, No oral exudates, Nose normal. Neck- Normal range of motion, No tenderness, Supple, No stridor. Eyes:  EOMI, Conjunctiva normal, No discharge. Respiratory:  Normal breath sounds, No respiratory distress, No wheezing, No chest tenderness. ,no use of accessory muscles, diaphragm movement is normal  Cardiovascular S1-S2 No Murmurs, added sounds. Normal rate rhythm. No rubs gallops. Carotid pulses and amplitude are normal no bruit noted. Pedal pulses normal femoral pulses normal.  No pedal edema  GI:  Bowel sounds normal, Soft, No tenderness  : No CVA tenderness. Musculoskeletal: No edema, No tenderness, No cyanosis, No clubbing. Back- No tenderness. Integument:  Warm, Dry, No erythema, No rash. BILITOT 0.5 0.8 0.4   ALKPHOS 64 [phone] 46 Juliuse Yari Escalante MD, MD 2/21/2022 9:05 AM

## 2022-02-21 NOTE — CONSULTS
Consult completed. Second PIV placed by bedside nurse. Pt has patent IV access and therapeutic needs met. Patient will have a line placed tomorrow in surgery d/t CKD stage 3 and creatinine of 4.

## 2022-02-21 NOTE — PLAN OF CARE
Problem: Falls - Risk of:  Goal: Will remain free from falls  Description: Will remain free from falls  2/20/2022 2216 by Janet Michel RN  Outcome: Ongoing  2/20/2022 1107 by Darryle Leeks, RN  Outcome: Ongoing  Goal: Absence of physical injury  Description: Absence of physical injury  2/20/2022 2216 by Janet Michel RN  Outcome: Ongoing  2/20/2022 1107 by Darryle Leeks, RN  Outcome: Ongoing     Problem: Pain:  Goal: Pain level will decrease  Description: Pain level will decrease  2/20/2022 2216 by Janet Michel RN  Outcome: Ongoing  2/20/2022 1107 by Darryle Leeks, RN  Outcome: Ongoing  Goal: Control of acute pain  Description: Control of acute pain  2/20/2022 2216 by Janet Michel RN  Outcome: Ongoing  2/20/2022 1107 by Darryle Leeks, RN  Outcome: Ongoing  Goal: Control of chronic pain  Description: Control of chronic pain  2/20/2022 2216 by Janet Michel RN  Outcome: Ongoing  2/20/2022 1107 by Darryle Leeks, RN  Outcome: Ongoing

## 2022-02-21 NOTE — RT PROTOCOL NOTE
using Per Protocol order mode. 4-6 - enter or revise RT Bronchodilator order(s) to two equivalent RT bronchodilator orders with one order with BID Frequency and one order with Frequency of every 4 hours PRN wheezing or increased work of breathing using Per Protocol order mode. 7-10 - enter or revise RT Bronchodilator order(s) to two equivalent RT bronchodilator orders with one order with TID Frequency and one order with Frequency of every 4 hours PRN wheezing or increased work of breathing using Per Protocol order mode. 11-13 - enter or revise RT Bronchodilator order(s) to one equivalent RT bronchodilator order with QID Frequency and an Albuterol order with Frequency of every 4 hours PRN wheezing or increased work of breathing using Per Protocol order mode. Greater than 13 - enter or revise RT Bronchodilator order(s) to one equivalent RT bronchodilator order with every 4 hours Frequency and an Albuterol order with Frequency of every 2 hours PRN wheezing or increased work of breathing using Per Protocol order mode. RT to enter RT Home Evaluation for COPD & MDI Assessment order using Per Protocol order mode.     Electronically signed by Cristina Arroyo RCP on 2/21/2022 at 7:26 AM

## 2022-02-21 NOTE — CONSULTS
Nephrology Service Consultation      [address], [address] [address]  Phone: [phone]  Office Hours: 8:30AM - 4:30PM  Monday - Friday            Patient:  Stephany Anne  MRN: [id number]  Consulting physician:  Wolfgang Jimenes MD  Reason for Consult: Elevated creat       PCP: TIKI Low    HISTORY OF PRESENT ILLNESS:   The patient is a 74 y.o. male with CAD, CHF, CKD3/4 presented with chest pain upon taking deep breaths. Renal consult for cr 4.7 from baseline 1.9  He is on heparin gtt, amiodarone gtt and IVF  He denies diarrhea, vomiting, new meds prior to admission  He was nonoliguric overnight    REVIEW OF SYSTEMS:  14 point ROS is Negative. See positive ROS per HPI    Past Medical History:        Diagnosis Date    Atrial fibrillation ([address]     Cancer ([address]     testicular    COPD, moderate ([address] 11/30/2020    Diabetes mellitus ([address]     Excessive daytime sleepiness 11/30/2020    GERD (gastroesophageal reflux disease)     Hypertension     Obstructive sleep apnea 2/3/2021    Pulmonary emphysema determined by X-ray ([facility] [address] 11/30/2020    Testicular hypofunction     thus on testosterone    Tobacco abuse 11/30/2020       Past Surgical History:        Procedure Laterality Date    ABDOMINAL AORTIC ANEURYSM REPAIR, ENDOVASCULAR  11/30/2015    endologix aaa device  mri conditional  card scanned into pacs    ARM SURGERY      CARDIAC DEFIBRILLATOR PLACEMENT Left 11/02/2021    [employer] Evera MRI XT DR Pires ICD    HERNIA REPAIR      KNEE SURGERY      LEG SURGERY  10/2020    camncer removed    TOE SURGERY         Medications:   Prior to Admission medications    Medication Sig Start Date End Date Taking?  Authorizing Provider   Insulin Aspart Prot & Aspart (NOVOLOG 70/30 FLEXPEN RELION SC) Inject into the skin    Historical Provider, MD   lisinopril (PRINIVIL;ZESTRIL) 2.5 MG tablet Take 2.5 mg by mouth daily    Historical Provider, MD   omeprazole (PRILOSEC) 20 MG delayed release capsule Take 20 mg by mouth daily    Historical Provider, MD   potassium chloride (KLOR-CON M) 10 MEQ extended release tablet Take 10 mEq by mouth 2 times daily    Historical Provider, MD   atorvastatin (LIPITOR) 40 MG tablet Take 40 mg by mouth nightly    Historical Provider, MD   dapagliflozin (FARXIGA) 10 MG tablet Take 1 tablet by mouth every morning 12/16/21   Khalif Suarez, APRN - CNP   amiodarone (CORDARONE) 200 MG tablet Take 1 tablet by mouth daily 12/5/21   Lang La, APRN - CNP   apixaban (ELIQUIS) 5 MG TABS tablet Take 1 tablet by mouth 2 times daily 12/4/21   Lang La, APRN - CNP   metoprolol succinate (TOPROL XL) 50 MG extended release tablet Take 1 tablet by mouth 2 times daily 12/4/21   Lang La, APRN - CNP   torsemide (DEMADEX) 20 MG tablet Take 1 tablet by mouth daily 12/5/21   Geo Hays, APRN - CNP   insulin glargine (BASAGLAR KWIKPEN) 100 UNIT/ML injection pen Inject 30 Units into the skin nightly    Historical Provider, MD   insulin glargine (LANTUS SOLOSTAR) 100 UNIT/ML injection pen Inject 30-50 Units into the skin nightly  Patient not taking: Reported on 1/5/2022 10/30/21   Meli Platt MD   Lancets MISC 1 each by Does not apply route 4 times daily 10/30/21   Meli Platt MD   glucose monitoring (FREESTYLE FREEDOM) kit 1 kit by Does not apply route daily 10/30/21   Meli Platt MD   Insulin Pen Needle ([employer] PEN NEEDLES) 31G X 6 MM MISC 1 each by Does not apply route daily 10/30/21   Meli Platt MD   spironolactone (ALDACTONE) 25 MG tablet Take 25 mg by mouth daily    Historical Provider, MD   fluticasone (FLONASE) 50 MCG/ACT nasal spray 1 spray by Each Nostril route daily    Historical Provider, MD   nitroGLYCERIN (NITROSTAT) 0.4 MG SL tablet up to max of 3 total doses.  If no relief after 1 dose, call 911. 7/4/21   Joesph Murphy MD   budesonide-formoterol [facility]) 80-4.5 MCG/ACT AERO Inhale 2 puffs into the lungs 2 times daily Patient not taking: Reported on 2/9/2022 5/8/21   Historical Provider, MD   clopidogrel (PLAVIX) 75 MG tablet Take 75 mg by mouth daily    Historical Provider, MD        Allergies:  Patient has no known allergies. Social History:   TOBACCO:   reports that he quit smoking about 10 months ago. His smoking use included cigarettes. He smoked 1.50 packs per day. He has never used smokeless tobacco.  ETOH:   reports no history of alcohol use. OCCUPATION:      Family History:   History reviewed. No pertinent family history. Physical Exam:    Vitals: BP 96/79   Pulse 71   Temp 97.8 °F (36.6 °C) (Oral)   Resp 20   Ht 5' 7\" (1.702 m)   Wt 240 lb 12.8 oz (109.2 kg)   SpO2 97%   BMI 37.71 kg/m²   General appearance: in no acute distress, appears stated age  Skin: Skin color, texture, turgor normal. No rashes or lesions  HEENT: normocephalic, atraumatic  Neck: supple, trachea midline  Lungs: +wheezing, breathing comfortably  Heart[de-identified] regular rate and rhythm, S1, S2 normal,  Abdomen: soft, non-tender; bowel sounds normal; no masses,   Extremities: extremities normal, atraumatic, no cyanosis or edema  Neurologic: Mental status: alert, oriented, interactive, following commands  Psychiatric: mood and affect appropriate    CBC:   Recent Labs     02/19/22  1134 02/20/22  0310 02/21/22  0450   WBC 14.8* 12.1* 17.2*   HGB 14.5 12.5* 11.6*    221 221     BMP:    Recent Labs     02/20/22  0826 02/20/22  2105 02/21/22  0450   * 126* 128*   K 4.9 5.4* 5.3*   CL 90* 92* 95*   CO2 23 20* 20*   BUN 38* 48* 50*   CREATININE 4.0* 4.7* 3.9*   GLUCOSE 203* 277* 294*     Hepatic:   Recent Labs     02/19/22  1134 02/20/22  0826 02/21/22  0450   AST 18 11* 10*   ALT 20 12 11   BILITOT 0.5 0.8 0.4   ALKPHOS 64 46 44     Troponin: No results for input(s): TROPONINI in the last 72 hours. BNP: No results for input(s): BNP in the last 72 hours. Lipids: No results for input(s): CHOL, HDL in the last 72 hours.     Invalid input(s): LDLCALCU  ABGs:   Lab Results   Component Value Date    PO2ART 74 05/06/2021    KHO5WPR 42.0 05/06/2021         Assessment and Recommendations     Hyponatremia  EDMAR on CKD3: CR 4.7 from baseline 1.9//ddx: volume depletion vs ATN vs cardiorenal  Hyperkalemia  Pleuritic chest pain  CHF hx    Plan:  God uop overnight, continue current mgmt  Please hold raas blockade agents  Avoid nephrotoxins  Will follow    Thank you    Electronically signed by Graham Valencia DO on 2/21/2022 at MD Curtis Berman DO [address],  Jerson Edmonds  [facility], [place] [address]  PHONE: [phone]  FAX: [phone]

## 2022-02-21 NOTE — PROGRESS NOTES
Patient needs blood cultures. Called phlebotomy and left message. Patient is a difficult stick and blood cultures are not to be taken from the art line.

## 2022-02-22 NOTE — CONSULTS
Electrophysiology Consult Note      Reason for consultation: Atrial fibrillation    Chief complaint : Chest pain    Referring physician: Dr. Jose Farley      Primary care physician: TIKI Rogers      History of Present Illness:       Patient is 78-year-old male with history of chronic kidney disease, ischemic cardiomyopathy s/p ICD, obstructive sleep apnea on CPAP, hypertension, PAF, hyperlipidemia, coronary artery disease presents with complaints of chest pain. Patient reports that on Saturday he began to have chest pain. He reports that the pain was stabbing. He rated the pain as 10 out of 10. It did not radiate. Patient said that the pain woke him up from his sleep. He additionally complained of fatigue. He denies palpitations lightheadedness dizziness edema or syncope. Patient has a history of PAF.  He reports that he stopped taking his blood thinner as he was unable to afford it        Pastmedical history:   Past Medical History:   Diagnosis Date    Atrial fibrillation ([address]     Cancer ([address]     testicular    COPD, moderate ([address] 11/30/2020    Diabetes mellitus ([address]     Excessive daytime sleepiness 11/30/2020    GERD (gastroesophageal reflux disease)     Hypertension     Obstructive sleep apnea 2/3/2021    Pulmonary emphysema determined by X-ray ([address] 11/30/2020    Testicular hypofunction     thus on testosterone    Tobacco abuse 11/30/2020       Surgical history :   Past Surgical History:   Procedure Laterality Date    ABDOMINAL AORTIC ANEURYSM REPAIR, ENDOVASCULAR  11/30/2015    endologix aaa device  mri conditional  card scanned into pacs    ARM SURGERY      CARDIAC DEFIBRILLATOR PLACEMENT Left 11/02/2021    [employer] Evera MRI XT DR Pires ICD    HERNIA REPAIR      KNEE SURGERY      LEG SURGERY  10/2020    camncer removed    TOE SURGERY         Family history: No history of sudden cardiac death    Social history :  reports that he quit smoking about 10 months ago. His smoking use included cigarettes. He smoked 1.50 packs per day. He has never used smokeless tobacco. He reports that he does not drink alcohol and does not use drugs. No Known Allergies    No current facility-administered medications on file prior to encounter.      Current Outpatient Medications on File Prior to Encounter   Medication Sig Dispense Refill    Insulin Aspart Prot & Aspart (NOVOLOG 70/30 FLEXPEN RELION SC) Inject into the skin      lisinopril (PRINIVIL;ZESTRIL) 2.5 MG tablet Take 2.5 mg by mouth daily      omeprazole (PRILOSEC) 20 MG delayed release capsule Take 20 mg by mouth daily      potassium chloride (KLOR-CON M) 10 MEQ extended release tablet Take 10 mEq by mouth 2 times daily      atorvastatin (LIPITOR) 40 MG tablet Take 40 mg by mouth nightly      dapagliflozin (FARXIGA) 10 MG tablet Take 1 tablet by mouth every morning 90 tablet 1    amiodarone (CORDARONE) 200 MG tablet Take 1 tablet by mouth daily 30 tablet 3    apixaban (ELIQUIS) 5 MG TABS tablet Take 1 tablet by mouth 2 times daily 60 tablet 3    metoprolol succinate (TOPROL XL) 50 MG extended release tablet Take 1 tablet by mouth 2 times daily 30 tablet 3    torsemide (DEMADEX) 20 MG tablet Take 1 tablet by mouth daily 30 tablet 3    insulin glargine (BASAGLAR KWIKPEN) 100 UNIT/ML injection pen Inject 30 Units into the skin nightly      insulin glargine (LANTUS SOLOSTAR) 100 UNIT/ML injection pen Inject 30-50 Units into the skin nightly (Patient not taking: Reported on 1/5/2022) 5 pen 3    Lancets MISC 1 each by Does not apply route 4 times daily 100 each 1    glucose monitoring (FREESTYLE FREEDOM) kit 1 kit by Does not apply route daily 1 kit 0    Insulin Pen Needle ([employer] PEN NEEDLES) 31G X 6 MM MISC 1 each by Does not apply route daily 100 each 3    spironolactone (ALDACTONE) 25 MG tablet Take 25 mg by mouth daily      fluticasone (FLONASE) 50 MCG/ACT nasal spray 1 spray by Each Nostril route daily      nitroGLYCERIN (NITROSTAT) 0.4 MG SL tablet up to max of 3 total doses. If no relief after 1 dose, call 911. 25 tablet 3    budesonide-formoterol (SYMBICORT) 80-4.5 MCG/ACT AERO Inhale 2 puffs into the lungs 2 times daily  (Patient not taking: Reported on 2/9/2022)      clopidogrel (PLAVIX) 75 MG tablet Take 75 mg by mouth daily         Review of Systems:   Review of Systems   Constitutional: Positive for activity change and fatigue. Negative for chills and fever. HENT: Negative for congestion, ear pain and tinnitus. Eyes: Negative for photophobia, pain and visual disturbance. Respiratory: Negative for cough, chest tightness, shortness of breath and wheezing. Cardiovascular: Positive for chest pain. Negative for palpitations and leg swelling. Gastrointestinal: Negative for abdominal pain, blood in stool, constipation, diarrhea, nausea and vomiting. Endocrine: Negative for cold intolerance and heat intolerance. Genitourinary: Negative for dysuria, flank pain and hematuria. Musculoskeletal: Positive for arthralgias. Negative for back pain, myalgias and neck stiffness. Skin: Negative for color change and rash. Allergic/Immunologic: Negative for food allergies. Neurological: Negative for dizziness, light-headedness, numbness and headaches. Hematological: Does not bruise/bleed easily. Psychiatric/Behavioral: Negative for agitation, behavioral problems and confusion. Examination:      Vitals:    02/22/22 0500 02/22/22 0826 02/22/22 1200 02/22/22 1326   BP:  115/63  (!) 114/52   Pulse: 77 136 80 72   Resp: 22 18 15 13   Temp:  97.9 °F (36.6 °C)  98 °F (36.7 °C)   TempSrc:  Oral  Oral   SpO2: 96%      Weight:       Height:            Body mass index is 37.71 kg/m². Physical Exam  Constitutional:       General: He is not in acute distress. Appearance: He is not ill-appearing or diaphoretic. HENT:      Head: Normocephalic and atraumatic.       Right Ear: External ear normal.      Left Ear: External ear normal.      Nose: No congestion. Mouth/Throat:      Mouth: Mucous membranes are moist.   Eyes:      Extraocular Movements: Extraocular movements intact. Conjunctiva/sclera: Conjunctivae normal.      Pupils: Pupils are equal, round, and reactive to light. Cardiovascular:      Rate and Rhythm: Normal rate and regular rhythm. Heart sounds: Murmur (GRADE 2/6 SYSTOLIC MURMUR) heard. Pulmonary:      Effort: Pulmonary effort is normal. No respiratory distress. Breath sounds: Normal breath sounds. No wheezing or rhonchi. Abdominal:      General: Abdomen is flat. Bowel sounds are normal. There is no distension. Palpations: Abdomen is soft. Tenderness: There is no abdominal tenderness. Musculoskeletal:         General: No tenderness. Cervical back: Normal range of motion. No tenderness. Right lower leg: No edema. Left lower leg: No edema. Skin:     General: Skin is warm. Neurological:      General: No focal deficit present. Mental Status: He is alert and oriented to person, place, and time. Cranial Nerves: No cranial nerve deficit.    Psychiatric:         Mood and Affect: Mood normal.               CBC:   Lab Results   Component Value Date    WBC 19.8 02/22/2022    HGB 11.8 02/22/2022    HCT 36.5 02/22/2022     02/22/2022     Lipids:  Lab Results   Component Value Date    CHOL 194 04/20/2021    TRIG 136 04/20/2021    HDL 42 04/20/2021    LDLDIRECT 136 (H) 04/20/2021     PT/INR:   Lab Results   Component Value Date    INR 0.91 12/02/2021        BMP:    Lab Results   Component Value Date     (L) 02/22/2022    K 5.5 (HH) 02/22/2022     02/22/2022    CO2 20 (L) 02/22/2022    BUN 56 (H) 02/22/2022     CMP:   Lab Results   Component Value Date    AST 10 (L) 02/22/2022    PROT 6.0 (L) 02/22/2022    BILITOT 0.2 02/22/2022    ALKPHOS 49 02/22/2022     TSH:  No results found for: TSH    EKGINTERPRETATION - EKG Interpretation:        IMPRESSION / RECOMMENDATIONS:     Paroxysmal atrial fibrillation with RVR  Ischemic cardiomyopathy s/p ICD  Coronary artery disease  Hypertension  CKD  Diabetes mellitus type 2    Patient presented with atrial fibrillation with RVR. Patient is now converted to sinus rhythm on amiodarone. Continue the amiodarone 200 mg twice daily for 14 days and then 200 mg once daily after that. Patient has been restarted on Eliquis which she has stopped due to cost and discussed with the patient that it is important to take anticoagulation otherwise his risk of stroke    Could consider Xarelto as now it has been approved and Medicare patients at a lower cost. We may ask our office staff to see if we can work with a better rate for him. Patient is on metoprolol. Patient is actually needing midodrine for maintaining pressure. If patient continues to have episodes in A. fib ablation may be needed. We will follow him as an outpatient within a week. Thanks again for allowing me to participate in care of this patient. Please call me if you have any questions. With best regards. Sparkle Painting MD, 2/22/2022 6:00 PM     Please note this report has been partially produced using speech recognition software and may contain errors related to that system including errors in grammar, punctuation, and spelling, as well as words and phrases that may be inappropriate. If there are any questions or concerns please feel free to contact the dictating provider for clarification.

## 2022-02-22 NOTE — PROGRESS NOTES
CARDIOLOGY PROGRESS NOTE                                                  Name:  Melissa Wood /Age/Sex: 1955  (74 y.o. male)   MRN & CSN:  [id number] & [id number] Admission Date/Time: 2022 11:15 AM   Location:  -A PCP: TIKI Rodgers         Admit Date:  2022  Hospital Day: 4      SUBJECTIVE:   Seen patient as follow up as consultation for CP AFIB    + reproducible and inspirational chest pain  No shortness of breath  + palpations    TELEMETRY: Atrial fibrillation RVR      Intake/Output Summary (Last 24 hours) at 2022 1059  Last data filed at 2022 0918  Gross per 24 hour   Intake 3318 ml   Output 2450 ml   Net 868 ml       Assessment/Plan:      1. Noncardiac chest Pain /exacerbated with inspiration -  Likely pleurisy / pericarditis      CRP/ESR - elevated   Continue with IV Solu-Medrol and colchicine - Responding well   Can be switched to oral steroids upon discharge    Pain has improved dramatically  Morphine for pain control  Echocardiogram as below. No sign of pericardial effusion    Elevated D-dimer  VQ scan is low probability for PE  No evidence of DVT in lower extremities on vascular study     3. Paroxysmal A. fib now with A. fib RVR  4. Moderately dilated left atrium  5. Mild aortic stenosis  6. Moderate MR      Previously on IV amiodarone later switched to oral  A. Fib received IV Cardiazem STAT  And now reverted back to NSR     Consult EP for evaluation  Continue with beta-blocker and Eliquis  Not on long term CCB due to cardiomyopathy / low EF       7. History of coronary disease s/p complex PCI to RCA and LAD at [facility] last year. Continue with DAPT beta-blocker statins. 8. Ischemic cardiomyopathy s/p AICD. Continue with Toprol-XL hold lisinopril and Aldactone. Continue with Farxiga    9. Essential hypertension: Blood pressure stable. Continue with Toprol-XL lisinopril Aldactone    10.  Hyperlipidemia: Continue with Lipitor 40 mg daily    11. EDMAR / Hyper K  =  nephorlogy following - ACEI and aldactone on hold           Echocardiogram 2/21/22       Left ventricular systolic function is abnormal.   Ejection fraction is visually estimated at 25-30%. Apical, lateral, anterior, and inferior walls appear akinetic. Dilated left ventricle noted. Grade II diastolic dysfunction. Moderately dilated left atrium. PPM wiring visualized within the right heart. Mild aortic stenosis is present; mean PG 11mmHg. Mild to moderate mitral regurgitation. No evidence of any pericardial effusion. Past medical history:    has a past medical history of Atrial fibrillation ([facility] Cancer ([facility] [address] COPD, moderate ([facility] [address] Diabetes mellitus ([facility] [address] Excessive daytime sleepiness, GERD (gastroesophageal reflux disease), Hypertension, Obstructive sleep apnea, Pulmonary emphysema determined by X-ray [facility]), Testicular hypofunction, and Tobacco abuse. Past surgical history:   has a past surgical history that includes hernia repair; AAA repair, endovascular (11/30/2015); Leg Surgery (10/2020); Cardiac defibrillator placement (Left, 11/02/2021); Arm Surgery; Toe Surgery; and knee surgery. Social History:   reports that he quit smoking about 10 months ago. His smoking use included cigarettes. He smoked 1.50 packs per day. He has never used smokeless tobacco. He reports that he does not drink alcohol and does not use drugs. Family history:  family history is not on file. OBJECTIVE:     /63   Pulse 136   Temp 97.9 °F (36.6 °C) (Oral)   Resp 18   Ht 5' 7\" (1.702 m)   Wt 240 lb 12.8 oz (109.2 kg)   SpO2 96%   BMI 37.71 kg/m²       Intake/Output Summary (Last 24 hours) at 2/22/2022 1059  Last data filed at 2/22/2022 2318  Gross per 24 hour   Intake 3318 ml   Output 2450 ml   Net 868 ml       Physical Exam:    Constitutional:  Well developed, Well nourished, No acute distress, Non-toxic appearance.    HENT:  Normocephalic, Atraumatic, Bilateral external ears normal, Oropharynx moist, No oral exudates, Nose normal. Neck- Normal range of motion, No tenderness, Supple, No stridor. Eyes:  EOMI, Conjunctiva normal, No discharge. Respiratory:  Normal breath sounds, No respiratory distress, No wheezing, No chest tenderness. ,no use of accessory muscles, diaphragm movement is normal  Cardiovascular S1-S2 No Murmurs, added sounds. Normal rate rhythm. No rubs gallops. Carotid pulses and amplitude are normal no bruit noted. Pedal pulses normal femoral pulses normal.  No pedal edema  GI:  Bowel sounds normal, Soft, No tenderness  : No CVA tenderness. Musculoskeletal: No edema, No tenderness, No cyanosis, No clubbing. Back- No tenderness. Integument:  Warm, Dry, No erythema, No rash. Lymphatic:  No lymphadenopathy noted. Neurologic:  Alert & oriented x 3, No focal deficits noted.    Psychiatric:  Affect normal, Judgment normal, Mood normal.           MEDICATIONS:     sodium zirconium cyclosilicate  10 g Oral BID    insulin glargine  35 Units SubCUTAneous Nightly    ipratropium  2 puff Inhalation BID    albuterol sulfate HFA  2 puff Inhalation BID    midodrine  10 mg Oral TID WC    amiodarone  200 mg Oral Daily    methylPREDNISolone  40 mg IntraVENous Q12H    insulin lispro  0-18 Units SubCUTAneous TID WC    insulin lispro  0-9 Units SubCUTAneous Nightly    lidocaine PF  5 mL IntraDERmal Once    sodium chloride flush  5-40 mL IntraVENous 2 times per day    aspirin  81 mg Oral Daily    clopidogrel  75 mg Oral Daily    colchicine  0.6 mg Oral Daily    budesonide-formoterol  2 puff Inhalation BID    fluticasone  1 spray Each Nostril Daily    pantoprazole  40 mg Oral QAM AC    apixaban  5 mg Oral BID    metoprolol succinate  50 mg Oral Daily    atorvastatin  40 mg Oral Nightly      phenylephrine (ORALIA-SYNEPHRINE) 50mg/250mL infusion      dilTIAZem (CARDIZEM) 100 mg in dextrose 5% 100 mL (ADD-[place])      sodium chloride 75 mL/hr at 02/22/22

## 2022-02-22 NOTE — PROGRESS NOTES
Progress Note  Date:2022       [facility]:[id number]  Patient Name:Peter Mann     YOB: 1955     Age:71 y.o. Having chest pain and pleuritic and noted to be in AFib with RVR this morning  Subjective    Subjective:  Symptoms:  Worsening. Pain:  He complains of pain that is mild. Review of Systems  Objective         Vitals Last 24 Hours:  TEMPERATURE:  Temp  Av °F (36.7 °C)  Min: 97.7 °F (36.5 °C)  Max: 98.1 °F (36.7 °C)  RESPIRATIONS RANGE: Resp  Av.5  Min: 10  Max: 30  PULSE OXIMETRY RANGE: SpO2  Av.5 %  Min: 91 %  Max: 99 %  PULSE RANGE: Pulse  Av.8  Min: 60  Max: 89  BLOOD PRESSURE RANGE: No data recorded.  ; No data recorded. I/O (24Hr): Intake/Output Summary (Last 24 hours) at 2022 0805  Last data filed at 2022 0500  Gross per 24 hour   Intake 3318 ml   Output 1850 ml   Net 1468 ml     Objective:  General Appearance:  Uncomfortable. Vital signs: (most recent): Blood pressure 115/63, pulse 80, temperature 97.9 °F (36.6 °C), temperature source Oral, resp. rate 15, height 5' 7\" (1.702 m), weight 240 lb 12.8 oz (109.2 kg), SpO2 96 %. (Tachy in afib with RVR). HEENT: Normal HEENT exam.    Lungs: There are decreased breath sounds. Heart: Tachycardia. Irregular rhythm. Abdomen: Abdomen is soft. Extremities: Decreased range of motion. Neurological: Patient is alert. Pupils:  Pupils are equal, round, and reactive to light. Skin:  Warm.       Labs/Imaging/Diagnostics    Labs:  CBC:  Recent Labs     22  0310 22  0450 22  0520   WBC 12.1* 17.2* 19.8*   RBC 4.29* 3.98* 4.02*   HGB 12.5* 11.6* 11.8*   HCT 40.1* 35.8* 36.5*   MCV 93.5 89.9 90.8   RDW 13.6 13.2 13.3    221 227     CHEMISTRIES:  Recent Labs     22  0826 22  0826 22  2105 22  0450 22  0520   *   < > 126* 128* 131*   K 4.9   < > 5.4* 5.3* 5.5*   CL 90*   < > 92* 95* 100   CO2 23   < > 20* 20* 20*   BUN 38* < > 48* 50* 56*   CREATININE 4.0*   < > 4.7* 3.9* 3.2*   GLUCOSE 203*   < > 277* 294* 250*   PHOS 4.4  --   --   --   --    MG 2.0  --   --   --   --     < > = values in this interval not displayed. PT/INR:No results for input(s): PROTIME, INR in the last 72 hours. APTT:No results for input(s): APTT in the last 72 hours. LIVER PROFILE:  Recent Labs     02/20/22  0826 02/21/22  0450 02/22/22  0520   AST 11* 10* 10*   ALT 12 11 12   BILITOT 0.8 0.4 0.2   ALKPHOS 46 44 49       Imaging Last 24 Hours:  Echocardiogram complete 2D with doppler with color    Result Date: 2/21/2022  Transthoracic Echocardiography Report (TTE)  Demographics   Patient Name       Eliud Sheppard  Date of Study       02/21/2022   Date of Birth      1955         Gender              Male   Age                74 year(s)         Race                   Patient Number     [id number]         Room Number         2125   Visit Number       [id number]   Corporate ID       [id number]   Accession Number   [id number]         Aury Morfin RDMS   Ordering Physician Cala Shone MD                 Physician           MD  Procedure Type of Study   TTE procedure:ECHOCARDIOGRAM COMPLETE 2D W DOPPLER W COLOR. Procedure Date Date: 02/21/2022 Start: 09:11 AM Study Location: Portable Technical Quality: Fair visualization Indications:Chest pain. Patient Status: Routine Contrast Medium: Definity. Height: 67 inches Weight: 235 pounds BSA: 2.17 m2 BMI: 36.81 kg/m2 HR: 77 bpm BP: 119/58 mmHg  Conclusions   Summary  Left ventricular systolic function is abnormal.  Ejection fraction is visually estimated at 25-30%. Apical, lateral, anterior, and inferior walls appear akinetic. Dilated left ventricle noted. Grade II diastolic dysfunction. Moderately dilated left atrium.   PPM wiring visualized within the right heart. Mild aortic stenosis is present; mean PG 11mmHg. Mild to moderate mitral regurgitation. No evidence of any pericardial effusion. Signature   ------------------------------------------------------------------  Electronically signed by Kim Degroot MD (Interpreting  physician) on 02/21/2022 at 01:57 PM  ------------------------------------------------------------------   Findings   Left Ventricle  Left ventricular systolic function is abnormal.  Ejection fraction is visually estimated at 25-30%. Apical, lateral, anterior, and inferior walls appear akinetic. Mild left ventricular hypertrophy. Dilated left ventricle noted. Grade II diastolic dysfunction. Left Atrium  Moderately dilated left atrium. Right Atrium  Essentially normal right atrium. Right Ventricle  PPM wiring visualized within the right heart. Aortic Valve  Mild aortic stenosis is present; mean PG 11mmHg. Mitral Valve  Mild to moderate mitral regurgitation. Tricuspid Valve  Mild tricuspid regurgitation; RVSP: 28 mmHg. Pulmonic Valve  The pulmonic valve was not well visualized. Pericardial Effusion  No evidence of any pericardial effusion. Pleural Effusion  No evidence of pleural effusion. Miscellaneous  IVC and abdominal aorta are within normal limits.   M-Mode/2D Measurements & Calculations   LV Diastolic Dimension:  LV Systolic Dimension:  LA Dimension: 4.6 cmAO Root  6.82 cm                  5.7 cm                  Dimension: 4 cmLA Area:  LV FS:16.4 %             LV Volume Diastolic:    27.4 cm2  LV PW Diastolic: 6.79 cm 186 ml  Septum Diastolic: 1.2 cm LV Volume Systolic: 787  CO: 6.71 l/min           ml  CI: 2.62 l/m*m2          LV EDV/LV EDV Index:    RV Diastolic Dimension:                           193 ml/89 m2LV ESV/LV   2.07 cm  LV Area Diastolic: 63.6  ESV Index: 143 ml/66 m2  cm2                      EF Calculated (A4C):    LA/Aorta: 5.87  LV Area Systolic: 52.5   17.4 %  cm2                      EF Calculated (2D):     LA volume/Index: 70 ml                           30.6 %                  /32m2                            LV Length: 8.82 cm                            LVOT: 2.3 cm  Doppler Measurements & Calculations   MV Peak E-Wave: 107   AV Peak Velocity: 200 cm/s   LVOT Peak Velocity: 85  cm/s                  AV Peak Gradient: 16 mmHg    cm/s  MV Peak A-Wave: 50.8  AV Mean Velocity: 142 cm/s   LVOT Mean Velocity: 59.1  cm/s                  AV Mean Gradient: 9 mmHg     cm/s  MV E/A Ratio: 2.11    AV VTI: 43.2 cm              LVOT Peak Gradient: 3  MV Peak Gradient:     AV Area (Continuity):1.71    mmHgLVOT Mean Gradient: 2  4.58 mmHg             cm2                          mmHg                                                     Estimated RVSP: 28 mmHg  MV P1/2t: 59 msec     LVOT VTI: 17.8 cm            Estimated RAP:3 mmHg  MVA by PHT:3.73 cm2                        Estimated PASP: 28.2 mmHg  MV E' Septal                                       TR Velocity:251 cm/s  Velocity: 3.95 cm/s                                TR Gradient:25.2 mmHg  MV E' Lateral  Velocity: 8.99 cm/s  MV E/E' septal: 27.09  MV E/E' lateral: 11.9      NM LUNG SCAN PERFUSION ONLY    Result Date: 2/20/2022  EXAMINATION: NUCLEAR MEDICINE PERFUSION SCAN. 2/20/2022 TECHNIQUE: Ventilation not performed as part of COVID-19 safety precautions. 5 millicuries of Tc 23P MAA was administered intravenously prior to planar imaging of the lungs in multiple projections. COMPARISON: Chest radiograph 02/19/2022. Perfusion study 10/28/2021. HISTORY: ORDERING SYSTEM PROVIDED HISTORY: Elevated d-dimers TECHNOLOGIST PROVIDED HISTORY: Reason for exam:->Elevated d-dimers Reason for Exam: elevated D-dimer FINDINGS: PERFUSION: Distribution of radiotracer is homogeneous. No segmental defects identified. CHEST RADIOGRAPH: No focal areas of consolidation or significant effusions on recent chest radiograph. Low Probability for Pulmonary Embolus. Assessment//Plan           Hospital Problems           Last Modified POA    * (Principal) Unstable angina ([facility] [address] 2/19/2022 Yes    EDMAR (acute kidney injury) ([facility] [address] 2/21/2022 Yes    Hyperkalemia 5/69/7724 Yes    Metabolic acidosis 9/77/1228 Yes        Assessment & Plan  Chest pain  -pleurisy and solumedrol and colchicine  -DAPT  Afib with RVR  -CXR pending  -amiodarone  -no DVT  -V/q low prob of PE  -give bolus of cardiazem and then drip if needed and updated cardiology  -check CXR  EDMAR on CKD 3  -IVF   Acute on chornic systolic CHF  -EF 31-24%  -BB, not a candidate for ACE/ARB with EDMAR  -lasix  HYponatremia  -monitor   Hperkalemia  -lokelma  CAD  -DAPT,statin, BB  Leukocytosis  -could be steroid  HTN  -off pressors and midorine  -BB  DM  -SSI    Addendum  -on recheck HR is stable and chest symptoms improved with better HR control.  If HR continues to be stable during the day  then ok to transfer to step down  Electronically signed by Osvaldo Fraire MD on 2/22/22 at 8:05 AM EST

## 2022-02-23 NOTE — PROGRESS NOTES
Nephrology Progress Note        [address], [address] [address]  Phone: [phone]  Office Hours: 8:30AM - 4:30PM  Monday - Friday 2/23/2022 8:54 AM  Subjective:   Admit Date: 2/19/2022  PCP: TIKI Soriano  Interval History: good uop of 2.5l yesterday  Feeling better    Diet: ADULT DIET; Regular; 4 carb choices (60 gm/meal); Low Sodium (2 gm); Low Potassium (Less than 3000 mg/day); 1800 ml; No Caffeine      Data:   Scheduled Meds:   sodium zirconium cyclosilicate  10 g Oral BID    polyethylene glycol  17 g Oral BID    insulin glargine  35 Units SubCUTAneous Nightly    ipratropium  2 puff Inhalation BID    albuterol sulfate HFA  2 puff Inhalation BID    midodrine  10 mg Oral TID WC    [Held by provider] amiodarone  200 mg Oral Daily    methylPREDNISolone  40 mg IntraVENous Q12H    insulin lispro  0-18 Units SubCUTAneous TID WC    insulin lispro  0-9 Units SubCUTAneous Nightly    lidocaine PF  5 mL IntraDERmal Once    sodium chloride flush  5-40 mL IntraVENous 2 times per day    aspirin  81 mg Oral Daily    clopidogrel  75 mg Oral Daily    colchicine  0.6 mg Oral Daily    budesonide-formoterol  2 puff Inhalation BID    fluticasone  1 spray Each Nostril Daily    pantoprazole  40 mg Oral QAM AC    apixaban  5 mg Oral BID    metoprolol succinate  50 mg Oral Daily    atorvastatin  40 mg Oral Nightly     Continuous Infusions:   amiodarone 1 mg/min (02/23/22 0734)    Followed by   Shawn anthonyodarone      dilTIAZem (CARDIZEM) 100 mg in dextrose 5% 100 mL ([facility]) Stopped (02/22/22 1000)    sodium chloride      dextrose       PRN Meds:morphine, ipratropium-albuterol, sodium chloride flush, sodium chloride, nitroGLYCERIN, ondansetron **OR** ondansetron, acetaminophen **OR** acetaminophen, polyethylene glycol, glucose, glucagon (rDNA), dextrose, dextrose bolus (hypoglycemia) **OR** dextrose bolus (hypoglycemia)  I/O last 3 completed shifts:   In: 1764 [P.O.:240; I.V.:1078]  Out: 3550 [Urine:3550]  I/O this shift:  In: -   Out: 200 [Urine:200]    Intake/Output Summary (Last 24 hours) at 2/23/2022 0854  Last data filed at 2/23/2022 0714  Gross per 24 hour   Intake --   Output 2700 ml   Net -2700 ml       CBC:   Recent Labs     02/21/22 0450 02/22/22 0520 02/23/22 0430   WBC 17.2* 19.8* 14.7*   HGB 11.6* 11.8* 11.4*    227 250       BMP:    Recent Labs     02/21/22 0450 02/22/22 0520 02/23/22 0430   * 131* 137   K 5.3* 5.5* 5.2*   CL 95* 100 102   CO2 20* 20* 23   BUN 50* 56* 58*   CREATININE 3.9* 3.2* 2.5*   GLUCOSE 294* 250* 163*     Hepatic:   Recent Labs     02/21/22 0450 02/22/22 0520 02/23/22 0430   AST 10* 10* 9*   ALT 11 12 11   BILITOT 0.4 0.2 0.3   ALKPHOS 44 49 45     Troponin: No results for input(s): TROPONINI in the last 72 hours. BNP: No results for input(s): BNP in the last 72 hours. Lipids: No results for input(s): CHOL, HDL in the last 72 hours. Invalid input(s): LDLCALCU  ABGs:   Lab Results   Component Value Date    PO2ART 74 05/06/2021    WIS4GMS 42.0 05/06/2021     INR: No results for input(s): INR in the last 72 hours.     Objective:   Vitals: BP (!) 114/52   Pulse 116   Temp 97.9 °F (36.6 °C) (Oral)   Resp 14   Ht 5' 7\" (1.702 m)   Wt 241 lb 10 oz (109.6 kg)   SpO2 92%   BMI 37.84 kg/m²   General appearance: alert and cooperative with exam, in no acute distress  HEENT: normocephalic, atraumatic,   Neck: supple, trachea midline  Lungs:, breathing comfortably on nc  Heart[de-identified] regular rate and rhythm, S1, S2 normal,  Abdomen: non distended,   Extremities: extremities atraumatic, no cyanosis or edema  Neurologic: alert, oriented, follows commands, interactive    Assessment and Plan:   Hyponatremia  EDMAR on CKD3 from volume depletion: CR 4.7 from baseline 1.9//  Hyperkalemia  Pleuritic chest pain  CHF hx     Plan:  Cr better at 2.5  Treat the hyperkalemia medically,  lokelma ordered  Stop ns  Give miralax as no bm since friday  Please hold raas blockade agents  Avoid nephrotoxins  Will follow                    Electronically signed by Nettie Sánchez DO on 2/23/2022 at 8:54 AM    [facility]  MD Giuseppe Schmitt DO [address],  [address]  [place], [place] [address]  PHONE: [phone]  FAX: [phone]

## 2022-02-23 NOTE — PROGRESS NOTES
CARDIOLOGY PROGRESS NOTE                                                  Name:  Rajesh Cooper /Age/Sex: 1955  (74 y.o. male)   MRN & CSN:  [id number] & [id number] Admission Date/Time: 2022 11:15 AM   Location:  -A PCP: TIKI Richardson         Admit Date:  2022  Hospital Day: 5      SUBJECTIVE:   Seen patient as follow up as consultation for CP AFIB    + reproducible and inspirational chest pain  No shortness of breath  + palpations    TELEMETRY: Atrial fibrillation RVR      Intake/Output Summary (Last 24 hours) at 2022 1306  Last data filed at 2022 1217  Gross per 24 hour   Intake --   Output 1650 ml   Net -1650 ml       Assessment/Plan:      1. Noncardiac chest Pain /exacerbated with inspiration -  Likely pleurisy / pericarditis      CRP/ESR - elevated   Continue with IV Solu-Medrol and colchicine - Responding well   Can be switched to oral steroids upon discharge    Chest pain has improved  Morphine for pain control  Echocardiogram as below. No sign of pericardial effusion    Elevated D-dimer  VQ scan is low probability for PE  No evidence of DVT in lower extremities on vascular study     3. Paroxysmal A. fib now with A. fib RVR  4. Moderately dilated left atrium  5. Mild aortic stenosis  6. Moderate MR    On IV amiodarone for now, can be switched to oral in the morning  On IV Cardizem drip  Digoxin  EP follow-up      Consult EP for evaluation  Continue with beta-blocker. Eliquis can be switched to Xarelto  Not on long term CCB due to cardiomyopathy / low EF       7. History of coronary disease s/p complex PCI to RCA and LAD at [facility] last year. Continue with DAPT beta-blocker statins. 8. Ischemic cardiomyopathy s/p AICD. Continue with Toprol-XL hold lisinopril and Aldactone. Continue with Farxiga    9. Essential hypertension: Blood pressure stable. Continue with Toprol-XL lisinopril Aldactone    10.  Hyperlipidemia: Continue with Lipitor 40 mg daily    11. EDMAR / Hyper K  =  nephorlogy following - ACEI and aldactone on hold           Echocardiogram 2/21/22       Left ventricular systolic function is abnormal.   Ejection fraction is visually estimated at 25-30%. Apical, lateral, anterior, and inferior walls appear akinetic. Dilated left ventricle noted. Grade II diastolic dysfunction. Moderately dilated left atrium. PPM wiring visualized within the right heart. Mild aortic stenosis is present; mean PG 11mmHg. Mild to moderate mitral regurgitation. No evidence of any pericardial effusion. Past medical history:    has a past medical history of Atrial fibrillation ([facility] Cancer ([facility] [facility] COPD, moderate ([facility] [facility] Diabetes mellitus ([facility] [facility] Excessive daytime sleepiness, GERD (gastroesophageal reflux disease), Hypertension, Obstructive sleep apnea, Pulmonary emphysema determined by X-ray [facility]), Testicular hypofunction, and Tobacco abuse. Past surgical history:   has a past surgical history that includes hernia repair; AAA repair, endovascular (11/30/2015); Leg Surgery (10/2020); Cardiac defibrillator placement (Left, 11/02/2021); Arm Surgery; Toe Surgery; and knee surgery. Social History:   reports that he quit smoking about 10 months ago. His smoking use included cigarettes. He smoked 1.50 packs per day. He has never used smokeless tobacco. He reports that he does not drink alcohol and does not use drugs. Family history:  family history is not on file. OBJECTIVE:     BP (!) 149/87   Pulse 85   Temp 97.7 °F (36.5 °C) (Oral)   Resp 14   Ht 5' 7\" (1.702 m)   Wt 241 lb 10 oz (109.6 kg)   SpO2 92%   BMI 37.84 kg/m²       Intake/Output Summary (Last 24 hours) at 2/23/2022 1306  Last data filed at 2/23/2022 1217  Gross per 24 hour   Intake --   Output 1650 ml   Net -1650 ml       Physical Exam:    Constitutional:  Well developed, Well nourished, No acute distress, Non-toxic appearance.    HENT:  Normocephalic, Atraumatic, Bilateral external ears normal, Oropharynx moist, No oral exudates, Nose normal. Neck- Normal range of motion, No tenderness, Supple, No stridor. Eyes:  EOMI, Conjunctiva normal, No discharge. Respiratory:  Normal breath sounds, No respiratory distress, No wheezing, No chest tenderness. ,no use of accessory muscles, diaphragm movement is normal  Cardiovascular S1-S2 No Murmurs, added sounds. Normal rate rhythm. No rubs gallops. Carotid pulses and amplitude are normal no bruit noted. Pedal pulses normal femoral pulses normal.  No pedal edema  GI:  Bowel sounds normal, Soft, No tenderness  : No CVA tenderness. Musculoskeletal: No edema, No tenderness, No cyanosis, No clubbing. Back- No tenderness. Integument:  Warm, Dry, No erythema, No rash. Lymphatic:  No lymphadenopathy noted. Neurologic:  Alert & oriented x 3, No focal deficits noted.    Psychiatric:  Affect normal, Judgment normal, Mood normal.           MEDICATIONS:     sodium zirconium cyclosilicate  10 g Oral BID    polyethylene glycol  17 g Oral BID    digoxin  125 mcg Oral Every Other Day    insulin glargine  35 Units SubCUTAneous Nightly    ipratropium  2 puff Inhalation BID    albuterol sulfate HFA  2 puff Inhalation BID    midodrine  10 mg Oral TID WC    [Held by provider] amiodarone  200 mg Oral Daily    methylPREDNISolone  40 mg IntraVENous Q12H    insulin lispro  0-18 Units SubCUTAneous TID WC    insulin lispro  0-9 Units SubCUTAneous Nightly    lidocaine PF  5 mL IntraDERmal Once    sodium chloride flush  5-40 mL IntraVENous 2 times per day    aspirin  81 mg Oral Daily    clopidogrel  75 mg Oral Daily    budesonide-formoterol  2 puff Inhalation BID    fluticasone  1 spray Each Nostril Daily    pantoprazole  40 mg Oral QAM AC    apixaban  5 mg Oral BID    metoprolol succinate  50 mg Oral Daily    atorvastatin  40 mg Oral Nightly      amiodarone      dilTIAZem (CARDIZEM) 100 mg in dextrose 5% 100 mL ([facility]) Stopped (02/22/22 1000)    sodium chloride      dextrose       morphine, ipratropium-albuterol, sodium chloride flush, sodium chloride, nitroGLYCERIN, ondansetron **OR** ondansetron, acetaminophen **OR** acetaminophen, polyethylene glycol, glucose, glucagon (rDNA), dextrose, dextrose bolus (hypoglycemia) **OR** dextrose bolus (hypoglycemia)  No Known Allergies    Lab Data:  CBC:   Recent Labs     02/21/22  0450 02/22/22  0520 02/23/22  0430   WBC 17.2* 19.8* 14.7*   HGB 11.6* 11.8* 11.4*   HCT 35.8* 36.5* 35.5*   MCV 89.9 90.8 91.0    227 250     BMP:   Recent Labs     02/21/22 0450 02/22/22  0520 02/23/22  0430   * 131* 137   K 5.3* 5.5* 5.2*   CL 95* 100 102   CO2 20* 20* 23   BUN 50* 56* 58*   CREATININE 3.9* 3.2* 2.5*     LIVER PROFILE:   Recent Labs     02/21/22 0450 02/22/22  0520 02/23/22  0430   AST 10* 10* 9*   ALT 11 12 11   BILITOT 0.4 0.2 0.3   ALKPHOS [phone] [facility] Ro Bass MD, MD 2/23/2022 1:06 PM

## 2022-02-23 NOTE — PROGRESS NOTES
Progress Note  Date:2022       [facility]:[id number]  Patient Name:Peter Mann     YOB: 1955     Age:71 y.o. No chest pain but HR uncontrolled on amio drip  Subjective    Subjective:  Pain:  He reports no pain. Review of Systems  Objective         Vitals Last 24 Hours:  TEMPERATURE:  Temp  Av.2 °F (36.8 °C)  Min: 97.9 °F (36.6 °C)  Max: 98.8 °F (37.1 °C)  RESPIRATIONS RANGE: Resp  Av.9  Min: 9  Max: 26  PULSE OXIMETRY RANGE: SpO2  Av.4 %  Min: 92 %  Max: 100 %  PULSE RANGE: Pulse  Av.3  Min: 60  Max: 142  BLOOD PRESSURE RANGE: Systolic (49VOE), LHO:028 , Min:114 , LRL:797   ; Diastolic (51EZX), QYQ:86, Min:52, Max:63    I/O (24Hr): Intake/Output Summary (Last 24 hours) at 2022 5489  Last data filed at 2022  Gross per 24 hour   Intake --   Output 2700 ml   Net -2700 ml     Objective:  General Appearance:  Comfortable. Vital signs: (most recent): Blood pressure (!) 114/52, pulse 116, temperature 97.8 °F (36.6 °C), temperature source Oral, resp. rate 14, height 5' 7\" (1.702 m), weight 241 lb 10 oz (109.6 kg), SpO2 92 %. (Tachy on amio). HEENT: Normal HEENT exam.    Lungs: There are decreased breath sounds. Heart: Tachycardia. Irregular rhythm. Abdomen: Abdomen is soft. Extremities: Decreased range of motion. Neurological: Patient is alert. Pupils:  Pupils are equal, round, and reactive to light. Skin:  Warm.       Labs/Imaging/Diagnostics    Labs:  CBC:  Recent Labs     22  0450 22  0520 22  0430   WBC 17.2* 19.8* 14.7*   RBC 3.98* 4.02* 3.90*   HGB 11.6* 11.8* 11.4*   HCT 35.8* 36.5* 35.5*   MCV 89.9 90.8 91.0   RDW 13.2 13.3 13.6    227 250     CHEMISTRIES:  Recent Labs     22  0826 22  2105 22  0450 22  0520 22  0430   *   < > 128* 131* 137   K 4.9   < > 5.3* 5.5* 5.2*   CL 90*   < > 95* 100 102   CO2 23   < > 20* 20* 23   BUN 38*   < > 50* 56* 58*   CREATININE 4.0*   < > 3.9* 3.2* 2.5*   GLUCOSE 203*   < > 294* 250* 163*   PHOS 4.4  --   --   --   --    MG 2.0  --   --   --   --     < > = values in this interval not displayed. PT/INR:No results for input(s): PROTIME, INR in the last 72 hours. APTT:No results for input(s): APTT in the last 72 hours. LIVER PROFILE:  Recent Labs     02/21/22  0450 02/22/22  0520 02/23/22  0430   AST 10* 10* 9*   ALT 11 12 11   BILITOT 0.4 0.2 0.3   ALKPHOS 44 49 45       Imaging Last 24 Hours:  Echocardiogram complete 2D with doppler with color    Result Date: 2/21/2022  Transthoracic Echocardiography Report (TTE)  Demographics   Patient Name       Cat Cole  Date of Study       02/21/2022   Date of Birth      1955         Gender              Male   Age                74 year(s)         Race                   Patient Number     [id number]         Room Number         2125   Visit Number       [id number]   Corporate ID       [id number]   Accession Number   [id number]         Garland Russell RDMS   Ordering Physician Vijay Pool MD                 Physician           MD  Procedure Type of Study   TTE procedure:ECHOCARDIOGRAM COMPLETE 2D W DOPPLER W COLOR. Procedure Date Date: 02/21/2022 Start: 09:11 AM Study Location: Portable Technical Quality: Fair visualization Indications:Chest pain. Patient Status: Routine Contrast Medium: Definity. Height: 67 inches Weight: 235 pounds BSA: 2.17 m2 BMI: 36.81 kg/m2 HR: 77 bpm BP: 119/58 mmHg  Conclusions   Summary  Left ventricular systolic function is abnormal.  Ejection fraction is visually estimated at 25-30%. Apical, lateral, anterior, and inferior walls appear akinetic. Dilated left ventricle noted. Grade II diastolic dysfunction. Moderately dilated left atrium. PPM wiring visualized within the right heart.   Mild aortic stenosis is present; mean PG 11mmHg. Mild to moderate mitral regurgitation. No evidence of any pericardial effusion. Signature   ------------------------------------------------------------------  Electronically signed by Maia Cuenca MD (Interpreting  physician) on 02/21/2022 at 01:57 PM  ------------------------------------------------------------------   Findings   Left Ventricle  Left ventricular systolic function is abnormal.  Ejection fraction is visually estimated at 25-30%. Apical, lateral, anterior, and inferior walls appear akinetic. Mild left ventricular hypertrophy. Dilated left ventricle noted. Grade II diastolic dysfunction. Left Atrium  Moderately dilated left atrium. Right Atrium  Essentially normal right atrium. Right Ventricle  PPM wiring visualized within the right heart. Aortic Valve  Mild aortic stenosis is present; mean PG 11mmHg. Mitral Valve  Mild to moderate mitral regurgitation. Tricuspid Valve  Mild tricuspid regurgitation; RVSP: 28 mmHg. Pulmonic Valve  The pulmonic valve was not well visualized. Pericardial Effusion  No evidence of any pericardial effusion. Pleural Effusion  No evidence of pleural effusion. Miscellaneous  IVC and abdominal aorta are within normal limits.   M-Mode/2D Measurements & Calculations   LV Diastolic Dimension:  LV Systolic Dimension:  LA Dimension: 4.6 cmAO Root  6.82 cm                  5.7 cm                  Dimension: 4 cmLA Area:  LV FS:16.4 %             LV Volume Diastolic:    30.7 cm2  LV PW Diastolic: 5.83 cm 467 ml  Septum Diastolic: 1.2 cm LV Volume Systolic: 511  CO: 2.60 l/min           ml  CI: 2.62 l/m*m2          LV EDV/LV EDV Index:    RV Diastolic Dimension:                           193 ml/89 m2LV ESV/LV   2.07 cm  LV Area Diastolic: 61.3  ESV Index: 143 ml/66 m2  cm2                      EF Calculated (A4C):    LA/Aorta: 3.43  LV Area Systolic: 00.0   38.6 %  cm2                      EF Calculated (2D):     LA volume/Index: 70 ml                           30.6 %                  /32m2                            LV Length: 8.82 cm                            LVOT: 2.3 cm  Doppler Measurements & Calculations   MV Peak E-Wave: 107   AV Peak Velocity: 200 cm/s   LVOT Peak Velocity: 85  cm/s                  AV Peak Gradient: 16 mmHg    cm/s  MV Peak A-Wave: 50.8  AV Mean Velocity: 142 cm/s   LVOT Mean Velocity: 59.1  cm/s                  AV Mean Gradient: 9 mmHg     cm/s  MV E/A Ratio: 2.11    AV VTI: 43.2 cm              LVOT Peak Gradient: 3  MV Peak Gradient:     AV Area (Continuity):1.71    mmHgLVOT Mean Gradient: 2  4.58 mmHg             cm2                          mmHg                                                     Estimated RVSP: 28 mmHg  MV P1/2t: 59 msec     LVOT VTI: 17.8 cm            Estimated RAP:3 mmHg  MVA by PHT:3.73 cm2                        Estimated PASP: 28.2 mmHg  MV E' Septal                                       TR Velocity:251 cm/s  Velocity: 3.95 cm/s                                TR Gradient:25.2 mmHg  MV E' Lateral  Velocity: 8.99 cm/s  MV E/E' septal: 27.09  MV E/E' lateral: 11.9      NM LUNG SCAN PERFUSION ONLY    Result Date: 2/20/2022  EXAMINATION: NUCLEAR MEDICINE PERFUSION SCAN. 2/20/2022 TECHNIQUE: Ventilation not performed as part of COVID-19 safety precautions. 5 millicuries of Tc 00K MAA was administered intravenously prior to planar imaging of the lungs in multiple projections. COMPARISON: Chest radiograph 02/19/2022. Perfusion study 10/28/2021. HISTORY: ORDERING SYSTEM PROVIDED HISTORY: Elevated d-dimers TECHNOLOGIST PROVIDED HISTORY: Reason for exam:->Elevated d-dimers Reason for Exam: elevated D-dimer FINDINGS: PERFUSION: Distribution of radiotracer is homogeneous. No segmental defects identified. CHEST RADIOGRAPH: No focal areas of consolidation or significant effusions on recent chest radiograph. Low Probability for Pulmonary Embolus.      Assessment//Plan Hospital Problems           Last Modified POA    * (Principal) Unstable angina ([facility] [facility] 2/19/2022 Yes    Acute chest pain 2/22/2022 Yes    PAF (paroxysmal atrial fibrillation) ([facility] [address] 2/22/2022 Yes    ED[facility] 2/21/2022 Yes    Hyperkalemia 7/84/5274 Yes    Metabolic acidosis 8/74/3752 Yes        Assessment & Plan  Chest pain  -pleurisy and solumedrol  -DAPT  Afib with RVR  -CXR neg  -eliquis and will change to xarelto tomorrow as xarelto may be more affordable   -amiodarone 200mg bid x 2 wks then 200mg but now on amio drip  -no DVT  -V/q low prob of PE  EDMAR on CKD 3  -IVF stopped and improved  Acute on chornic systolic CHF  -EF 41-80%  -BB, not a candidate for ACE/ARB/aldactone with EDMAR and hyperkalemia  -CXR stable  HYponatremia(resolved)  Hperkalemia  -lokelma  CAD  -DAPT,statin, BB  Leukocytosis  -due to steroid and trending down  HTN  -off pressors and midorine  -BB  DM  -SSI    Electronically signed by Pamela Matthews MD on 2/22/22 at 8:05 AM EST

## 2022-02-23 NOTE — PROGRESS NOTES
Admit Date:  2/19/2022    Admission diagnosis / Complaint :  Chest pain      Subjective:  Mr. Yassine Arciniega feels better. Denies chest pain today      Objective:   BP (!) 114/52   Pulse 116   Temp 97.8 °F (36.6 °C) (Oral)   Resp 14   Ht 5' 7\" (1.702 m)   Wt 241 lb 10 oz (109.6 kg)   SpO2 92%   BMI 37.84 kg/m²     Intake/Output Summary (Last 24 hours) at 2/23/2022 1220  Last data filed at 2/23/2022 1611  Gross per 24 hour   Intake --   Output 1500 ml   Net -1500 ml       TELEMETRY: PAF episodes     has a past medical history of Atrial fibrillation ([facility] [address] Cancer ([facility] [address] COPD, moderate ([facility] [address] Diabetes mellitus ([facility] [address] Excessive daytime sleepiness, GERD (gastroesophageal reflux disease), Hypertension, Obstructive sleep apnea, Pulmonary emphysema determined by X-ray [facility]), Testicular hypofunction, and Tobacco abuse.   has a past surgical history that includes hernia repair; AAA repair, endovascular (11/30/2015); Leg Surgery (10/2020); Cardiac defibrillator placement (Left, 11/02/2021); Arm Surgery; Toe Surgery; and knee surgery.   Physical Exam:  General:  Awake, alert, NAD  Skin:  Warm and dry  Neck:  JVD none  Chest:  Clear to auscultation, respiration easy  Cardiovascular:  RRR S1S2, grade 2/6 systolic murmur  Abdomen:  Soft non tender  Extremities:  no edema    Medications:    sodium zirconium cyclosilicate  10 g Oral BID    polyethylene glycol  17 g Oral BID    insulin glargine  35 Units SubCUTAneous Nightly    ipratropium  2 puff Inhalation BID    albuterol sulfate HFA  2 puff Inhalation BID    midodrine  10 mg Oral TID WC    [Held by provider] amiodarone  200 mg Oral Daily    methylPREDNISolone  40 mg IntraVENous Q12H    insulin lispro  0-18 Units SubCUTAneous TID WC    insulin lispro  0-9 Units SubCUTAneous Nightly    lidocaine PF  5 mL IntraDERmal Once    sodium chloride flush  5-40 mL IntraVENous 2 times per day    aspirin  81 mg Oral Daily    clopidogrel  75 mg Oral Daily    budesonide-formoterol 2 puff Inhalation BID    fluticasone  1 spray Each Nostril Daily    pantoprazole  40 mg Oral QAM AC    apixaban  5 mg Oral BID    metoprolol succinate  50 mg Oral Daily    atorvastatin  40 mg Oral Nightly      amiodarone 1 mg/min (02/23/22 0734)    Followed by   Keisha Kelly amiodarone      dilTIAZem (CARDIZEM) 100 mg in dextrose 5% 100 mL ([facility]) Stopped (02/22/22 1000)    sodium chloride      dextrose       morphine, ipratropium-albuterol, sodium chloride flush, sodium chloride, nitroGLYCERIN, ondansetron **OR** ondansetron, acetaminophen **OR** acetaminophen, polyethylene glycol, glucose, glucagon (rDNA), dextrose, dextrose bolus (hypoglycemia) **OR** dextrose bolus (hypoglycemia)    Lab Data:  CBC:   Recent Labs     02/21/22  0450 02/22/22  0520 02/23/22  0430   WBC 17.2* 19.8* 14.7*   HGB 11.6* 11.8* 11.4*   HCT 35.8* 36.5* 35.5*   MCV 89.9 90.8 91.0    227 250     BMP:   Recent Labs     02/21/22  0450 02/22/22  0520 02/23/22  0430   * 131* 137   K 5.3* 5.5* 5.2*   CL 95* 100 102   CO2 20* 20* 23   BUN 50* 56* 58*   CREATININE 3.9* 3.2* 2.5*     LIVER PROFILE:   Recent Labs     02/21/22 0450 02/22/22  0520 02/23/22  0430   AST 10* 10* 9*   ALT 11 12 11   BILITOT 0.4 0.2 0.3   ALKPHOS 44 49 45     PT/INR: No results for input(s): PROTIME, INR in the last 72 hours. APTT: No results for input(s): APTT in the last 72 hours. BNP:  No results for input(s): BNP in the last 72 hours. TROPONIN: @TROPONINI:3@      Assessment:    Paroxysmal atrial fibrillation with RVR  Ischemic cardiomyopathy s/p ICD  Coronary artery disease  Hypertension  CKD  Diabetes mellitus type 2    Patient is having PAF episodes  Last night amiodarone was started again IV but patient appears to be already loaded  Patient with cardiomyopathy also we need to be careful with Cardizem as decreased contractility can be an issue in this patients.   Patient also has low blood pressure so that could be another factor to with the patient    For now I would recommend digoxin every other day as patient has chronic kidney disease to decrease the incidence of dig toxicity. Continue amiodarone oral as recommended    Patient may need to consider A. fib ablation and I discussed with him and we can schedule this as an outpatient    If A. fib ablation is not successful and patient is having issues controlling of the rhythm then patient may need to have AV node ablation with a BiV upgrade but at this point we will try to medically manage first and then reassess    I am not thinking of long-term digoxin as plan is to get ablation scheduled within a month if patient agrees. Patient wants to think about it    We can follow him as an outpatient        Shad Alatorre MD, 2/23/2022 12:20 PM     Please note this report has been partially produced using speech recognition software and may contain errors related to that system including errors in grammar, punctuation, and spelling, as well as words and phrases that may be inappropriate. If there are any questions or concerns please feel free to contact the dictating provider for clarification.

## 2022-02-24 NOTE — PROGRESS NOTES
Nephrology Progress Note        [address], [address] [address]  Phone: [phone]  Office Hours: 8:30AM - 4:30PM  Monday - Friday 2/24/2022 8:08 AM  Subjective:   Admit Date: 2/19/2022  PCP: TIKI Ramirez  Interval History: tachycardic  On room air    Diet: ADULT DIET; Regular; 4 carb choices (60 gm/meal); Low Sodium (2 gm); Low Potassium (Less than 3000 mg/day); 1800 ml; No Caffeine      Data:   Scheduled Meds:   polyethylene glycol  17 g Oral BID    digoxin  125 mcg Oral Every Other Day    insulin glargine  35 Units SubCUTAneous Nightly    ipratropium  2 puff Inhalation BID    albuterol sulfate HFA  2 puff Inhalation BID    midodrine  10 mg Oral TID WC    [Held by provider] amiodarone  200 mg Oral Daily    methylPREDNISolone  40 mg IntraVENous Q12H    insulin lispro  0-18 Units SubCUTAneous TID WC    insulin lispro  0-9 Units SubCUTAneous Nightly    lidocaine PF  5 mL IntraDERmal Once    sodium chloride flush  5-40 mL IntraVENous 2 times per day    aspirin  81 mg Oral Daily    clopidogrel  75 mg Oral Daily    budesonide-formoterol  2 puff Inhalation BID    fluticasone  1 spray Each Nostril Daily    pantoprazole  40 mg Oral QAM AC    apixaban  5 mg Oral BID    metoprolol succinate  50 mg Oral Daily    atorvastatin  40 mg Oral Nightly     Continuous Infusions:   dilTIAZem (CARDIZEM) 100 mg in dextrose 5% 100 mL ([facility]) Stopped (02/22/22 1000)    sodium chloride      dextrose       PRN Meds:morphine, ipratropium-albuterol, sodium chloride flush, sodium chloride, nitroGLYCERIN, ondansetron **OR** ondansetron, acetaminophen **OR** acetaminophen, polyethylene glycol, glucose, glucagon (rDNA), dextrose, dextrose bolus (hypoglycemia) **OR** dextrose bolus (hypoglycemia)  I/O last 3 completed shifts: In: 130 [P.O.:120; I.V.:10]  Out: 1925 [Urine:1925]  No intake/output data recorded.     Intake/Output Summary (Last 24 hours) at 2/24/2022 2641  Last data filed at 2/23/2022 2119  Gross per 24 hour   Intake 130 ml   Output 425 ml   Net -295 ml       CBC:   Recent Labs     02/22/22  0520 02/23/22  0430   WBC 19.8* 14.7*   HGB 11.8* 11.4*    250       BMP:    Recent Labs     02/22/22  0520 02/23/22  0430   * 137   K 5.5* 5.2*    102   CO2 20* 23   BUN 56* 58*   CREATININE 3.2* 2.5*   GLUCOSE 250* 163*     Hepatic:   Recent Labs     02/22/22  0520 02/23/22  0430   AST 10* 9*   ALT 12 11   BILITOT 0.2 0.3   ALKPHOS 49 45         Objective:   Vitals: BP (!) 145/83   Pulse 73   Temp 97.2 °F (36.2 °C) (Oral)   Resp 12   Ht 5' 7\" (1.702 m)   Wt 255 lb 4.8 oz (115.8 kg)   SpO2 96%   BMI 39.99 kg/m²   General appearance: alert and cooperative with exam, in no acute distress  HEENT: normocephalic, atraumatic,   Neck: supple, trachea midline  Lungs:  breathing comfortably  Heart[de-identified] tahycardic  Abdomen: soft, non-tender; non distended, +bowel sounds  Extremities: extremities atraumatic, no cyanosis or edema  Neurologic: alert, oriented, follows commands, interactive    Assessment and Plan:     -Hyponatremia  -EDMAR on CKD3 from volume depletion: CR 4.7 from baseline 1.9//  -Hyperkalemia  -Pleuritic chest pain  -CHF hx     Plan:  Cr better at 2.5 yesterday  Please hold raas blockade agents for now  Avoid nephrotoxins  Will follow                  Electronically signed by John Brito DO on 2/24/2022 at 8:08 MD Vera Burns Leader, DO Brian Rey,  Jerson GUEVARA [facility], [facility] [address]  PHONE: [phone]  FAX: [phone]

## 2022-02-24 NOTE — PROCEDURES
Procedure:     TERRY and Cardioversion    Indication: Atrial fibrillation    ASA/Mallapati score: 3/3    After obtaining the informed consent pt was sedated  With  Versed and  Fentanyl     A  200 J x 2  synchronised  shock was given. Pt converted to  Sinus rythym        Pt remained clinically and hemodynamically stable. TERRY before procedure did not show any atrial or appendage clot . Patient was anticoagulated before the procedure. Impression:    TERRY guided successful Cardioversion     Plan:    Amiodarone 400 mg p.o. twice daily for 7 days, then 200 daily  Stop IV Cardizem  Continue with Eliquis  Continue with oral digoxin  If recurrent A. fib, will discuss with EP to consider ablation.     Dr. Kentrell Tran

## 2022-02-24 NOTE — PROGRESS NOTES
CARDIOLOGY PROGRESS NOTE                                                  Name:  Akshat Jones /Age/Sex: 1955  (74 y.o. male)   MRN & CSN:  [id number] & [id number] Admission Date/Time: 2022 11:15 AM   Location:  3730/0195-Y PCP: TIKI Miller         Admit Date:  2022  Hospital Day: 6      SUBJECTIVE:   Seen patient as follow up as consultation for CP AFIB    Mild chest pain  No shortness of breath  + palpations    TELEMETRY: Atrial fibrillation RVR      Intake/Output Summary (Last 24 hours) at 2022 1039  Last data filed at 2022 2119  Gross per 24 hour   Intake 130 ml   Output 425 ml   Net -295 ml       Assessment/Plan:      1. Noncardiac chest Pain /exacerbated with inspiration -  Likely pleurisy / pericarditis:      CRP/ESR - elevated   Continue with IV Solu-Medrol and colchicine - Responding well   Can be switched to oral steroids upon discharge    Chest pain has improved  Morphine for pain control  Echocardiogram as below. No sign of pericardial effusion    Elevated D-dimer  VQ scan is low probability for PE  No evidence of DVT in lower extremities on vascular study     3. Paroxysmal A. fib now with A. fib RVR  4. Moderately dilated left atrium  5. Mild aortic stenosis  6. Moderate MR    S/P amiodarone and Cardizem gtt. Now on oral amiodarone daily and digoxin every other day. Patient remains in Afib despite medication titration, TERRY/DCCV today. Consult EP for evaluation  Continue with beta-blocker. Continue Eliquis for anticoagulation. Not on long term CCB due to cardiomyopathy / low EF       7. History of coronary disease s/p complex PCI to RCA and LAD at [facility] last year. Continue with DAPT beta-blocker statins. 8. Ischemic cardiomyopathy s/p AICD. Continue with Toprol-XL, hold lisinopril and Aldactone. Continue with Farxiga    9. Essential hypertension: hypotension, continue with midodrine 10 mg    10.  Hyperlipidemia: Continue with Lipitor 40 mg daily    11. EDMAR / Hyper K  =  nephorlogy following - ACEI and aldactone on hold         Echocardiogram 2/21/22     Left ventricular systolic function is abnormal.   Ejection fraction is visually estimated at 25-30%. Apical, lateral, anterior, and inferior walls appear akinetic. Dilated left ventricle noted. Grade II diastolic dysfunction. Moderately dilated left atrium. PPM wiring visualized within the right heart. Mild aortic stenosis is present; mean PG 11mmHg. Mild to moderate mitral regurgitation. No evidence of any pericardial effusion. Past medical history:    has a past medical history of Atrial fibrillation ([facility] [address] Cancer ([facility] [address] COPD, moderate ([facility] [address] Diabetes mellitus ([facility] [address] Excessive daytime sleepiness, GERD (gastroesophageal reflux disease), Hypertension, Obstructive sleep apnea, Pulmonary emphysema determined by X-ray [facility]), Testicular hypofunction, and Tobacco abuse. Past surgical history:   has a past surgical history that includes hernia repair; AAA repair, endovascular (11/30/2015); Leg Surgery (10/2020); Cardiac defibrillator placement (Left, 11/02/2021); Arm Surgery; Toe Surgery; and knee surgery. Social History:   reports that he quit smoking about 10 months ago. His smoking use included cigarettes. He smoked 1.50 packs per day. He has never used smokeless tobacco. He reports that he does not drink alcohol and does not use drugs. Family history:  family history is not on file. OBJECTIVE:     BP (!) 140/103   Pulse 134   Temp 97.6 °F (36.4 °C) (Oral)   Resp 17   Ht 5' 7\" (1.702 m)   Wt 255 lb 4.8 oz (115.8 kg)   SpO2 97%   BMI 39.99 kg/m²       Intake/Output Summary (Last 24 hours) at 2/24/2022 1039  Last data filed at 2/23/2022 2119  Gross per 24 hour   Intake 130 ml   Output 425 ml   Net -295 ml       Physical Exam:    Constitutional:  Well developed, Well nourished, No acute distress, Non-toxic appearance.    HENT:  Normocephalic, Atraumatic, Bilateral external ears normal, Oropharynx moist, No oral exudates, Nose normal. Neck- Normal range of motion, No tenderness, Supple, No stridor. Eyes:  EOMI, Conjunctiva normal, No discharge. Respiratory:  Normal breath sounds, No respiratory distress, No wheezing, No chest tenderness. ,no use of accessory muscles, diaphragm movement is normal  Cardiovascular S1-S2 No Murmurs, added sounds. elevated rate rhythm. No rubs gallops. Carotid pulses and amplitude are normal no bruit noted. Pedal pulses normal femoral pulses normal.  No pedal edema  GI:  Bowel sounds normal, Soft, No tenderness  : No CVA tenderness. Musculoskeletal: No edema, No tenderness, No cyanosis, No clubbing. Back- No tenderness. Integument:  Warm, Dry, No erythema, No rash. Lymphatic:  No lymphadenopathy noted. Neurologic:  Alert & oriented x 3, No focal deficits noted.    Psychiatric:  Affect normal, Judgment normal, Mood normal.           MEDICATIONS:     sodium chloride flush  5-40 mL IntraVENous 2 times per day    digoxin  125 mcg Oral Every Other Day    insulin glargine  35 Units SubCUTAneous Nightly    ipratropium  2 puff Inhalation BID    albuterol sulfate HFA  2 puff Inhalation BID    midodrine  10 mg Oral TID WC    amiodarone  200 mg Oral Daily    methylPREDNISolone  40 mg IntraVENous Q12H    insulin lispro  0-18 Units SubCUTAneous TID WC    insulin lispro  0-9 Units SubCUTAneous Nightly    lidocaine PF  5 mL IntraDERmal Once    sodium chloride flush  5-40 mL IntraVENous 2 times per day    aspirin  81 mg Oral Daily    clopidogrel  75 mg Oral Daily    budesonide-formoterol  2 puff Inhalation BID    fluticasone  1 spray Each Nostril Daily    pantoprazole  40 mg Oral QAM AC    apixaban  5 mg Oral BID    metoprolol succinate  50 mg Oral Daily    atorvastatin  40 mg Oral Nightly      sodium chloride      sodium chloride      dilTIAZem (CARDIZEM) 100 mg in dextrose 5% 100 mL ([facility]) Stopped (02/22/22 1000)    sodium chloride      dextrose       sodium chloride flush, sodium chloride, morphine, ipratropium-albuterol, sodium chloride flush, sodium chloride, nitroGLYCERIN, ondansetron **OR** ondansetron, acetaminophen **OR** acetaminophen, polyethylene glycol, glucose, glucagon (rDNA), dextrose, dextrose bolus (hypoglycemia) **OR** dextrose bolus (hypoglycemia)  No Known Allergies    Lab Data:  CBC:   Recent Labs     02/22/22  0520 02/23/22  0430   WBC 19.8* 14.7*   HGB 11.8* 11.4*   HCT 36.5* 35.5*   MCV 90.8 91.0    250     BMP:   Recent Labs     02/22/22  0520 02/23/22  0430   * 137   K 5.5* 5.2*    102   CO2 20* 23   BUN 56* 58*   CREATININE 3.2* 2.5*     LIVER PROFILE:   Recent Labs     02/22/22  0520 02/23/22  0430   AST 10* 9*   ALT 12 11   BILITOT 0.2 0.3   ALKPHOS 49 45        MICHAEL Martinez - CNP, MD 2/24/2022 10:39 AM            CARDIOLOGY ATTENDING ADDENDUM    I have seen, spoken to and examined this patient personally, independent of the NP/PAC. I have reviewed the hospital care given to date and reviewed all pertinent labs and imaging. I have spoken with patient, nursing staff and provided written and verbal instructions . The above note has been reviewed. I have spent substantive amount of time in formulating patient care.            AFIB RVR       Physical Exam:    General:   Awake, alert  Head:normal  Eye:normal  Chest:   Clear to auscultation  0 Basilar crackles   Cardiovascular:  S1S2 IRIR   Abdomen: soft   Extremities:   0 edema  Pulses; palpable      MEDICAL DECISION MAKING :       Patient remained in atrial fibrillation with rapid ventricular response despite IV amiodarone IV Cardizem and digoxin  Plan for TERRY guided cardioversion today                Dr. Yvonne Longoria MD

## 2022-02-24 NOTE — PROGRESS NOTES
Progress Note  Date:2022       Room:[address] [place] [address]-  Patient Name:Peter Mann     YOB: 1955     Age:71 y.o. S/P TERRY and cardioversion. Pt is NSR at 70s. Subjective    Subjective:  Pain:  He reports no pain. Review of Systems   Constitutional: Negative. HENT: Negative. Eyes: Negative. Respiratory: Negative. Cardiovascular: Negative. Chest discomfort from the shock he got from his defibrillator prior to admission   Gastrointestinal: Negative. Endocrine: Negative. Genitourinary: Negative. Musculoskeletal: Negative. Allergic/Immunologic: Negative. Neurological: Negative. Hematological: Negative. Psychiatric/Behavioral: Negative. Objective         Vitals Last 24 Hours:  TEMPERATURE:  Temp  Av.7 °F (36.5 °C)  Min: 97.2 °F (36.2 °C)  Max: 98 °F (36.7 °C)  RESPIRATIONS RANGE: Resp  Av.2  Min: 12  Max: 23  PULSE OXIMETRY RANGE: SpO2  Av.7 %  Min: 93 %  Max: 99 %  PULSE RANGE: Pulse  Av.9  Min: 66  Max: 136  BLOOD PRESSURE RANGE: Systolic (71RRX), QOS:236 , Min:120 , FQI:452   ; Diastolic (60EQO), HVK:62, Min:75, Max:138    I/O (24Hr): Intake/Output Summary (Last 24 hours) at 2022 1554  Last data filed at 2022 2119  Gross per 24 hour   Intake 130 ml   Output 150 ml   Net -20 ml     Objective:  General Appearance:  Comfortable. Vital signs: (most recent): Blood pressure (!) 145/99, pulse 76, temperature 97.6 °F (36.4 °C), temperature source Oral, resp. rate 19, height 5' 7\" (1.702 m), weight 255 lb 4.8 oz (115.8 kg), SpO2 96 %. (Tachy on amio). HEENT: Normal HEENT exam.    Lungs:  Normal effort and normal respiratory rate. He is not in respiratory distress. No decreased breath sounds. Heart: Normal rate. Regular rhythm. Abdomen: Abdomen is soft and non-distended. Bowel sounds are normal.   There is no abdominal tenderness. Extremities: Normal range of motion. There is no local swelling. Neurological: Patient is alert. Pupils:  Pupils are equal, round, and reactive to light. Skin:  Warm. Labs/Imaging/Diagnostics    Labs:  CBC:  Recent Labs     02/22/22  0520 02/23/22 0430   WBC 19.8* 14.7*   RBC 4.02* 3.90*   HGB 11.8* 11.4*   HCT 36.5* 35.5*   MCV 90.8 91.0   RDW 13.3 13.6    250     CHEMISTRIES:  Recent Labs     02/22/22  0520 02/23/22 0430   * 137   K 5.5* 5.2*    102   CO2 20* 23   BUN 56* 58*   CREATININE 3.2* 2.5*   GLUCOSE 250* 163*     PT/INR:No results for input(s): PROTIME, INR in the last 72 hours. APTT:No results for input(s): APTT in the last 72 hours. LIVER PROFILE:  Recent Labs     02/22/22 0520 02/23/22 0430   AST 10* 9*   ALT 12 11   BILITOT 0.2 0.3   ALKPHOS 49 45       Imaging Last 24 Hours:  Echocardiogram complete 2D with doppler with color    Result Date: 2/21/2022  Transthoracic Echocardiography Report (TTE)  Demographics   Patient Name       Brandon Georgia  Date of Study       02/21/2022   Date of Birth      1955         Gender              Male   Age                74 year(s)         Race                   Patient Number     [id number]         Room Number         2125   Visit Number       [id number]   Corporate ID       [id number]   Accession Number   [id number]         Regina Reaves RDMS   Ordering Physician Sumaya Flower MD                 Physician           MD  Procedure Type of Study   TTE procedure:ECHOCARDIOGRAM COMPLETE 2D W DOPPLER W COLOR. Procedure Date Date: 02/21/2022 Start: 09:11 AM Study Location: Portable Technical Quality: Fair visualization Indications:Chest pain. Patient Status: Routine Contrast Medium: Definity.  Height: 67 inches Weight: 235 pounds BSA: 2.17 m2 BMI: 36.81 kg/m2 HR: 77 bpm BP: 119/58 mmHg  Conclusions   Summary  Left ventricular systolic function is abnormal.  Ejection fraction is visually estimated at 25-30%. Apical, lateral, anterior, and inferior walls appear akinetic. Dilated left ventricle noted. Grade II diastolic dysfunction. Moderately dilated left atrium. PPM wiring visualized within the right heart. Mild aortic stenosis is present; mean PG 11mmHg. Mild to moderate mitral regurgitation. No evidence of any pericardial effusion. Signature   ------------------------------------------------------------------  Electronically signed by Papo Lilly MD (Interpreting  physician) on 02/21/2022 at 01:57 PM  ------------------------------------------------------------------   Findings   Left Ventricle  Left ventricular systolic function is abnormal.  Ejection fraction is visually estimated at 25-30%. Apical, lateral, anterior, and inferior walls appear akinetic. Mild left ventricular hypertrophy. Dilated left ventricle noted. Grade II diastolic dysfunction. Left Atrium  Moderately dilated left atrium. Right Atrium  Essentially normal right atrium. Right Ventricle  PPM wiring visualized within the right heart. Aortic Valve  Mild aortic stenosis is present; mean PG 11mmHg. Mitral Valve  Mild to moderate mitral regurgitation. Tricuspid Valve  Mild tricuspid regurgitation; RVSP: 28 mmHg. Pulmonic Valve  The pulmonic valve was not well visualized. Pericardial Effusion  No evidence of any pericardial effusion. Pleural Effusion  No evidence of pleural effusion. Miscellaneous  IVC and abdominal aorta are within normal limits.   M-Mode/2D Measurements & Calculations   LV Diastolic Dimension:  LV Systolic Dimension:  LA Dimension: 4.6 cmAO Root  6.82 cm                  5.7 cm                  Dimension: 4 cmLA Area:  LV FS:16.4 %             LV Volume Diastolic:    48.6 cm2  LV PW Diastolic: 8.71 cm 307 ml  Septum Diastolic: 1.2 cm LV Volume Systolic: 474  CO: 2.49 l/min           ml  CI: 2.62 l/m*m2          LV EDV/LV EDV elevated D-dimer FINDINGS: PERFUSION: Distribution of radiotracer is homogeneous. No segmental defects identified. CHEST RADIOGRAPH: No focal areas of consolidation or significant effusions on recent chest radiograph. Low Probability for Pulmonary Embolus. Assessment//Plan           Hospital Problems           Last Modified POA    * (Principal) Unstable angina ([facility] [address] 2/19/2022 Yes    Chest pain 2/24/2022 Yes    PAF (paroxysmal atrial fibrillation) ([facility] [address] 2/22/2022 Yes    EDMAR (acute kidney injury) ([facility] [address] 2/21/2022 Yes    Hyperkalemia 3/83/5259 Yes    Metabolic acidosis 7/42/2149 Yes        Assessment & Plan  Chest pain  -pleurisy and solumedrol  -DAPT    Afib with RVR  -CXR neg  -eliquis and will change to xarelto tomorrow as xarelto may be more affordable   -amiodarone 200mg bid x 2 wks then 200mg   -S/P TERRY and cardioversion. EDMAR on CKD 3  -IVF stopped and improved  -Pt did not have any labs done. Can't evaluate. Multiple lab orders were placed. Acute on chornic systolic CHF  -EF 05-15%  -BB, not a candidate for ACE/ARB/aldactone with EDMAR and hyperkalemia    HYponatremia(resolved)    Hperkalemia  -lokelma  -Can't evaluate, no labs.     CAD  -DAPT,statin, BB    Leukocytosis  -due to steroid and trending down    HTN  -off pressors and midorine  -BB    DM  -SSI    Electronically signed by Apolinar Rollins MD on 2/22/22 at 8:05 AM EST

## 2022-02-25 NOTE — PROGRESS NOTES
Outpatient Pharmacy Progress Note for Meds-to-Beds    Total number of Prescriptions Filled: 5  The following medications were dispensed to the patient during the discharge process:  Digoxin 125mcg  Albuterol inhaler  Xarelto 15mg  Midodrine 10mg  Amiodarone 200mg    Additional Documentation:  Medication(s) were delivered to the patient's room prior to discharge   A manufacture coupon card was applied to provide patient with a $0 co-pay for Xarelto. Thank you for letting us serve your patients.   [address] [place] [address]    [address], [address] [facility]    Phone: [phone]    Fax: [phone]

## 2022-02-25 NOTE — PROGRESS NOTES
Nephrology Progress Note        [address], [address] [address]  Phone: [phone]  Office Hours: 8:30AM - 4:30PM  Monday - Friday 2/25/2022 8:35 AM  Subjective:   Admit Date: 2/19/2022  PCP: TIKI Saunders  Interval History:   S/p cardioversion yesterday    Diet: Diet NPO Exceptions are: Sips of Water with Meds      Data:   Scheduled Meds:   sodium chloride flush  5-40 mL IntraVENous 2 times per day    amiodarone  400 mg Oral BID    digoxin  125 mcg Oral Every Other Day    insulin glargine  35 Units SubCUTAneous Nightly    ipratropium  2 puff Inhalation BID    albuterol sulfate HFA  2 puff Inhalation BID    midodrine  10 mg Oral TID WC    methylPREDNISolone  40 mg IntraVENous Q12H    insulin lispro  0-18 Units SubCUTAneous TID WC    insulin lispro  0-9 Units SubCUTAneous Nightly    lidocaine PF  5 mL IntraDERmal Once    sodium chloride flush  5-40 mL IntraVENous 2 times per day    aspirin  81 mg Oral Daily    clopidogrel  75 mg Oral Daily    budesonide-formoterol  2 puff Inhalation BID    fluticasone  1 spray Each Nostril Daily    pantoprazole  40 mg Oral QAM AC    apixaban  5 mg Oral BID    metoprolol succinate  50 mg Oral Daily    atorvastatin  40 mg Oral Nightly     Continuous Infusions:   sodium chloride 75 mL/hr at 02/24/22 1329    sodium chloride      sodium chloride      dextrose       PRN Meds:sodium chloride flush, sodium chloride, morphine, ipratropium-albuterol, sodium chloride flush, sodium chloride, nitroGLYCERIN, ondansetron **OR** [DISCONTINUED] ondansetron, acetaminophen **OR** acetaminophen, polyethylene glycol, glucose, glucagon (rDNA), dextrose, dextrose bolus (hypoglycemia) **OR** dextrose bolus (hypoglycemia)  I/O last 3 completed shifts: In: 10 [I.V.:10]  Out: 590 [Urine:590]  No intake/output data recorded.     Intake/Output Summary (Last 24 hours) at 2/25/2022 0835  Last data filed at 2/24/2022 1718  Gross per 24 hour Intake --   Output 590 ml   Net -590 ml       CBC:   Recent Labs     02/23/22 0430 02/24/22 2153 02/25/22  0056   WBC 14.7* 9.5 8.7   HGB 11.4* 12.3* 11.8*    286 272       BMP:    Recent Labs     02/23/22  0430 02/24/22 2153 02/25/22  0056    137 134*   K 5.2* 5.0 4.8    99 99   CO2 23 26 22   BUN 58* 55* 57*   CREATININE 2.5* 2.3* 2.1*   GLUCOSE 163* 287* 267*     Hepatic:   Recent Labs     02/23/22 0430 02/24/22 2153 02/25/22  0056   AST 9* 12* 11*   ALT 11 17 16   BILITOT 0.3 0.5 0.4   ALKPHOS 45 50 54     Troponin: No results for input(s): TROPONINI in the last 72 hours. BNP: No results for input(s): BNP in the last 72 hours. Lipids: No results for input(s): CHOL, HDL in the last 72 hours. Invalid input(s): LDLCALCU  ABGs:   Lab Results   Component Value Date    PO2ART 74 05/06/2021    CBC9MIG 42.0 05/06/2021     INR: No results for input(s): INR in the last 72 hours. Objective:   Vitals: BP (!) 154/98   Pulse 68   Temp 97.6 °F (36.4 °C) (Axillary) Comment: Pt requested vitals to be taken.    Resp 20   Ht 5' 7\" (1.702 m)   Wt 254 lb 6.4 oz (115.4 kg)   SpO2 95%   BMI 39.84 kg/m²   General appearance: alert and cooperative with exam, in no acute distress  HEENT: normocephalic, atraumatic,   Neck: supple, trachea midline  Lungs: clear to auscultation bilaterally, breathing comfortably on room air  Heart[de-identified] regular rate and rhythm, S1, S2 normal,  Abdomen: soft, non-tender; non distended, +bowel sounds  Extremities: extremities atraumatic, no cyanosis or edema  Neurologic: alert, oriented, follows commands, interactive    Assessment and Plan:     -Hyponatremia  -EDMAR on CKD3 from volume depletion: CR 4.7 from baseline 1.9//  -Hyperkalemia  -Pleuritic chest pain  -CHF hx     Plan:  Cr better at 2.1   Please hold raas blockade agents until complete edmar resolution  Avoid nephrotoxins  Will follow                 Electronically signed by Dallas Pierre DO on 2/25/2022 at 8:35 AM    [facility]  Christiano Castle MD  [address] , [address]  [place], [place] [address]  PHONE: [phone]  FAX: [phone]

## 2022-02-25 NOTE — PROGRESS NOTES
CARDIOLOGY PROGRESS NOTE                                                  Name:  Radames Aaron /Age/Sex: 1955  (74 y.o. male)   MRN & CSN:  [id number] & [id number] Admission Date/Time: 2022 11:15 AM   Location:  [id number] PCP: TIKI Adrian         Admit Date:  2022  Hospital Day: 7      SUBJECTIVE:   Seen patient as follow up as consultation for CP AFIB    Mild chest pain  No shortness of breath  + palpations    TELEMETRY: Atrial fibrillation RVR      Intake/Output Summary (Last 24 hours) at 2022 0956  Last data filed at 2022 1718  Gross per 24 hour   Intake --   Output 590 ml   Net -590 ml       Assessment/Plan:      1. Noncardiac chest Pain /exacerbated with inspiration -  Likely pleurisy / pericarditis:      CRP/ESR - elevated   Continue with IV Solu-Medrol and colchicine - Responding well   Can be switched to oral steroids upon discharge    Chest pain, resolved  Morphine for pain control  Echocardiogram as below. No sign of pericardial effusion    Elevated D-dimer  VQ scan is low probability for PE  No evidence of DVT in lower extremities on vascular study     3. Paroxysmal A. fib now with A. fib RVR: remains in NSR S/P TERRY/DCCV yesterday. 4. Moderately dilated left atrium  5. Mild aortic stenosis  6. Moderate MR    S/P amiodarone and Cardizem gtt. Now on oral amiodarone daily and digoxin every other day. Risks of long term use of amiodarone and digoxin discussed, also reviewed interaction with clotting times with the combination of amiodarone and Warfarin. Patient voices understanding. Consulted EP, will follow up as outpatient. Continue with beta-blocker. Patient can not afford Eliquis, pradaxa, or Xeralto, cost verified with OP pharmacy. Will give 1 month free supply card of Xeralto and change to [address] as outpatient. Not on long term CCB due to cardiomyopathy / low EF       7.  History of coronary disease s/p complex PCI to RCA and LAD at [facility] BMI 39.84 kg/m²       Intake/Output Summary (Last 24 hours) at 2/25/2022 0956  Last data filed at 2/24/2022 1718  Gross per 24 hour   Intake --   Output 590 ml   Net -590 ml       Physical Exam:    Constitutional:  Well developed, Well nourished, No acute distress, Non-toxic appearance. HENT:  Normocephalic, Atraumatic, Bilateral external ears normal, Oropharynx moist, No oral exudates, Nose normal. Neck- Normal range of motion, No tenderness, Supple, No stridor. Eyes:  EOMI, Conjunctiva normal, No discharge. Respiratory:  Normal breath sounds, No respiratory distress, No wheezing, No chest tenderness. ,no use of accessory muscles, diaphragm movement is normal  Cardiovascular S1-S2 No Murmurs, added sounds. Regular rate and rhythm. No rubs gallops. Carotid pulses and amplitude are normal no bruit noted. Pedal pulses normal femoral pulses normal.  No pedal edema  GI:  Bowel sounds normal, Soft, No tenderness  : No CVA tenderness. Musculoskeletal: No edema, No tenderness, No cyanosis, No clubbing. Back- No tenderness. Integument:  Warm, Dry, No erythema, No rash. Lymphatic:  No lymphadenopathy noted. Neurologic:  Alert & oriented x 3, No focal deficits noted.    Psychiatric:  Affect normal, Judgment normal, Mood normal.           MEDICATIONS:     rivaroxaban  15 mg Oral Daily    sodium chloride flush  5-40 mL IntraVENous 2 times per day    amiodarone  400 mg Oral BID    digoxin  125 mcg Oral Every Other Day    insulin glargine  35 Units SubCUTAneous Nightly    ipratropium  2 puff Inhalation BID    albuterol sulfate HFA  2 puff Inhalation BID    midodrine  10 mg Oral TID WC    methylPREDNISolone  40 mg IntraVENous Q12H    insulin lispro  0-18 Units SubCUTAneous TID WC    insulin lispro  0-9 Units SubCUTAneous Nightly    lidocaine PF  5 mL IntraDERmal Once    sodium chloride flush  5-40 mL IntraVENous 2 times per day    aspirin  81 mg Oral Daily    clopidogrel  75 mg Oral Daily    budesonide-formoterol  2 puff Inhalation BID    fluticasone  1 spray Each Nostril Daily    pantoprazole  40 mg Oral QAM AC    metoprolol succinate  50 mg Oral Daily    atorvastatin  40 mg Oral Nightly      sodium chloride 75 mL/hr at 02/24/22 1329    sodium chloride      sodium chloride      dextrose       sodium chloride flush, sodium chloride, morphine, ipratropium-albuterol, sodium chloride flush, sodium chloride, nitroGLYCERIN, ondansetron **OR** [DISCONTINUED] ondansetron, acetaminophen **OR** acetaminophen, polyethylene glycol, glucose, glucagon (rDNA), dextrose, dextrose bolus (hypoglycemia) **OR** dextrose bolus (hypoglycemia)  No Known Allergies    Lab Data:  CBC:   Recent Labs     02/23/22 0430 02/24/22 2153 02/25/22  0056   WBC 14.7* 9.5 8.7   HGB 11.4* 12.3* 11.8*   HCT 35.5* 38.5* 35.5*   MCV 91.0 91.7 88.8    286 272     BMP:   Recent Labs     02/23/22 0430 02/24/22 2153 02/25/22  0056    137 134*   K 5.2* 5.0 4.8    99 99   CO2 23 26 22   BUN 58* 55* 57*   CREATININE 2.5* 2.3* 2.1*     LIVER PROFILE:   Recent Labs     02/23/22 0430 02/24/22 2153 02/25/22  0056   AST 9* 12* 11*   ALT 11 17 16   BILITOT 0.3 0.5 0.4   ALKPHOS 45 50 54        MICHAEL Iverson - CNP, MD 2/25/2022 9:56 AM           CARDIOLOGY ATTENDING ADDENDUM    I have seen, spoken to and examined this patient personally, independent of the NP/PAC. I have reviewed the hospital care given to date and reviewed all pertinent labs and imaging. I have spoken with patient, nursing staff and provided written and verbal instructions . The above note has been reviewed. I have spent substantive amount of time in formulating patient care.         Patient remains in sinus rhythm  Denies any chest pain      Physical Exam:    General:   Awake, alert  Head:normal  Eye:normal  Chest:   Clear to auscultation  0 Basilar crackles   Cardiovascular:  S1S2 Abdomen: soft   Extremities:   0 edema  Pulses; palpable      MEDICAL DECISION MAKING :       Noncardiac chest pain/pericarditis  Chemistries to oral steroids. Continue colchicine. Chest pain resolved  Echocardiogram does not show any effusions  Elevated D-dimer with low probability of PE on VQ scan and negative vascular study of lower extremities for DVT      Paroxysmal atrial fibrillation with moderate dilated left atrium and mild aortic stenosis. Patient underwent TERRY guided cardioversion yesterday  Stays in sinus rhythm  Continue with oral amiodarone and digoxin - As outlined    Unfortunately patient cannot afford Eliquis Pradaxa or Xarelto due to insurance and increase out of pocket budget. Will provide with 1 month free supply of Xarelto. For long-term, plan to start patient on warfarin as outpatient with Coumadin clinic follow-up. Patient is not on long-term short acting calcium channel blockers due to history of cardiomyopathy and low EF      Prior history of CAD with PCI to RCA and LAD continue with DAPT beta-blocker and statins    History of ischemic cardiomyopathy s/p AICD continue with Toprol-XL.   Not on ACE or Aldactone due to renal failure    Continue with midodrine      Patient can be discharged home from cardiovascular standpoint (check with nephrology)     Follow-up with me/Dr. Kesha Hatch as outpatient in 1 to 2 weeks      Please call us with any questions      Dr. Swapna Lizama MD

## 2022-02-25 NOTE — PROGRESS NOTES
AVS reviewed with patient. Education provided. PIV removed. Pharmacy phoned, scripts ready. Pt to be wheeled to outpatient pharmacy to receive, then to front door where he will drive his own vehicle home.  No further actions needed at this time

## 2022-02-25 NOTE — DISCHARGE SUMMARY
V2.0  Discharge Summary    Name:  Alisha Hill /Age/Sex: 1955 (59 y.o. male)   Admit Date: 2022  Discharge Date: 22    MRN & CSN:  [id number] & [id number] Encounter Date and Time 22 11:53 AM EST    Attending:  Malik Saenz DO Discharging Provider: Malik Saenz DO       Hospital Course:     Brief HPI: Alisha Hill is a 74 y.o. male who presented with h/o CAD, CKD III, Systolic CHF, and A fib, complaining of chest pain and unstable angina. Brief Problem Based Course:     Chest pain  -pleurisy and solumedrol  -DAPT  -Resolved     Afib with RVR  -CXR neg  -eliquis and will change to xarelto tomorrow as xarelto. Discount card was given for a month suppley, and pt will be switched to Coumadin as out pt, by Cardiology.  -amiodarone gtt was changed to PO, and pt will take 400mg BID for 6 more days, and will be changed to 200mg BID afterworlds. -S/P TERRY and cardioversion. Pt is feeling well and maintaining NSR.     EDMAR on CKD 3  -IVF stopped and improved  -sCr is 2.1 and pt is now cleared for discharge. Pt will not be started on ACEI, or ARBs, and Spironolactone will be held as well.  -Re initiation, only as approved by Nephrology.     Acute on chornic systolic CHF  -EF 01-%  -BB. No other meds secondary to EDMAR on CKD III and hyperkalemia.      Hperkalemia  -lokelma  -Resolved, with removal of offensive agents.     CAD  -DAPT,statin, BB     Leukocytosis  -due to steroid and trending down     HTN  -off pressors and midorine  -BB     DM  -SSI    The patient expressed appropriate understanding of, and agreement with the discharge recommendations, medications, and plan.      Consults this admission:  IP CONSULT TO HOSPITALIST  IP CONSULT TO CARDIOLOGY  IP CONSULT TO NEPHROLOGY  IP CONSULT TO IV TEAM    Discharge Diagnosis:   Unstable angina ([address]  A fib with RVR  Acute on chronic systolic CHF with EF of 32-45%  EDMAR on CKD III  Hyperkalemia  Hyponatremia  CAD  Leukocytosis  HTN  DM II    Discharge Instruction:   Follow up appointments: F/U with PCP within a week post discharge, as well as Cardiology and Nephrology, as scheduled. Primary care physician: TIKI Lance within 2 weeks  Diet: cardiac diet and diabetic diet   Activity: activity as tolerated  Disposition: Discharged to:   [x]Home, [][facility], []SNF, []Acute Rehab, []Hospice   Condition on discharge: Stable  Labs and Tests to be Followed up as an outpatient by PCP or Specialist:  F/U with sCr and electrolytes with Nephrology. F/U with Cardiology for converting to Coumadin. F/U with PCP within a week post discharge. Discharge Medications:        Medication List      START taking these medications    albuterol sulfate  (90 Base) MCG/ACT inhaler  Inhale 2 puffs into the lungs 2 times daily     aspirin 81 MG chewable tablet  Take 1 tablet by mouth daily  Start taking on: February 26, 2022     digoxin 125 MCG tablet  Commonly known as: LANOXIN  Take 1 tablet by mouth every other day  Start taking on: February 27, 2022     insulin glargine 100 UNIT/ML injection vial  Commonly known as: LANTUS  Inject 35 Units into the skin nightly  Replaces: Basaglar KwikPen 100 UNIT/ML injection pen     midodrine 10 MG tablet  Commonly known as: PROAMATINE  Take 1 tablet by mouth 3 times daily (with meals)     rivaroxaban 15 MG Tabs tablet  Commonly known as: XARELTO  Take 1 tablet by mouth daily        CHANGE how you take these medications    * amiodarone 400 MG tablet  Commonly known as: PACERONE  Take 1 tablet by mouth 2 times daily for 12 doses  What changed:   · medication strength  · how much to take  · when to take this     * amiodarone 200 MG tablet  Commonly known as: CORDARONE  Take 1 tablet by mouth 2 times daily  Start taking on: March 2, 2022  What changed: You were already taking a medication with the same name, and this prescription was added.  Make sure you understand how and when to take each. * This list has 2 medication(s) that are the same as other medications prescribed for you. Read the directions carefully, and ask your doctor or other care provider to review them with you. CONTINUE taking these medications    atorvastatin 40 MG tablet  Commonly known as: LIPITOR     clopidogrel 75 MG tablet  Commonly known as: PLAVIX     dapagliflozin 10 MG tablet  Commonly known as: Farxiga  Take 1 tablet by mouth every morning     fluticasone 50 MCG/ACT nasal spray  Commonly known as: FLONASE     glucose monitoring kit  1 kit by Does not apply route daily     [employer] Pen Needles 31G X 6 MM Misc  Generic drug: Insulin Pen Needle  1 each by Does not apply route daily     Lancets Misc  1 each by Does not apply route 4 times daily     metoprolol succinate 50 MG extended release tablet  Commonly known as: TOPROL XL  Take 1 tablet by mouth 2 times daily     nitroGLYCERIN 0.4 MG SL tablet  Commonly known as: NITROSTAT  up to max of 3 total doses. If no relief after 1 dose, call 911.      omeprazole 20 MG delayed release capsule  Commonly known as: PRILOSEC     Symbicort 80-4.5 MCG/ACT Aero  Generic drug: budesonide-formoterol        STOP taking these medications    apixaban 5 MG Tabs tablet  Commonly known as: ELIQUIS     Basaglar KwikPen 100 UNIT/ML injection pen  Generic drug: insulin glargine  Replaced by: insulin glargine 100 UNIT/ML injection vial     Lantus SoloStar 100 UNIT/ML injection pen  Generic drug: insulin glargine     lisinopril 2.5 MG tablet  Commonly known as: PRINIVIL;ZESTRIL     NOVOLOG 70/30 FLEXPEN RELION SC     potassium chloride 10 MEQ extended release tablet  Commonly known as: KLOR-CON M     spironolactone 25 MG tablet  Commonly known as: ALDACTONE     torsemide 20 MG tablet  Commonly known as: DEMADEX           Where to Get Your Medications      These medications were sent to [address] [place] [address] [address], [address] [facility]    Phone: [phone]   · albuterol sulfate  (90 Base) MCG/ACT inhaler  · amiodarone 200 MG tablet  · amiodarone 400 MG tablet  · aspirin 81 MG chewable tablet  · digoxin 125 MCG tablet  · insulin glargine 100 UNIT/ML injection vial  · midodrine 10 MG tablet  · rivaroxaban 15 MG Tabs tablet        Objective Findings at Discharge:   BP (!) 154/98   Pulse 68   Temp 97.6 °F (36.4 °C) (Axillary) Comment: Pt requested vitals to be taken. Resp 20   Ht 5' 7\" (1.702 m)   Wt 254 lb 6.4 oz (115.4 kg)   SpO2 95%   BMI 39.84 kg/m²       Physical Exam:   General: NAD  Eyes: EOMI  ENT: neck supple  Cardiovascular: Regular rate. Respiratory: Clear to auscultation  Gastrointestinal: Soft, non tender  Genitourinary: no suprapubic tenderness  Musculoskeletal: No edema  Skin: warm, dry  Neuro: Alert. Psych: Mood appropriate. Labs and Imaging   Echocardiogram transesophageal    Result Date: 2/24/2022  Transesophageal Echocardiography Report (TERRY)   Demographics   Patient Name      Mina Wu   Date of Study       02/24/2022   Date of Birth     1955          Gender              Male   Age               74 year(s)          Race                   Patient Number    [id number]          Room Number         6792   Visit Number      [id number]   Corporate ID      [id number]   Accession Number  [id number]          Maricarmen Castro RN   Ordering          Adrian Qiu CNP  Interpreting        [facility]  Physician                             Physician           MD   The procedure was explained in detail to the patient. Risks,  complications and alternative treatments were reviewed. Written consent  was obtained. Procedure Type of Study   TERRY procedure:ECHOCARDIOGRAM TRANSESOPHAGEAL.   Procedure Date Date: 02/24/2022 Start: 11:05 AM Study Location: Portable Indications:Atrial fibrillation. Patient Status: Routine Height: 67 inches Weight: 235 pounds BSA: 2.17 m2 BMI: 36.81 kg/m2 HR: 140 bpm BP: 136/81 mmHg TERRY Performed By: the attending and the sonographer  Procedure Informed Consent  Procedure consent form obtained. - See IV sedation record   Type of Anesthesia: Moderate sedation   Conclusions   Summary  There was no thrombus noted in the left atrial appendage. Negative bubble study. No PFO or ASD noted. Successful cardioversion after second attempt (200 Joules). Signature   ------------------------------------------------------------------  Electronically signed by Maddie Arana MD (Interpreting  physician) on 02/24/2022 at 01:56 PM  ------------------------------------------------------------------   Procedure Description   This is a 74year old male patient brought to the non-invasive laboratory  today. Informed consent was obtained. Xylocaine viscous local anesthesia was  utilized. Sedation was given through IV. The patient received 5 mg of versed  and 25 mcg of fentanyl. A mouth guard was placed. Transesophageal  echocardiogram probe with lubricant was advanced to the posterior pharynx,  mid esophagus without difficulty. Transesophageal echocardiographic study  was performed utilizing the standard technique with an omniplane probe. Echocardiographic images were obtained in the upper, middle and lower  esophagus. There was not a thrombus seen in the TIA, therefore we proceeded  with the cardioversion. The patient was shocked twice using 200 synchronized  joules and was successfully converted to sinus rhythm. Findings   Left Atrium  Normal size left atrium. Right Atrium  No evidence of thrombus or mass in the right atrium. Right Ventricle  The right ventricle was not clearly visualized . Aortic Valve  Structurally normal aortic valve. Mitral Valve  Mild mitral regurgitation is present.   No evidence of mitral valve stenosis. Tricuspid Valve  No evidence of tricuspid regurgitation. No evidence of tricuspid stenosis. Pulmonic Valve  No evidence of any pulmonic regurgitation. Pericardial Effusion  There is a small (trivial) pericardial effusion. Pleural Effusion  No evidence of pleural effusion. Miscellaneous  There was no thrombus noted in the left atrial appendage. Negative bubble study. No PFO or ASD noted. Echocardiogram complete 2D with doppler with color    Result Date: 2/21/2022  Transthoracic Echocardiography Report (TTE)  Demographics   Patient Name       Brandon Keller  Date of Study       02/21/2022   Date of Birth      1955         Gender              Male   Age                74 year(s)         Race                   Patient Number     [id number]         Room Number         2125   Visit Number       [id number]   Corporate ID       [id number]   Accession Number   [id number]         Regina Reaves RDMS   Ordering Physician Sumaya Flower MD                 Physician           MD  Procedure Type of Study   TTE procedure:ECHOCARDIOGRAM COMPLETE 2D W DOPPLER W COLOR. Procedure Date Date: 02/21/2022 Start: 09:11 AM Study Location: Portable Technical Quality: Fair visualization Indications:Chest pain. Patient Status: Routine Contrast Medium: Definity. Height: 67 inches Weight: 235 pounds BSA: 2.17 m2 BMI: 36.81 kg/m2 HR: 77 bpm BP: 119/58 mmHg  Conclusions   Summary  Left ventricular systolic function is abnormal.  Ejection fraction is visually estimated at 25-30%. Apical, lateral, anterior, and inferior walls appear akinetic. Dilated left ventricle noted. Grade II diastolic dysfunction. Moderately dilated left atrium. PPM wiring visualized within the right heart. Mild aortic stenosis is present; mean PG 11mmHg.   Mild to moderate mitral regurgitation. No evidence of any pericardial effusion. Signature   ------------------------------------------------------------------  Electronically signed by Papo Lilly MD (Interpreting  physician) on 02/21/2022 at 01:57 PM  ------------------------------------------------------------------   Findings   Left Ventricle  Left ventricular systolic function is abnormal.  Ejection fraction is visually estimated at 25-30%. Apical, lateral, anterior, and inferior walls appear akinetic. Mild left ventricular hypertrophy. Dilated left ventricle noted. Grade II diastolic dysfunction. Left Atrium  Moderately dilated left atrium. Right Atrium  Essentially normal right atrium. Right Ventricle  PPM wiring visualized within the right heart. Aortic Valve  Mild aortic stenosis is present; mean PG 11mmHg. Mitral Valve  Mild to moderate mitral regurgitation. Tricuspid Valve  Mild tricuspid regurgitation; RVSP: 28 mmHg. Pulmonic Valve  The pulmonic valve was not well visualized. Pericardial Effusion  No evidence of any pericardial effusion. Pleural Effusion  No evidence of pleural effusion. Miscellaneous  IVC and abdominal aorta are within normal limits.   M-Mode/2D Measurements & Calculations   LV Diastolic Dimension:  LV Systolic Dimension:  LA Dimension: 4.6 cmAO Root  6.82 cm                  5.7 cm                  Dimension: 4 cmLA Area:  LV FS:16.4 %             LV Volume Diastolic:    87.0 cm2  LV PW Diastolic: 5.38 cm 299 ml  Septum Diastolic: 1.2 cm LV Volume Systolic: 905  CO: 2.89 l/min           ml  CI: 2.62 l/m*m2          LV EDV/LV EDV Index:    RV Diastolic Dimension:                           193 ml/89 m2LV ESV/LV   2.07 cm  LV Area Diastolic: 63.7  ESV Index: 143 ml/66 m2  cm2                      EF Calculated (A4C):    LA/Aorta: 9.12  LV Area Systolic: 88.3   19.0 %  cm2                      EF Calculated (2D):     LA volume/Index: 70 ml                           30.6 % /32m2                            LV Length: 8.82 cm                            LVOT: 2.3 cm  Doppler Measurements & Calculations   MV Peak E-Wave: 107   AV Peak Velocity: 200 cm/s   LVOT Peak Velocity: 85  cm/s                  AV Peak Gradient: 16 mmHg    cm/s  MV Peak A-Wave: 50.8  AV Mean Velocity: 142 cm/s   LVOT Mean Velocity: 59.1  cm/s                  AV Mean Gradient: 9 mmHg     cm/s  MV E/A Ratio: 2.11    AV VTI: 43.2 cm              LVOT Peak Gradient: 3  MV Peak Gradient:     AV Area (Continuity):1.71    mmHgLVOT Mean Gradient: 2  4.58 mmHg             cm2                          mmHg                                                     Estimated RVSP: 28 mmHg  MV P1/2t: 59 msec     LVOT VTI: 17.8 cm            Estimated RAP:3 mmHg  MVA by PHT:3.73 cm2                        Estimated PASP: 28.2 mmHg  MV E' Septal                                       TR Velocity:251 cm/s  Velocity: 3.95 cm/s                                TR Gradient:25.2 mmHg  MV E' Lateral  Velocity: 8.99 cm/s  MV E/E' septal: 27.09  MV E/E' lateral: 11.9      XR CHEST (2 VW)    Result Date: 2/19/2022  EXAMINATION: TWO XRAY VIEWS OF THE CHEST 2/19/2022 11:36 am COMPARISON: 12/01/2021. HISTORY: ORDERING SYSTEM PROVIDED HISTORY: CP TECHNOLOGIST PROVIDED HISTORY: Reason for exam:->CP Reason for Exam: chest pain when breathing FINDINGS: The cardiomediastinal silhouette is unremarkable. The lungs are clear. No infiltrate, pleural fluid or evidence of overt failure. Left sided AICD devise. Partially visualized aortic endograft in the mid abdomen. No acute osseous findings. No acute cardiopulmonary disease. NM LUNG SCAN PERFUSION ONLY    Result Date: 2/20/2022  EXAMINATION: NUCLEAR MEDICINE PERFUSION SCAN. 2/20/2022 TECHNIQUE: Ventilation not performed as part of COVID-19 safety precautions. 5 millicuries of Tc 98K MAA was administered intravenously prior to planar imaging of the lungs in multiple projections. COMPARISON: Chest radiograph 02/19/2022. Perfusion study 10/28/2021. HISTORY: ORDERING SYSTEM PROVIDED HISTORY: Elevated d-dimers TECHNOLOGIST PROVIDED HISTORY: Reason for exam:->Elevated d-dimers Reason for Exam: elevated D-dimer FINDINGS: PERFUSION: Distribution of radiotracer is homogeneous. No segmental defects identified. CHEST RADIOGRAPH: No focal areas of consolidation or significant effusions on recent chest radiograph. Low Probability for Pulmonary Embolus. XR CHEST 1 VIEW    Result Date: 2/22/2022  EXAMINATION: ONE XRAY VIEW OF THE CHEST 2/22/2022 9:30 am COMPARISON: 02/19/2022 HISTORY: ORDERING SYSTEM PROVIDED HISTORY: afib iwth RVR. on IVF and hx of CHF TECHNOLOGIST PROVIDED HISTORY: Reason for exam:->afib iwth RVR. on IVF and hx of CHF Reason for Exam: afib iwth RVR. on IVF and hx of CHF FINDINGS: AICD/pacer device is stable. The lungs are without acute focal process. There is no effusion or pneumothorax. The cardiomediastinal silhouette is stable. The osseous structures are stable. No acute process. Stable exam.     VL DUP LOWER EXTREMITY VENOUS BILATERAL    Result Date: 2/19/2022  EXAMINATION: DUPLEX VENOUS ULTRASOUND OF THE BILATERAL LOWER EXTREMITIES2/19/2022 6:14 pm TECHNIQUE: Duplex ultrasound using B-mode/gray scaled imaging, Doppler spectral analysis and color flow Doppler was obtained of the deep venous structures of the lower bilateral extremities. COMPARISON: None. HISTORY: ORDERING SYSTEM PROVIDED HISTORY: concern for possible PE, refusing CT PE study due to history of CKD TECHNOLOGIST PROVIDED HISTORY: Reason for exam:->concern for possible PE, refusing CT PE study due to history of CKD Reason for Exam: chest pain FINDINGS: The visualized veins of the bilateral lower extremities are patent and free of echogenic thrombus. The veins demonstrate good compressibility with normal color flow study and spectral analysis. No evidence of DVT in either lower extremity.        CBC: Recent Labs     02/23/22  0430 02/24/22  2153 02/25/22  0056   WBC 14.7* 9.5 8.7   HGB 11.4* 12.3* 11.8*    286 272     BMP:    Recent Labs     02/23/22  0430 02/24/22  2153 02/25/22  0056    137 134*   K 5.2* 5.0 4.8    99 99   CO2 23 26 22   BUN 58* 55* 57*   CREATININE 2.5* 2.3* 2.1*   GLUCOSE 163* 287* 267*     Hepatic:   Recent Labs     02/23/22  0430 02/24/22 2153 02/25/22  0056   AST 9* 12* 11*   ALT 11 17 16   BILITOT 0.3 0.5 0.4   ALKPHOS 45 50 54     Lipids:   Lab Results   Component Value Date    CHOL 194 04/20/2021    HDL 42 04/20/2021    TRIG 136 04/20/2021     Hemoglobin A1C:   Lab Results   Component Value Date    LABA1C 12.1 10/28/2021     TSH: No results found for: TSH  Troponin:   Lab Results   Component Value Date    TROPONINT <0.010 02/22/2022    TROPONINT 0.019 02/20/2022    TROPONINT 0.034 02/20/2022     Lactic Acid: No results for input(s): LACTA in the last 72 hours. BNP: No results for input(s): PROBNP in the last 72 hours.   UA:  Lab Results   Component Value Date    NITRU NEGATIVE 11/19/2020    COLORU YELLOW 11/19/2020    WBCUA <1 11/19/2020    RBCUA 1 11/19/2020    MUCUS RARE 11/19/2020    TRICHOMONAS NONE SEEN 11/19/2020    YEAST RARE 10/16/2017    BACTERIA NEGATIVE 11/19/2020    CLARITYU CLEAR 11/19/2020    SPECGRAV 1.011 11/19/2020    LEUKOCYTESUR NEGATIVE 11/19/2020    UROBILINOGEN NORMAL 11/19/2020    BILIRUBINUR NEGATIVE 11/19/2020    BLOODU NEGATIVE 11/19/2020    KETUA NEGATIVE 11/19/2020     Urine Cultures: No results found for: LABURIN  Blood Cultures: No results found for: BC  No results found for: BLOODCULT2  Organism: No results found for: ORG    Time Spent Discharging patient 43 minutes    Electronically signed by Julián Ramirez DO on 2/25/2022 at 11:53 AM

## 2022-02-25 NOTE — FLOWSHEET NOTE
Rounding visit. Patient was in good spirit during the visit. Patient did not express any needs.  provided pastoral presence.

## 2022-03-01 NOTE — PROGRESS NOTES
Татьяна Prado MD                                  CARDIOLOGY  NOTE      Chief Complaint:    Chief Complaint   Patient presents with    Follow-Up from Hospital     Main complaint of leg aching/cramps. No cp since hospital. No palp. Sob on exertion and when resting. No dizziness. No swelling. No vericose veins. No scheduled surgeries. No caffiene. No exercise. No tobacco, alcohol or other drug use. Patient was admitted in the hospital in February 2022  For paroxysmal atrial fibrillation and chest pains    Underwent TERRY guided cardioversion  As well as started on colchicine and steroids for pericarditis    Now presents for follow-up      Prior history    Loki Solorio is a 74y.o. year old male who presents to the clinic to establish care. Patient has previously followed up with Dr. Chris Cruz -he also follows up with Dr. Bibiana Montalvo      Patient is a 57-year-old gentleman with prior medical history significant for COPD, CKD, hypertension hyperlipidemia coronary artery disease status post PCI, obstructive sleep apnea, abdominal aortic aneurysm s/p repair, ischemic cardiomyopathy, severe LV dysfunction. Patient was recently admitted to the hospital with congestive heart failure and was treated with IV diuresis. May 7, 2021 patient had PCI to RCA at [facility].  Patient also had another PCI to LAD and diagonal in June 2021 @ [facility]   He is known to have cardiomyopathy with LVEF of around 15 to 20%. Patient previously was on Coreg however was later switched to Toprol-XL. He also is on lisinopril and Aldactone. Pt is sp ICD for primary prevention     Echo 10/28/2021     Definity was utilized to rule out presence of thrombus. Left ventricular systolic function is abnormal.   Ejection fraction is visually estimated at 25-30%. Slightly hypokinetic apical wall segments. No significant valvular disease noted. No evidence of any pericardial effusion.    Multiple PVC's noted throughout the exam.          Current Outpatient Medications   Medication Sig Dispense Refill    dapagliflozin (FARXIGA) 10 MG tablet Take 1 tablet by mouth every morning 90 tablet 1    apixaban (ELIQUIS) 2.5 MG TABS tablet Take 2 tablets by mouth 2 times daily 60 tablet 5    methylPREDNISolone (MEDROL, RICHIE,) 4 MG tablet By mouth. 1 kit 0    albuterol sulfate  (90 Base) MCG/ACT inhaler Inhale 2 puffs into the lungs 2 times daily 18 g 3    insulin glargine (LANTUS) 100 UNIT/ML injection vial Inject 35 Units into the skin nightly 10 mL 3    digoxin (LANOXIN) 125 MCG tablet Take 1 tablet by mouth every other day 30 tablet 3    midodrine (PROAMATINE) 10 MG tablet Take 1 tablet by mouth 3 times daily (with meals) 90 tablet 3    amiodarone (PACERONE) 400 MG tablet Take 1 tablet by mouth 2 times daily for 12 doses 12 tablet 0    [START ON 3/2/2022] amiodarone (CORDARONE) 200 MG tablet Take 1 tablet by mouth 2 times daily 60 tablet 0    omeprazole (PRILOSEC) 20 MG delayed release capsule Take 20 mg by mouth daily      atorvastatin (LIPITOR) 40 MG tablet Take 40 mg by mouth nightly      metoprolol succinate (TOPROL XL) 50 MG extended release tablet Take 1 tablet by mouth 2 times daily 30 tablet 3    Lancets MISC 1 each by Does not apply route 4 times daily 100 each 1    glucose monitoring (FREESTYLE FREEDOM) kit 1 kit by Does not apply route daily 1 kit 0    Insulin Pen Needle (KROGER PEN NEEDLES) 31G X 6 MM MISC 1 each by Does not apply route daily 100 each 3    fluticasone (FLONASE) 50 MCG/ACT nasal spray 1 spray by Each Nostril route daily      nitroGLYCERIN (NITROSTAT) 0.4 MG SL tablet up to max of 3 total doses. If no relief after 1 dose, call 911. 25 tablet 3    budesonide-formoterol (SYMBICORT) 80-4.5 MCG/ACT AERO Inhale 2 puffs into the lungs 2 times daily       clopidogrel (PLAVIX) 75 MG tablet Take 75 mg by mouth daily       No current facility-administered medications for this visit. temperature intolerance  · Hematologic/Lymphatic: No bleeding problems, blood clots or swollen lymph nodes  · Allergic/Immunologic: No nasal congestion or hives        Objective:      Physical Exam:    /70 (Site: Left Upper Arm, Position: Sitting, Cuff Size: Medium Adult)   Pulse 72   Ht 5' 7\" (1.702 m)   Wt 242 lb 6.4 oz (110 kg)   BMI 37.97 kg/m²   Wt Readings from Last 3 Encounters:   03/01/22 242 lb 6.4 oz (110 kg)   02/25/22 254 lb 6.4 oz (115.4 kg)   02/09/22 234 lb (106.1 kg)     Body mass index is 37.97 kg/m². Vitals:    03/01/22 0927   BP: 126/70   Pulse: 72        General Appearance and Constitutional: Conversant, Well developed, Well nourished, No acute distress, Non-toxic appearance. HEENT:  Normocephalic, Atraumatic, Bilateral external ears normal, Oropharynx moist, No oral exudates,   Nose normal.   Neck- Normal range of motion, No tenderness, Supple  Eyes:  EOMI, Conjunctiva normal, No discharge. Respiratory:  Normal breath sounds, No respiratory distress, No wheezing, No Rales, No Ronchi. No chest tenderness. Cardiovascular: S1-S2, no added heart sounds, No Mumurs appreciated. No gallops, rubs. No Pedal Edema   GI:  Bowel sounds normal, Soft, No tenderness,  :  No costovertebral angle tenderness   Musculoskeletal:  No gross deformities.  Back- No tenderness  Integument:  Well hydrated, no rash   Lymphatic:  No lymphadenopathy noted   Neurologic:  Alert & oriented x 3, Normal motor function, normal sensory function, no focal deficits noted   Psychiatric:  Speech and behavior appropriate       Medical decision making and Data review:    DATA:    Lab Results   Component Value Date    TROPONINT <0.010 02/22/2022     BNP:    Lab Results   Component Value Date    PROBNP 9,169 (H) 02/20/2022     PT/INR:  No results found for: [employer]  Lab Results   Component Value Date    LABA1C 12.1 (H) 10/28/2021    LABA1C 6.9 (H) 07/02/2021     Lab Results   Component Value Date    CHOL 194 04/20/2021 TRIG 136 04/20/2021    HDL 42 04/20/2021    LDLDIRECT 136 (H) 04/20/2021     Lab Results   Component Value Date    WBC 8.7 02/25/2022    HGB 11.8 (L) 02/25/2022    HCT 35.5 (L) 02/25/2022    MCV 88.8 02/25/2022     02/25/2022     TSH: No results found for: TSH  Lab Results   Component Value Date    AST 11 (L) 02/25/2022    ALT 16 02/25/2022    BILIDIR 0.2 10/28/2021    BILITOT 0.4 02/25/2022    ALKPHOS 54 02/25/2022         All labs, medications and tests reviewed by myself including data and history from outside source , patient and available family . 1. PAF (paroxysmal atrial fibrillation) ([facility] [address]    2. Essential hypertension    3. Dyslipidemia    4. S/P ICD (internal cardiac defibrillator) procedure    5. Ischemic cardiomyopathy         Impression and Plan:      1. Acute pericarditis    Recent hospitalization with elevated CRP ESR  Off colchicine. Received IV steroids in the hospital.  None continued upon discharge. Start low-dose Medrol dose pack  VQ scan low probability of PE, no DVT noted in lower extremity      2. Mild aortic stenosis: Observe for now  3. Moderate mitral regurgitation with dilated left atrium  4. Paroxysmal atrial fibrillation with episodes of RVR in the hospital.  S/p TERRY guided cardioversion on 2/24/2022    Continue with oral amiodarone 200 mg daily after loading phase,  and digoxin every other day  Patient was evaluated by EP - will follow with Moni Buchanan to afford NOACs Currently on Xarelto for 1 month as samples. Patient is in touch with his insurance, which likely will pay for Eliquis. Prescribe Eliquis  If unable to afford. Will consider warfarin    5. CAD s/p PCI to RCA/LAD at [facility] May/June 2021:  stop aspirin. Continue with Plavix Eliquis. Continue beta-blocker    6. Severe ischemic cardiomyopathy, NYHA II symptoms s/p AICD.   Continue with guideline directed medical therapy including Toprol-XL 50 mg twice daily, off ACE inhibitor and Aldactone due to renal failure and hyperkalemia  7. Add Farxiga    8. Essential hypertension: Blood pressure log from home reviewed. Blood pressure usually runs around 120s range. The office today is 90 systolic. Patient is asymptomatic. Continue with current therapy including Toprol-XL lisinopril Aldactone. Off diuretics, euvolemic    9. Hyperlipidemia: Continue with Lipitor 40 mg daily     Return in about 3 months (around 6/1/2022). Counseled extensively and medication compliance urged. We discussed that for the  prevention of ASCVD our  goal is aggressive risk modification. Patient is encouraged to exercise even a brisk walk for 30 minutes  at least 3 to 4 times a week   Various goals were discussed and questions answered. Continue current medications. Appropriate prescriptions are addressed and refills ordered. Questions answered and patient verbalizes understanding. Call for any problems, questions, or concerns.

## 2022-03-01 NOTE — PATIENT INSTRUCTIONS
**It is YOUR responsibilty to bring medication bottles and/or updated medication list to [address] [place] [address]. This will allow us to better serve you and all your healthcare needs**  Please be informed that if you contact our office outside of normal business hours the physician on call cannot help with any scheduling or rescheduling issues, procedure instruction questions or any type of medication issue. We advise you for any urgent/emergency that you go to the nearest emergency room!     PLEASE CALL OUR OFFICE DURING NORMAL BUSINESS HOURS    Monday - Friday   8 am to 5 pm    [place]: [address]: [phone]    [place]:  [phone]

## 2022-03-03 NOTE — PROGRESS NOTES
Electrophysiology Progress Note      Reason for consultation: Atrial fibrillation    Chief complaint: Here for follow-up on atrial fibrillation    Referring physician: Dr Charmayne Scarce      Primary care physician: MICHAEL Palomo CNP      History of Present Illness: This visit 3/3/2022  Patient is here today for follow-up on atrial fibrillation. Patient reports that since discharge from the hospital he has experienced fatigue. He states that his PCP prescribed him Neurontin to take for leg pain and he reports that last night he slept for 6 hours. He states today he feels pretty good because he got some sleep. Previous visit  Mary Tom is a 78-year-old male with a history of chronic kidney disease, ischemic cardiomyopathy status post ICD, obstructive sleep apnea on CPAP, hypertension, paroxysmal atrial fibrillation dyslipidemia, coronary artery disease. He presents with complaints of chest pain. He reports that on Saturday he began to have chest pain. He reports that the pain was stabbing. He rated the pain a 10 out of 10. It did not radiate. He states that the pain woke him out of sleep. He additionally complained of fatigue. He denies palpitations, lightheadedness, dizziness, edema or syncope. Patient has a history of PAF. He reports that he stopped taking his blood thinner as he was unable to afford it.     Past medical history:   Past Medical History:   Diagnosis Date    Atrial fibrillation ([address]     Cancer ([address]     testicular    COPD, moderate ([address] 11/30/2020    Diabetes mellitus ([address]     Excessive daytime sleepiness 11/30/2020    GERD (gastroesophageal reflux disease)     Hypertension     Obstructive sleep apnea 2/3/2021    Pulmonary emphysema determined by X-ray [facility]) 11/30/2020    Testicular hypofunction     thus on testosterone    Tobacco abuse 11/30/2020       Surgical history :  Past Surgical History:   Procedure Laterality Date    ABDOMINAL AORTIC ANEURYSM REPAIR, ENDOVASCULAR  11/30/2015    endologix aaa device  mri conditional  card scanned into pacs    ARM SURGERY      CARDIAC DEFIBRILLATOR PLACEMENT Left 11/02/2021    Medtronic Evera MRI XT DR Pires ICD    HERNIA REPAIR      KNEE SURGERY      LEG SURGERY  10/2020    camncer removed    TOE SURGERY         Family history: No family history on file. Social history :  reports that he quit smoking about 11 months ago. His smoking use included cigarettes. He smoked 1.50 packs per day. He has never used smokeless tobacco. He reports that he does not drink alcohol and does not use drugs. No Known Allergies    Current Outpatient Medications on File Prior to Visit   Medication Sig Dispense Refill    dapagliflozin (FARXIGA) 10 MG tablet Take 1 tablet by mouth every morning 90 tablet 1    apixaban (ELIQUIS) 2.5 MG TABS tablet Take 2 tablets by mouth 2 times daily 60 tablet 5    methylPREDNISolone (MEDROL, RICHIE,) 4 MG tablet By mouth.  1 kit 0    albuterol sulfate  (90 Base) MCG/ACT inhaler Inhale 2 puffs into the lungs 2 times daily 18 g 3    insulin glargine (LANTUS) 100 UNIT/ML injection vial Inject 35 Units into the skin nightly 10 mL 3    midodrine (PROAMATINE) 10 MG tablet Take 1 tablet by mouth 3 times daily (with meals) 90 tablet 3    amiodarone (PACERONE) 400 MG tablet Take 1 tablet by mouth 2 times daily for 12 doses 12 tablet 0    amiodarone (CORDARONE) 200 MG tablet Take 1 tablet by mouth 2 times daily 60 tablet 0    omeprazole (PRILOSEC) 20 MG delayed release capsule Take 20 mg by mouth daily      atorvastatin (LIPITOR) 40 MG tablet Take 40 mg by mouth nightly      Lancets MISC 1 each by Does not apply route 4 times daily 100 each 1    glucose monitoring (FREESTYLE FREEDOM) kit 1 kit by Does not apply route daily 1 kit 0    Insulin Pen Needle (KROGER PEN NEEDLES) 31G X 6 MM MISC 1 each by Does not apply route daily 100 each 3    fluticasone (FLONASE) 50 MCG/ACT nasal spray 1 spray by Each Pharynx: Oropharynx is clear. Eyes:      General:         Right eye: No discharge. Left eye: No discharge. Conjunctiva/sclera: Conjunctivae normal.   Cardiovascular:      Rate and Rhythm: Normal rate and regular rhythm. Heart sounds: Murmur (Grade 2 out of 6 systolic) heard. No friction rub. No gallop. Pulmonary:      Breath sounds: Normal breath sounds. No wheezing or rhonchi. Abdominal:      General: Abdomen is flat. Bowel sounds are normal. There is no distension. Musculoskeletal:      Cervical back: Normal range of motion and neck supple. Right lower leg: No edema. Left lower leg: No edema. Skin:     General: Skin is warm and dry. Neurological:      Mental Status: He is alert and oriented to person, place, and time. Psychiatric:         Mood and Affect: Mood normal.         Behavior: Behavior normal.             CBC:   Lab Results   Component Value Date    WBC 8.7 02/25/2022    HGB 11.8 02/25/2022    HCT 35.5 02/25/2022     02/25/2022     Lipids:   Lab Results   Component Value Date    CHOL 194 04/20/2021    TRIG 136 04/20/2021    HDL 42 04/20/2021    LDLDIRECT 136 (H) 04/20/2021     PT/INR:   Lab Results   Component Value Date    INR 0.91 12/02/2021        BMP:    Lab Results   Component Value Date     03/03/2022    K 4.3 03/03/2022     03/03/2022    CO2 27 03/03/2022    BUN 26 (H) 03/03/2022     CMP:   Lab Results   Component Value Date    AST 11 (L) 02/25/2022    ALT 16 02/25/2022    PROT 5.7 (L) 02/25/2022    BILITOT 0.4 02/25/2022    ALKPHOS 54 02/25/2022     TSH:  No results found for: TSH, T4    EKG Interpretation:        Device Assessment:     The device is Medtronic ICD - Dual Chamber chamber      MRI Compatible : yes    Device interrogation was performed. Mode :  AAI --- DDD     Sensing is normal. Impedence is normal.  Threshold is normal.     There has not been interval changes.       Estimated battery life is 10.7 years    The underlying rhythm is AS,VS.      9.3 % atrial paced; 0.3 % ventricular paced. Atrial Arrhythmia : 4.4% burden of atrial fibrillation. Patient is on eliquis    Non sustained VT episodes : 30 episodes    Sustained VT episodes : No    The patient is not pacemaker dependent. Patient activity reported by the device to be 0.8 hr/ day    OptiVol levels are elevated. Patient denies shortness of breath with exertion. Denies recent weight gain. Denies lower leg swelling        IMPRESSION / RECOMMENDATIONS:       Paroxysmal atrial fibrillation with RVR  Ischemic cardiomyopathy status post ICD  Coronary artery disease  Hypertension  CKD  Diabetes type 2    ICD interrogated. Patient noted to have 52 episodes of PAF and burden of 4.4%  Patient continues to have PAF episodes with RVR despite being on amiodarone 200 mg daily and digoxin 125 mcg every other day  Patient reports that he has continued to have fatigue since discharge. He states that he did get some sleep last night and feels a little better today. Patient to continue taking Eliquis 5 mg twice daily  Discussed with Dr. Rachna Servin will proceed with A. fib ablation  Since patient continues to have RVR episodes despite being on digoxin and creatinine elevated, we will stop digoxin. We will increase Toprol- mg twice daily as patient's blood pressure is stable. The risks including, but not limited to, vascular injury, bleeding, infection, radiation exposure, injury to cardiac and surrounding structures (including esophageal and pulmonary vein injury), injury to the normal conduction system of the heart (possibly requiring a pacemaker), stroke, myocardial infarction and death were all discussed. The patient considered the risks, benefits and alternatives; decided to proceed with the procedure. Thanks again for allowing me to participate in care of thispatient. Please call me if you have any questions. With best regards.         Ashley Lewisiver, APRN-CNP

## 2022-03-07 NOTE — PROGRESS NOTES
Patient here in office and educated on Atrial Fibrillation Ablation, schedule for 3/10/2022 @ 0130, with arrival @ [phone], @ [facility]; risk explained; and consents signed. Also copy of orders given for labs and CXR due 3/7/2022 at [facility]. Instruction given to patient to :  NPO after midnight the night before procedure; call hospital at [phone] to pre-register. May take rest of morning meds of procedure except listed. Take insulin 1/2 dose on the night before. Do not take regular insulin dose the morning of the procedure. Hold ALL blood pressure medications morning of procedure. Hold Eliquis the evening dose night before procedure and Morning dose of the procedure. Patient voiced understanding. Copies of consent & info scanned in chart. Patient informed of instructions and guidance for performing test.  Throat swab performed. Swab placed into labeled collection tube. Collection tube and lab order placed in plastic bag. Bag placed in biohazard bag and placed in fridge.  called for . Patient instructed to self quarantine until procedure.

## 2022-03-09 NOTE — ANESTHESIA PRE PROCEDURE
Department of Anesthesiology  Preprocedure Note       Name:  Derwin Shone   Age:  74 y.o.  :  1955                                          MRN:  [id number]         Date:  3/9/2022      Surgeon: * Surgery not found *    Procedure:     Medications prior to admission:   Prior to Admission medications    Medication Sig Start Date End Date Taking?  Authorizing Provider   metoprolol succinate (TOPROL XL) 100 MG extended release tablet Take 1 tablet by mouth 2 times daily 3/3/22   MICHAEL Chávez - CNP   dapagliflozin (FARXIGA) 10 MG tablet Take 1 tablet by mouth every morning 3/1/22   Destin Lomeli MD   apixaban (ELIQUIS) 2.5 MG TABS tablet Take 2 tablets by mouth 2 times daily 3/1/22   Destin Lomeli MD   albuterol sulfate  (90 Base) MCG/ACT inhaler Inhale 2 puffs into the lungs 2 times daily 22   Sebastien Whipple DO   insulin glargine (LANTUS) 100 UNIT/ML injection vial Inject 35 Units into the skin nightly 22   Sebastien Whipple DO   midodrine (PROAMATINE) 10 MG tablet Take 1 tablet by mouth 3 times daily (with meals) 22   Sebastien Whipple DO   amiodarone (PACERONE) 400 MG tablet Take 1 tablet by mouth 2 times daily for 12 doses 2/25/22 3/3/22  Sebastien Whipple DO   amiodarone (CORDARONE) 200 MG tablet Take 1 tablet by mouth 2 times daily 3/2/22   Sebastien Whipple DO   omeprazole (PRILOSEC) 20 MG delayed release capsule Take 20 mg by mouth daily    Historical Provider, MD   atorvastatin (LIPITOR) 40 MG tablet Take 40 mg by mouth nightly    Historical Provider, MD   Lancets MISC 1 each by Does not apply route 4 times daily 10/30/21   Bharat Paige MD   glucose monitoring (FREESTYLE FREEDOM) kit 1 kit by Does not apply route daily 10/30/21   Bharat Paige MD   Insulin Pen Needle ([employer] PEN NEEDLES) 31G X 6 MM MISC 1 each by Does not apply route daily 10/30/21   Bharat Paige MD   fluticasone (FLONASE) 50 MCG/ACT nasal spray 1 spray by Each Nostril route daily    Historical Provider, MD   nitroGLYCERIN (NITROSTAT) 0.4 MG SL tablet up to max of 3 total doses. If no relief after 1 dose, call 911. 7/4/21   Niranjan Qiu MD   budesonide-formoterol [facility]) 80-4.5 MCG/ACT AERO Inhale 2 puffs into the lungs 2 times daily  5/8/21   Historical Provider, MD   clopidogrel (PLAVIX) 75 MG tablet Take 75 mg by mouth daily    Historical Provider, MD       Current medications:    Current Outpatient Medications   Medication Sig Dispense Refill    metoprolol succinate (TOPROL XL) 100 MG extended release tablet Take 1 tablet by mouth 2 times daily 60 tablet 1    dapagliflozin (FARXIGA) 10 MG tablet Take 1 tablet by mouth every morning 90 tablet 1    apixaban (ELIQUIS) 2.5 MG TABS tablet Take 2 tablets by mouth 2 times daily 60 tablet 5    albuterol sulfate  (90 Base) MCG/ACT inhaler Inhale 2 puffs into the lungs 2 times daily 18 g 3    insulin glargine (LANTUS) 100 UNIT/ML injection vial Inject 35 Units into the skin nightly 10 mL 3    midodrine (PROAMATINE) 10 MG tablet Take 1 tablet by mouth 3 times daily (with meals) 90 tablet 3    amiodarone (PACERONE) 400 MG tablet Take 1 tablet by mouth 2 times daily for 12 doses 12 tablet 0    amiodarone (CORDARONE) 200 MG tablet Take 1 tablet by mouth 2 times daily 60 tablet 0    omeprazole (PRILOSEC) 20 MG delayed release capsule Take 20 mg by mouth daily      atorvastatin (LIPITOR) 40 MG tablet Take 40 mg by mouth nightly      Lancets MISC 1 each by Does not apply route 4 times daily 100 each 1    glucose monitoring (FREESTYLE FREEDOM) kit 1 kit by Does not apply route daily 1 kit 0    Insulin Pen Needle ([employer] PEN NEEDLES) 31G X 6 MM MISC 1 each by Does not apply route daily 100 each 3    fluticasone (FLONASE) 50 MCG/ACT nasal spray 1 spray by Each Nostril route daily      nitroGLYCERIN (NITROSTAT) 0.4 MG SL tablet up to max of 3 total doses.  If no relief after 1 dose, call 911. 25 tablet 3    budesonide-formoterol (SYMBICORT) 80-4.5 MCG/ACT AERO Inhale 2 puffs into the lungs 2 times daily       clopidogrel (PLAVIX) 75 MG tablet Take 75 mg by mouth daily       No current facility-administered medications for this encounter.        Allergies:  No Known Allergies    Problem List:    Patient Active Problem List   Diagnosis Code    Stage 3 chronic kidney disease (HCC) N18.30    SOB (shortness of breath) R06.02    Chronic kidney disease-mineral and bone disorder N18.9, E83.9, M89.9    Essential hypertension I10    Hyponatremia E87.1    COPD, moderate ([address] J44.9    Pulmonary emphysema determined by X-ray ([facility] J43.9    Tobacco abuse Z72.0    Excessive daytime sleepiness G47.19    Obstructive sleep apnea G47.33    Hypertensive renal disease I12.9    Abnormal stress test R94.39    Coronary artery disease involving native coronary artery of native heart without angina pectoris I25.10    Chest pain R07.9    Chronic congestive heart failure ([facility]) I50.9    Acute on chronic combined systolic and diastolic congestive heart failure, NYHA class 3 ([facility]) I50.43    Abdominal distention R14.0    Generalized abdominal pain R10.84    Splenic lesion D73.89    Globus sensation R19.8    Neck swelling R22.1    COPD exacerbation ([facility]) J44.1    Hypotension, unspecified I95.9    S/P ICD (internal cardiac defibrillator) procedure Z95.810    PAF (paroxysmal atrial fibrillation) ([facility]) I48.0    Ischemic cardiomyopathy I25.5    Dyslipidemia E78.5    Unstable angina ([facility]) I20.0    EDMAR (acute kidney injury) ([facility] [address] N17.9    Hyperkalemia T60.6    Metabolic acidosis W26.3       Past Medical History:        Diagnosis Date    Atrial fibrillation (HCC)     Cancer (HCC)     testicular    COPD, moderate ([address] 11/30/2020    Diabetes mellitus ([address]     Excessive daytime sleepiness 11/30/2020    GERD (gastroesophageal reflux disease)     Hypertension     Obstructive sleep apnea 2/3/2021    Pulmonary emphysema determined by X-ray [facility]) 2020    Testicular hypofunction     thus on testosterone    Tobacco abuse 2020       Past Surgical History:        Procedure Laterality Date    ABDOMINAL AORTIC ANEURYSM REPAIR, ENDOVASCULAR  2015    endologix aaa device  mri conditional  card scanned into pacs    ARM SURGERY      CARDIAC DEFIBRILLATOR PLACEMENT Left 2021    [employer] Evera MRI XT DR Pires ICD    HERNIA REPAIR      KNEE SURGERY      LEG SURGERY  10/2020    camncer removed    TOE SURGERY         Social History:    Social History     Tobacco Use    Smoking status: Former Smoker     Packs/day: 1.50     Types: Cigarettes     Quit date: 2021     Years since quittin.9    Smokeless tobacco: Never Used   Substance Use Topics    Alcohol use: Never     Comment: 6-9 cups of coffee a week                                Counseling given: Not Answered      Vital Signs (Current): There were no vitals filed for this visit.                                            BP Readings from Last 3 Encounters:   22 126/78   22 126/70   22 (!) 154/98       NPO Status:                                                                                 BMI:   Wt Readings from Last 3 Encounters:   22 239 lb 9.6 oz (108.7 kg)   22 242 lb 6.4 oz (110 kg)   22 254 lb 6.4 oz (115.4 kg)     There is no height or weight on file to calculate BMI.    CBC:   Lab Results   Component Value Date    WBC 10.0 2022    RBC 4.00 2022    HGB 11.5 2022    HCT 38.4 2022    MCV 96.0 2022    RDW 14.1 2022     2022       CMP:   Lab Results   Component Value Date     2022    K 4.1 2022     2022    CO2 28 2022    BUN 22 2022    CREATININE 2.4 2022    GFRAA 33 2022    LABGLOM 27 2022    GLUCOSE 92 2022    PROT 5.7 2022    CALCIUM 9.0 2022    BILITOT 0.4 2022    ALKPHOS 54 2022    AST 11 2022    ALT 16 2022       POC Tests: No results for input(s): POCGLU, POCNA, POCK, POCCL, POCBUN, POCHEMO, POCHCT in the last 72 hours. Coags:   Lab Results   Component Value Date    PROTIME 13.2 2022    INR 1.02 2022    APTT 34.5 2022       HCG (If Applicable): No results found for: PREGTESTUR, PREGSERUM, HCG, HCGQUANT     ABGs:   Lab Results   Component Value Date    PO2ART 74 2021    VKB5WUZ 42.0 2021    BCA3MPJ 26.6 2021        Type & Screen (If Applicable):  No results found for: LABABO, LABRH    Drug/Infectious Status (If Applicable):  No results found for: HIV, HEPCAB    COVID-19 Screening (If Applicable):   Lab Results   Component Value Date    COVID19 NOT DETECTED 2022           Anesthesia Evaluation  Patient summary reviewed  Airway:         Dental:          Pulmonary:   (+) COPD:  sleep apnea:                            ROS comment: Former Smoker  Quit Smokin21       Cardiovascular:    (+) hypertension:, pacemaker: AICD, CAD:, dysrhythmias: atrial fibrillation, pulmonary hypertension: mild, hyperlipidemia      NYHA Classification: III                       ROS comment: Echo 2022:  Summary   Left ventricular systolic function is abnormal.   Ejection fraction is visually estimated at 25-30%. Apical, lateral, anterior, and inferior walls appear akinetic. Dilated left ventricle noted. Grade II diastolic dysfunction. Moderately dilated left atrium. PPM wiring visualized within the right heart. Mild aortic stenosis is present; mean PG 11mmHg. Mild to moderate mitral regurgitation. No evidence of any pericardial effusion. Neuro/Psych:   Negative Neuro/Psych ROS              GI/Hepatic/Renal:   (+) GERD:, renal disease: CRI, morbid obesity          Endo/Other:    (+) Diabetes, blood dyscrasia: anemia:., .                 Abdominal:             Vascular: negative vascular ROS.          Other Findings: Anesthesia Plan      general     ASA 4       Induction: intravenous. arterial line                      MICHAEL Merida - CRNA   3/9/2022         Pre Anesthesia Assessment complete.  Chart reviewed on 3/9/2022

## 2022-03-10 PROBLEM — Z98.890 STATUS POST CATHETER ABLATION OF ATRIAL FIBRILLATION: Status: ACTIVE | Noted: 2022-01-01

## 2022-03-10 NOTE — ANESTHESIA PRE PROCEDURE
Department of Anesthesiology  Preprocedure Note       Name:  Chaz Busch   Age:  74 y.o.  :  1955                                          MRN:  [id number]         Date:  3/10/2022      Surgeon: * Surgery not found *    Procedure:     Medications prior to admission:   Prior to Admission medications    Medication Sig Start Date End Date Taking?  Authorizing Provider   metoprolol succinate (TOPROL XL) 100 MG extended release tablet Take 1 tablet by mouth 2 times daily 3/3/22  Yes Patricia Cortes, APRN - CNP   dapagliflozin (FARXIGA) 10 MG tablet Take 1 tablet by mouth every morning 3/1/22  Yes Klaudia Jones MD   apixaban (ELIQUIS) 2.5 MG TABS tablet Take 2 tablets by mouth 2 times daily 3/1/22  Yes Klaudia Jones MD   albuterol sulfate  (90 Base) MCG/ACT inhaler Inhale 2 puffs into the lungs 2 times daily 22  Yes Michelle Borrego DO   insulin glargine (LANTUS) 100 UNIT/ML injection vial Inject 35 Units into the skin nightly 22  Yes Michelle Borrego DO   midodrine (PROAMATINE) 10 MG tablet Take 1 tablet by mouth 3 times daily (with meals) 22  Yes Michelle Borrego DO   amiodarone (CORDARONE) 200 MG tablet Take 1 tablet by mouth 2 times daily 3/2/22  Yes Michelle Borrego DO   omeprazole (PRILOSEC) 20 MG delayed release capsule Take 20 mg by mouth daily   Yes Historical Provider, MD   atorvastatin (LIPITOR) 40 MG tablet Take 40 mg by mouth nightly   Yes Historical Provider, MD   fluticasone (FLONASE) 50 MCG/ACT nasal spray 1 spray by Each Nostril route daily   Yes Historical Provider, MD   budesonide-formoterol (SYMBICORT) 80-4.5 MCG/ACT AERO Inhale 2 puffs into the lungs 2 times daily  21  Yes Historical Provider, MD   clopidogrel (PLAVIX) 75 MG tablet Take 75 mg by mouth daily   Yes Historical Provider, MD   amiodarone (PACERONE) 400 MG tablet Take 1 tablet by mouth 2 times daily for 12 doses 2/25/22 3/3/22  Michelle Borrego DO   Lancets MISC 1 each by Does not apply route 4 times daily 10/30/21   Trina Hope MD   glucose monitoring (FREESTYLE FREEDOM) kit 1 kit by Does not apply route daily 10/30/21   Trina Hope MD   Insulin Pen Needle ([employer] PEN NEEDLES) 31G X 6 MM MISC 1 each by Does not apply route daily 10/30/21   Trina Hope MD   nitroGLYCERIN (NITROSTAT) 0.4 MG SL tablet up to max of 3 total doses. If no relief after 1 dose, call 911. 7/4/21   Sandra Johnston MD       Current medications:    No current facility-administered medications for this encounter.        Allergies:  No Known Allergies    Problem List:    Patient Active Problem List   Diagnosis Code    Stage 3 chronic kidney disease (HCC) N18.30    SOB (shortness of breath) R06.02    Chronic kidney disease-mineral and bone disorder N18.9, E83.9, M89.9    Essential hypertension I10    Hyponatremia E87.1    COPD, moderate ([facility] J44.9    Pulmonary emphysema determined by X-ray ([facility] J43.9    Tobacco abuse Z72.0    Excessive daytime sleepiness G47.19    Obstructive sleep apnea G47.33    Hypertensive renal disease I12.9    Abnormal stress test R94.39    Coronary artery disease involving native coronary artery of native heart without angina pectoris I25.10    Chest pain R07.9    Chronic congestive heart failure ([facility]) I50.9    Acute on chronic combined systolic and diastolic congestive heart failure, NYHA class 3 ([facility]) I50.43    Abdominal distention R14.0    Generalized abdominal pain R10.84    Splenic lesion D73.89    Globus sensation R19.8    Neck swelling R22.1    COPD exacerbation ([facility]) J44.1    Hypotension, unspecified I95.9    S/P ICD (internal cardiac defibrillator) procedure Z95.810    PAF (paroxysmal atrial fibrillation) ([facility]) I48.0    Ischemic cardiomyopathy I25.5    Dyslipidemia E78.5    Unstable angina ([facility]) I20.0    EDMAR (acute kidney injury) ([facility] [address] N17.9    Hyperkalemia W26.9    Metabolic acidosis V51.4       Past Medical History:        Diagnosis Date    Atrial fibrillation ([address]     Cancer ([facility]     testicular    COPD, moderate ([address] 2020    Diabetes mellitus ([address]     Excessive daytime sleepiness 2020    GERD (gastroesophageal reflux disease)     Hypertension     Obstructive sleep apnea 2/3/2021    Pulmonary emphysema determined by X-ray [facility]) 2020    Testicular hypofunction     thus on testosterone    Tobacco abuse 2020       Past Surgical History:        Procedure Laterality Date    ABDOMINAL AORTIC ANEURYSM REPAIR, ENDOVASCULAR  2015    endologix aaa device  mri conditional  card scanned into pacs    ARM SURGERY      CARDIAC DEFIBRILLATOR PLACEMENT Left 2021    [employer] Evera MRI XT DR Pires ICD    HERNIA REPAIR      KNEE SURGERY      LEG SURGERY  10/2020    camncer removed    TOE SURGERY         Social History:    Social History     Tobacco Use    Smoking status: Former Smoker     Packs/day: 1.50     Types: Cigarettes     Quit date: 2021     Years since quittin.9    Smokeless tobacco: Never Used   Substance Use Topics    Alcohol use: Never     Comment: 6-9 cups of coffee a week                                Counseling given: Not Answered      Vital Signs (Current):   Vitals:    03/10/22 0603   BP: 130/75   Pulse: 61   Resp: 18   Temp: 35.7 °C (96.2 °F)   TempSrc: Temporal   SpO2: 97%   Weight: 239 lb (108.4 kg)   Height: 5' 7\" (1.702 m)                                              BP Readings from Last 3 Encounters:   03/10/22 130/75   22 126/78   22 126/70       NPO Status: Time of last liquid consumption:                         Time of last solid consumption:                         Date of last liquid consumption: 22                        Date of last solid food consumption: 22    BMI:   Wt Readings from Last 3 Encounters:   03/10/22 239 lb (108.4 kg)   22 239 lb 9.6 oz (108.7 kg)   22 242 lb 6.4 oz (110 kg)     Body mass index is 37.43 kg/m².     CBC: Lab Results   Component Value Date    WBC 10.0 2022    RBC 4.00 2022    HGB 11.5 2022    HCT 38.4 2022    MCV 96.0 2022    RDW 14.1 2022     2022       CMP:   Lab Results   Component Value Date     2022    K 4.1 2022     2022    CO2 28 2022    BUN 22 2022    CREATININE 2.4 2022    GFRAA 33 2022    LABGLOM 27 2022    GLUCOSE 92 2022    PROT 5.7 2022    CALCIUM 9.0 2022    BILITOT 0.4 2022    ALKPHOS 54 2022    AST 11 2022    ALT 16 2022       POC Tests: No results for input(s): POCGLU, POCNA, POCK, POCCL, POCBUN, POCHEMO, POCHCT in the last 72 hours. Coags:   Lab Results   Component Value Date    PROTIME 13.2 2022    INR 1.02 2022    APTT 34.5 2022       HCG (If Applicable): No results found for: PREGTESTUR, PREGSERUM, HCG, HCGQUANT     ABGs:   Lab Results   Component Value Date    PO2ART 74 2021    [id number] 42.0 2021    JLO0ALI 26.6 2021        Type & Screen (If Applicable):  No results found for: LABABO, LABRH    Drug/Infectious Status (If Applicable):  No results found for: HIV, HEPCAB    COVID-19 Screening (If Applicable):   Lab Results   Component Value Date    COVID19 NOT DETECTED 2022           Anesthesia Evaluation  Patient summary reviewed no history of anesthetic complications:   Airway: Mallampati: II  TM distance: >3 FB   Neck ROM: full  Comment: Full beard  Mouth opening: > = 3 FB Dental:    (+) upper dentures and lower dentures      Pulmonary:   (+) COPD:  sleep apnea:  current smoker          Patient did not smoke on day of surgery.                 ROS comment: Former Smoker  Quit Smokin21       Cardiovascular:  Exercise tolerance: poor (<4 METS),   (+) hypertension:, angina:, pacemaker: AICD, CAD:, dysrhythmias: atrial fibrillation, CHF:, pulmonary hypertension: mild, hyperlipidemia      NYHA Classification: III  ECG reviewed      Echocardiogram reviewed         Beta Blocker:  Dose within 24 Hrs      ROS comment: Echo 2/21/2022:  Summary   Left ventricular systolic function is abnormal.   Ejection fraction is visually estimated at 25-30%. Apical, lateral, anterior, and inferior walls appear akinetic. Dilated left ventricle noted. Grade II diastolic dysfunction. Moderately dilated left atrium. PPM wiring visualized within the right heart. Mild aortic stenosis is present; mean PG 11mmHg. Mild to moderate mitral regurgitation. No evidence of any pericardial effusion. Neuro/Psych:   Negative Neuro/Psych ROS              GI/Hepatic/Renal:   (+) GERD:, renal disease: CRI, morbid obesity          Endo/Other:    (+) Diabetesusing insulin, blood dyscrasia: anemia and anticoagulation therapy:., .                 Abdominal:   (+) obese,     Abdomen: soft. Vascular:   + PVD, aortic or cerebral, . Other Findings:           Anesthesia Plan      general     ASA 3       Induction: intravenous. arterial line  MIPS: Postoperative opioids intended and Prophylactic antiemetics administered. Anesthetic plan and risks discussed with patient. Use of blood products discussed with patient whom consented to blood products. Plan discussed with CRNA. Attending anesthesiologist reviewed and agrees with Preprocedure content    Pre Anesthesia Evaluation complete. Anesthesia plan, risks, benefits, alternatives, and personal involved discussed with patient. Patients and/or legal guardian verbalized an understanding  and agreed to proceed.   Joyce Akhtar DO  3/10/2022

## 2022-03-10 NOTE — OP NOTE
PROCEDURES PERFORMED:    1. Comprehensive electrophysiologic evaluation including transseptal catheterization, insertion and repositioning of multiple electrode catheters with induction or attempted induction of an arrhythmia including left or right atrial pacing/recording when necessary, right ventricular pacing/recording when necessary, and His bundle recording when necessary with intracardiac catheter ablation of atrial fibrillation by pulmonary vein isolation  2. Intracardiac electrophysiologic three-dimensional mapping  3. Transseptal puncture through an intact septum with measurement of RA and LA pressures. 4. Intracardiac echocardiography. 5. Drug testing        ATTENDING: Juan Bullock MD.    COMPLICATIONS: None. ESTIMATED BLOOD LOSS: 30cc    ANESTHESIA: General with intubation    MEDICATIONS USED FOR INDUCTION/TESTING: Adenosine    PREOPERATIVE DIAGNOSIS: Paroxysmal Atrial fibrillation     POSTOPERATIVE DIAGNOSIS: Paroxysmal Atrial fibrillation sp Pulmonary vein isolation     INDICATIONS FOR PROCEDURE: Patient with hx of symptomatic Paroxysmal Atrial fibrillation despite medical therapy here for Ablation       DETAILS OF PROCEDURE:     The patient was brought to the electrophysiology laboratory in stable condition. The patient was in a fasting, nonsedated state. The risks, benefits and alternatives of the procedure were discussed with the patient and his family. The risks including, but not limited to, the risks of vascular injury, bleeding, infection, radiation exposure, injury to cardiac and surrounding structures (including esophageal and phrenic nerve injury in addition to pulmonary vein stenosis), injury to the normal conduction system of the heart, stroke, myocardial infarction and death were discussed. Written informed consent was obtained and placed on the chart. A timeout protocol was completed to identify the patient and the procedure being performed.     TERRY was performed prior to the ablation procedure to assure the absence of any intracardiac thrombi or other contraindications to proceeding with ablation. The full TERRY report is dictated separately. The patient was prepped and draped in a sterile fashion. Lidocaine was administered to the right and left groin. Using a modified Seldinger technique, venous access was obtained and sheaths were placed as detailed below. Catheters were then placed under fluoroscopic guidance as detailed below. SHEATH AND CATHETER PLACEMENT:  Location  Sheath  Catheter  site    Left femoral vein  7F  Biosense Rodriguez 6F Decapolar catheter  Coronary sinus    Left femoral vein  11F  Quadripolar catheter      Intracardiac Echo with sound technology  Right atrium    Right femoral vein  8F Short and SRO sheath D-F curve Smart touch irrigated [employer] Ablation catheter      Pentaray catheter  Pulmonay veins, Left atrium, RA, RV, Cavotricuspid isthmus      LA mapping              Patient ICD was adjusted pre and post procedure and device was performing appropriately      A small caliber arterial line in the radial artery was obtained by the anesthesia service. Comprehensive EP study    Patient presented in sinus rhythm so we performed Comprehensive EP study    DE: 240 ms  QRS: 104 ms  QT: 450 ms    AH : 85 ms  HV : 50 ms    Sinus node function is normal     AVWCL : 560 ms      AVNERP : 650 / 450 ms  AERP: 650 / 280 ms    Arrhythmia induction    Programmed stimulation was performed. Could not induce any sustained arrhythmia. Given patient had documented PAF episodes were decided to proceed with PV isolation       Intracardiac echocardiography:     A baseline intracardiac echocardiography study was performed. During the procedure, intracardiac echocardiography was used for transseptal puncture, monitoring of catheter placement during radiofrequency lesions, and monitoring for complications.     Three-dimensional electroanatomical mapping:    A three-dimensional electroanatomical mapping: Using the [employer] 3 three-dimensional electroanatomical map, a shell map of the left atrium and the pulmonary veins was created by dragging the ablation catheter and the pentaray catheter in different areas of the left atrium and in the pulmonary veins. The map was used for aiding the navigation process and to reduce fluoroscopy use. It was also used to guide ablation lesions and to sil those lesions. Trans-septal puncture:     During transseptal access, he was in sinus rhythm,  an 0.032 inch J-tipped guidewire was advanced through the 8-[place] sheath in the right femoral vein under fluoroscopic guidance. An SRO sheath and dilator were advanced over the guidewire into the superior vena cava under fluoroscopic guidance. The guidewire was removed. A [place] 1 needle was advanced through the dilator until the tip was slightly behind the tip of the dilator. This system was withdrawn until the apparatus was in contact with the foramen ovale. Transseptal access was obtained. Under fluoroscopic, hemodynamic ((Mean RA pressure 5 cm and mean LA pressure on transseptal was 12 mm) and ultrasound guidance, the left atrium was cannulated and sheath advanced. The dilator and needle were removed. Intracardiac echocardiography demonstrated no acute complications. Heparin was given to the patient to keep ACT at around 350sec through out procedure with ACT checks every 15 minutes       The pentaray catheter was sequentially positioned in the ostium of each of the pulmonary veins under fluoroscopic, electroanatomic, electrophysiologic and ultrasound guidance. Examination of the Pentaray catheter signals demonstrated conduction of electrical activity in all the pulmonary veins (two left pulmonary veins and two right pulmonary veins). Ablation:     The Smart touch ablation catheter was advanced into position near the right-sided pulmonary veins.  A wide circumferential ablation line was performed to encompass the ostia of both the right superior and right inferior pulmonary veins. The settings for ablation were 35 cook with a contact force ranging between  3 gm/sec to 40 gm/ sec was given per lesion with assessment of signal reduction at the site of ablation, impedance drop, Visitag ablation mapping and also FTI upto 450 and Ablation index of upto 450 on posterior wall and upto 550 on anterior wall    Esophageal temperature was monitored through out the procedure with movement of the esophageal temperature probe. Max temp reached during whole procedure on esophageal temp probe was 37 C    Ablation lesions were continued until a large circumference ablation line was created, both anterior and posterior to the right-sided pulmonary veins. After ablation was completed, entrance and exit block from each of the veins was checked and confirmed. A wide circumference ablation line was similarly done around the left-sided superior and inferior pulmonary veins - including ridge and noel area was ablated. Exit and entrance block from the left-sided pulmonary veins was then checked and confirmed. Following ablation, we confirmed entrance block at each vessel again. Pacing was also performed from each pole of the pentaray catheter at 10 milliamps, 2 milliseconds to assess for exit block. Both entrance and exit block were confirmed. Drug Infusion :     Adenosine was given to assess for spontaneous activation of PVP and also to assess dormant conduction from the PV. Adenosine was given at each vein and exit block from Pulmonary vein were confirmed and entrance and exit block confirmed. Post adenosine pacing was performed and  No arrhythmia was induced    Intracardiac echocardiography was used to document the absence of any significant effusion or any other complication. Catheters were removed under fluoroscopic guidance.  The patient was extubated and transferred to recovery. Sheaths were pulled and manual compression performed to achieve hemostasis in recovery  . There was no bleeding noted from any of the access sites. SUMMARY:    Successful isolation of the pulmonary veins for ablation of paroxysmal atrial fibrillation      RECOMMENDATIONS:  1. Admit to the floor  2. Call Verlon Ing if the patient becomes hypotensive, febrile, unstable or if there is a change in mental status. 3.  Resume anticoagulation as recommended. 4. Bed rest x6 hours with legs straight after sheaths are removed. 5. Continue current medications. 6. Follow up in the electrophysiology clinic in three weeks.

## 2022-03-10 NOTE — H&P
Electrophysiology H&p Note      Reason for consultation: Atrial fibrillation    Chief complaint: here for atrial fibrillation ablation    Referring physician: Dr Delfino Howe      Primary care physician: MICHAEL Maier CNP      History of Present Illness: This visit 3/3/2022  Patient is here today for follow-up on atrial fibrillation. Patient reports that since discharge from the hospital he has experienced fatigue. He states that his PCP prescribed him Neurontin to take for leg pain and he reports that last night he slept for 6 hours. He states today he feels pretty good because he got some sleep. Previous visit  Prince Hernandez is a 66-year-old male with a history of chronic kidney disease, ischemic cardiomyopathy status post ICD, obstructive sleep apnea on CPAP, hypertension, paroxysmal atrial fibrillation dyslipidemia, coronary artery disease. He presents with complaints of chest pain. He reports that on Saturday he began to have chest pain. He reports that the pain was stabbing. He rated the pain a 10 out of 10. It did not radiate. He states that the pain woke him out of sleep. He additionally complained of fatigue. He denies palpitations, lightheadedness, dizziness, edema or syncope. Patient has a history of PAF. He reports that he stopped taking his blood thinner as he was unable to afford it.     Past medical history:   Past Medical History:   Diagnosis Date    Atrial fibrillation ([address]     Cancer ([address]     testicular    COPD, moderate ([address] 11/30/2020    Diabetes mellitus ([address]     Excessive daytime sleepiness 11/30/2020    GERD (gastroesophageal reflux disease)     Hypertension     Obstructive sleep apnea 2/3/2021    Pulmonary emphysema [facility]) 11/30/2020    Testicular hypofunction     thus on testosterone    Tobacco abuse 11/30/2020       Surgical history :  Past Surgical History:   Procedure Laterality Date    ABDOMINAL AORTIC ANEURYSM REPAIR, ENDOVASCULAR  11/30/2015    endologix aaa device  mri conditional  card scanned into pacs    ARM SURGERY      CARDIAC DEFIBRILLATOR PLACEMENT Left 11/02/2021    Medtronic Evera MRI XT DR Pires ICD    HERNIA REPAIR      KNEE SURGERY      LEG SURGERY  10/2020    camncer removed    TOE SURGERY         Family history: History reviewed. No pertinent family history. Social history :  reports that he quit smoking about 11 months ago. His smoking use included cigarettes. He smoked 1.50 packs per day. He has never used smokeless tobacco. He reports that he does not drink alcohol and does not use drugs. No Known Allergies    No current facility-administered medications on file prior to encounter.      Current Outpatient Medications on File Prior to Encounter   Medication Sig Dispense Refill    metoprolol succinate (TOPROL XL) 100 MG extended release tablet Take 1 tablet by mouth 2 times daily 60 tablet 1    dapagliflozin (FARXIGA) 10 MG tablet Take 1 tablet by mouth every morning 90 tablet 1    apixaban (ELIQUIS) 2.5 MG TABS tablet Take 2 tablets by mouth 2 times daily 60 tablet 5    albuterol sulfate  (90 Base) MCG/ACT inhaler Inhale 2 puffs into the lungs 2 times daily 18 g 3    insulin glargine (LANTUS) 100 UNIT/ML injection vial Inject 35 Units into the skin nightly 10 mL 3    midodrine (PROAMATINE) 10 MG tablet Take 1 tablet by mouth 3 times daily (with meals) 90 tablet 3    amiodarone (CORDARONE) 200 MG tablet Take 1 tablet by mouth 2 times daily 60 tablet 0    omeprazole (PRILOSEC) 20 MG delayed release capsule Take 20 mg by mouth daily      atorvastatin (LIPITOR) 40 MG tablet Take 40 mg by mouth nightly      fluticasone (FLONASE) 50 MCG/ACT nasal spray 1 spray by Each Nostril route daily      budesonide-formoterol (SYMBICORT) 80-4.5 MCG/ACT AERO Inhale 2 puffs into the lungs 2 times daily       clopidogrel (PLAVIX) 75 MG tablet Take 75 mg by mouth daily      amiodarone (PACERONE) 400 MG tablet Take 1 tablet by mouth 2 times daily for 12 doses 12 tablet 0    Lancets MISC 1 each by Does not apply route 4 times daily 100 each 1    glucose monitoring (FREESTYLE FREEDOM) kit 1 kit by Does not apply route daily 1 kit 0    Insulin Pen Needle ([employer] PEN NEEDLES) 31G X 6 MM MISC 1 each by Does not apply route daily 100 each 3    nitroGLYCERIN (NITROSTAT) 0.4 MG SL tablet up to max of 3 total doses. If no relief after 1 dose, call 911. 25 tablet 3       Review of Systems:   Review of Systems   Constitutional: Positive for activity change and fatigue. Negative for appetite change. HENT: Negative for congestion, sinus pressure and sinus pain. Eyes: Negative for redness, itching and visual disturbance. Respiratory: Negative for cough and shortness of breath. Cardiovascular: Negative for chest pain and palpitations. Gastrointestinal: Negative for abdominal distention, abdominal pain, blood in stool, constipation, diarrhea, nausea and vomiting. Genitourinary: Negative for difficulty urinating and dysuria. Musculoskeletal: Positive for arthralgias. Negative for back pain and gait problem. Skin: Negative for color change, pallor, rash and wound. Neurological: Negative for dizziness, syncope and light-headedness. Psychiatric/Behavioral: Negative for agitation and confusion. The patient is not nervous/anxious. Physical Examination:    /75   Pulse 61   Temp 96.2 °F (35.7 °C) (Temporal)   Resp 18   Ht 5' 7\" (1.702 m)   Wt 239 lb (108.4 kg)   SpO2 97%   BMI 37.43 kg/m²    Wt Readings from Last 3 Encounters:   03/10/22 239 lb (108.4 kg)   03/03/22 239 lb 9.6 oz (108.7 kg)   03/01/22 242 lb 6.4 oz (110 kg)     Body mass index is 37.43 kg/m². Physical Exam  Constitutional:       Appearance: Normal appearance. He is obese. He is not ill-appearing or diaphoretic. HENT:      Head: Normocephalic and atraumatic.       Right Ear: External ear normal.      Left Ear: External ear normal.      Nose: No congestion or rhinorrhea. Mouth/Throat:      Mouth: Mucous membranes are moist.      Pharynx: Oropharynx is clear. Eyes:      General:         Right eye: No discharge. Left eye: No discharge. Conjunctiva/sclera: Conjunctivae normal.   Cardiovascular:      Rate and Rhythm: Normal rate and regular rhythm. Heart sounds: Murmur (Grade 2 out of 6 systolic) heard. No friction rub. No gallop. Pulmonary:      Breath sounds: Normal breath sounds. No wheezing or rhonchi. Abdominal:      General: Abdomen is flat. Bowel sounds are normal. There is no distension. Musculoskeletal:      Cervical back: Normal range of motion and neck supple. Right lower leg: No edema. Left lower leg: No edema. Skin:     General: Skin is warm and dry. Neurological:      Mental Status: He is alert and oriented to person, place, and time. Psychiatric:         Mood and Affect: Mood normal.         Behavior: Behavior normal.             CBC:   Lab Results   Component Value Date    WBC 10.0 03/08/2022    HGB 11.5 03/08/2022    HCT 38.4 03/08/2022     03/08/2022     Lipids:   Lab Results   Component Value Date    CHOL 194 04/20/2021    TRIG 136 04/20/2021    HDL 42 04/20/2021    LDLDIRECT 136 (H) 04/20/2021     PT/INR:   Lab Results   Component Value Date    INR 1.02 03/08/2022        BMP:    Lab Results   Component Value Date     03/08/2022    K 4.1 03/08/2022     03/08/2022    CO2 28 03/08/2022    BUN 22 03/08/2022     CMP:   Lab Results   Component Value Date    AST 11 (L) 02/25/2022    ALT 16 02/25/2022    PROT 5.7 (L) 02/25/2022    BILITOT 0.4 02/25/2022    ALKPHOS 54 02/25/2022     TSH:  No results found for: TSH, T4    EKG Interpretation:        Device Assessment:     The device is Medtronic ICD - Dual Chamber chamber      MRI Compatible : yes    Device interrogation was performed.     Mode :  AAI --- DDD     Sensing is normal. Impedence is

## 2022-03-10 NOTE — ANESTHESIA POSTPROCEDURE EVALUATION
Department of Anesthesiology  Postprocedure Note    Patient: Chandrakant Galo  MRN: [id number]  YOB: 1955  Date of evaluation: 3/10/2022  Time:  11:12 AM     Procedure Summary     Date: 03/10/22 Room / Location: [facility] Cath Lab    Anesthesia Start: 0730 Anesthesia Stop:     Procedure: [facility] EP AFIB ABLATION W ANESTH Diagnosis:       Paroxysmal atrial fibrillation      Status post catheter ablation of atrial fibrillation    Scheduled Providers:  Responsible Provider: Zaira Cristobal DO    Anesthesia Type: General, general ASA Status: 3          Anesthesia Type: General, general    Keyonna Phase I: Keyonna Score: 10    Keyonna Phase II:      Last vitals: Reviewed and per EMR flowsheets.        Anesthesia Post Evaluation    Patient location during evaluation: PACU  Patient participation: complete - patient participated  Level of consciousness: awake and alert  Pain score: 0  Airway patency: patent  Nausea & Vomiting: no nausea and no vomiting  Complications: no  Cardiovascular status: blood pressure returned to baseline and hemodynamically stable  Respiratory status: acceptable, spontaneous ventilation, nonlabored ventilation, face mask and nasal airway  Hydration status: stable

## 2022-03-10 NOTE — PROGRESS NOTES
1120 Patient arrived to PACU from EP lab. Monitors applied and alarms on. Angel Hoffman RN at bedside to pull sheaths. Report from [email or web address].  1123 First sheath pulled on right side. 1135 Echo done at bedside in PACU. 1150 EKG done at bedside in PACU.   1153 Patient tolerating ice chips. 1205 Bilateral sheaths pulled. Patient notified he must remain lying flat for the next six hours till 18:05.   1208 Patient blood sugar is 156  1300 PACU care completed. Waiting on room to become available. 1422 Report called to [facility] on floor. 1432 Patient transferred out of PACU To floor.

## 2022-03-10 NOTE — PROCEDURES
Jud Barragan   74 y.o., male  1955      3/10/2022    Attending: Fidencio Li MD    Sedation : Anesthesia                        Indication:      Pre-atrial fibrillation ablation                                                                                                                                                    After obtaining the informed cosent. Pt brought to EP lab. Sedation per anesthesia . After proper sedation TERRY performed. US Doppler was used to assess abnormalities where appropriate. Post procedure pt is stable. Findings:  LV=  LVEF is severely reduced  LA=  dilated  TIA= NO CLOTS  RV= normal, ICD wire noted  RA=  Normal, pacer wire noted  IAS= Normal  Bubble study=not done for PFO or ASD  AV=tricuspid, no significant regurgitation or stenosis  MV=mild regurgitation, no stenosis  TV=mild regurgitation  PV= no significant regurgitation,   Aorta=mild stable plaque noted  PUL LOLITA FLOW=Systolic blunting noted.      Impression:  No TIA clot    Plan:  Proceed with ablation

## 2022-03-11 NOTE — PROGRESS NOTES
Pt has bilat PIV removed, no complications. Telemetry removed. PT verbalizes understanding to follow up with physicians, take medication as ordered, and activity limitiation from discharge education. Pt alert and oriented, discharged to himself from physician order. Wheeled down with all personal belongings. Pt thanks staff, no complications, discharged to self per physician order.

## 2022-03-11 NOTE — PLAN OF CARE
Problem: Falls - Risk of:  Goal: Will remain free from falls  Description: Will remain free from falls  3/11/2022 1046 by Bridger Banks RN  Outcome: Completed  3/11/2022 1046 by Bridger Banks RN  Outcome: Met This Shift  3/11/2022 0100 by Belia Pickett LPN  Outcome: Ongoing  Goal: Absence of physical injury  Description: Absence of physical injury  3/11/2022 1046 by Bridger Banks RN  Outcome: Completed  3/11/2022 1046 by Bridger Banks RN  Outcome: Met This Shift  3/11/2022 0100 by Belia Pickett LPN  Outcome: Ongoing     Problem: Infection:  Goal: Will remain free from infection  Description: Will remain free from infection  3/11/2022 1046 by Bridger Banks RN  Outcome: Completed  3/11/2022 1046 by Bridger Banks RN  Outcome: Met This Shift  3/11/2022 0100 by Belia Pickett LPN  Outcome: Ongoing     Problem: Safety:  Goal: Free from accidental physical injury  Description: Free from accidental physical injury  3/11/2022 1046 by Bridger Banks RN  Outcome: Completed  3/11/2022 1046 by Bridger Banks RN  Outcome: Met This Shift  3/11/2022 0100 by Belia Pickett LPN  Outcome: Ongoing  Goal: Free from intentional harm  Description: Free from intentional harm  3/11/2022 1046 by Bridger Banks RN  Outcome: Completed  3/11/2022 1046 by Bridger Banks RN  Outcome: Met This Shift  3/11/2022 0100 by Belia Pickett LPN  Outcome: Ongoing     Problem: Daily Care:  Goal: Daily care needs are met  Description: Daily care needs are met  3/11/2022 1046 by Bridger Banks RN  Outcome: Completed  3/11/2022 1046 by Bridger Banks RN  Outcome: Met This Shift  3/11/2022 0100 by Belia Pickett LPN  Outcome: Ongoing     Problem: Pain:  Goal: Patient's pain/discomfort is manageable  Description: Patient's pain/discomfort is manageable  3/11/2022 1046 by Twylla Darren, RN  Outcome: Completed  3/11/2022 1046 by Bridger Banks, RN  Outcome: Met This Shift  3/11/2022 0100 by Belia Pickett, LPN  Outcome: Ongoing     Problem: Skin Integrity:  Goal: Skin integrity will stabilize  Description: Skin integrity will stabilize  3/11/2022 1046 by Andressa Angel RN  Outcome: Completed  3/11/2022 1046 by Andressa Angel RN  Outcome: Met This Shift  3/11/2022 0100 by Dread Mclean LPN  Outcome: Ongoing     Problem: Discharge Planning:  Goal: Patients continuum of care needs are met  Description: Patients continuum of care needs are met  3/11/2022 1046 by Andressa Angel RN  Outcome: Completed  3/11/2022 1046 by Andressa Angel RN  Outcome: Met This Shift  3/11/2022 0100 by Dread Mclean LPN  Outcome: Ongoing

## 2022-03-11 NOTE — DISCHARGE SUMMARY
[facility]  Discharge Summary    Gene Leyva  :  1955  MRN:  [id number]    Admit date:  3/10/2022  Discharge date:  3/11/2022    Admitting Physician:  Jennifer Cordova MD    Discharge Diagnoses:      1. Sp Ablation of paroxysmal atrial fibrillation- successful isolation of the pulmonary veins      Admission Condition:  fair    Discharged Condition:  good    Hospital Course:   Patient admitted post ablation of atrial fibrillation for observation. Patient tolerated the procedure and post procedure stay was uneventful. Patient received IV lasix post procedure for noted high optivol levels on ICD interrogation. I discussed with Dr Maegan Olguin about restarting torsemide 20 mg daily. Dr Maegan Olguin agreed to adding medication. Patient to see Dr Maegan Olguin in the office next week. Patient was stable for discharge to be followed as an outpatient.       Discharge Medication List as of 3/11/2022 12:59 PM           Details   torsemide (DEMADEX) 20 MG tablet Take 1 tablet by mouth daily, Disp-30 tablet, R-3NO PRINT              Details   amiodarone (CORDARONE) 200 MG tablet Take 1 tablet by mouth daily, Disp-90 tablet, R-0Normal              Details   metoprolol succinate (TOPROL XL) 100 MG extended release tablet Take 1 tablet by mouth 2 times daily, Disp-60 tablet, R-1Normal      dapagliflozin (FARXIGA) 10 MG tablet Take 1 tablet by mouth every morning, Disp-90 tablet, R-1Normal      apixaban (ELIQUIS) 2.5 MG TABS tablet Take 2 tablets by mouth 2 times daily, Disp-60 tablet, R-5Normal      albuterol sulfate  (90 Base) MCG/ACT inhaler Inhale 2 puffs into the lungs 2 times daily, Disp-18 g, R-3Normal      insulin glargine (LANTUS) 100 UNIT/ML injection vial Inject 35 Units into the skin nightly, Disp-10 mL, R-3Normal      midodrine (PROAMATINE) 10 MG tablet Take 1 tablet by mouth 3 times daily (with meals), Disp-90 tablet, R-3Normal      omeprazole (PRILOSEC) 20 MG delayed release capsule Take 20 mg by mouth dailyHistorical Med      atorvastatin (LIPITOR) 40 MG tablet Take 40 mg by mouth nightlyHistorical Med      Lancets MISC 4 TIMES DAILY Starting Sat 10/30/2021, Disp-100 each, R-1, Normal      glucose monitoring (FREESTYLE FREEDOM) kit DAILY Starting Sat 10/30/2021, Disp-1 kit, R-0, Normal      Insulin Pen Needle ([employer] PEN NEEDLES) 31G X 6 MM MISC DAILY Starting Sat 10/30/2021, Disp-100 each, R-3, Normal      fluticasone (FLONASE) 50 MCG/ACT nasal spray 1 spray by Each Nostril route dailyHistorical Med      nitroGLYCERIN (NITROSTAT) 0.4 MG SL tablet up to max of 3 total doses. If no relief after 1 dose, call 911., Disp-25 tablet, R-3Print      budesonide-formoterol (SYMBICORT) 80-4.5 MCG/ACT AERO Inhale 2 puffs into the lungs 2 times daily Historical Med      clopidogrel (PLAVIX) 75 MG tablet Take 75 mg by mouth dailyHistorical Med             Discharge Exam:  /71   Pulse 60   Temp 97.8 °F (36.6 °C) (Oral)   Resp 12   Ht 5' 7\" (1.702 m)   Wt 239 lb (108.4 kg)   SpO2 94%   BMI 37.43 kg/m²   General appearance: alert, appears stated age and cooperative  Lungs: clear to auscultation bilaterally  Heart: regular rate and rhythm, S1, S2 normal, grade 2/6 systolic murmur appreciated,  No click, rub or gallop  Extremities: extremities normal, atraumatic, no cyanosis or edema  Pulses: 2+ and symmetric  Skin: Skin color, texture, turgor normal. No rashes or lesions. Bilateral femoral groin sites soft, nontender. No hematoma.   No    Disposition:   home    Signed:  MICHAEL Guerrero CNP  3/11/2022, 5:35 PM

## 2022-03-11 NOTE — PROGRESS NOTES
Pt alert, oriented, cooperative with care. Pt to be discharged home today. Bilat femoral sites are soft, no signs of bleeding or hematoma, and without complications. Pt has chamberlain removed without complication, pt medicated. PT in chair, all needs met, call light in reach, thanks staff for care, denies complaints.

## 2022-03-11 NOTE — PLAN OF CARE
Problem: Falls - Risk of:  Goal: Will remain free from falls  Description: Will remain free from falls  3/11/2022 0100 by Corby Garcia LPN  Outcome: Ongoing  3/10/2022 1709 by Lydia Car. HERON Mosquera  Outcome: Ongoing  Goal: Absence of physical injury  Description: Absence of physical injury  3/11/2022 0100 by Corby Garcia, EDUARDN  Outcome: Ongoing  3/10/2022 1709 by Lydia Car. HERON Mosquera  Outcome: Ongoing     Problem: Infection:  Goal: Will remain free from infection  Description: Will remain free from infection  3/11/2022 0100 by Corby Garcia LPN  Outcome: Ongoing  3/10/2022 1709 by Lydia Car. HERON Mosquera  Outcome: Ongoing     Problem: Safety:  Goal: Free from accidental physical injury  Description: Free from accidental physical injury  3/11/2022 0100 by Corby Garcia LPN  Outcome: Ongoing  3/10/2022 1709 by Lydia Car. HERON Mosquera  Outcome: Ongoing  Goal: Free from intentional harm  Description: Free from intentional harm  3/11/2022 0100 by Corby Garcia LPN  Outcome: Ongoing  3/10/2022 1709 by Lydia Car. HERON Mosquera  Outcome: Ongoing     Problem: Daily Care:  Goal: Daily care needs are met  Description: Daily care needs are met  3/11/2022 0100 by Corby Garcia LPN  Outcome: Ongoing  3/10/2022 1709 by Lydia Car. HERON Mosquera  Outcome: Ongoing     Problem: Pain:  Goal: Patient's pain/discomfort is manageable  Description: Patient's pain/discomfort is manageable  3/11/2022 0100 by Corby Garcia LPN  Outcome: Ongoing  3/10/2022 1709 by Lydia Car. HERON Mosquera  Outcome: Ongoing     Problem: Skin Integrity:  Goal: Skin integrity will stabilize  Description: Skin integrity will stabilize  3/11/2022 0100 by Corby Garcia LPN  Outcome: Ongoing  3/10/2022 1709 by Lydia Car.  HERON Mosquera  Outcome: Ongoing     Problem: Discharge Planning:  Goal: Patients continuum of care needs are met  Description: Patients continuum of care needs are met  3/11/2022 0100 by Ena Rodriguez LPN  Outcome: Ongoing  3/10/2022 1709 by Nuria Caldera.  HERON Mosquera  Outcome: Ongoing

## 2022-03-16 PROBLEM — I21.4 NSTEMI (NON-ST ELEVATED MYOCARDIAL INFARCTION) (HCC): Status: ACTIVE | Noted: 2022-01-01

## 2022-03-16 PROBLEM — R07.2 PRECORDIAL PAIN: Status: ACTIVE | Noted: 2021-01-01

## 2022-03-16 NOTE — ED PROVIDER NOTES
12 lead EKG per my interpretation:  Atrial flutter with variable block at 118  Axis is   Normal  QTc is  within an acceptable range  There is no specific T wave changes appreciated. There is no specific ST wave changes appreciated. No STEMI    Prior EKG to compare with was available and atrial paced rhythm was seen on prior.     MD Ike Ontiveros MD  03/16/22 [id number]

## 2022-03-16 NOTE — ED NOTES
[id number] paged hospitalist     Amie Greenfield  03/16/22 1135 [facility] with [facility]S group returned call      Amie Greenfield  03/16/22 [id number]

## 2022-03-16 NOTE — ED PROVIDER NOTES
[facility]      TRIAGE CHIEF COMPLAINT:   Shortness of Breath (started yesterday and has progressed today)      [place]:  Stephany Anne is a 74 y.o. male that presents with complaint of chest pain shortness of breath cough for the past couple days. He has a history of COPD emphysema, atrial fibrillation, CAD he has a defibrillator on. States he has been coughing short of breath chest pain on inspiration with past couple days. He does see cardiology recently had cardioversion, states he is on Eliquis. Denies any COVID. Denies any history of PE or chest AAA. Patient denies other questions or concerns. No abdominal pain no swelling. Did state he fell the other day complaint of left knee pain. No head injury    REVIEW OF SYSTEMS:  At least 10 systems reviewed and otherwise acutely negative except as in the [address] Review of Systems   Constitutional: Positive for activity change and fatigue. HENT: Positive for congestion. Eyes: Negative. Respiratory: Positive for cough and shortness of breath. Cardiovascular: Positive for chest pain. Gastrointestinal: Negative. Endocrine: Negative. Genitourinary: Negative. Musculoskeletal: Positive for arthralgias and myalgias. Skin: Negative. Allergic/Immunologic: Negative. Neurological: Negative. Hematological: Negative. Psychiatric/Behavioral: Negative. All other systems reviewed and are negative.       Past Medical History:   Diagnosis Date    Atrial fibrillation ([address]     Cancer ([address]     testicular    COPD, moderate ([address] 11/30/2020    Diabetes mellitus ([address]     Excessive daytime sleepiness 11/30/2020    GERD (gastroesophageal reflux disease)     Hypertension     Obstructive sleep apnea 2/3/2021    Pulmonary emphysema determined by X-ray [facility]) 11/30/2020    Testicular hypofunction     thus on testosterone    Tobacco abuse 11/30/2020     Past Surgical History:   Procedure Laterality Date    ABDOMINAL AORTIC ANEURYSM REPAIR, ENDOVASCULAR  2015    endologix aaa device  mri conditional  card scanned into pacs    ABLATION OF DYSRHYTHMIC FOCUS N/A 03/10/2022    TERRY, A-Fib Ablation    ARM SURGERY      CARDIAC DEFIBRILLATOR PLACEMENT Left 2021    [employer] Evera MRI XT DR Pires ICD    HERNIA REPAIR      KNEE SURGERY      LEG SURGERY  10/2020    camncer removed    TOE SURGERY       History reviewed. No pertinent family history. Social History     Socioeconomic History    Marital status: Single     Spouse name: Not on file    Number of children: Not on file    Years of education: Not on file    Highest education level: Not on file   Occupational History    Not on file   Tobacco Use    Smoking status: Former Smoker     Packs/day: 1.50     Types: Cigarettes     Quit date: 2021     Years since quittin.9    Smokeless tobacco: Never Used   Vaping Use    Vaping Use: Never used   Substance and Sexual Activity    Alcohol use: Never     Comment: 6-9 cups of coffee a week    Drug use: Never    Sexual activity: Not on file   Other Topics Concern    Not on file   Social History Narrative    Not on file     Social Determinants of Health     Financial Resource Strain:     Difficulty of Paying Living Expenses: Not on file   Food Insecurity:     Worried About [address] in the Last Year: Not on file    Michael of Food in the Last Year: Not on file   Transportation Needs:     Lack of Transportation (Medical): Not on file    Lack of Transportation (Non-Medical):  Not on file   Physical Activity:     Days of Exercise per Week: Not on file    Minutes of Exercise per Session: Not on file   Stress:     Feeling of Stress : Not on file   Social Connections:     Frequency of Communication with Friends and Family: Not on file    Frequency of Social Gatherings with Friends and Family: Not on file    Attends Roman Catholic Services: Not on file   [employer] of Clubs or Organizations: Not on file    Attends Club or Organization Meetings: Not on file    Marital Status: Not on file   Intimate Partner Violence:     Fear of Current or Ex-Partner: Not on file    Emotionally Abused: Not on file    Physically Abused: Not on file    Sexually Abused: Not on file   Housing Stability:     Unable to Pay for Housing in the Last Year: Not on file    Number of [place] in the Last Year: Not on file    Unstable Housing in the Last Year: Not on file     Current Facility-Administered Medications   Medication Dose Route Frequency Provider Last Rate Last Admin    ondansetron (ZOFRAN) injection 4 mg  4 mg IntraVENous Q30 Min PRN Miriam Madera DO   4 mg at 03/16/22 0618     Current Outpatient Medications   Medication Sig Dispense Refill    amiodarone (CORDARONE) 200 MG tablet Take 1 tablet by mouth daily 90 tablet 0    torsemide (DEMADEX) 20 MG tablet Take 1 tablet by mouth daily 30 tablet 3    metoprolol succinate (TOPROL XL) 100 MG extended release tablet Take 1 tablet by mouth 2 times daily 60 tablet 1    dapagliflozin (FARXIGA) 10 MG tablet Take 1 tablet by mouth every morning 90 tablet 1    apixaban (ELIQUIS) 2.5 MG TABS tablet Take 2 tablets by mouth 2 times daily 60 tablet 5    albuterol sulfate  (90 Base) MCG/ACT inhaler Inhale 2 puffs into the lungs 2 times daily 18 g 3    insulin glargine (LANTUS) 100 UNIT/ML injection vial Inject 35 Units into the skin nightly 10 mL 3    midodrine (PROAMATINE) 10 MG tablet Take 1 tablet by mouth 3 times daily (with meals) 90 tablet 3    omeprazole (PRILOSEC) 20 MG delayed release capsule Take 20 mg by mouth daily      atorvastatin (LIPITOR) 40 MG tablet Take 40 mg by mouth nightly      Lancets MISC 1 each by Does not apply route 4 times daily 100 each 1    glucose monitoring (FREESTYLE FREEDOM) kit 1 kit by Does not apply route daily 1 kit 0    Insulin Pen Needle ([employer] PEN NEEDLES) 31G X 6 MM MISC 1 each by Does not apply route daily 100 each 3    fluticasone (FLONASE) 50 MCG/ACT nasal spray 1 spray by Each Nostril route daily      nitroGLYCERIN (NITROSTAT) 0.4 MG SL tablet up to max of 3 total doses.  If no relief after 1 dose, call 911. 25 tablet 3    budesonide-formoterol (SYMBICORT) 80-4.5 MCG/ACT AERO Inhale 2 puffs into the lungs 2 times daily       clopidogrel (PLAVIX) 75 MG tablet Take 75 mg by mouth daily        No Known Allergies  Current Facility-Administered Medications   Medication Dose Route Frequency Provider Last Rate Last Admin    ondansetron (ZOFRAN) injection 4 mg  4 mg IntraVENous Q30 Min PRN Ly Morley, DO   4 mg at 03/16/22 0618     Current Outpatient Medications   Medication Sig Dispense Refill    amiodarone (CORDARONE) 200 MG tablet Take 1 tablet by mouth daily 90 tablet 0    torsemide (DEMADEX) 20 MG tablet Take 1 tablet by mouth daily 30 tablet 3    metoprolol succinate (TOPROL XL) 100 MG extended release tablet Take 1 tablet by mouth 2 times daily 60 tablet 1    dapagliflozin (FARXIGA) 10 MG tablet Take 1 tablet by mouth every morning 90 tablet 1    apixaban (ELIQUIS) 2.5 MG TABS tablet Take 2 tablets by mouth 2 times daily 60 tablet 5    albuterol sulfate  (90 Base) MCG/ACT inhaler Inhale 2 puffs into the lungs 2 times daily 18 g 3    insulin glargine (LANTUS) 100 UNIT/ML injection vial Inject 35 Units into the skin nightly 10 mL 3    midodrine (PROAMATINE) 10 MG tablet Take 1 tablet by mouth 3 times daily (with meals) 90 tablet 3    omeprazole (PRILOSEC) 20 MG delayed release capsule Take 20 mg by mouth daily      atorvastatin (LIPITOR) 40 MG tablet Take 40 mg by mouth nightly      Lancets MISC 1 each by Does not apply route 4 times daily 100 each 1    glucose monitoring (FREESTYLE FREEDOM) kit 1 kit by Does not apply route daily 1 kit 0    Insulin Pen Needle ([employer] PEN NEEDLES) 31G X 6 MM MISC 1 each by Does not apply route daily 100 each 3    fluticasone (FLONASE) 50 MCG/ACT nasal spray 1 spray by Each Nostril route daily      nitroGLYCERIN (NITROSTAT) 0.4 MG SL tablet up to max of 3 total doses. If no relief after 1 dose, call 911. 25 tablet 3    budesonide-formoterol (SYMBICORT) 80-4.5 MCG/ACT AERO Inhale 2 puffs into the lungs 2 times daily       clopidogrel (PLAVIX) 75 MG tablet Take 75 mg by mouth daily         Nursing Notes Reviewed    VITAL SIGNS:  ED Triage Vitals [03/16/22 0447]   Enc Vitals Group      /74      Pulse 60      Resp 20      Temp 99.1 °F (37.3 °C)      Temp Source Oral      SpO2 100 %      Weight       Height       Head Circumference       Peak Flow       Pain Score       Pain Loc       Pain Edu? Excl. in [address]? PHYSICAL EXAM:  Physical Exam  Vitals and nursing note reviewed. Constitutional:       General: He is not in acute distress. Appearance: He is well-developed, well-groomed and overweight. He is ill-appearing. He is not toxic-appearing or diaphoretic. Interventions: Nasal cannula in place. HENT:      Head: Normocephalic and atraumatic. Right Ear: External ear normal.      Left Ear: External ear normal.      Nose: Congestion present. Eyes:      General: No scleral icterus. Right eye: No discharge. Left eye: No discharge. Extraocular Movements: Extraocular movements intact. Conjunctiva/sclera: Conjunctivae normal.   Neck:      Vascular: No JVD. Trachea: Phonation normal.   Cardiovascular:      Rate and Rhythm: Tachycardia present. Rhythm irregular. Pulses: Normal pulses. Heart sounds: Normal heart sounds. No murmur heard. No friction rub. No gallop. Pulmonary:      Effort: Pulmonary effort is normal. No respiratory distress. Breath sounds: No stridor. Wheezing and rhonchi present. No rales. Abdominal:      General: Bowel sounds are normal. There is no distension. Palpations: Abdomen is soft. There is no mass or pulsatile mass. Tenderness:  There is no abdominal tenderness. There is no guarding or rebound. Negative signs include Diaz's sign and Rovsing's sign. Hernia: No hernia is present. Musculoskeletal:         General: Tenderness present. No swelling, deformity or signs of injury. Cervical back: Full passive range of motion without pain and normal range of motion. No rigidity. Left knee: Tenderness present. Right lower leg: No edema. Left lower leg: No edema. Skin:     General: Skin is warm. Coloration: Skin is not jaundiced or pale. Findings: No bruising, erythema, lesion or rash. Neurological:      General: No focal deficit present. Mental Status: He is alert and oriented to person, place, and time. GCS: GCS eye subscore is 4. GCS verbal subscore is 5. GCS motor subscore is 6. Cranial Nerves: Cranial nerves are intact. No cranial nerve deficit, dysarthria or facial asymmetry. Sensory: No sensory deficit. Motor: No weakness. Coordination: Coordination normal.   Psychiatric:         Mood and Affect: Mood normal.         Behavior: Behavior normal. Behavior is cooperative. Thought Content:  Thought content normal.         Judgment: Judgment normal.           I have reviewed andinterpreted all of the currently available lab results from this visit (if applicable):    Results for orders placed or performed during the hospital encounter of 03/16/22   CBC with Auto Differential   Result Value Ref Range    WBC 9.9 4.0 - 10.5 K/CU MM    RBC 3.60 (L) 4.6 - 6.2 M/CU MM    Hemoglobin 10.7 (L) 13.5 - 18.0 GM/DL    Hematocrit 32.8 (L) 42 - 52 %    MCV 91.1 78 - 100 FL    MCH 29.7 27 - 31 PG    MCHC 32.6 32.0 - 36.0 %    RDW 14.3 11.7 - 14.9 %    Platelets 564 252 - 020 K/CU MM    MPV 10.2 7.5 - 11.1 FL    Differential Type AUTOMATED DIFFERENTIAL     Segs Relative 72.4 (H) 36 - 66 %    Lymphocytes % 17.3 (L) 24 - 44 %    Monocytes % 9.1 (H) 0 - 4 %    Eosinophils % 0.7 0 - 3 %    Basophils % 0.2 0 - 1 %    Segs Absolute 7.2 K/CU MM    Lymphocytes Absolute 1.7 K/CU MM    Monocytes Absolute 0.9 K/CU MM    Eosinophils Absolute 0.1 K/CU MM    Basophils Absolute 0.0 K/CU MM    Nucleated RBC % 0.0 %    Total Nucleated RBC 0.0 K/CU MM    Total Immature Neutrophil 0.03 K/CU MM    Immature Neutrophil % 0.3 0 - 0.43 %   Comprehensive Metabolic Panel   Result Value Ref Range    Sodium 134 (L) 135 - 145 MMOL/L    Potassium 4.0 3.5 - 5.1 MMOL/L    Chloride 95 (L) 99 - 110 mMol/L    CO2 24 21 - 32 MMOL/L    BUN 27 (H) 6 - 23 MG/DL    CREATININE 2.3 (H) 0.9 - 1.3 MG/DL    Glucose 143 (H) 70 - 99 MG/DL    Calcium 8.4 8.3 - 10.6 MG/DL    Albumin 3.6 3.4 - 5.0 GM/DL    Total Protein 6.0 (L) 6.4 - 8.2 GM/DL    Total Bilirubin 0.4 0.0 - 1.0 MG/DL    ALT 18 10 - 40 U/L    AST 14 (L) 15 - 37 IU/L    Alkaline Phosphatase 49 40 - 128 IU/L    GFR Non- 28 (L) >60 mL/min/1.73m2    GFR  34 (L) >60 mL/min/1.73m2    Anion Gap 15 4 - 16   Troponin   Result Value Ref Range    Troponin T 0.136 (HH) <0.01 NG/ML   Brain Natriuretic Peptide   Result Value Ref Range    Pro-BNP 9,422 (H) <300 PG/ML   Magnesium   Result Value Ref Range    Magnesium 2.2 1.8 - 2.4 mg/dl   TSH   Result Value Ref Range    TSH, High Sensitivity 6.050 (H) 0.270 - 4.20 uIu/ml   T4, Free   Result Value Ref Range    T4 Free 1.44 0.9 - 1.8 NG/DL   Blood Gas, Venous   Result Value Ref Range    pH, Yuriy 7.44 (H) 7.32 - 7.42    pCO2, Yuriy 41 38 - 52 mmHG    pO2, Yuriy 31 28 - 48 mmHG    Base Exc, Mixed 3.3 (H) 0 - 1.2    HCO3, Venous 27.8 (H) 19 - 25 MMOL/L    O2 Sat, Yuriy 53.5 50 - 70 %    Comment VBG    Lactic Acid   Result Value Ref Range    Lactate 0.8 0.4 - 2.0 mMOL/L   EKG 12 Lead   Result Value Ref Range    Ventricular Rate 117 BPM    Atrial Rate 133 BPM    QRS Duration 104 ms    Q-T Interval 346 ms    QTc Calculation (Bazett) 482 ms    R Axis 64 degrees    T Axis -9 degrees    Diagnosis       Atrial fibrillation with rapid ventricular response with premature ventricular or aberrantly conducted complexes  Inferior infarct (cited on or before 02-JUL-2021)  Abnormal ECG  When compared with ECG of 10-MAR-2022 11:51,  Atrial fibrillation has replaced Electronic atrial pacemaker  Vent. rate has increased BY  57 BPM  Nonspecific T wave abnormality has replaced inverted T waves in Lateral leads          Radiographs (if obtained):  [] The following radiograph was interpreted by myself in the absence of a radiologist:  [x] Radiologist's Report Reviewed:    CXR    Echocardiogram transesophageal    Result Date: 2/24/2022  Transesophageal Echocardiography Report (TERRY)   Demographics   Patient Name      Jak Dominguez   Date of Study       02/24/2022   Date of Birth     1955          Gender              Male   Age               74 year(s)          Race                   Patient Number    [id number]          Room Number         8022   Visit Number      [id number]   Corporate ID      [id number]   Accession Number  [id number]          Samantha Jeffries RN   Ordering          Brennen Ravena CNP  Interpreting        Kim Degroot  Physician                             Physician           MD   The procedure was explained in detail to the patient. Risks,  complications and alternative treatments were reviewed. Written consent  was obtained. Procedure Type of Study   TERRY procedure:ECHOCARDIOGRAM TRANSESOPHAGEAL. Procedure Date Date: 02/24/2022 Start: 11:05 AM Study Location: Portable Indications:Atrial fibrillation. Patient Status: Routine Height: 67 inches Weight: 235 pounds BSA: 2.17 m2 BMI: 36.81 kg/m2 HR: 140 bpm BP: 136/81 mmHg TERRY Performed By: the attending and the sonographer  Procedure Informed Consent  Procedure consent form obtained.    - See IV sedation record   Type of Anesthesia: Moderate sedation   Conclusions   Summary  There was no thrombus noted in the left atrial appendage. Negative bubble study. No PFO or ASD noted. Successful cardioversion after second attempt (200 Joules). Signature   ------------------------------------------------------------------  Electronically signed by Cristian Kim MD (Interpreting  physician) on 02/24/2022 at 01:56 PM  ------------------------------------------------------------------   Procedure Description   This is a 74year old male patient brought to the non-invasive laboratory  today. Informed consent was obtained. Xylocaine viscous local anesthesia was  utilized. Sedation was given through IV. The patient received 5 mg of versed  and 25 mcg of fentanyl. A mouth guard was placed. Transesophageal  echocardiogram probe with lubricant was advanced to the posterior pharynx,  mid esophagus without difficulty. Transesophageal echocardiographic study  was performed utilizing the standard technique with an omniplane probe. Echocardiographic images were obtained in the upper, middle and lower  esophagus. There was not a thrombus seen in the TIA, therefore we proceeded  with the cardioversion. The patient was shocked twice using 200 synchronized  joules and was successfully converted to sinus rhythm. Findings   Left Atrium  Normal size left atrium. Right Atrium  No evidence of thrombus or mass in the right atrium. Right Ventricle  The right ventricle was not clearly visualized . Aortic Valve  Structurally normal aortic valve. Mitral Valve  Mild mitral regurgitation is present. No evidence of mitral valve stenosis. Tricuspid Valve  No evidence of tricuspid regurgitation. No evidence of tricuspid stenosis. Pulmonic Valve  No evidence of any pulmonic regurgitation. Pericardial Effusion  There is a small (trivial) pericardial effusion. Pleural Effusion  No evidence of pleural effusion. Miscellaneous  There was no thrombus noted in the left atrial appendage. Negative bubble study. No PFO or ASD noted. Echocardiogram complete 2D with doppler with color    Result Date: 2/21/2022  Transthoracic Echocardiography Report (TTE)  Demographics   Patient Name       Renetta Human  Date of Study       02/21/2022   Date of Birth      1955         Gender              Male   Age                74 year(s)         Race                   Patient Number     [id number]         Room Number         2125   Visit Number       [id number]   Corporate ID       [id number]   Accession Number   [id number]         Lexi Mancia RDMS   Ordering Physician Kurtis Shelley MD                 Physician           MD  Procedure Type of Study   TTE procedure:ECHOCARDIOGRAM COMPLETE 2D W DOPPLER W COLOR. Procedure Date Date: 02/21/2022 Start: 09:11 AM Study Location: Portable Technical Quality: Fair visualization Indications:Chest pain. Patient Status: Routine Contrast Medium: Definity. Height: 67 inches Weight: 235 pounds BSA: 2.17 m2 BMI: 36.81 kg/m2 HR: 77 bpm BP: 119/58 mmHg  Conclusions   Summary  Left ventricular systolic function is abnormal.  Ejection fraction is visually estimated at 25-30%. Apical, lateral, anterior, and inferior walls appear akinetic. Dilated left ventricle noted. Grade II diastolic dysfunction. Moderately dilated left atrium. PPM wiring visualized within the right heart. Mild aortic stenosis is present; mean PG 11mmHg. Mild to moderate mitral regurgitation. No evidence of any pericardial effusion.    Signature   ------------------------------------------------------------------  Electronically signed by Jessica Buitrago MD (Interpreting  physician) on 02/21/2022 at 01:57 PM  ------------------------------------------------------------------   Findings   Left Ventricle  Left ventricular systolic function is abnormal.  Ejection fraction is visually estimated at 25-30%. Apical, lateral, anterior, and inferior walls appear akinetic. Mild left ventricular hypertrophy. Dilated left ventricle noted. Grade II diastolic dysfunction. Left Atrium  Moderately dilated left atrium. Right Atrium  Essentially normal right atrium. Right Ventricle  PPM wiring visualized within the right heart. Aortic Valve  Mild aortic stenosis is present; mean PG 11mmHg. Mitral Valve  Mild to moderate mitral regurgitation. Tricuspid Valve  Mild tricuspid regurgitation; RVSP: 28 mmHg. Pulmonic Valve  The pulmonic valve was not well visualized. Pericardial Effusion  No evidence of any pericardial effusion. Pleural Effusion  No evidence of pleural effusion. Miscellaneous  IVC and abdominal aorta are within normal limits.   M-Mode/2D Measurements & Calculations   LV Diastolic Dimension:  LV Systolic Dimension:  LA Dimension: 4.6 cmAO Root  6.82 cm                  5.7 cm                  Dimension: 4 cmLA Area:  LV FS:16.4 %             LV Volume Diastolic:    95.7 cm2  LV PW Diastolic: 1.52 cm 670 ml  Septum Diastolic: 1.2 cm LV Volume Systolic: 008  CO: 3.29 l/min           ml  CI: 2.62 l/m*m2          LV EDV/LV EDV Index:    RV Diastolic Dimension:                           193 ml/89 m2LV ESV/LV   2.07 cm  LV Area Diastolic: 49.4  ESV Index: 143 ml/66 m2  cm2                      EF Calculated (A4C):    LA/Aorta: 2.99  LV Area Systolic: 66.5   30.4 %  cm2                      EF Calculated (2D):     LA volume/Index: 70 ml                           30.6 %                  /32m2                            LV Length: 8.82 cm                            LVOT: 2.3 cm  Doppler Measurements & Calculations   MV Peak E-Wave: 107   AV Peak Velocity: 200 cm/s   LVOT Peak Velocity: 85  cm/s                  AV Peak Gradient: 16 mmHg    cm/s  MV Peak A-Wave: 50.8  AV Mean Velocity: 142 cm/s   LVOT Mean Velocity: 59.1  cm/s                  AV Mean Gradient: 9 mmHg     cm/s  MV E/A Ratio: 2.11    AV VTI: 43.2 cm              LVOT Peak Gradient: 3  MV Peak Gradient:     AV Area (Continuity):1.71    mmHgLVOT Mean Gradient: 2  4.58 mmHg             cm2                          mmHg                                                     Estimated RVSP: 28 mmHg  MV P1/2t: 59 msec     LVOT VTI: 17.8 cm            Estimated RAP:3 mmHg  MVA by PHT:3.73 cm2                        Estimated PASP: 28.2 mmHg  MV E' Septal                                       TR Velocity:251 cm/s  Velocity: 3.95 cm/s                                TR Gradient:25.2 mmHg  MV E' Lateral  Velocity: 8.99 cm/s  MV E/E' septal: 27.09  MV E/E' lateral: 11.9      Echocardiogram limited    Result Date: 3/10/2022  Transthoracic Echocardiography Report (TTE)  Demographics   Patient Name       Nunu Leslie  Date of Study       03/10/2022   Date of Birth      1955         Gender              Male   Age                74 year(s)         Race                   Patient Number     [id number]         Room Number         1473   Visit Number       [id number]   Corporate ID       [id number]   Accession Number   [id number]         Sanchez Nails RN   Ordering Physician [facility]            Physician           Kerry Lopez MD  Procedure Type of Study   TTE procedure:ECHOCARDIOGRAM LIMITED. Procedure Date Date: 03/10/2022 Start: 11:37 AM Study Location: Portable Indications:S/P Ablation. Patient Status: Routine Height: 67 inches Weight: 240 pounds BSA: 2.18 m2 BMI: 37.59 kg/m2 HR: 60 bpm BP: 114/67 mmHg  Conclusions   Summary  This is a limited echocardiogram.  Left ventricular systolic function is abnormal.  Ejection fraction is visually estimated at 25%. No evidence of any pericardial effusion.    Signature   ------------------------------------------------------------------ Electronically signed by Dino Mac MD  (Interpreting physician) on 03/10/2022 at 04:17 PM  ------------------------------------------------------------------   Findings   Left Ventricle  Left ventricular systolic function is abnormal.  Ejection fraction is visually estimated at 25%. Pericardial Effusion  No evidence of any pericardial effusion. XR CHEST (2 VW)    Result Date: 3/8/2022  EXAMINATION: TWO XRAY VIEWS OF THE CHEST 3/8/2022 8:25 am COMPARISON: 02/22/2022 HISTORY: ORDERING SYSTEM PROVIDED HISTORY: Atrial fibrillation, unspecified type [facility] TECHNOLOGIST PROVIDED HISTORY: Reason for exam:->Chest x-ray performed for pre testing for ablation with transesophageal echocardiogram procedure, hx of atrial fibrillation Reason for Exam: Chest x-ray performed for pre testing for ablation with transesophageal echocardiogram procedure, hx of atrial fibrillation Initial evaluation FINDINGS: The patient has a pacemaker. The trachea is midline. The cardiac silhouette is unremarkable. The lungs are clear without evidence of infiltrate, mass, or pneumothorax. There is no pleural fluid. No acute cardiopulmonary process. XR CHEST (2 VW)    Result Date: 2/19/2022  EXAMINATION: TWO XRAY VIEWS OF THE CHEST 2/19/2022 11:36 am COMPARISON: 12/01/2021. HISTORY: ORDERING SYSTEM PROVIDED HISTORY: CP TECHNOLOGIST PROVIDED HISTORY: Reason for exam:->CP Reason for Exam: chest pain when breathing FINDINGS: The cardiomediastinal silhouette is unremarkable. The lungs are clear. No infiltrate, pleural fluid or evidence of overt failure. Left sided AICD devise. Partially visualized aortic endograft in the mid abdomen. No acute osseous findings. No acute cardiopulmonary disease. NM LUNG SCAN PERFUSION ONLY    Result Date: 2/20/2022  EXAMINATION: NUCLEAR MEDICINE PERFUSION SCAN. 2/20/2022 TECHNIQUE: Ventilation not performed as part of COVID-19 safety precautions.   5 millicuries of Tc 49G MAA was administered intravenously prior to planar imaging of the lungs in multiple projections. COMPARISON: Chest radiograph 02/19/2022. Perfusion study 10/28/2021. HISTORY: ORDERING SYSTEM PROVIDED HISTORY: Elevated d-dimers TECHNOLOGIST PROVIDED HISTORY: Reason for exam:->Elevated d-dimers Reason for Exam: elevated D-dimer FINDINGS: PERFUSION: Distribution of radiotracer is homogeneous. No segmental defects identified. CHEST RADIOGRAPH: No focal areas of consolidation or significant effusions on recent chest radiograph. Low Probability for Pulmonary Embolus. XR CHEST PORTABLE    Result Date: 3/16/2022  EXAMINATION: ONE XRAY VIEW OF THE CHEST 3/16/2022 5:03 am COMPARISON: Chest two views March 8, 2022 HISTORY: ORDERING SYSTEM PROVIDED HISTORY: shortness of breath TECHNOLOGIST PROVIDED HISTORY: Reason for exam:->shortness of breath Reason for Exam: SOB FINDINGS: Left-sided cardiac ICD is stable in position. The heart is mildly to moderately enlarged with otherwise unremarkable configuration. The mediastinal contours are within normal limits. The lungs are well aerated. Chronic mild blunting of left costophrenic angle is seen. . Bones and soft tissues are unremarkable. 1. Mild-to-moderate cardiomegaly. 2. Chronic mild blunting of left costophrenic angle suggesting pleural thickening/scarring. 3. No acute cardiopulmonary abnormality. XR CHEST 1 VIEW    Result Date: 2/22/2022  EXAMINATION: ONE XRAY VIEW OF THE CHEST 2/22/2022 9:30 am COMPARISON: 02/19/2022 HISTORY: ORDERING SYSTEM PROVIDED HISTORY: afib iwth RVR. on IVF and hx of CHF TECHNOLOGIST PROVIDED HISTORY: Reason for exam:->afib iwth RVR. on IVF and hx of CHF Reason for Exam: afib iwth RVR. on IVF and hx of CHF FINDINGS: AICD/pacer device is stable. The lungs are without acute focal process. There is no effusion or pneumothorax. The cardiomediastinal silhouette is stable. The osseous structures are stable. No acute process.  Stable exam.     VL DUP LOWER EXTREMITY VENOUS BILATERAL    Result Date: 2/19/2022  EXAMINATION: DUPLEX VENOUS ULTRASOUND OF THE BILATERAL LOWER EXTREMITIES2/19/2022 6:14 pm TECHNIQUE: Duplex ultrasound using B-mode/gray scaled imaging, Doppler spectral analysis and color flow Doppler was obtained of the deep venous structures of the lower bilateral extremities. COMPARISON: None. HISTORY: ORDERING SYSTEM PROVIDED HISTORY: concern for possible PE, refusing CT PE study due to history of CKD TECHNOLOGIST PROVIDED HISTORY: Reason for exam:->concern for possible PE, refusing CT PE study due to history of CKD Reason for Exam: chest pain FINDINGS: The visualized veins of the bilateral lower extremities are patent and free of echogenic thrombus. The veins demonstrate good compressibility with normal color flow study and spectral analysis. No evidence of DVT in either lower extremity. EKG (if obtained): (All EKG's are interpreted by myself in the absence of a cardiologist)    12 lead EKG per my interpretation #1:  Atrial Flutter 118 with variable AV block  Axis is   Normal  QTc is  462  There is no specific T wave changes appreciated. There is no specific ST wave changes appreciated. Prior EKG to compare with was not available     12 lead EKG per my interpretation #2:  Atrial Fibrillation 117  Axis is   Normal  QTc is  482  There is no specific T wave changes appreciated. There is no specific ST wave changes appreciated. Prior EKG to compare with was not available     Total critical care time today provided was at least 15 minutes. This excludes seperately billable procedure. Critical care time provided for reviewing labs, images giving oxygen breathing treatments fluids consulting medicine, consult  that required close evaluation and/or intervention with concern for patient decompensation. MDM:    Patient with complaint of chest pain shortness of breath. Also complains of left knee pain after falling today.   He did not hit his head or pass out. Although his main complaint is chest pain shortness of breath the past day or 2. History of CAD, A. fib he is on Eliquis he has a defibrillator did not go off. Recently had a cardioversion. Again does have COPD emphysematous complains of cough chest pain shortness of breath. Pain is on inspiration. Denies history of PE he has been compliant with medication. Patient otherwise well-appearing he is mildly tachycardic on arrival and blood pressure is 96/54 he has good breath sounds no given breathing treatments, pain medicine nausea medicine as needed will give him IV fluids will check labs imaging including left knee, chest x-ray. Holding off on PE study given he has been compliant with his Eliquis. Will likely need admission patient rechecked is feeling better. So far work-up performed he does have elevated troponin today which is more than usual stable creatinine stable BMP labs otherwise -2 EKGs are negative x-rays negative I did talk to his cardiologist in hospital medicine will admit. Holding off on antidysrhythmic's at this time as he is currently not in rapid rate, mild tachycardia around 109 patient was stable admitted. I talked to hospital medicine he is okay with plan. CLINICAL IMPRESSION:  Final diagnoses:   Dyspnea, unspecified type   Atrial flutter, unspecified type (HCC)   Chest pain on breathing   Knee pain, unspecified chronicity, unspecified laterality   Serum creatinine raised   Troponin level elevated   Elevated brain natriuretic peptide (BNP) level       (Please note that portions of this note may have been completed with a voice recognition program. Efforts were made to edit the dictations but occasionally words aremis-transcribed.)    DISPOSITION REFERRAL (if applicable):  No follow-up provider specified.     DISPOSITION MEDICATIONS (if applicable):  New Prescriptions    No medications on file          German Cranker, DO German Cranker, DO  03/16/22 0801

## 2022-03-16 NOTE — H&P
History and Physical      Name:  Erika Soliz /Age/Sex: 1955  (74 y.o. male)   MRN & CSN:  [id number] & [id number] Encounter Date/Time: 3/16/2022 7:48 AM EDT   Location:  ED26/ED-26 PCP: Mary Nuñez [address] Day: 1    Assessment and Plan:   Medical decision making:   Chest pain with elevated troponin r/o ACS  o In setting of CAD, valvular heart disease, atrial fibrillation, CKD, AICD placement. CXR showing stable cardiomegaly. EKG showing atrial fibrillation with RVR, rate in low 100s. He was given full strength ASA in ER.  o Initial troponin detectable, trend  o Tele monitoring  o Cardiology consulted - Dr. Meredith Benz  o Nitroglycerin sublingual PRN  o Last echo on 3/10/22 with EF 55%, systolic function abnormal  o Continue home eliquis  o BNP elevated, but is chronically elevated     Atrial fibrillation with RVR, chronic  o Was admitted 3/10-3/11 for cardiac ablation by Dr. Catherine Medina. Has AICD. EKG showing afib RVR. o Last echo on 3/10/22 with EF 86%, systolic function abnormal  o Cardiology consult as above  o TSH elevated but T4 normal  o Continue home medications eliquis, amiodarone, metoprolol. Give AM medications and monitor need for rate controlling medication via infusion    Chronic conditions: home medications continued unless contraindicated   HFrEF - does not appear to be in exacerbation. continue torsemide. Not on ACE due to CKD   CAD s/p PCI with stents in - continue plavix/ASA   CKD - avoid nephrotoxins    COPD - does not appear to be in exacerbation.  continue inhalers   Hypotension - continue midodrine   HLD - continue atorvastatin   Obesity BMI 40   [place]    Disposition:   Current Living situation: home alone  Expected Disposition: same  Estimated D/C: several days  Diet No diet orders on file cardiac   DVT Prophylaxis [] Lovenox, []  Heparin, [] SCDs, [] Ambulation,  [x] Eliquis, [] Xarelto   Code Status Prior full   Surrogate Decision Maker/ POA Daughter Kassandra     History from:   Patient and EMR  History of Present Illness:   Chief Complaint: chest pain  Hollie Deshpande is a 74 y.o. male with pmh of CKD, obesity, HLD, COPD, CAD, HFrEF, atrial fibrillation who presents to the ER for evaluation of dyspnea that began several days ago that worsens with ambulation. He has chest pain with dyspnea that radiates into right arm and resolves on its own. Patient was given full strength ASA by the ER. Patient denies fever, chills, abdominal pain, headaches, dysuria, hematuria, weakness. Patient denies history of DVT, recent travel, surgeries, or lower extremity swelling. Patient denies nicotine use or ETOH use. At this time, patient's chest pain and dyspneahas improved. Of note, he has been admitted 8 times in the last year for cardiac reasons with most recent admission on 3/10/22 where he had cardioversion by Dr. Edwin Hackett. He felt good for several days before the dyspnea began. Patient was found to have elevated troponin and was in afib RVR in the ER. Cardiology was consulted. Patient's past medical, social, and family history have been reviewed. Patient case discussed with ED provider Dr. Vane García of Systems:    10 point system reviewed, negative, unless as noted in above HPI. Objective:   No intake or output data in the 24 hours ending 03/16/22 0748   Vitals:   Vitals:    03/16/22 0630 03/16/22 0646 03/16/22 0700 03/16/22 0715   BP: 94/66 87/64 94/64 (!) 106/93   Pulse: 118 119 118 109   Resp: 19 12 14 17   Temp:       TempSrc:       SpO2: 96% 94% 97% 93%   Weight:       Height:         Medications Prior to Admission     Prior to Admission medications    Medication Sig Start Date End Date Taking?  Authorizing Provider   amiodarone (CORDARONE) 200 MG tablet Take 1 tablet by mouth daily 3/11/22   IMCHAEL Knutson CNP   torsemide [facility]) 20 MG tablet Take 1 tablet by mouth daily 3/11/22   MICHAEL Knutson - CNP   metoprolol succinate (TOPROL XL) 100 MG extended release tablet Take 1 tablet by mouth 2 times daily 3/3/22   MICHAEL Pineda - CNP   dapagliflozin (FARXIGA) 10 MG tablet Take 1 tablet by mouth every morning 3/1/22   Kamran Guevara MD   apixaban (ELIQUIS) 2.5 MG TABS tablet Take 2 tablets by mouth 2 times daily 3/1/22   Kamran Guevara MD   albuterol sulfate  (90 Base) MCG/ACT inhaler Inhale 2 puffs into the lungs 2 times daily 2/25/22   Clarice Gibbs DO   insulin glargine (LANTUS) 100 UNIT/ML injection vial Inject 35 Units into the skin nightly 2/25/22   Clarice Gibbs DO   midodrine (PROAMATINE) 10 MG tablet Take 1 tablet by mouth 3 times daily (with meals) 2/25/22   Clarice Gibbs DO   omeprazole (PRILOSEC) 20 MG delayed release capsule Take 20 mg by mouth daily    Historical Provider, MD   atorvastatin (LIPITOR) 40 MG tablet Take 40 mg by mouth nightly    Historical Provider, MD   Lancets MISC 1 each by Does not apply route 4 times daily 10/30/21   Britta Burrell MD   glucose monitoring (FREESTYLE FREEDOM) kit 1 kit by Does not apply route daily 10/30/21   Britta Burrell MD   Insulin Pen Needle ([employer] PEN NEEDLES) 31G X 6 MM MISC 1 each by Does not apply route daily 10/30/21   Britta Burrell MD   fluticasone (FLONASE) 50 MCG/ACT nasal spray 1 spray by Each Nostril route daily    Historical Provider, MD   nitroGLYCERIN (NITROSTAT) 0.4 MG SL tablet up to max of 3 total doses. If no relief after 1 dose, call 911. 7/4/21   Brooklynn Hurst MD   budesonide-formoterol [facility]) 80-4.5 MCG/ACT AERO Inhale 2 puffs into the lungs 2 times daily  5/8/21   Historical Provider, MD   clopidogrel (PLAVIX) 75 MG tablet Take 75 mg by mouth daily    Historical Provider, MD     Physical Exam:      GEN -Awake. NAD. Appears given age. Obese  EYES -PERRLA. No scleral erythema, discharge, or conjunctivitis. HENT -MM are moist. Oral pharynx without exudates, no evidence of thrush.   NECK -Supple, no apparent thyromegaly or masses. RESP -CTA, no wheezes, rales or rhonchi. Symmetric chest movement while on 2L NC.   C/V -S1/S2 auscultated. Afib RVR without appreciable M/R/G. No JVD or carotid bruits. Peripheral pulses equal bilaterally and palpable. Cap refill <3 sec. No peripheral edema. GI -Abdomen is soft non distended and without significant tenderness to palpation. + BS. No masses or guarding.  -No CVA/ flank tenderness. Hart catheter is not present. LYMPH-No palpable cervical lymphadenopathy and no hepatosplenomegaly. No petechiae or ecchymoses. MS -No gross joint deformities. SKIN -Normal coloration, warm, dry. Zetta Bye, alert, oriented x  person, place, time, situation. Cranial nerves appear grossly intact, normal speech, no lateralizing weakness. PSYC- Appropriate affect. Past Medical History:   PMHx   Past Medical History:   Diagnosis Date    Atrial fibrillation ([facility]     Cancer ([facility]     testicular    COPD, moderate ([address] 11/30/2020    Diabetes mellitus ([address]     Excessive daytime sleepiness 11/30/2020    GERD (gastroesophageal reflux disease)     Hypertension     Obstructive sleep apnea 2/3/2021    Pulmonary emphysema determined by X-ray [facility]) 11/30/2020    Testicular hypofunction     thus on testosterone    Tobacco abuse 11/30/2020     PSHX:  has a past surgical history that includes hernia repair; AAA repair, endovascular (11/30/2015); Leg Surgery (10/2020); Cardiac defibrillator placement (Left, 11/02/2021); Arm Surgery; Toe Surgery; knee surgery; and ablation of dysrhythmic focus (N/A, 03/10/2022). Allergies: No Known Allergies  Fam HX: family history is not on file.   Soc HX:   Social History     Socioeconomic History    Marital status: Single     Spouse name: None    Number of children: None    Years of education: None    Highest education level: None   Occupational History    None   Tobacco Use    Smoking status: Former Smoker     Packs/day: 1.50     Types: Cigarettes     Quit date: 2021     Years since quittin.9    Smokeless tobacco: Never Used   Vaping Use    Vaping Use: Never used   Substance and Sexual Activity    Alcohol use: Never     Comment: 6-9 cups of coffee a week    Drug use: Never    Sexual activity: None   Other Topics Concern    None   Social History Narrative    None     Social Determinants of Health     Financial Resource Strain:     Difficulty of Paying Living Expenses: Not on file   Food Insecurity:     Worried About Running Out of Food in the Last Year: Not on file    Michael of Food in the Last Year: Not on file   Transportation Needs:     Lack of Transportation (Medical): Not on file    Lack of Transportation (Non-Medical):  Not on file   Physical Activity:     Days of Exercise per Week: Not on file    Minutes of Exercise per Session: Not on file   Stress:     Feeling of Stress : Not on file   Social Connections:     Frequency of Communication with Friends and Family: Not on file    Frequency of Social Gatherings with Friends and Family: Not on file    Attends [employer] Services: Not on file    Active Member of [address] [place] [address] or Organizations: Not on file    Attends Club or Organization Meetings: Not on file    Marital Status: Not on file   Intimate Partner Violence:     Fear of Current or Ex-Partner: Not on file    Emotionally Abused: Not on file    Physically Abused: Not on file    Sexually Abused: Not on file   Housing Stability:     Unable to Pay for Housing in the Last Year: Not on file    Number of [place] in the Last Year: Not on file    Unstable Housing in the Last Year: Not on file     Medications:   Medications:    Infusions:   PRN Meds: ondansetron, 4 mg, Q30 Min PRN      Labs    Echocardiogram limited    Result Date: 3/10/2022  Transthoracic Echocardiography Report (TTE)  Demographics   Patient Name       Bishop Buckley  Date of Study       03/10/2022   Date of Birth      1955         Gender              Male   Age 74 year(s)         Race                   Patient Number     [id number]         Room Number         3989   Visit Number       [id number]   Corporate ID       [id number]   Accession Number   [id number]         [address] Elton Bolton RN   Ordering Physician Gilmer HALL            Physician           Katey Pro MD  Procedure Type of Study   TTE procedure:ECHOCARDIOGRAM LIMITED. Procedure Date Date: 03/10/2022 Start: 11:37 AM Study Location: Portable Indications:S/P Ablation. Patient Status: Routine Height: 67 inches Weight: 240 pounds BSA: 2.18 m2 BMI: 37.59 kg/m2 HR: 60 bpm BP: 114/67 mmHg  Conclusions   Summary  This is a limited echocardiogram.  Left ventricular systolic function is abnormal.  Ejection fraction is visually estimated at 25%. No evidence of any pericardial effusion. Signature   ------------------------------------------------------------------  Electronically signed by Sergo Duarte MD  (Interpreting physician) on 03/10/2022 at 04:17 PM  ------------------------------------------------------------------   Findings   Left Ventricle  Left ventricular systolic function is abnormal.  Ejection fraction is visually estimated at 25%. Pericardial Effusion  No evidence of any pericardial effusion. XR KNEE LEFT (3 VIEWS)    Result Date: 3/16/2022  EXAMINATION: THREE XRAY VIEWS OF THE LEFT KNEE 3/16/2022 5:52 am COMPARISON: 04/25/2019 HISTORY: ORDERING SYSTEM PROVIDED HISTORY: L knee swelling, popped when he fell TECHNOLOGIST PROVIDED HISTORY: Reason for exam:->L knee swelling, popped when he fell Reason for Exam: LT KNEE SWELLING FROM FALL FINDINGS: No acute fracture or dislocation at the left knee.   Narrowing of the joint spaces with articular sclerosis, marginal osteophytes, and subchondral cystic changes are redemonstrated. There is no significant joint effusion. Small ossifications at the suprapatellar region were also present on the previous exam. There are no radiopaque foreign bodies around the knee. No acute fracture or dislocation. Moderate to severe tricompartmental osteoarthritis is again noted and may have small loose bodies in the suprapatellar region. XR CHEST PORTABLE    Result Date: 3/16/2022  EXAMINATION: ONE XRAY VIEW OF THE CHEST 3/16/2022 5:03 am COMPARISON: Chest two views March 8, 2022 HISTORY: ORDERING SYSTEM PROVIDED HISTORY: shortness of breath TECHNOLOGIST PROVIDED HISTORY: Reason for exam:->shortness of breath Reason for Exam: SOB FINDINGS: Left-sided cardiac ICD is stable in position. The heart is mildly to moderately enlarged with otherwise unremarkable configuration. The mediastinal contours are within normal limits. The lungs are well aerated. Chronic mild blunting of left costophrenic angle is seen. . Bones and soft tissues are unremarkable. 1. Mild-to-moderate cardiomegaly. 2. Chronic mild blunting of left costophrenic angle suggesting pleural thickening/scarring. 3. No acute cardiopulmonary abnormality. CBC:   Recent Labs     03/16/22 0457   WBC 9.9   HGB 10.7*        BMP:    Recent Labs     03/16/22 0457   *   K 4.0   CL 95*   CO2 24   BUN 27*   CREATININE 2.3*   GLUCOSE 143*     Hepatic:   Recent Labs     03/16/22 0457   AST 14*   ALT 18   BILITOT 0.4   ALKPHOS 49     Lipids:   Lab Results   Component Value Date    CHOL 194 04/20/2021    HDL 42 04/20/2021    TRIG 136 04/20/2021     Hemoglobin A1C:   Lab Results   Component Value Date    LABA1C 12.1 10/28/2021     TSH: No results found for: TSH  Troponin:   Lab Results   Component Value Date    TROPONINT 0.136 03/16/2022    TROPONINT <0.010 02/22/2022    TROPONINT 0.019 02/20/2022     Lactic Acid: No results for input(s): LACTA in the last 72 hours.   BNP:   Recent Labs     03/16/22  0531   PROBNP 0,201*     UA:  Lab Results   Component Value Date    NITRU NEGATIVE 11/19/2020    COLORU YELLOW 11/19/2020    WBCUA <1 11/19/2020    RBCUA 1 11/19/2020    MUCUS RARE 11/19/2020    TRICHOMONAS NONE SEEN 11/19/2020    YEAST RARE 10/16/2017    BACTERIA NEGATIVE 11/19/2020    CLARITYU CLEAR 11/19/2020    SPECGRAV 1.011 11/19/2020    LEUKOCYTESUR NEGATIVE 11/19/2020    UROBILINOGEN NORMAL 11/19/2020    BILIRUBINUR NEGATIVE 11/19/2020    BLOODU NEGATIVE 11/19/2020    KETUA NEGATIVE 11/19/2020     Urine Cultures: No results found for: Reda Stearns  Blood Cultures: No results found for: BC  No results found for: BLOODCULT2  Organism: No results found for: ORG  Imaging/Diagnostics Last 24 Hours   Echocardiogram limited    Result Date: 3/10/2022  Transthoracic Echocardiography Report (TTE)  Demographics   Patient Name       Jak Dominguez  Date of Study       03/10/2022   Date of Birth      1955         Gender              Male   Age                74 year(s)         Race                   Patient Number     [id number]         Room Number         3120   Visit Number       [id number]   Corporate ID       [id number]   Accession Number   [id number]         [address] [place] [address] Estephanie Bolton, HERON   Ordering Physician Kourtney Trinidad            Physician           Louisa Steele MD  Procedure Type of Study   TTE procedure:ECHOCARDIOGRAM LIMITED. Procedure Date Date: 03/10/2022 Start: 11:37 AM Study Location: Portable Indications:S/P Ablation. Patient Status: Routine Height: 67 inches Weight: 240 pounds BSA: 2.18 m2 BMI: 37.59 kg/m2 HR: 60 bpm BP: 114/67 mmHg  Conclusions   Summary  This is a limited echocardiogram.  Left ventricular systolic function is abnormal.  Ejection fraction is visually estimated at 25%. No evidence of any pericardial effusion.    Signature ------------------------------------------------------------------  Electronically signed by New Reddy MD  (Interpreting physician) on 03/10/2022 at 04:17 PM  ------------------------------------------------------------------   Findings   Left Ventricle  Left ventricular systolic function is abnormal.  Ejection fraction is visually estimated at 25%. Pericardial Effusion  No evidence of any pericardial effusion. XR KNEE LEFT (3 VIEWS)    Result Date: 3/16/2022  EXAMINATION: THREE XRAY VIEWS OF THE LEFT KNEE 3/16/2022 5:52 am COMPARISON: 04/25/2019 HISTORY: ORDERING SYSTEM PROVIDED HISTORY: L knee swelling, popped when he fell TECHNOLOGIST PROVIDED HISTORY: Reason for exam:->L knee swelling, popped when he fell Reason for Exam: LT KNEE SWELLING FROM FALL FINDINGS: No acute fracture or dislocation at the left knee. Narrowing of the joint spaces with articular sclerosis, marginal osteophytes, and subchondral cystic changes are redemonstrated. There is no significant joint effusion. Small ossifications at the suprapatellar region were also present on the previous exam. There are no radiopaque foreign bodies around the knee. No acute fracture or dislocation. Moderate to severe tricompartmental osteoarthritis is again noted and may have small loose bodies in the suprapatellar region. XR CHEST PORTABLE    Result Date: 3/16/2022  EXAMINATION: ONE XRAY VIEW OF THE CHEST 3/16/2022 5:03 am COMPARISON: Chest two views March 8, 2022 HISTORY: ORDERING SYSTEM PROVIDED HISTORY: shortness of breath TECHNOLOGIST PROVIDED HISTORY: Reason for exam:->shortness of breath Reason for Exam: SOB FINDINGS: Left-sided cardiac ICD is stable in position. The heart is mildly to moderately enlarged with otherwise unremarkable configuration. The mediastinal contours are within normal limits. The lungs are well aerated. Chronic mild blunting of left costophrenic angle is seen. . Bones and soft tissues are unremarkable. 1. Mild-to-moderate cardiomegaly. 2. Chronic mild blunting of left costophrenic angle suggesting pleural thickening/scarring. 3. No acute cardiopulmonary abnormality.        Personally reviewed Lab Studies, Imaging, and discussed case with Dr. Ger Burrows PA-C  Hospitalist  3/16/2022, 7:48 AM

## 2022-03-17 NOTE — FLOWSHEET NOTE
Transferred back to room [phone]. Right radial procedure site free of bleeding, oozing, and hematoma. No distress noted upon transfer. Becky Olson RN at bedside on 3 East to check procedure site and to receive handoff report.

## 2022-03-17 NOTE — PROGRESS NOTES
Dr. Brice All at bedside. Family updated. Order received to consult Pulmonary. Dr. Jelena Astudillo called. MD informed RN he no longer takes inpatient consults. Please direct consult to Dr. Yue Beth. Dr. Yue Beth sent consult via Perfect serve.

## 2022-03-17 NOTE — PROCEDURES
[address] [place] [address], [address] [place] [address]                            CARDIAC CATHETERIZATION    PATIENT NAME: Vannessa Carmichael                  :        1955  MED REC NO:   [id number]                          ROOM:  ACCOUNT NO:   [de-identified]                           ADMIT DATE: 2022  PROVIDER:     Domenic Vega MD    DATE OF PROCEDURE:  2022    INDICATION FOR PROCEDURE:  Heart failure, coronary artery disease,  atrial fibrillation, chest pain. This is a 68-year-old male patient brought to cath lab today. Informed  consent was obtained from the patient. The patient was prepped and  draped in usual sterile fashion. The patient was injected with 5 mL of  2% lidocaine in the right radial artery region. Using a radial needle,  the right radial artery was cannulized, and a 5/6-Telugu sheath was  placed in the right radial artery. The entire procedure was done using  a guidewire. The sheath was flushed in between procedures. Using a pigtail catheter, EDP was measured. EDP was around 24 mmHg  present. On the pullback, there was no gradient present across the  aortic valve. Using a TIG catheter, left coronary angiography was performed. Left  coronary angiogram revealed the left main is patent. It trifurcates  into LAD, circ, and ramus branch. Circ is a medium-sized vessel. It  gives off a medium-sized OM branch where mild disease is noted. Ramus  is a small-sized vessel, it is patent. LAD is a medium-sized vessel, it  reaches and wraps the apex. LAD has a mid-stent present. Right at the  diagonal branch, the stent is widely patent. AYLA-3 flow is noted. The  diagonal branch is widely patent. AYLA-3 flow is present. Using a TIG catheter, right coronary angiography was performed. Right  coronary angiogram revealed the right artery is a large-sized vessel, it  is a dominant vessel.   The right coronary artery, the stents in the mid  segment and distals are widely patent. AYLA-3 flow is present. IMPRESSIONS:  1. Left main is patent. 2.  LAD mid-stent is widely patent. 3.  Circ has mild disease noted. It is patent. 4.  Ramus is patent. 5.  Right radial artery, the mid and the distal stents are widely  patent, and AYLA-3 flow is present. EDP was around 24 mmHg present. The patient has patent coronary arteries present. EDP is elevated at 24  mmHg present. Continue medical treatment.     Blood loss Anna Pal MD    D: 03/17/2022 11:21:54       T: 03/17/2022 11:24:16     [email or web address]  Job#: [id number]     Doc#: [id number]    CC:

## 2022-03-17 NOTE — PROGRESS NOTES
Cannot test patient for overnight oxygen, due to patient having a diagnosis of [place].   Will wait to do the first two steps of home O2 tomorrow due to patient not being able to ambulate at this time due to heart cath

## 2022-03-17 NOTE — PLAN OF CARE
Problem: Falls - Risk of:  Goal: Will remain free from falls  Description: Will remain free from falls  3/16/2022 2309 by Elvira Caro RN  Outcome: Met This Shift  3/16/2022 1340 by Lili Perez LPN  Outcome: Ongoing  Goal: Absence of physical injury  Description: Absence of physical injury  3/16/2022 2309 by Elvira Caro RN  Outcome: Met This Shift  3/16/2022 1340 by Lili Perez LPN  Outcome: Ongoing     Problem: Discharge Planning:  Goal: Participates in care planning  Description: Participates in care planning  Outcome: Met This Shift  Goal: Discharged to appropriate level of care  Description: Discharged to appropriate level of care  Outcome: Met This Shift     Problem: Activity Intolerance:  Goal: Ability to tolerate increased activity will improve  Description: Ability to tolerate increased activity will improve  Outcome: Met This Shift     Problem: Anxiety/Stress:  Goal: Level of anxiety will decrease  Description: Level of anxiety will decrease  Outcome: Met This Shift     Problem:  Bowel Function - Altered:  Goal: Bowel elimination is within specified parameters  Description: Bowel elimination is within specified parameters  Outcome: Met This Shift     Problem: Fluid Volume - Deficit:  Goal: Absence of fluid volume deficit signs and symptoms  Description: Absence of fluid volume deficit signs and symptoms  Outcome: Met This Shift  Goal: Electrolytes within specified parameters  Description: Electrolytes within specified parameters  Outcome: Met This Shift     Problem: Nutrition Deficit:  Goal: Ability to achieve adequate nutritional intake will improve  Description: Ability to achieve adequate nutritional intake will improve  Outcome: Met This Shift     Problem: Pain:  Goal: Pain level will decrease  Description: Pain level will decrease  Outcome: Met This Shift  Goal: Ability to notify healthcare provider of pain before it becomes unmanageable or unbearable will improve  Description: Ability to notify healthcare provider of pain before it becomes unmanageable or unbearable will improve  Outcome: Met This Shift  Goal: Control of acute pain  Description: Control of acute pain  Outcome: Met This Shift  Goal: Control of chronic pain  Description: Control of chronic pain  Outcome: Met This Shift     Problem: Sleep Pattern Disturbance:  Goal: Appears well-rested  Description: Appears well-rested  Outcome: Met This Shift

## 2022-03-17 NOTE — PROCEDURES
[address], [facility]                                 ECHOCARDIOGRAM    PATIENT NAME: Malini Gallo                  :        1955  MED REC NO:   [id number]                          ROOM:  ACCOUNT NO:   [de-identified]                           ADMIT DATE: 2022  PROVIDER:     Sofia Merino MD    CARDIOVERSION    This is a 26-year-old male patient who underwent an AFib ablation. The  patient is back in atrial flutter/fib. An attempt was made to pace him  out. The patient remained tachycardic. Therefore, cardioversion was  performed. The cardioversion was performed post cath in the cath lab. The patient  received 3 mg of Versed and 60 mcg of fentanyl in incremental doses. The patient was cardioverted successfully from atrial fib to sinus  rhythm with PACs with one shock of 2 joules. IMPRESSION:  Successful cardioversion. Continue anticoagulation. The patient had been on anticoagulation. Continue beta blockers. I think the patient's underlying lung issues a  very important and I think that needs to be addressed and I think he  needs oxygen because I believe his underlying lung issues are also  contributing to his dyspnea. No complication noted.         Nestor Reyes MD    D: 2022 11:27:10       T: 2022 11:29:20     [email or web address]  Job#: [id number]     Doc#: [id number]    CC:

## 2022-03-17 NOTE — CONSULTS
[address], [facility]                                  CONSULTATION    PATIENT NAME: Dallas Zamudio                  :        1955  MED REC NO:   [id number]                          ROOM:       9567  ACCOUNT NO:   [de-identified]                           ADMIT DATE: 2022  PROVIDER:     Barry Salazar MD    CONSULT DATE:  2022    INDICATION:  Heart failure. HISTORY OF PRESENT ILLNESS:  This is a 67-year-old male patient with a  very complex history. I have taken care from last year. The patient  has seen Dr. Yadira England and the patient has seen Dr. Lata Che and I spoke to him  at length. The patient wants me to take care of him at this time , then  he will be able to decide whom he wants to follow up . The patient is  just saying that he just wants to get better and he is not getting any  better. The patient has a history of coronary artery disease. I did an initial  heart cath on him back in 2021. At that time the left main was  patent, LAD had 90% stenosis and ramus had mild disease. RCA had 90%  stenosis. Because of the high risk and heart failure, the patient was  sent to [facility] with Dr. Cherylene Pall. At that time, it was 21%  and the patient underwent angioplasty at [facility].  He had  undergone angioplasty of the LAD and RCA. He had angioplasty done in  2021 of the right coronary artery, 4.5 x 16 mm Synergy stent was  placed. Then he had a repeat angioplasty done in 2021 of the LAD and  then he said he had felt a little bit better, then after that he has not  felt any better. The patient has a very complex history, history of coronary artery  disease with angioplasty done of the LAD and RCA last year in ;  history of heart failure, EF is less than 35%, underwent an ICD  placement. He has a Medtronic dual-chamber ICD present.   History of  renal insufficiency, follows with Dr. Belen Cruz. He has sleep apnea but  does not want to use CPAP nor oxygen and history of hypertension,  hyperlipidemia present. History of atrial fibrillation, he is status  post AFib ablation recently done. He was also cardioverted in 02/2022  for AFib also. He also has GERD, COPD and he has testicle cancer. PAST SURGICAL HISTORY:  AAA repair and had endovascular repair. He had  an angioplasty of the LAD and RCA at [facility] last year in  05/2021 and 06/2021 and AFib ablation recently; ICD placed, Medtronic  device and cardioverted for AFib in 02/2022. SOCIAL HISTORY:  He does not smoke or drink. ALLERGIES:  NKDA. PRESENT MEDICATIONS:  He has been titrated up and he is on amiodarone  200 mg a day, Demadex 20 mg once a day, Toprol 100 mg b.i.d., _____  Eliquis 5 b.i.d. and inhalers, Lantus Primatene. PHYSICAL EXAMINATION:  GENERAL:  The patient is awake, alert, and answering questions. VITAL SIGNS:  Temperature afebrile, pulse is in 80 to 110, blood  pressure *------*. HEENT:  Head is normocephalic. Pupils are equal and reactive to light. CHEST:  Equal expansion.:  Decreased breath sounds present. HEART:  Irregularly irregular. ABDOMEN:  Soft, nontender, and bowel sounds present. No  hepatosplenomegaly or guarding present. EXTREMITIES:  No cyanosis or clubbing noted. NEUROLOGIC:  Cranial nerves grossly intact. LABORATORY AND DIAGNOSTIC DATA:  Creatinine is 2.3, this is his  baseline. Troponins are elevated. BNP is 9400. LFTs are normal.  TSH  is elevated. T4 normal.  CBC normal.    IMPRESSION:  A 78-year-old male patient who has a very complex history  present; history of coronary disease; history of heart failure and ICD  placement; AFib and he comes in with having shortness of breath again  and he is back in AFib. PLAN:  1. Discussed with Dr. Grisel Travis. The plan is for him to please terminate  him from atrial flutter. Appears on the monitoring.   If that does not  work, we will consider cardioversion. 2.  I think he may need a repeat heart cath also to see if there is any  ischemic issues causing him to have recurrent heart failure symptoms. I  think, however we will discuss this further with the patient and then  may get Renal also involved at that time. At this time rate control,  anticoagulate and we will make further recommendations.         Ghazal Harris MD    D: 03/16/2022 15:18:55       T: 03/16/2022 15:22:55     [email or web address]  Job#: [id number]     Doc#: [id number]    CC:

## 2022-03-17 NOTE — PROGRESS NOTES
Hospitalist Progress Note      Name:  Fidelina Apodaca /Age/Sex: 1955  (74 y.o. male)   MRN & CSN:  [id number] & [id number] Admission Date/Time: 3/16/2022  5:05 AM   Location:  [id number]- PCP: [address] Day: 2    Assessment and Plan:     Fidelina Apodaca is a 74 y.o. male with pmh of CKD, obesity, HLD, COPD, CAD, HFrEF, atrial fibrillation who presents to the ER for evaluation of dyspnea that began several days ago that worsens with ambulation. He has chest pain with dyspnea that radiates into right arm and resolves on its own. he has been admitted 8 times in the last year for cardiac reasons with most recent admission on 3/10/22 where he had cardioversion by Dr. Osito Hyatt. Patient was found to have elevated troponin and was in afib RVR in the ER. Chest pain with elevated troponin, ACS ruled out  HFrEF   Atrial fibrillation with RVR, chronic  CAD  Valvular heart disease  S/p AICD placement. Seen by cardiology  S/p successful cardioversion. S/p cardiac cath today; no obstructive lesion noted  Continue Eliquis  Continue Toprol and amiodarone  CXR with stable cardiomegaly. Cont Tele monitoring  Nitroglycerin sublingual PRN  Last echo on 3/10/22 with EF 45%, systolic function abnormal  BNP chronically elevated  3/11: s/p cardiac ablation by Dr. Osito Hyatt   s/p PCI with stents in   continue torsemide. Not on ACE due to CKD  continue plavix/ASA    EDMAR on CKD-II  Creatinine 2.3; baseline 1.5  Likely cardiorenal  Continue diuretics for now  avoid nephrotoxins     COPD  Without exacerbation  continue bronchodilators  Pulmonary evaluation    Obesity BMI 37  [place]   CPAP at night    Diet ADULT DIET;  Regular   DVT Prophylaxis [] Lovenox, []  Heparin, [] SCDs, [] Ambulation DOAC   GI Prophylaxis [x] PPI,  [] H2 Blocker,  [] Carafate,  [] Diet/Tube Feeds   Code Status Full Code   Disposition Patient requires continued admission due to CHF exacerbation and EDMAR  The anticipated discharge is in greater than 24 hours. MDM [] Low, [] Moderate,[x]  High       SUBJECTIVE:  Patient seen and examined at bedside. Alert and oriented x3. comfortable. No acute distress. complaining of fatigue. Denies shortness of breath, chest pain, palpitations, fever, nausea, vomiting, diarrhea, headache or urinary symptoms. Saturating on 1 L nasal cannula. Ten point ROS reviewed negative, unless as noted above    Objective: Intake/Output Summary (Last 24 hours) at 3/17/2022 1449  Last data filed at 3/17/2022 0701  Gross per 24 hour   Intake --   Output 930 ml   Net -930 ml      Vitals:   Vitals:    03/17/22 1230   BP: 97/70   Pulse: 77   Resp: 21   Temp:    SpO2: 97%     Physical Exam:   GEN Awake male, sitting upright in bed in no apparent distress. Appears given age. EYES Pupils are equally round. No scleral erythema, discharge, or conjunctivitis. HENT Mucous membranes are moist. Oral pharynx without exudates, no evidence of thrush. NECK Supple, no apparent thyromegaly or masses. RESP Clear to auscultation, no wheezes, rales or rhonchi. Symmetric chest movement while on room air. CARDIO/VASC S1/S2 auscultated. Regular rate without appreciable murmurs, rubs, or gallops. No JVD or carotid bruits. Peripheral pulses equal bilaterally and palpable. No peripheral edema. GI Abdomen is soft without significant tenderness, masses, or guarding. Bowel sounds are normoactive. Rectal exam deferred.  No costovertebral angle tenderness. Normal appearing external genitalia. Hart catheter is not present. HEME/LYMPH No palpable cervical lymphadenopathy and no hepatosplenomegaly. No petechiae or ecchymoses. MSK No gross joint deformities. SKIN Normal coloration, warm, dry. NEURO Cranial nerves appear grossly intact, normal speech, no lateralizing weakness. PSYCH Awake, alert, oriented x 4. Affect appropriate.     Medications:   Medications:    apixaban  5 mg Oral BID    torsemide  20 mg Oral Daily  sodium chloride flush  5-40 mL IntraVENous 2 times per day    albuterol sulfate HFA  2 puff Inhalation BID    amiodarone  200 mg Oral Daily    atorvastatin  40 mg Oral Nightly    budesonide-formoterol  2 puff Inhalation BID    clopidogrel  75 mg Oral Daily    fluticasone  1 spray Each Nostril Daily    insulin glargine  35 Units SubCUTAneous Nightly    metoprolol succinate  100 mg Oral BID    aspirin  81 mg Oral Daily    insulin lispro  0-12 Units SubCUTAneous TID WC    insulin lispro  0-6 Units SubCUTAneous Nightly    pantoprazole  40 mg Oral QAM AC    acetylcysteine  1,200 mg Oral BID      Infusions:    sodium chloride      dextrose       PRN Meds: sodium chloride flush, 5-40 mL, PRN  sodium chloride, 25 mL, PRN  acetaminophen, 650 mg, Q4H PRN  atropine, 0.5 mg, Once PRN  morphine, 1 mg, Once PRN  ondansetron, 4 mg, Q30 Min PRN  nitroGLYCERIN, 0.4 mg, Q5 Min PRN  ondansetron, 4 mg, Q6H PRN  acetaminophen, 650 mg, Q6H PRN  magnesium hydroxide, 30 mL, Daily PRN  glucose, 15 g, PRN  glucagon (rDNA), 1 mg, PRN  dextrose, 100 mL/hr, PRN  dextrose bolus (hypoglycemia), 125 mL, PRN   Or  dextrose bolus (hypoglycemia), 250 mL, PRN        Electronically signed by Shana Cranker, MD on 3/17/2022 at 2:49 PM

## 2022-03-17 NOTE — H&P
No change in H/p  Proceed with cardioversion  ASA 3  Mal 3    Successful cardioversion 200J one shock  [id number]

## 2022-03-17 NOTE — PLAN OF CARE
Attempted to remove air from TR band and bleed. Air placed back in and will wait to reassess.  [facility], [address] [place] [address] 3/17/2022

## 2022-03-17 NOTE — PROGRESS NOTES
Daily Progress Note     Awake, alert, NAD  Afib on tele, HR in the 110's to 120's   BP stable  Denies CP at this time   [facility] today     CATH MILD CAD  CV TO SINUS FROM AFIB  NEED PULMONARY EVALUATION  KEEP ON CURRENT MED AND AC     Chest pain     Hx CAD s/p PCI and  valvular heart disease     Continue ASA, statin, plavix, BB     Troponin elevated    EKG showed afib with RVR     [facility] today - consent signed and on soft chart       Afib RVR    Recently admitted for cardiac ablation     S/p cardioversion in 2/2022    Currently afib on tele, HR in the 110's to 120's     Continue amiodarone, metoprolol     Eliquis on hold for cath    HFrEF    Last echo showed EF 07%, systolic function abnormal     Currently holding torsemide    CKD    Nephrology following     Avoid nephrotoxic medications      Echo 3/10/2022  Summary   This is a limited echocardiogram.   Left ventricular systolic function is abnormal.   Ejection fraction is visually estimated at 25%. No evidence of any pericardial effusion. The patient has a very complex history, history of coronary artery  disease with angioplasty done of the LAD and RCA last year in 2021;  history of heart failure, EF is less than 35%, underwent an ICD  placement. He has a Medtronic dual-chamber ICD present. History of  renal insufficiency, follows with Dr. Vidya Lee. He has sleep apnea but  does not want to use CPAP nor oxygen and history of hypertension,  hyperlipidemia present. History of atrial fibrillation, he is status  post AFib ablation recently done. He was also cardioverted in 02/2022  for AFib also.     He also has GERD, COPD and he has testicle cancer.     PAST SURGICAL HISTORY:  AAA repair and had endovascular repair.   He had  an angioplasty of the LAD and RCA at [facility] last year in  05/2021 and 06/2021 and AFib ablation recently; ICD placed, Medtronic  device and cardioverted for AFib in 02/2022.     SOCIAL HISTORY:  He does not smoke or drink.     ALLERGIES:  NKDA.     PRESENT MEDICATIONS:  He has been titrated up and he is on amiodarone  200 mg a day, Demadex 20 mg once a day, Toprol 100 mg b.i.d., _____  Eliquis 5 b.i.d. and inhalers, Lantus Primatene. Objective:   BP (!) 113/94   Pulse 119   Temp 98.6 °F (37 °C) (Oral)   Resp 18   Ht 5' 7\" (1.702 m)   Wt 239 lb (108.4 kg)   SpO2 92%   BMI 37.43 kg/m²     Intake/Output Summary (Last 24 hours) at 3/17/2022 0947  Last data filed at 3/17/2022 0701  Gross per 24 hour   Intake --   Output 930 ml   Net -930 ml       Medications:   Scheduled Meds:   albuterol sulfate HFA  2 puff Inhalation BID    amiodarone  200 mg Oral Daily    [Held by provider] apixaban  5 mg Oral BID    atorvastatin  40 mg Oral Nightly    budesonide-formoterol  2 puff Inhalation BID    clopidogrel  75 mg Oral Daily    fluticasone  1 spray Each Nostril Daily    insulin glargine  35 Units SubCUTAneous Nightly    metoprolol succinate  100 mg Oral BID    [Held by provider] torsemide  20 mg Oral Daily    sodium chloride flush  10 mL IntraVENous 2 times per day    aspirin  81 mg Oral Daily    insulin lispro  0-12 Units SubCUTAneous TID WC    insulin lispro  0-6 Units SubCUTAneous Nightly    pantoprazole  40 mg Oral QAM AC    acetylcysteine  1,200 mg Oral BID      Infusions:   sodium chloride      dextrose      sodium chloride 50 mL/hr at 03/16/22 2021      PRN Meds:  ondansetron, nitroGLYCERIN, sodium chloride flush, sodium chloride, promethazine **OR** ondansetron, acetaminophen **OR** acetaminophen, magnesium hydroxide, glucose, glucagon (rDNA), dextrose, dextrose bolus (hypoglycemia) **OR** dextrose bolus (hypoglycemia), oxyCODONE-acetaminophen       Physical Exam:  Vitals:    03/17/22 0846   BP:    Pulse:    Resp: 18   Temp:    SpO2:         General: A&Ox4, NAD  Chest: Nontender  Cardiac: s1 and s2 auscultated, no murmurs or gallops   Lungs:Clear to auscultation and percussion.   Abdomen: Soft, NT, ND, +BS  Extremities: no cyanosis or edema   Vascular:  Equal 2+ peripheral pulses. Lab Data:  CBC:   Recent Labs     03/16/22 0457 03/17/22  0631   WBC 9.9 9.5   HGB 10.7* 10.0*   HCT 32.8* 31.1*   MCV 91.1 91.2    209     BMP:   Recent Labs     03/16/22  0457 03/17/22  0631   * 137   K 4.0 4.1   CL 95* 99   CO2 24 25   BUN 27* 28*   CREATININE 2.3* 2.3*     LIVER PROFILE:   Recent Labs     03/16/22 0457 03/17/22  0631   AST 14* 13*   ALT 18 19   BILITOT 0.4 0.4   ALKPHOS 49 51     PT/INR:   Recent Labs     03/16/22  1706   PROTIME 23.4*   INR 1.80     APTT:   Recent Labs     03/16/22 1706   APTT 48.4*     BNP:  No results for input(s): BNP in the last 72 hours.       Assessment:  Patient Active Problem List    Diagnosis Date Noted    NSTEMI (non-ST elevated myocardial infarction) ([address] 03/16/2022    Status post catheter ablation of atrial fibrillation 03/10/2022    EDMAR (acute kidney injury) ([address]     Hyperkalemia     Metabolic acidosis     Unstable angina ([address] 02/19/2022    Ischemic cardiomyopathy 01/05/2022    Dyslipidemia 01/05/2022    PAF (paroxysmal atrial fibrillation) ([address] 12/02/2021    S/P ICD (internal cardiac defibrillator) procedure     Hypotension, unspecified 10/28/2021    Globus sensation 10/08/2021    Neck swelling 10/08/2021    COPD exacerbation ([address] 10/08/2021    Generalized abdominal pain     Splenic lesion     Abdominal distention 07/29/2021    Chronic congestive heart failure ([address] 07/04/2021    Acute on chronic combined systolic and diastolic congestive heart failure, NYHA class 3 ([address] 07/04/2021    Precordial pain 07/02/2021    Abnormal stress test 05/06/2021    Coronary artery disease involving native coronary artery of native heart without angina pectoris 05/06/2021    Hypertensive renal disease 02/11/2021    Obstructive sleep apnea 02/03/2021    COPD, moderate ([facility] 11/30/2020    Pulmonary emphysema determined by X-ray ([facility] 11/30/2020    Tobacco abuse 11/30/2020    Excessive daytime sleepiness 11/30/2020    Stage 3 chronic kidney disease ([facility] 11/20/2020    Dyspnea 11/20/2020    Chronic kidney disease-mineral and bone disorder 11/20/2020    Essential hypertension 11/20/2020    Hyponatremia 11/20/2020       Electronically signed by MICHAEL Kamara - CNP on 3/17/2022 at 9:47 AM     Electronically signed by Leisa Arredondo MD on 3/17/22 at 11:36 AM EDT

## 2022-03-17 NOTE — OP NOTE
Operative Note      Patient: Lindy Suarez  YOB: 1955  MRN: [id number]    Date of Procedure:  3/17/22  Pre-Op Diagnosis: CAD/AFIB/CHF    Post-Op Diagnosis: Same       Estimated Blood Loss (mL): Minimal    Complications: None      Electronically signed by Barry Salazar MD on 3/17/2022 at 11:19 AM     [facility] [phone]  LEFT MAIN PATENT  LAD MID STENT PATENT  LCX MILD DX  RAMUS MILD DX  RCA MID AND DISTAL STENT PATENT  LVEDP 24    Medical treatment   RIGHT RADIAL  30CC OF CONTRAST  NO COMPLICATIONS    Electronically signed by Barry Salazar MD on 3/17/22 at 11:22 AM EDT

## 2022-03-17 NOTE — CONSULTS
Pulmonary Consult Note      Reason for Consult:  eval for O2  Requesting Physician:   Chris Cruz MD  Subjective:   CHIEF COMPLAINT :SOB    Patient Active Problem List    Diagnosis Date Noted    NSTEMI (non-ST elevated myocardial infarction) ([address] 03/16/2022    Status post catheter ablation of atrial fibrillation 03/10/2022    EDMAR (acute kidney injury) ([address]     Hyperkalemia     Metabolic acidosis     Unstable angina ([address] 02/19/2022    Ischemic cardiomyopathy 01/05/2022    Dyslipidemia 01/05/2022    PAF (paroxysmal atrial fibrillation) ([address] 12/02/2021    S/P ICD (internal cardiac defibrillator) procedure     Hypotension, unspecified 10/28/2021    Globus sensation 10/08/2021    Neck swelling 10/08/2021    COPD exacerbation ([address] 10/08/2021    Generalized abdominal pain     Splenic lesion     Abdominal distention 07/29/2021    Chronic congestive heart failure ([address] 07/04/2021    Acute on chronic combined systolic and diastolic congestive heart failure, NYHA class 3 ([address] 07/04/2021    Precordial pain 07/02/2021    Abnormal stress test 05/06/2021    Coronary artery disease involving native coronary artery of native heart without angina pectoris 05/06/2021    Hypertensive renal disease 02/11/2021    Obstructive sleep apnea 02/03/2021    COPD, moderate ([address] 11/30/2020    Pulmonary emphysema determined by X-ray ([address] 11/30/2020    Tobacco abuse 11/30/2020    Excessive daytime sleepiness 11/30/2020    Stage 3 chronic kidney disease ([address] 11/20/2020    Dyspnea 11/20/2020    Chronic kidney disease-mineral and bone disorder 11/20/2020    Essential hypertension 11/20/2020    Hyponatremia 11/20/2020        HPI:                The patient is a 74 y.o. male with significant past medical history of CKD, obesity, HLD, COPD, CAD, HFrEF, atrial fibrillation  presents with complaints of SOB x 5 days associated with chest pain.  He has no fever,no cough, no n/v, no palpitations, no headache, no n/vno abd pain, no diarrhea, no dysuria. he was found carlyn be in  Afib. He has 60 pk yr smoking quit 3 years ago. He also has h/o [place] non compliant with the CPAP. At this time he is lying in the bed.  He is in mild resp distress    Past Medical History:      Diagnosis Date    Atrial fibrillation ([address]     Cancer ([address]     testicular    COPD, moderate ([address] 11/30/2020    Diabetes mellitus ([address]     Excessive daytime sleepiness 11/30/2020    GERD (gastroesophageal reflux disease)     Hypertension     Obstructive sleep apnea 2/3/2021    Pulmonary emphysema determined by X-ray ([facility] 11/30/2020    Testicular hypofunction     thus on testosterone    Tobacco abuse 11/30/2020      Past Surgical History:        Procedure Laterality Date    ABDOMINAL AORTIC ANEURYSM REPAIR, ENDOVASCULAR  11/30/2015    endologix aaa device  mri conditional  card scanned into pacs    ABLATION OF DYSRHYTHMIC FOCUS N/A 03/10/2022    TERRY, A-Fib Ablation    ARM SURGERY      CARDIAC DEFIBRILLATOR PLACEMENT Left 11/02/2021    [employer] MRI XT DR Pires ICD    HERNIA REPAIR      KNEE SURGERY      LEG SURGERY  10/2020    camncer removed    TOE SURGERY       Current Medications:     albuterol sulfate HFA  2 puff Inhalation BID    amiodarone  200 mg Oral Daily    apixaban  5 mg Oral BID    atorvastatin  40 mg Oral Nightly    budesonide-formoterol  2 puff Inhalation BID    clopidogrel  75 mg Oral Daily    fluticasone  1 spray Each Nostril Daily    insulin glargine  35 Units SubCUTAneous Nightly    metoprolol succinate  100 mg Oral BID    [Held by provider] torsemide  20 mg Oral Daily    sodium chloride flush  10 mL IntraVENous 2 times per day    aspirin  81 mg Oral Daily    insulin lispro  0-12 Units SubCUTAneous TID WC    insulin lispro  0-6 Units SubCUTAneous Nightly    pantoprazole  40 mg Oral QAM AC    acetylcysteine  1,200 mg Oral BID     Allergies:    Social History:    TOBACCO:   reports that he quit smoking about a year ago. His smoking use included cigarettes. He smoked 1.50 packs per day. He has never used smokeless tobacco.  ETOH:   reports no history of alcohol use. Patient currently lives independently    Family History:   History reviewed. No pertinent family history. REVIEW OF SYSTEMS:    CONSTITUTIONAL:  negative for fevers, chills, diaphoresis, activity change, appetite change, fatigue, night sweats and unexpected weight change. EYES:  negative for blurred vision, eye discharge, visual disturbance and icterus  HEENT:  negative for hearing loss, tinnitus, ear drainage, sinus pressure, nasal congestion, epistaxis and snoring  RESPIRATORY:  See HPI  CARDIOVASCULAR:  negative for chest pain, palpitations, exertional chest pressure/discomfort, edema, syncope  GASTROINTESTINAL:  negative for nausea, vomiting, diarrhea, constipation, blood in stool and abdominal pain  GENITOURINARY:  negative for frequency, dysuria, urinary incontinence, decreased urine volume, and hematuria  HEMATOLOGIC/LYMPHATIC:  negative for easy bruising, bleeding and lymphadenopathy  ALLERGIC/IMMUNOLOGIC:  negative for recurrent infections, angioedema, anaphylaxis and drug reactions  ENDOCRINE:  negative for weight changes and diabetic symptoms including polyuria, polydipsia and polyphagia  MUSCULOSKELETAL:  negative for  pain, joint swelling, decreased range of motion and muscle weakness  NEUROLOGICAL:  negative for headaches, slurred speech, unilateral weakness  PSYCHIATRIC/BEHAVIORAL: negative for hallucinations, behavioral problems, confusion and agitation.      Objective:   PHYSICAL EXAM:      VITALS:  BP 97/70   Pulse 77   Temp 98.6 °F (37 °C) (Oral)   Resp 21   Ht 5' 7\" (1.702 m)   Wt 239 lb (108.4 kg)   SpO2 97%   BMI 37.43 kg/m²   24HR INTAKE/OUTPUT:      Intake/Output Summary (Last 24 hours) at 3/17/2022 1314  Last data filed at 3/17/2022 0701  Gross per 24 hour   Intake --   Output 930 ml   Net -930 ml     CURRENT PULSE OXIMETRY:  SpO2: 97 %  24HR PULSE OXIMETRY RANGE:  SpO2  Av.7 %  Min: 92 %  Max: 98 %    CONSTITUTIONAL:  awake, alert, cooperative, no apparent distress, and appears stated age  NECK:  Supple, symmetrical, trachea midline, no adenopathy, thyroid symmetric, not enlarged and no tenderness, skin normal  LUNGS:  no increased work of breathing and clear to auscultation. No accessory muscle use  CARDIOVASCULAR:  normal S1 and S2, no edema and no JVD  ABDOMEN:  normal bowel sounds, non-distended and no masses palpated, and no tenderness to palpation. No hepatospleenomegaly  LYMPHADENOPATHY:  no axillary or supraclavicular adenopathy. No cervical adnenopathy  PSYCHIATRIC: Oriented to person place and time. No obvious depression or anxiety. MUSCULOSKELETAL: No obvious misalignment or effusion of the joints. No clubbing, cyanosis of the digits. SKIN:  normal skin color, texture, turgor and no redness, warmth, or swelling.  No palpable nodules    DATA:    Old records have been reviewed  CBC with Differential:    Lab Results   Component Value Date    WBC 9.5 2022    RBC 3.41 2022    HGB 10.0 2022    HCT 31.1 2022     2022    MCV 91.2 2022    MCH 29.3 2022    MCHC 32.2 2022    RDW 14.5 2022    SEGSPCT 73.9 2022    LYMPHOPCT 15.5 2022    MONOPCT 9.0 2022    BASOPCT 0.2 2022    MONOSABS 0.9 2022    LYMPHSABS 1.5 2022    EOSABS 0.1 2022    BASOSABS 0.0 2022    DIFFTYPE AUTOMATED DIFFERENTIAL 2022     BMP:    Lab Results   Component Value Date     2022    K 4.1 2022    CL 99 2022    CO2 25 2022    BUN 28 2022    CREATININE 2.3 2022    CALCIUM 8.4 2022    GFRAA 34 2022    LABGLOM 28 2022    GLUCOSE 91 2022     Hepatic Function Panel:    Lab Results   Component Value Date    ALKPHOS 51 2022    ALT 19 2022    AST 13 2022    PROT 5.9 03/17/2022    BILITOT 0.4 03/17/2022    BILIDIR 0.2 10/28/2021    IBILI 0.2 10/28/2021     ABG:    Lab Results   Component Value Date    AYA0LPB 27.4 03/10/2022    UEU4NTX 35.2 03/10/2022    PO2ART 249.3 03/10/2022       Cultures:   Pending      Radiology Review:   1. Mild-to-moderate cardiomegaly. 2. Chronic mild blunting of left costophrenic angle suggesting pleural   thickening/scarring. 3. No acute cardiopulmonary abnormality.                Assessment/Plan       Patient Active Problem List    Diagnosis Date Noted    NSTEMI (non-ST elevated myocardial infarction) ([address] 03/16/2022    Status post catheter ablation of atrial fibrillation 03/10/2022    EDMAR (acute kidney injury) ([address]     Hyperkalemia     Metabolic acidosis     Unstable angina ([address] 02/19/2022    Ischemic cardiomyopathy 01/05/2022    Dyslipidemia 01/05/2022    PAF (paroxysmal atrial fibrillation) ([address] 12/02/2021    S/P ICD (internal cardiac defibrillator) procedure     Hypotension, unspecified 10/28/2021    Globus sensation 10/08/2021    Neck swelling 10/08/2021    COPD exacerbation ([address] 10/08/2021    Generalized abdominal pain     Splenic lesion     Abdominal distention 07/29/2021    Chronic congestive heart failure ([address] 07/04/2021    Acute on chronic combined systolic and diastolic congestive heart failure, NYHA class 3 ([address] 07/04/2021    Precordial pain 07/02/2021    Abnormal stress test 05/06/2021    Coronary artery disease involving native coronary artery of native heart without angina pectoris 05/06/2021    Hypertensive renal disease 02/11/2021    Obstructive sleep apnea 02/03/2021    COPD, moderate ([address] 11/30/2020    Pulmonary emphysema determined by X-ray ([address] 11/30/2020    Tobacco abuse 11/30/2020    Excessive daytime sleepiness 11/30/2020    Stage 3 chronic kidney disease ([address] 11/20/2020    Dyspnea 11/20/2020    Chronic kidney disease-mineral and bone disorder 11/20/2020    Essential hypertension 11/20/2020    Hyponatremia 11/20/2020   Acute Hypoxic resp failure  Cig smoker  COPD  Obesity  ? [place]  EDMAR on CKD  Afib s/p Ablation  Systolic CHF EF 19%      1. CHF optimization  2. Rate control of afib  3. Keep sats > 92%  4. Inhalers  5. OP PFT  6. OP PSG  7. OP follow up  8. Home O2 eval  9. DVT and GI Prophylaxis  10. CKD per renal  11.  C/w present management      Electronically signed by Nathaly Gilbert MD on 3/17/2022 at 1:14 PM

## 2022-03-17 NOTE — CARE COORDINATION
Pt chart reviewed. Screened for discharge planning. Pt from home. Pt appears independent and has no DME. Pt has PCP and insurance. Pt discharge plan is home. CM will continue to follow for any needs.

## 2022-03-17 NOTE — PROGRESS NOTES
Risks and benefits discussed at length by Dr. Jon Salmon regarding heart cath and cardioversion with pt and pts daughter. All questions were answered and consent was given to proceed with procedure. No

## 2022-03-17 NOTE — PROGRESS NOTES
3/17/2022 1:23 PM  Patient Room #: [place] [id number] [facility]  Patient Name: Chaz Busch    (Step 1 Done by RN if possible otherwise call Pulmonary Diagnostics)  1. Place patient on room air at rest for at least 30 minutes. If patient falls below 88% before 30 minutes then you can record the level and stop. Record room air saturation level _97_ %. If patient is at 88% or below, they will qualify for home oxygen and you can stop. If level does not fall below 88%, fill in level above. If indicated continue to Step 2. Signature:Stacey Christina, RRT Date:03/17/2022\  (Step 2&3 Done by [facility])    2. Ambulate patient on room air until saturation falls below 89%. Record level of room air saturation with ambulation_91__ %. Next, place patient back on ___lpm oxygen and ambulate, record level __%. (Note:  this level must show improvement from room air level done with ambulation.)  If patients saturation on room air with ambulation is 88% or below AND patient shows improvement with oxygen during ambulation, they will qualify for home oxygen and you can stop. If patient does not drop below 89%, then patient should have an overnight oximetry trending on room air to see if level falls below 88%. Complete level in Step 3 below. 3. Room air overnight oximetry level 88 % for___  cumulative minutes. If patients room air oxygen level is < 89% for at least 5 cumulative minutes, patient will qualify for home oxygen and you can stop. (Attach Night Trending Report)    Complete order below: Diagnosis:CAD, COPD  Home oxygen at:  Length of Need: X Lifetime ?  3 Months     ___lpm or __%   via  [] nasal cannula  []mask  [] other: Conserving Device         []continuous []  with activity  []  Nocturnal   [] Portable Tanks []  Concentrator  [] Conserving Device        Therapist Signature: Magen Conway, ALPESH   Date:03/17/22  Physician Signature:  __Electronically Signed in EMR_    Date:___  Physician Printed NameOrdering User: Frandy Schneider MD  Provider ID: [id number]  NPI:  [id number]  [] Patient Qualifies      [x] Patient Does NOT qualify

## 2022-03-17 NOTE — CONSULTS
[address] [place] [address] [address] [place] [address]  Phone: [phone]  Office Hours: 8:30AM - 4:30PM  Monday - Friday     Nephrology Service Consultation    Patient:  Alvie Phoenix  MRN: [id number]  Consulting physician:  Blessing Perry MD  Reason for Consult:CKD4 s/p cath  History Obtained From:  patient, electronic medical record  PCP: MICHAEL Berrios - CNP    HISTORY OF PRESENT ILLNESS:   The patient is a 74 y.o. male who presents with chest pain and rapid HR  He underwent cardiac cath and cardioversion  Has CKD4 and baseline creat is 2.3  Making urine  See ext past history in the chart    Past Medical History:        Diagnosis Date    Atrial fibrillation ([address]     Cancer ([address]     testicular    COPD, moderate ([address] 11/30/2020    Diabetes mellitus ([address]     Excessive daytime sleepiness 11/30/2020    GERD (gastroesophageal reflux disease)     Hypertension     Obstructive sleep apnea 2/3/2021    Pulmonary emphysema determined by X-ray ([address] 11/30/2020    Testicular hypofunction     thus on testosterone    Tobacco abuse 11/30/2020       Past Surgical History:        Procedure Laterality Date    ABDOMINAL AORTIC ANEURYSM REPAIR, ENDOVASCULAR  11/30/2015    endologix aaa device  mri conditional  card scanned into pacs    ABLATION OF DYSRHYTHMIC FOCUS N/A 03/10/2022    TERRY, A-Fib Ablation    ARM SURGERY      CARDIAC DEFIBRILLATOR PLACEMENT Left 11/02/2021    [employer] Evera MRI XT DR Pires ICD    HERNIA REPAIR      KNEE SURGERY      LEG SURGERY  10/2020    camncer removed    TOE SURGERY         Medications:   Scheduled Meds:   apixaban  5 mg Oral BID    torsemide  20 mg Oral Daily    sodium chloride flush  5-40 mL IntraVENous 2 times per day    lidocaine  1 patch TransDERmal Daily    albuterol sulfate HFA  2 puff Inhalation BID    amiodarone  200 mg Oral Daily    atorvastatin  40 mg Oral Nightly    budesonide-formoterol  2 puff Inhalation BID    clopidogrel  75 mg Oral Daily    fluticasone  1 spray Each Nostril Daily    insulin glargine  35 Units SubCUTAneous Nightly    metoprolol succinate  100 mg Oral BID    aspirin  81 mg Oral Daily    insulin lispro  0-12 Units SubCUTAneous TID WC    insulin lispro  0-6 Units SubCUTAneous Nightly    pantoprazole  40 mg Oral QAM AC    acetylcysteine  1,200 mg Oral BID     Continuous Infusions:   sodium chloride      dextrose       PRN Meds:.sodium chloride flush, sodium chloride, acetaminophen, atropine, morphine, ondansetron, nitroGLYCERIN, [DISCONTINUED] promethazine **OR** ondansetron, [DISCONTINUED] acetaminophen **OR** acetaminophen, magnesium hydroxide, glucose, glucagon (rDNA), dextrose, dextrose bolus (hypoglycemia) **OR** dextrose bolus (hypoglycemia)    Allergies:  Patient has no known allergies. Social History:   TOBACCO:   reports that he quit smoking about a year ago. His smoking use included cigarettes. He smoked 1.50 packs per day. He has never used smokeless tobacco.  ETOH:   reports no history of alcohol use. OCCUPATION:      Family History:   History reviewed. No pertinent family history. REVIEW OF SYSTEMS:  Negative except for chest pain and rapid HR    Physical Exam:    Vitals: /89   Pulse 88   Temp 98.6 °F (37 °C) (Oral)   Resp 15   Ht 5' 7\" (1.702 m)   Wt 239 lb (108.4 kg)   SpO2 91%   BMI 37.43 kg/m²   General appearance: alert, appears stated age and cooperative  Skin: Skin color, texture, turgor normal. No rashes or lesions  HEENT: Head: Normal, normocephalic, atraumatic.   Neck: no adenopathy, no carotid bruit, no JVD, supple, symmetrical, trachea midline and thyroid not enlarged, symmetric, no tenderness/mass/nodules  Lungs: diminished breath sounds bibasilar  Heart: regularly irregular rhythm  Abdomen: soft, non-tender; bowel sounds normal; no masses,  no organomegaly  Extremities: extremities normal, atraumatic, no cyanosis or edema  Neurologic: Mental status: alertness: alert    CBC: Recent Labs     03/16/22  0457 03/17/22  0631   WBC 9.9 9.5   HGB 10.7* 10.0*    209     BMP:    Recent Labs     03/16/22  0457 03/17/22  0631   * 137   K 4.0 4.1   CL 95* 99   CO2 24 25   BUN 27* 28*   CREATININE 2.3* 2.3*   GLUCOSE 143* 91     Troponin: No results for input(s): TROPONINI in the last 72 hours. BNP: No results for input(s): BNP in the last 72 hours. Lipids: No results for input(s): CHOL, HDL in the last 72 hours.     Invalid input(s): LDLCALCU  ABGs:   Lab Results   Component Value Date    PO2ART 249.3 03/10/2022    WUD8ACQ 35.2 03/10/2022     -----------------------------------------------------------------      Assessment and Recommendations     Patient Active Problem List   Diagnosis Code    Stage 3 chronic kidney disease ([facility] N18.30    Dyspnea R06.00    Chronic kidney disease-mineral and bone disorder N18.9, E83.9, M89.9    Essential hypertension I10    Hyponatremia E87.1    COPD, moderate ([facility] J44.9    Pulmonary emphysema determined by X-ray ([facility] J43.9    Tobacco abuse Z72.0    Excessive daytime sleepiness G47.19    Obstructive sleep apnea G47.33    Hypertensive renal disease I12.9    Abnormal stress test R94.39    Coronary artery disease involving native coronary artery of native heart without angina pectoris I25.10    Precordial pain R07.2    Chronic congestive heart failure (HCC) I50.9    Acute on chronic combined systolic and diastolic congestive heart failure, NYHA class 3 (HCC) I50.43    Abdominal distention R14.0    Generalized abdominal pain R10.84    Splenic lesion D73.89    Globus sensation R19.8    Neck swelling R22.1    COPD exacerbation (HCC) J44.1    Hypotension, unspecified I95.9    S/P ICD (internal cardiac defibrillator) procedure Z95.810    PAF (paroxysmal atrial fibrillation) (HCC) I48.0    Ischemic cardiomyopathy I25.5    Dyslipidemia E78.5    Unstable angina (HCC) I20.0    EDMAR (acute kidney injury) ([facility] N17.9    Hyperkalemia H76.0    Metabolic acidosis I25.9    Status post catheter ablation of atrial fibrillation Z98.890    NSTEMI (non-ST elevated myocardial infarction) (HCC) I21.4   IMP  CKD4- s/p cath and cardioversion  Watch for renal decline injury from dye and cardiac instabilty  Hyponatremia resolved  Discussed with Dr Shanon Bassett decline in GFR after 48 hrs  Needs close OP follow up  Will check BMP in am  Will follow    Bruce Louise MD, MD

## 2022-03-18 NOTE — DISCHARGE SUMMARY
prescribing oxygen because the diagnosis and testing requires the patient to have oxygen in the home.  Conditions will improve or be benefited by oxygen use.  The patient is able to perform good mobility and therefore requires the use of a portable oxygen system for ambulation.      The patient expressed appropriate understanding of and agreement with the discharge recommendations, medications, and plan.      Consults this admission:  IP CONSULT TO HOSPITALIST  IP CONSULT TO CARDIOLOGY  IP CONSULT TO NEPHROLOGY  IP CONSULT TO PULMONOLOGY    Discharge Instruction:   Follow up appointments: Cardiology and pulmonology  Primary care physician: within 1 week    Diet:  cardiac diet   Activity: activity as tolerated  Disposition: Discharged to:   [x]Home, [][facility], []SNF, []Acute Rehab, []Hospice  Condition on discharge: Stable    Discharge Medications:        Medication List      START taking these medications    aspirin 81 MG chewable tablet  Take 1 tablet by mouth daily  Start taking on: March 19, 2022        CHANGE how you take these medications    metoprolol succinate 100 MG extended release tablet  Commonly known as: TOPROL XL  Take 1 tablet by mouth 3 times daily  What changed: when to take this        CONTINUE taking these medications    albuterol sulfate  (90 Base) MCG/ACT inhaler  Inhale 2 puffs into the lungs 2 times daily     amiodarone 200 MG tablet  Commonly known as: CORDARONE  Take 1 tablet by mouth daily     apixaban 2.5 MG Tabs tablet  Commonly known as: Eliquis  Take 2 tablets by mouth 2 times daily     atorvastatin 40 MG tablet  Commonly known as: LIPITOR     clopidogrel 75 MG tablet  Commonly known as: PLAVIX     dapagliflozin 10 MG tablet  Commonly known as: Farxiga  Take 1 tablet by mouth every morning     fluticasone 50 MCG/ACT nasal spray  Commonly known as: FLONASE     glucose monitoring kit  1 kit by Does not apply route daily     insulin glargine 100 UNIT/ML injection vial  Commonly known as: exam deferred.  No costovertebral angle tenderness. Normal appearing external genitalia. Hart catheter is not present. HEME/LYMPH No palpable cervical lymphadenopathy and no hepatosplenomegaly. No petechiae or ecchymoses. MSK No gross joint deformities. SKIN Normal coloration, warm, dry. NEURO Cranial nerves appear grossly intact, normal speech, no lateralizing weakness. PSYCH Awake, alert, oriented x 4. Affect appropriate.     BMP/CBC  Recent Labs     03/16/22  0457 03/17/22  0631   * 137   K 4.0 4.1   CL 95* 99   CO2 24 25   BUN 27* 28*   CREATININE 2.3* 2.3*   WBC 9.9 9.5   HCT 32.8* 31.1*    209       IMAGING:  As above    Discharge Time of 35 minutes    Electronically signed by James Whitlock MD on 3/18/2022 at 12:53 PM

## 2022-03-18 NOTE — PROGRESS NOTES
Daily Progress Note     Awake, alert, NAD  Remains SR on tele, HR 80  BP stable     Chest pain     Hx CAD s/p PCI and  valvular heart disease     Continue ASA, statin, plavix, BB     Troponin elevated    EKG showed afib with RVR     [facility] on 3/17       Afib RVR    Recently admitted for cardiac ablation     S/p cardioversion in 2/2022    SR on tele     Continue amiodarone, metoprolol      continue eliquis     Successful cardioversion on 3/17/2022     HFrEF    Last echo showed EF 37%, systolic function abnormal     Currently holding torsemide     CKD    Nephrology following     Avoid nephrotoxic medications     [facility] 3/17/2022  DICTATED -[id number]  LEFT MAIN PATENT  LAD MID STENT PATENT  LCX MILD DX  RAMUS MILD DX  RCA MID AND DISTAL STENT PATENT  LVEDP 24     Medical treatment   RIGHT RADIAL  30CC OF CONTRAST  NO COMPLICATIONS    Echo 1/33/4797  Summary   This is a limited echocardiogram.   Left ventricular systolic function is abnormal.   Ejection fraction is visually estimated at 25%.   No evidence of any pericardial effusion.     The patient has a very complex history, history of coronary artery  disease with angioplasty done of the LAD and RCA last year in 2021;  history of heart failure, EF is less than 35%, underwent an ICD  placement.  He has a Medtronic dual-chamber ICD present.  History of  renal insufficiency, follows with Dr. Daniel Adler has sleep apnea but  does not want to use CPAP nor oxygen and history of hypertension,  hyperlipidemia present.  History of atrial fibrillation, he is status  post AFib ablation recently done. [facility] was also cardioverted in 02/2022  for AFib also.     He also has GERD, COPD and he has testicle cancer.     PAST SURGICAL HISTORY:  AAA repair and had endovascular repair. [facility] had  an angioplasty of the LAD and RCA at [facility] [place] last year in  05/2021 and 06/2021 and AFib ablation recently; ICD placed, Medtronic  device and cardioverted for AFib in 02/2022.     SOCIAL HISTORY:  He does not smoke or drink.     ALLERGIES:  NKDA.     PRESENT MEDICATIONS:  He has been titrated up and he is on amiodarone  200 mg a day, Demadex 20 mg once a day, Toprol 100 mg b.i.d., _____  Eliquis 5 b.i.d. and inhalers, Lantus Primatene. Objective:   BP 99/75   Pulse 80   Temp 98.1 °F (36.7 °C) (Oral)   Resp 20   Ht 5' 7\" (1.702 m)   Wt 239 lb (108.4 kg)   SpO2 97%   BMI 37.43 kg/m²     Intake/Output Summary (Last 24 hours) at 3/18/2022 1056  Last data filed at 3/17/2022 2022  Gross per 24 hour   Intake --   Output 300 ml   Net -300 ml       Medications:   Scheduled Meds:   apixaban  5 mg Oral BID    torsemide  20 mg Oral Daily    sodium chloride flush  5-40 mL IntraVENous 2 times per day    lidocaine  1 patch TransDERmal Daily    albuterol sulfate HFA  2 puff Inhalation BID    amiodarone  200 mg Oral Daily    atorvastatin  40 mg Oral Nightly    budesonide-formoterol  2 puff Inhalation BID    clopidogrel  75 mg Oral Daily    fluticasone  1 spray Each Nostril Daily    insulin glargine  35 Units SubCUTAneous Nightly    metoprolol succinate  100 mg Oral BID    aspirin  81 mg Oral Daily    insulin lispro  0-12 Units SubCUTAneous TID WC    insulin lispro  0-6 Units SubCUTAneous Nightly    pantoprazole  40 mg Oral QAM AC    acetylcysteine  1,200 mg Oral BID      Infusions:   sodium chloride      dextrose        PRN Meds:  sodium chloride flush, sodium chloride, acetaminophen, ondansetron, nitroGLYCERIN, [DISCONTINUED] promethazine **OR** ondansetron, [DISCONTINUED] acetaminophen **OR** acetaminophen, magnesium hydroxide, glucose, glucagon (rDNA), dextrose, dextrose bolus (hypoglycemia) **OR** dextrose bolus (hypoglycemia)       Physical Exam:  Vitals:    03/18/22 0900   BP: 99/75   Pulse: 80   Resp: 20   Temp:    SpO2: 97%        General: A&Ox4, NAD  Chest: Nontender  Cardiac: s1 and s2 auscultated, no murmurs or gallops   Lungs:Clear to auscultation and percussion.   Abdomen: Soft, NT, ND, +BS  Extremities: no cyanosis or edema   Vascular:  Equal 2+ peripheral pulses. Lab Data:  CBC:   Recent Labs     03/16/22 0457 03/17/22  0631   WBC 9.9 9.5   HGB 10.7* 10.0*   HCT 32.8* 31.1*   MCV 91.1 91.2    209     BMP:   Recent Labs     03/16/22 0457 03/17/22  0631   * 137   K 4.0 4.1   CL 95* 99   CO2 24 25   BUN 27* 28*   CREATININE 2.3* 2.3*     LIVER PROFILE:   Recent Labs     03/16/22 0457 03/17/22  0631   AST 14* 13*   ALT 18 19   BILITOT 0.4 0.4   ALKPHOS 49 51     PT/INR:   Recent Labs     03/16/22  1706   PROTIME 23.4*   INR 1.80     APTT:   Recent Labs     03/16/22  1706   APTT 48.4*     BNP:  No results for input(s): BNP in the last 72 hours.       Assessment:  Patient Active Problem List    Diagnosis Date Noted    Acute renal failure ([address]     Chronic kidney disease, stage IV (severe) (HCC)     NSTEMI (non-ST elevated myocardial infarction) ([address] 03/16/2022    Status post catheter ablation of atrial fibrillation 03/10/2022    EDMAR (acute kidney injury) ([address]     Hyperkalemia     Metabolic acidosis     Unstable angina ([address] 02/19/2022    Ischemic cardiomyopathy 01/05/2022    Dyslipidemia 01/05/2022    PAF (paroxysmal atrial fibrillation) ([address] 12/02/2021    S/P ICD (internal cardiac defibrillator) procedure     Hypotension, unspecified 10/28/2021    Globus sensation 10/08/2021    Neck swelling 10/08/2021    COPD exacerbation ([address] 10/08/2021    Generalized abdominal pain     Splenic lesion     Abdominal distention 07/29/2021    Chronic congestive heart failure ([address] 07/04/2021    Acute on chronic combined systolic and diastolic congestive heart failure, NYHA class 3 ([address] 07/04/2021    Precordial pain 07/02/2021    Abnormal stress test 05/06/2021    Coronary artery disease involving native coronary artery of native heart without angina pectoris 05/06/2021    Hypertensive renal disease 02/11/2021    Obstructive sleep apnea 02/03/2021    COPD, moderate [facility] 11/30/2020    Pulmonary emphysema determined by X-ray ([facility] 11/30/2020    Tobacco abuse 11/30/2020    Excessive daytime sleepiness 11/30/2020    Stage 3 chronic kidney disease ([facility] 11/20/2020    Dyspnea 11/20/2020    Chronic kidney disease-mineral and bone disorder 11/20/2020    Essential hypertension 11/20/2020    Hyponatremia 11/20/2020       Electronically signed by MICHAEL Booker CNP on 3/18/2022 at 10:56 AM   Patient seen and examined agree with above assessment and plan no complication from cardiac cath on patient clinically doing well

## 2022-03-18 NOTE — PROGRESS NOTES
The pt's RN, Maria Victoria Otero talked to me about setting the pt up on the Autopap, So he would wear throughout the night. RRT set the pt up and with nasal pillows. I placed the nasal pillows on the pt, and the pt stated complaining about the flow coming through the nasal pillows. The pt stated that there is no way he was going to be able to tolerate the nasal pillows. Pt's RR 20, the Cpap was 4.0 and right before I was going to turn the O2 of 2lpm bleed in the pt's SaO2 was 76%. I increased the O2 to 6lpm and proceeded to take off the nasal pillows and place the pt on the 2lpm Nasal cannula. The pt also stated he would not be able to tolerate the full face mask. I turned off the Autopap and moved it over closer to the counter. The pt has been diagnosed with [place]. RRT cannot do a Apnea Link test on the pt if they already have [place]. The pt is never going to wear a full face mask or anything else having to do with the Autopap. RRT mentioned that if at home asleep, you may not wake up due to not breathing while sleeping. But that didn't matter to the pt.

## 2022-03-18 NOTE — PLAN OF CARE
Problem: Falls - Risk of:  Goal: Will remain free from falls  Description: Will remain free from falls  Outcome: Met This Shift  Goal: Absence of physical injury  Description: Absence of physical injury  Outcome: Met This Shift     Problem: Discharge Planning:  Goal: Participates in care planning  Description: Participates in care planning  Outcome: Met This Shift  Goal: Discharged to appropriate level of care  Description: Discharged to appropriate level of care  Outcome: Met This Shift     Problem: Activity Intolerance:  Goal: Ability to tolerate increased activity will improve  Description: Ability to tolerate increased activity will improve  Outcome: Met This Shift     Problem: Anxiety/Stress:  Goal: Level of anxiety will decrease  Description: Level of anxiety will decrease  Outcome: Met This Shift     Problem:  Bowel Function - Altered:  Goal: Bowel elimination is within specified parameters  Description: Bowel elimination is within specified parameters  Outcome: Met This Shift     Problem: Fluid Volume - Deficit:  Goal: Absence of fluid volume deficit signs and symptoms  Description: Absence of fluid volume deficit signs and symptoms  Outcome: Met This Shift  Goal: Electrolytes within specified parameters  Description: Electrolytes within specified parameters  Outcome: Met This Shift     Problem: Nutrition Deficit:  Goal: Ability to achieve adequate nutritional intake will improve  Description: Ability to achieve adequate nutritional intake will improve  Outcome: Met This Shift     Problem: Pain:  Goal: Pain level will decrease  Description: Pain level will decrease  Outcome: Met This Shift  Goal: Ability to notify healthcare provider of pain before it becomes unmanageable or unbearable will improve  Description: Ability to notify healthcare provider of pain before it becomes unmanageable or unbearable will improve  Outcome: Met This Shift  Goal: Control of acute pain  Description: Control of acute pain  Outcome: Met This Shift  Goal: Control of chronic pain  Description: Control of chronic pain  Outcome: Met This Shift     Problem: Sleep Pattern Disturbance:  Goal: Appears well-rested  Description: Appears well-rested  Outcome: Met This Shift     Problem: Pain:  Goal: Pain level will decrease  Description: Pain level will decrease  Outcome: Met This Shift  Goal: Control of acute pain  Description: Control of acute pain  Outcome: Met This Shift  Goal: Control of chronic pain  Description: Control of chronic pain  Outcome: Met This Shift

## 2022-03-18 NOTE — PROGRESS NOTES
Pulmonary and Critical Care  Progress Note      VITALS:  BP 96/64   Pulse 69   Temp 98.1 °F (36.7 °C) (Oral)   Resp 19   Ht 5' 7\" (1.702 m)   Wt 239 lb (108.4 kg)   SpO2 93%   BMI 37.43 kg/m²     Subjective:   CHIEF COMPLAINT :SOB     HPI:                The patient is sitting in the bed. He is in mild resp distress    Objective:   PHYSICAL EXAM:    LUNGS:Occasional basal crackles  Abd-soft, BS+, NT  Ext- no pedal edema  CVS-s1s2, no murmurs      DATA:    CBC:  Recent Labs     03/16/22 0457 03/17/22  0631   WBC 9.9 9.5   RBC 3.60* 3.41*   HGB 10.7* 10.0*   HCT 32.8* 31.1*    209   MCV 91.1 91.2   MCH 29.7 29.3   MCHC 32.6 32.2   RDW 14.3 14.5   SEGSPCT 72.4* 73.9*      BMP:  Recent Labs     03/16/22 0457 03/17/22  0631   * 137   K 4.0 4.1   CL 95* 99   CO2 24 25   BUN 27* 28*   CREATININE 2.3* 2.3*   CALCIUM 8.4 8.4   GLUCOSE 143* 91      ABG:  No results for input(s): PH, PO2ART, NFJ7HRB, HCO3, BEART, O2SAT in the last 72 hours. BNP  No results found for: BNP   D-Dimer:  Lab Results   Component Value Date    DDIMER 1649 (H) 02/20/2022      1.  Radiology: None      Assessment/Plan     Patient Active Problem List    Diagnosis Date Noted    Acute renal failure ([address]     [facility], [facility] IV [facility]) [facility]     NSTEMI (non-ST elevated myocardial infarction) ([address] 03/16/2022    Status post catheter ablation of atrial fibrillation 03/10/2022    EDMAR (acute kidney injury) ([address]     Hyperkalemia     Metabolic acidosis     Unstable angina ([address] 02/19/2022    Ischemic cardiomyopathy 01/05/2022    Dyslipidemia 01/05/2022    PAF (paroxysmal atrial fibrillation) ([address] 12/02/2021    S/P ICD (internal cardiac defibrillator) procedure     Hypotension, unspecified 10/28/2021    Globus sensation 10/08/2021    Neck swelling 10/08/2021    COPD exacerbation ([address] 10/08/2021    Generalized abdominal pain     Splenic lesion     Abdominal distention 07/29/2021    Chronic congestive heart failure ([address] 07/04/2021    Acute on chronic combined systolic and diastolic congestive heart failure, NYHA class 3 ([facility] 07/04/2021    Precordial pain 07/02/2021    Abnormal stress test 05/06/2021    Coronary artery disease involving native coronary artery of native heart without angina pectoris 05/06/2021    Hypertensive renal disease 02/11/2021    Obstructive sleep apnea 02/03/2021    COPD, moderate ([facility] 11/30/2020    Pulmonary emphysema [facility] by X-ray ([facility] 11/30/2020    Tobacco abuse 11/30/2020    Excessive daytime sleepiness 11/30/2020    Stage 3 chronic kidney disease ([facility] 11/20/2020    Dyspnea 11/20/2020    Chronic kidney disease-mineral and bone disorder 11/20/2020    Essential hypertension 11/20/2020    Hyponatremia 11/20/2020   Acute Hypoxic resp failure  Cig smoker  COPD  Obesity  ? [place]  EDMAR on CKD  Afib s/p Ablation  Systolic CHF EF 52%       1. Rate control of Afib  2. Inhalers  3. ICS  4. OOB  5. CHF optimization  6. BIPAP qhs and prn  7. Keep sats > 92%  8.  C.w present management    Electronically signed by Liz Stapleton MD on 3/18/2022 at 8:39 AM

## 2022-03-18 NOTE — PROGRESS NOTES
Physician Progress Note      PATIENT:               Veronika Morin  CSN #:                  [id number]  :                       1955  ADMIT DATE:       3/16/2022 5:05 AM  DISCH DATE:  Agustín Ortiz  PROVIDER #:        Taylor Bartlett          QUERY TEXT:    Pt admitted with chest pain. Pt noted to have negative LHC, afib RVR, and   elevated troponin. If possible, please document in progress notes and   discharge summary if you are evaluating and/or treating any of the following: The medical record reflects the following:  Risk Factors: HTN, afib, CAD  Clinical Indicators: Pt c/o L chest pain radiating to L arm accompanied by   SOB. Pt was cardioverted 3/10/22 by Dr. Marlin Nixon. Pt in afib RVR on arrival to   ED. Troponin: 0.136, 0.109, 0.098. CXR showed mild to moderate cardiomegaly. LHC unremarkable with no intervention needed. Pt was cardioverted after cath   to restore NSR. Treatment: Card consult, imaging, labs, LHC, cardioversion, ASA, nitro    Thank you,  Michelle Pirece, RN  [phone]  Options provided:  -- Chest pain due to NSTEMI  -- Chest pain possibly due to afib RVR  -- Other - I will add my own diagnosis  -- Disagree - Not applicable / Not valid  -- Disagree - Clinically unable to determine / Unknown  -- Refer to Clinical Documentation Reviewer    PROVIDER RESPONSE TEXT:    This patient has chest pain possibly due to afib RVR. Query created by: Ely Llamas on 3/18/2022 11:37 AM      Electronically signed by:   Taylor Bartlett 3/18/2022 12:52 PM

## 2022-03-18 NOTE — PROGRESS NOTES
3/18/2022 3:17 PM  Patient Room #: 4163/1379-Z  Patient Name: Derwin Shone    (Step 1 Done by RN if possible otherwise call Pulmonary Diagnostics)  1. Place patient on room air at rest for at least 30 minutes. If patient falls below 88% before 30 minutes then you can record the level and stop. Record room air saturation level 96 %. If patient is at 88% or below, they will qualify for home oxygen and you can stop. If level does not fall below 88%, fill in level above. If indicated continue to Step 2. Signature:_Storm Campo Date: 03/18/2022_  (Step 2&3 Done by [facility])  2. Ambulate patient on room air until saturation falls below 89%. Record level of room air saturation with ambulation 88_ %. Next, place patient back on 2lpm oxygen and ambulate, record level 92__%. (Note:  this level must show improvement from room air level done with ambulation.)  If patients saturation on room air with ambulation is 88% or below AND patient shows improvement with oxygen during ambulation, they will qualify for home oxygen and you can stop. If patient does not drop below 89%, then patient should have an overnight oximetry trending on room air to see if level falls below 88%. Complete level in Step 3 below. 3. Room air overnight oximetry level 88 % for___  cumulative minutes. If patients room air oxygen level is < 89% for at least 5 cumulative minutes, patient will qualify for home oxygen and you can stop. (Attach Night Trending Report)    Complete order below: Diagnosis:COPD___  Home oxygen at:  Length of Need: x Lifetime ?  3 Months     _2_lpm or __%   via  [x] nasal cannula  []mask  [] other: Conserving Device         []continuous [x]  with activity  [x]  Nocturnal   [x] Portable Tanks [x]  [employer]  [] Conserving Device        Therapist Signature:Storm Ruiz_     Date: 3/18/2022  Physician Signature:  __Electronically Signed in EMR_    Date:___  Physician Printed Name: Jesus Bear

## 2022-03-24 NOTE — TELEPHONE ENCOUNTER
Pharmacy called to clarify patients eliquis dosage and patient is to be taking Eliquis 5 mg twice daily. Dr Rhoda Baker sent prescription 2.5 mg, 2 tablets twice daily, to pharmacy 3/1/2021, but pharmacy requesting a new prescription for 5 mg tablet twice daily. Will pend new prescription to [facility] to review and approve. Did discontinue Eliquis 2.5 mg tablet two tablets twice daily in Epic.

## 2022-03-24 NOTE — TELEPHONE ENCOUNTER
Discussed with Maxime Gallegos CNP and after Maxime Gallegos reviewed patients chart patient should be taking 5 mg eliquis twice daily.

## 2022-03-29 PROBLEM — R06.02 SOB (SHORTNESS OF BREATH): Status: ACTIVE | Noted: 2022-01-01

## 2022-03-29 NOTE — PROGRESS NOTES
Echo EFf 20% range  Moderate pericardial effusion present  No RV collapse or RA collapse  Will place on low dose pressors for pressure  Start IVF

## 2022-03-29 NOTE — ED NOTES
Phone report called to Lexi Bui, tye RN for transfer to unit and assumption of care.        Anup Rodney RN  03/29/22 6000

## 2022-03-29 NOTE — CONSULTS
Nephrology Service Consultation      [address], [address] [address]  Phone: [phone]  Office Hours: 8:30AM - 4:30PM  Monday - Friday            Patient:  Balta Conner  MRN: [id number]  Consulting physician:  Chari Mendoza MD  Reason for Consult: cr 3      PCP: Cathlene Schaumann, APRN - CNP    HISTORY OF PRESENT ILLNESS:   The patient is a 74 y.o. male with systolic CHF, Afib, presented due to difficulties breathing at home. Renal consult for cr 3 from baseline 2.3. He denies diarrhea, vomiting  There is a suspicion for  Pericardial effusion. He is on room air  BP low side    REVIEW OF SYSTEMS:  14 point ROS is Negative.  See positive ROS per HPI    Past Medical History:        Diagnosis Date    Atrial fibrillation (HCC)     Atrial flutter (HCC)     Bradycardia     Cancer (HCC)     testicular    CHF (congestive heart failure) (HCC)     Chronic kidney disease     stage 3    COPD, moderate ([address] 11/30/2020    Diabetes mellitus ([address]     Excessive daytime sleepiness 11/30/2020    GERD (gastroesophageal reflux disease)     Hyperlipidemia     Hypertension     Hyponatremia     Laceration of pancreas     NSTEMI (non-ST elevated myocardial infarction) ([address]     Obstructive sleep apnea 2/3/2021    Pulmonary emphysema determined by X-ray ([facility] 11/30/2020    Spleen laceration     Testicular hypofunction     thus on testosterone    Tobacco abuse 11/30/2020       Past Surgical History:        Procedure Laterality Date    ABDOMINAL AORTIC ANEURYSM REPAIR, ENDOVASCULAR  11/30/2015    endologix aaa device  mri conditional  card scanned into pacs    ABLATION OF DYSRHYTHMIC FOCUS N/A 03/10/2022    TERRY, A-Fib Ablation    ARM SURGERY      CARDIAC DEFIBRILLATOR PLACEMENT Left 11/02/2021    Medtronic Evera MRI XT DR Pires ICD    HERNIA REPAIR      KNEE SURGERY      LEG SURGERY  10/2020    camncer removed    TOE SURGERY         Medications:   Prior to Admission medications    Medication Sig Start Date End Date Taking? Authorizing Provider   apixaban (ELIQUIS) 5 MG TABS tablet Take 1 tablet by mouth 2 times daily 3/24/22   MICHAEL Shine CNP   gabapentin (NEURONTIN) 300 MG capsule Take 300 mg by mouth nightly. Historical Provider, MD   metoprolol succinate (TOPROL XL) 100 MG extended release tablet Take 1 tablet by mouth 3 times daily 3/23/22   Andres Ahuja DO   aspirin 81 MG chewable tablet Take 1 tablet by mouth daily 3/19/22   Cristian Porras MD   amiodarone (CORDARONE) 200 MG tablet Take 1 tablet by mouth daily 3/11/22   MICHAEL Shine CNP   torsemide (DEMADEX) 20 MG tablet Take 1 tablet by mouth daily 3/11/22   MICHAEL Shine CNP   dapagliflozin (FARXIGA) 10 MG tablet Take 1 tablet by mouth every morning 3/1/22   Candie Landon MD   albuterol sulfate  (90 Base) MCG/ACT inhaler Inhale 2 puffs into the lungs 2 times daily 2/25/22   Jaskaran Tan,    insulin glargine (LANTUS) 100 UNIT/ML injection vial Inject 35 Units into the skin nightly 2/25/22   Jaskaran Tan,    midodrine (PROAMATINE) 10 MG tablet Take 1 tablet by mouth 3 times daily (with meals) 2/25/22   Jaskaran Tan, DO   omeprazole (PRILOSEC) 20 MG delayed release capsule Take 20 mg by mouth daily    Historical Provider, MD   atorvastatin (LIPITOR) 40 MG tablet Take 40 mg by mouth nightly    Historical Provider, MD   Lancets MISC 1 each by Does not apply route 4 times daily 10/30/21   Paulie Keenan MD   glucose monitoring (FREESTYLE FREEDOM) kit 1 kit by Does not apply route daily 10/30/21   Paulie Keenan MD   Insulin Pen Needle ([employer] PEN NEEDLES) 31G X 6 MM MISC 1 each by Does not apply route daily 10/30/21   Paulie Keenan MD   fluticasone (FLONASE) 50 MCG/ACT nasal spray 1 spray by Each Nostril route daily    Historical Provider, MD   nitroGLYCERIN (NITROSTAT) 0.4 MG SL tablet up to max of 3 total doses.  If no relief after 1 dose, call 911. 7/4/21   Uriah Marisol Wallace MD   budesonide-formoterol [facility]) 80-4.5 MCG/ACT AERO Inhale 2 puffs into the lungs 2 times daily  5/8/21   Historical Provider, MD   clopidogrel (PLAVIX) 75 MG tablet Take 75 mg by mouth daily    Historical Provider, MD        Allergies:  Patient has no known allergies. Social History:   TOBACCO:   reports that he quit smoking about a year ago. His smoking use included cigarettes. He smoked 1.50 packs per day. He has never used smokeless tobacco.  ETOH:   reports no history of alcohol use. OCCUPATION:      Family History:   History reviewed. No pertinent family history.         Physical Exam:    Vitals: BP (!) 95/51   Pulse 60   Resp 21   Ht 5' 7\" (1.702 m)   Wt 228 lb (103.4 kg)   SpO2 100%   BMI 35.71 kg/m²   General appearance: in no acute distress, appears stated age  Skin: Skin color, texture, turgor normal. No rashes or lesions  HEENT: normocephalic, atraumatic  Neck: supple, trachea midline  Lungs: clear to auscultation bilaterally, breathing comfortably  Heart[de-identified] regular rate and rhythm, S1, S2 normal,  Abdomen: soft, non-tender; bowel sounds normal; no masses,   Extremities: extremities normal, atraumatic, no cyanosis or edema  Neurologic: Mental status: alert, oriented, interactive, following commands  Psychiatric: mood and affect appropriate    CBC:   Recent Labs     03/29/22  0445   WBC 14.5*   HGB 8.9*   *     BMP:    Recent Labs     03/29/22  0445   *   K 4.0   CL 91*   CO2 22   BUN 49*   CREATININE 3.2*   GLUCOSE 136*     Hepatic:   Recent Labs     03/29/22  0445   *   *   BILITOT 0.9   ALKPHOS 111         Assessment and Recommendations     Patient Active Problem List    Diagnosis Date Noted    SOB (shortness of breath) 03/29/2022    Acute renal failure (HCC)     Chronic kidney disease, stage IV (severe) (HCC)     [facility]ST [facility] 03/16/2022    Status post catheter ablation of atrial fibrillation 03/10/2022    EDMAR (acute kidney injury) ([address]     Hyperkalemia     Metabolic acidosis     Unstable angina ([address] 02/19/2022    Ischemic cardiomyopathy 01/05/2022    Dyslipidemia 01/05/2022    PAF (paroxysmal atrial fibrillation) ([address] 12/02/2021    S/P ICD (internal cardiac defibrillator) procedure     Hypotension, unspecified 10/28/2021    Globus sensation 10/08/2021    Neck swelling 10/08/2021    COPD exacerbation ([address] 10/08/2021    Generalized abdominal pain     Splenic lesion     Abdominal distention 07/29/2021    Chronic congestive heart failure ([address] 07/04/2021    Acute on chronic combined systolic and diastolic congestive heart failure, NYHA class 3 ([address] 07/04/2021    Precordial pain 07/02/2021    Abnormal stress test 05/06/2021    Coronary artery disease involving native coronary artery of native heart without angina pectoris 05/06/2021    Hypertensive renal disease 02/11/2021    Obstructive sleep apnea 02/03/2021    COPD, moderate ([address] 11/30/2020    Pulmonary emphysema determined by X-ray ([address] 11/30/2020    Tobacco abuse 11/30/2020    Excessive daytime sleepiness 11/30/2020    Stage 3 chronic kidney disease ([address] 11/20/2020    Dyspnea 11/20/2020    Chronic kidney disease-mineral and bone disorder 11/20/2020    Essential hypertension 11/20/2020    Hyponatremia 11/20/2020     EDMAR on CKD3  Hyponatremia  Dyspnea   Suspected pericardial effusion  Hx systolic CHF  Hx Afib with RVR with  Many cardioversions    Plan:  diureritcs on hold unitl pericardial effusion is formally evaluated  Give IVF  Avoid nephrotoxins  Monitor UOP  Will follow    Electronically signed by [facility] CenterDO on 3/29/2022 at 11:02 AM    MD Heidi Sanders DO [address]  [place], [place] [address]  PHONE: [phone]  FAX: [phone]

## 2022-03-29 NOTE — CONSULTS
Dictated  Check echo  Check ct chest  Hold [facility] for now  Start IVF  Acute renal injury on chronic  Questionable pericardial effusion    Hx of CAD recent cath patent stents  afib CV recently    Electronically signed by Ignacio Campos MD on 3/29/22 at 10:18 AM EDT

## 2022-03-29 NOTE — PROGRESS NOTES
PROCEDURE PERFORMED:  Central Venous Catheter Insertion    PRIMARY INDICATION FOR PROCEDURE:  Medication Management     INFORMED CONSENT:  Obtained prior to procedure. Consent placed in chart. IR TEAM ARRIVED TO PT ROOM AT:    1400                  ASSESSMENT:  Alert and oriented x4. Pt verbalizes understanding of procedure. BARRIER PRECAUTIONS & STERILE TECHNIQUE:               Pt remains in bed and positioned supine. Warm blankets given. Pt placed on Cardiac Monitor. Pt prepped and draped in a sterile fashion with chlorhexadine. TIME OUT AT:      6013     Name, , allergies, labs, code status and procedure reviewed during time out. PAIN/LOCAL ANESTHESIA/SEDATION MANAGEMENT:           Local: Lidocaine 1% given by Dr Nupur Palacios:           ACCESS TIME: 6580          US/FLUORO: 4          WIRE USED:           SHEATH USED:           CATHETER USED:  7f x 20 cm three lumen CVC Kit          FINAL IMAGE TAKEN TO CONFIRM PLACEMENT OF:           CONTRAST/CC:     Access to right IJ  Catheter inserted OTW  Blood return noted and flushed  Catheter sutured in place.     STERILE DRESSINGS: bio patch and tegaderm    EBL:    Less than 1 cc    FOLLOW- UP X-RAY: ordered    COMPLICATIONS:  None    STAFF IN ROOM:  Dr Raven Rivers RN, Giovany Masterson RN    REPORT CALLED TO: Dong Henry RN on Unit    IR TEAM LEFT PT ROOM AT:  0841

## 2022-03-29 NOTE — ED NOTES
Unable to obtain oral temp. Blankets placed on pt and to try again.       Diana Roque RN  03/29/22 1853

## 2022-03-29 NOTE — ED PROVIDER NOTES
Triage Chief Complaint:   Shortness of Breath    [place]:  Casey Vasques is a 74 y.o. male that presents for shortness of breath. Patient was discharged within the past 2 weeks after he was admitted for acute on chronic CHF as well as COPD. States that over the past few days he has had worsening shortness of breath especially with exertion. States that he has been compliant with his torsemide but feels bloated in his abdomen and has gained about 4 pounds. Denies any chest pain, fever, cough, diarrhea. States that he has had some nausea and vomiting which usually occurs when he becomes fluid overloaded. No other acute complaints at this time. Had a recent cardiac catheterization that showed patent coronary arteries. He has an EF of 25%. ROS:  At least 10 systems reviewed and otherwise acutely negative except as in the [address]     Past Medical History:   Diagnosis Date    Atrial fibrillation (HCC)     Atrial flutter (HCC)     Bradycardia     Cancer (HCC)     testicular    CHF (congestive heart failure) (HCC)     Chronic kidney disease     stage 3    COPD, moderate ([address] 11/30/2020    Diabetes mellitus ([address]     Excessive daytime sleepiness 11/30/2020    GERD (gastroesophageal reflux disease)     Hyperlipidemia     Hypertension     Hyponatremia     Laceration of pancreas     NSTEMI (non-ST elevated myocardial infarction) ([address]     Obstructive sleep apnea 2/3/2021    Pulmonary emphysema determined by X-ray ([address] 11/30/2020    Spleen laceration     Testicular hypofunction     thus on testosterone    Tobacco abuse 11/30/2020     Past Surgical History:   Procedure Laterality Date    ABDOMINAL AORTIC ANEURYSM REPAIR, ENDOVASCULAR  11/30/2015    endologix aaa device  mri conditional  card scanned into pacs    ABLATION OF DYSRHYTHMIC FOCUS N/A 03/10/2022    TERRY, A-Fib Ablation    ARM SURGERY      CARDIAC DEFIBRILLATOR PLACEMENT Left 11/02/2021    [employer]tronic Evera MRI XT DR Pires ICD    HERNIA REPAIR      KNEE SURGERY      LEG SURGERY  10/2020    camncer removed    TOE SURGERY       History reviewed. No pertinent family history. Social History     Socioeconomic History    Marital status: Single     Spouse name: Not on file    Number of children: Not on file    Years of education: Not on file    Highest education level: Not on file   Occupational History    Not on file   Tobacco Use    Smoking status: Former Smoker     Packs/day: 1.50     Types: Cigarettes     Quit date: 2021     Years since quittin.9    Smokeless tobacco: Never Used   Vaping Use    Vaping Use: Never used   Substance and Sexual Activity    Alcohol use: Never     Comment: 6-9 cups of coffee a week    Drug use: Never    Sexual activity: Not on file   Other Topics Concern    Not on file   Social History Narrative    Not on file     Social Determinants of Health     Financial Resource Strain:     Difficulty of Paying Living Expenses: Not on file   Food Insecurity:     Worried About [address] [employer] in the Last Year: Not on file    Michael of Food in the Last Year: Not on file   Transportation Needs:     Lack of Transportation (Medical): Not on file    Lack of Transportation (Non-Medical):  Not on file   Physical Activity:     Days of Exercise per Week: Not on file    Minutes of Exercise per Session: Not on file   Stress:     Feeling of Stress : Not on file   Social Connections:     Frequency of Communication with Friends and Family: Not on file    Frequency of Social Gatherings with Friends and Family: Not on file    Attends Jainism Services: Not on file    Active Member of Clubs or Organizations: Not on file    Attends Club or Organization Meetings: Not on file    Marital Status: Not on file   Intimate Partner Violence:     Fear of Current or Ex-Partner: Not on file    Emotionally Abused: Not on file    Physically Abused: Not on file    Sexually Abused: Not on file   Housing Stability:  Unable to Pay for Housing in the Last Year: Not on file    Number of Places Lived in the Last Year: Not on file    Unstable Housing in the Last Year: Not on file     No current facility-administered medications for this encounter. Current Outpatient Medications   Medication Sig Dispense Refill    apixaban (ELIQUIS) 5 MG TABS tablet Take 1 tablet by mouth 2 times daily 60 tablet 0    gabapentin (NEURONTIN) 300 MG capsule Take 300 mg by mouth nightly.  metoprolol succinate (TOPROL XL) 100 MG extended release tablet Take 1 tablet by mouth 3 times daily 60 tablet 1    aspirin 81 MG chewable tablet Take 1 tablet by mouth daily 30 tablet 0    amiodarone (CORDARONE) 200 MG tablet Take 1 tablet by mouth daily 90 tablet 0    torsemide (DEMADEX) 20 MG tablet Take 1 tablet by mouth daily 30 tablet 3    dapagliflozin (FARXIGA) 10 MG tablet Take 1 tablet by mouth every morning 90 tablet 1    albuterol sulfate  (90 Base) MCG/ACT inhaler Inhale 2 puffs into the lungs 2 times daily 18 g 3    insulin glargine (LANTUS) 100 UNIT/ML injection vial Inject 35 Units into the skin nightly 10 mL 3    midodrine (PROAMATINE) 10 MG tablet Take 1 tablet by mouth 3 times daily (with meals) 90 tablet 3    omeprazole (PRILOSEC) 20 MG delayed release capsule Take 20 mg by mouth daily      atorvastatin (LIPITOR) 40 MG tablet Take 40 mg by mouth nightly      Lancets MISC 1 each by Does not apply route 4 times daily 100 each 1    glucose monitoring (FREESTYLE FREEDOM) kit 1 kit by Does not apply route daily 1 kit 0    Insulin Pen Needle ([employer] PEN NEEDLES) 31G X 6 MM MISC 1 each by Does not apply route daily 100 each 3    fluticasone (FLONASE) 50 MCG/ACT nasal spray 1 spray by Each Nostril route daily      nitroGLYCERIN (NITROSTAT) 0.4 MG SL tablet up to max of 3 total doses.  If no relief after 1 dose, call 911. 25 tablet 3    budesonide-formoterol (SYMBICORT) 80-4.5 MCG/ACT AERO Inhale 2 puffs into the lungs 2 this time for sepsis. Given EF of 25%, will keep fluid neutral at this time. Blood cultures, lactic acid are obtained. Unknown etiology of the patient's shortness of breath at this time. He does not appear overtly fluid overloaded. He does have a history of COPD and is given DuoNeb treatment here. Chest x-ray does not show any evidence of acute abnormality or significant pulmonary edema or effusions. Patient has mild improvement after DuoNeb treatment. He continues to have shortness of breath with any kind of exertional movement or lying flat. He recently had a perfusion scan ruling out pulmonary embolism during last admission. On my bedside ultrasound, the patient has a moderate sized pericardial effusion. Last CT scan in October showed a trace pericardial effusion at that time. I feel that this is contributing to the patient's symptoms significantly. He remains hemodynamically stable here but would likely benefit from admission and emergent pericardiocentesis. I spoke with Dr. Lennox Mars who will see the patient this morning. Admitted to the hospitalist at this time. I directly delivered medical care to this critically ill patient. Timely evaluation and treatment was necessary to address the significant organ system dysfunction present in this patient. Due to high probability of clinically significant, life-threatening deterioration, the patient required my highest level of preparedness to intervene emergently and I personally spent this critical care time directly and personally managing the patient. I was involved in the stabilization of this critical patient for 40 minutes.   During this time I was physically present at the bedside during my initial exam and for re-examinations at intervals coordinating this patient's care with other physicians, examining radiographs, interpreting electrocardiograms and rhythm strips, reviewing laboratory results, discussing the patient's condition and management with the patient/family. Time billed does not include time for procedures. Clinical Impression:  1.  Pericardial effusion      (Please note that portions of this note may have been completed with a voice recognition program. Efforts were made to edit the dictations but occasionally words are mis-transcribed.)    MD Tiffany Rome MD  03/29/22 6225

## 2022-03-29 NOTE — ED NOTES
Brought in by family. Pt in to ED via [facility]. Reports being short of breath and has been for the last 4 days having to wear his oxygen. Reports unable to walk 5 feet without having to sit down. States having CHF and is admitted frequently being released last week. Pt dusky in color.       Salma Tovar RN  03/29/22 4274

## 2022-03-29 NOTE — SIGNIFICANT EVENT
ABD CT noted. Lipase normal. Reported ABD bloating, nausea and vomiting. Start IV zosyn.  Check ABD US and consult General Surgery     Glenn Cruz, APRN - CNP

## 2022-03-29 NOTE — H&P
History and Physical      Name:  Day Argueta /Age/Sex: 1955  (74 y.o. male)   MRN & CSN:  [id number] & [id number] Admission Date/Time: 3/29/2022  3:58 AM   Location:  -A PCP: MICHAEL Marte CNP           Assessment and Plan:       Day Argueta is a 74 y.o. male with PMH A. fib, CHF, chronic kidney disease, CAD, COPD, HLD, obesity and [place] presented to [facility] on 3/29/2022 with complaints of SOB. He was hospitalized for further work-up/treatment. Chronic systolic heart failure: 05 TTE with EF 25%. Has ICD. Did not appear to be overloaded on exam; LCTA, no edema. CXR with cardiomegaly otherwise nonacute. BNP 5K but lower than previous. Hold on IV diuresis for now. Continue home torsemide. Cardiology consulted     EDMAR on CKD: hx CKD stage IV. Renal function worse than baseline. Possibly cardiorenal syndrome. Cont home torsemide. UA pending. Check PVR. Nephrology consulted    Pericardial effusion: noted on review as per ER notes. Repeat echo pending. Cardiology following    Hypotension: per hx. BP soft/borderline. Discussed with cardiology and with pericardial effusion will tx to ICU and start pressors; defer to Cardiology     Elevated troponin: troponin 0.017. Had recent cardiac work-up with [facility] and now with worsening renal fx. Denied chest pain. EKG atrial paced. Cycle troponin. Cardiology consulted    Elevated lactic acid: lactic acid 2.9. With soft/borderline BP but no tachycardia. WBC minimally elevated. CXR without evidence of PNA. Did not appear toxic or acute. Possibly secondary to worsening renal function. Holding on fluids with low EF. Monitor repeat lactic. Blood culture/urine culture pending. Hold on ATB for now     Transaminitis: Elevated LFTs noted. With increased abdominal bloating on exam and nausea. Check lipase, ABD CT    Weakness: with physical deconditioning.   Patient has had multiple recent hospitalizations over the last year; this is his fourth hospitalization this year. He may benefit from SNF. PT/OT    Chronic respiratory failure: wears O2 at 2 L ATC at home. Adequately oxygenating on home dose O2     CAD: per hx. 3/16/22 Spencer Brito with patent stent and nonobstructive CAD. Cont home ASA, plavix, BB, statin     Diabetes: per hx. control unknown; last A1c 10/2020 112.1%. Holding home oral hypoglycemic. Cont home long-acting. Add SSI. Monitor blood sugar and titrate PRN. Hgb A1c    A-fib: per hx. S/p ablation on 3/10/22 per Dr. Mariza Forrester. Rate controlled. Cont home amiodarone, BB, eliquis     COPD: per hx. Does not appear to be in exacerbation. Continue inhalers    [place]: per hx. noncompliant with CPAP    DVT prophylaxis: Eliquis        History of Present Illness:     Danelle Sal is a 74 y.o. male with PMH A. fib, CHF, chronic kidney disease, CAD, COPD, HLD, obesity and [place] presented to [facility] on 3/29/2022 with complaints of SOB. He was hospitalized for further work-up/treatment. Information obtained from chart review and patient report. Patient was recently discharged for CHF and COPD exacerbation. Patient returns with persistent SOB. Says he felt well at time of most recent discharge. Reported shortness of breath started approximately 2 days prior to arrival.  No aggravating or alleviating factors. Feels SOB at rest as well as with activity he was recently started on oxygen at home. Says he went upstairs to go to bed felt short of breath, came downstairs and called his friend and then came to the ER. Denied chest pain. He does report 3 pound weight gain over the last 2 days. Also thinks his abdomen is more bloated than normal.  Still feels short of breath at time of my exam, also feels tired and rundown. Ten point ROS: reviewed negative, unless as noted in above HPI.     Objective:   No intake or output data in the 24 hours ending 03/29/22 0910     Vitals:   Vitals:    03/29/22 0700 03/29/22 0715 03/29/22 0730 03/29/22 0745   BP: 91/66 (!) 78/40 (!) 85/53 (!) 95/51   Pulse: 61 60 60 60   Resp: 19 13 19 21   SpO2: 98% 100% 99% 100%   Weight:       Height:           Physical Exam: 03/29/22       General: 58-year-old who appeared older than stated age, chronically ill sitting in wheelchair in ED  Eyes: eyelids/lashes normal appearence. Pupils equal, round and reactive. No scleral erythema, discharge, or conjunctivitis. HENT: bilateral ear and nose normal in appearance. Mucous membranes moist. Hearing grossly intact. Neck supple, no apparent thyromegaly or masses. Respiratory: LCTA. On O2 2 liters via NC  Cardiovascular: RRR, S1/S2 auscultated. No murmurs, rubs, or gallops. No JVD or carotid bruits. Peripheral pulses equal bilaterally and palpable. No peripheral edema. GI: abdomen soft. No tenderness, masses, or guarding. Bowel sounds are normoactive. Rectal exam deferred. : No costovertebral angle tenderness. Hart catheter is not present. Heme/lymph: no palpable cervical lymphadenopathy and no hepatosplenomegaly. No petechiae or ecchymoses. Musculoskeletal: Full ROM of all 4 extremities, strength 5/5 in all extremities. No gross joint deformities. Skin: intact, no rashes or lesions. Neurological: cranial nerves 2-12 grossly intact. No slurred speech, no facial droop. Non-focal   Psych: LOC X4, calm and cooperative. Affect appropriate.       Past Medical History:      Past Medical History:   Diagnosis Date    Atrial fibrillation (HCC)     Atrial flutter (HCC)     Bradycardia     Cancer (HCC)     testicular    CHF (congestive heart failure) (HCC)     Chronic kidney disease     stage 3    COPD, moderate ([address] 11/30/2020    Diabetes mellitus ([address]     Excessive daytime sleepiness 11/30/2020    GERD (gastroesophageal reflux disease)     Hyperlipidemia     Hypertension     Hyponatremia     Laceration of pancreas     NSTEMI (non-ST elevated myocardial infarction) ([address]     Obstructive sleep apnea 2/3/2021    Pulmonary emphysema determined by X-ray [facility]) 2020    Spleen laceration     Testicular hypofunction     thus on testosterone    Tobacco abuse 2020       Past Surgical  History:    has a past surgical history that includes hernia repair; AAA repair, endovascular (2015); Leg Surgery (10/2020); Cardiac defibrillator placement (Left, 2021); Arm Surgery; Toe Surgery; knee surgery; and ablation of dysrhythmic focus (N/A, 03/10/2022). Social History:    FAM HX: Reviewed and noncontributory   family history is not on file. Soc HX:   Social History     Socioeconomic History    Marital status: Single     Spouse name: None    Number of children: None    Years of education: None    Highest education level: None   Occupational History    None   Tobacco Use    Smoking status: Former Smoker     Packs/day: 1.50     Types: Cigarettes     Quit date: 2021     Years since quittin.9    Smokeless tobacco: Never Used   Vaping Use    Vaping Use: Never used   Substance and Sexual Activity    Alcohol use: Never     Comment: 6-9 cups of coffee a week    Drug use: Never    Sexual activity: None   Other Topics Concern    None   Social History Narrative    None     Social Determinants of Health     Financial Resource Strain:     Difficulty of Paying Living Expenses: Not on file   Food Insecurity:     Worried About Running Out of Food in the Last Year: Not on file    Michael of Food in the Last Year: Not on file   Transportation Needs:     Lack of Transportation (Medical): Not on file    Lack of Transportation (Non-Medical):  Not on file   Physical Activity:     Days of Exercise per Week: Not on file    Minutes of Exercise per Session: Not on file   Stress:     Feeling of Stress : Not on file   Social Connections:     Frequency of Communication with Friends and Family: Not on file    Frequency of Social Gatherings with Friends and Family: Not on file    Attends Christian Services: Not on file    Active Member of Clubs or Organizations: Not on file    Attends Club or Organization Meetings: Not on file    Marital Status: Not on file   Intimate Partner Violence:     Fear of Current or Ex-Partner: Not on file    Emotionally Abused: Not on file    Physically Abused: Not on file    Sexually Abused: Not on file   Housing Stability:     Unable to Pay for Housing in the Last Year: Not on file    Number of [place] in the Last Year: Not on file    Unstable Housing in the Last Year: Not on file     TOBACCO:   reports that he quit smoking about a year ago. His smoking use included cigarettes. He smoked 1.50 packs per day. He has never used smokeless tobacco.  ETOH:   reports no history of alcohol use. Drugs:  reports no history of drug use. Allergies: No Known Allergies    Home Medications:     Prior to Admission medications    Medication Sig Start Date End Date Taking? Authorizing Provider   apixaban (ELIQUIS) 5 MG TABS tablet Take 1 tablet by mouth 2 times daily 3/24/22   MICHAEL Parham CNP   gabapentin (NEURONTIN) 300 MG capsule Take 300 mg by mouth nightly.     Historical Provider, MD   metoprolol succinate (TOPROL XL) 100 MG extended release tablet Take 1 tablet by mouth 3 times daily 3/23/22   Andres Ahuja DO   aspirin 81 MG chewable tablet Take 1 tablet by mouth daily 3/19/22   Arin Berman MD   amiodarone (CORDARONE) 200 MG tablet Take 1 tablet by mouth daily 3/11/22   MICHAEL Parham CNP   torsemide (DEMADEX) 20 MG tablet Take 1 tablet by mouth daily 3/11/22   MICHAEL Parham CNP   dapagliflozin (FARXIGA) 10 MG tablet Take 1 tablet by mouth every morning 3/1/22   Laree Fothergill, MD   albuterol sulfate  (90 Base) MCG/ACT inhaler Inhale 2 puffs into the lungs 2 times daily 2/25/22   Barak Jimenez DO   insulin glargine (LANTUS) 100 UNIT/ML injection vial Inject 35 Units into the skin nightly 2/25/22   Barak Jimenez DO midodrine (PROAMATINE) 10 MG tablet Take 1 tablet by mouth 3 times daily (with meals) 2/25/22   Keyur Cannon DO   omeprazole (PRILOSEC) 20 MG delayed release capsule Take 20 mg by mouth daily    Historical Provider, MD   atorvastatin (LIPITOR) 40 MG tablet Take 40 mg by mouth nightly    Historical Provider, MD   Lancets MISC 1 each by Does not apply route 4 times daily 10/30/21   Mayte Leal MD   glucose monitoring (FREESTYLE FREEDOM) kit 1 kit by Does not apply route daily 10/30/21   Mayte Leal MD   Insulin Pen Needle ([employer] PEN NEEDLES) 31G X 6 MM MISC 1 each by Does not apply route daily 10/30/21   Mayte Leal MD   fluticasone (FLONASE) 50 MCG/ACT nasal spray 1 spray by Each Nostril route daily    Historical Provider, MD   nitroGLYCERIN (NITROSTAT) 0.4 MG SL tablet up to max of 3 total doses. If no relief after 1 dose, call 911. 7/4/21   Jake Cantu MD   budesonide-formoterol [facility]) 80-4.5 MCG/ACT AERO Inhale 2 puffs into the lungs 2 times daily  5/8/21   Historical Provider, MD   clopidogrel (PLAVIX) 75 MG tablet Take 75 mg by mouth daily    Historical Provider, MD         Medications:   Medications:    insulin lispro  0-6 Units SubCUTAneous TID WC    insulin lispro  0-3 Units SubCUTAneous Nightly      Infusions:    dextrose       PRN Meds: glucose, 15 g, PRN  glucagon (rDNA), 1 mg, PRN  dextrose, 100 mL/hr, PRN  dextrose bolus (hypoglycemia), 125 mL, PRN   Or  dextrose bolus (hypoglycemia), 250 mL, PRN        Data:     Laboratory this visit:  Reviewed  Recent Labs     03/29/22  0445   WBC 14.5*   HGB 8.9*   HCT 28.4*   *      Recent Labs     03/29/22  0445   *   K 4.0   CL 91*   CO2 22   BUN 49*   CREATININE 3.2*     Recent Labs     03/29/22 0445   *   *   BILITOT 0.9   ALKPHOS 111     No results for input(s): INR in the last 72 hours. Radiology this visit:  Reviewed.     Echocardiogram limited    Result Date: 3/10/2022  Transthoracic Echocardiography Report (TTE)  Demographics   Patient Name       Magdalene Krause  Date of Study       03/10/2022   Date of Birth      1955         Gender              Male   Age                74 year(s)         Race                   Patient Number     [id number]         Room Number         3120   Visit Number       [id number]   Corporate ID       [id number]   Accession Number   [id number]         [id number] Mitul Greer RN   Ordering Physician [facility]            Physician           Shakira Bojorquez MD  Procedure Type of Study   TTE procedure:ECHOCARDIOGRAM LIMITED. Procedure Date Date: 03/10/2022 Start: 11:37 AM Study Location: Portable Indications:S/P Ablation. Patient Status: Routine Height: 67 inches Weight: 240 pounds BSA: 2.18 m2 BMI: 37.59 kg/m2 HR: 60 bpm BP: 114/67 mmHg  Conclusions   Summary  This is a limited echocardiogram.  Left ventricular systolic function is abnormal.  Ejection fraction is visually estimated at 25%. No evidence of any pericardial effusion. Signature   ------------------------------------------------------------------  Electronically signed by Bri Tran MD  (Interpreting physician) on 03/10/2022 at 04:17 PM  ------------------------------------------------------------------   Findings   Left Ventricle  Left ventricular systolic function is abnormal.  Ejection fraction is visually estimated at 25%. Pericardial Effusion  No evidence of any pericardial effusion.       XR CHEST (2 VW)    Result Date: 3/8/2022  EXAMINATION: TWO XRAY VIEWS OF THE CHEST 3/8/2022 8:25 am COMPARISON: 02/22/2022 HISTORY: ORDERING SYSTEM PROVIDED HISTORY: Atrial fibrillation, unspecified type [facility]) TECHNOLOGIST PROVIDED HISTORY: Reason for exam:->Chest x-ray performed for pre testing for ablation with transesophageal echocardiogram procedure, hx of atrial fibrillation Reason for Exam: Chest x-ray performed for pre testing for ablation with transesophageal echocardiogram procedure, hx of atrial fibrillation Initial evaluation FINDINGS: The patient has a pacemaker. The trachea is midline. The cardiac silhouette is unremarkable. The lungs are clear without evidence of infiltrate, mass, or pneumothorax. There is no pleural fluid. No acute cardiopulmonary process. XR KNEE LEFT (3 VIEWS)    Result Date: 3/16/2022  EXAMINATION: THREE XRAY VIEWS OF THE LEFT KNEE 3/16/2022 5:52 am COMPARISON: 04/25/2019 HISTORY: ORDERING SYSTEM PROVIDED HISTORY: L knee swelling, popped when he fell TECHNOLOGIST PROVIDED HISTORY: Reason for exam:->L knee swelling, popped when he fell Reason for Exam: LT KNEE SWELLING FROM FALL FINDINGS: No acute fracture or dislocation at the left knee. Narrowing of the joint spaces with articular sclerosis, marginal osteophytes, and subchondral cystic changes are redemonstrated. There is no significant joint effusion. Small ossifications at the suprapatellar region were also present on the previous exam. There are no radiopaque foreign bodies around the knee. No acute fracture or dislocation. Moderate to severe tricompartmental osteoarthritis is again noted and may have small loose bodies in the suprapatellar region. XR CHEST PORTABLE    Result Date: 3/29/2022  EXAMINATION: ONE XRAY VIEW OF THE CHEST 3/29/2022 4:31 am COMPARISON: Chest radiograph dated March 16, 2022 HISTORY: ORDERING SYSTEM PROVIDED HISTORY: dyspnea TECHNOLOGIST PROVIDED HISTORY: Reason for exam:->dyspnea Reason for Exam: dyspnea FINDINGS: Cardiomegaly is noted. A left-sided intracardiac device is seen. There is no focal consolidation, pleural effusion, or pneumothorax. There is no evidence of edema. No acute findings. Cardiomegaly.      XR CHEST PORTABLE    Result Date: 3/16/2022  EXAMINATION: ONE XRAY VIEW OF THE CHEST 3/16/2022 5:03 am COMPARISON: Chest two views March 8, 2022 HISTORY: ORDERING SYSTEM PROVIDED HISTORY: shortness of breath TECHNOLOGIST PROVIDED HISTORY: Reason for exam:->shortness of breath Reason for Exam: SOB FINDINGS: Left-sided cardiac ICD is stable in position. The heart is mildly to moderately enlarged with otherwise unremarkable configuration. The mediastinal contours are within normal limits. The lungs are well aerated. Chronic mild blunting of left costophrenic angle is seen. . Bones and soft tissues are unremarkable. 1. Mild-to-moderate cardiomegaly. 2. Chronic mild blunting of left costophrenic angle suggesting pleural thickening/scarring. 3. No acute cardiopulmonary abnormality.            EKG this visit:  Reviewed   EKG: a-paced       Electronically signed by MICHAEL Bah CNP on 3/29/2022 at 9:10 AM

## 2022-03-30 NOTE — PROGRESS NOTES
Responded to code blue. ACLS started and I bagged with 100% o2. At pulse check I was able to intubated with 7.5 ETT 25 lip with out complication. Grade 1 view noted, condensation in ETT, positive color change. Equal BS and chest rise.  ACLS continued until stop ordered by

## 2022-03-30 NOTE — SIGNIFICANT EVENT
0240 Pt became unresponsive. Rapid response called. 0243 Chest compressions initiated  0244 Epinephrine administered  0245 No pulse. Compressions resumed  0247 Epinephrine administered. No pulse. Compressions resumed. BG 47  0249 Bicarb and D50 administered. No pulse. Compressions resumed. 0250 Epinephrine and calcium administered  0252 Epinephrine administered. No pulse. Compressions resumed. 2643 Magnesium sulfate 2 g given  0254  No pulse. Compressions resumed. 0255 Bicarb given  0256 Epinephrine administered. No pulse. Compressions resumed. 0258 V fib shocked 200 j. Compressions resumed. 0259 Epinephrine administered  0300 Amiodarone 300 administered. V fib shocked 200 j. Compressions resumed.  0302  V fib shocked 200 j. Compressions resumed.  0303 Epinephrine and Amiodarone 150 administered. 0304 V fib shocked 200 j. Compressions resumed.  0306 Lidocaine administered. V fib shocked 200 j. Compressions resumed. 0308 Lidocaine administered. V fib shocked 200 j. Compressions resumed. 0310 PEA. Compressions resumed. 8878 Epinephrine administered. 4482 Death pronounced per Dr. Deann Buchanan.

## 2022-03-30 NOTE — DEATH NOTES
Death Pronouncement Note  Patient's Name: Arun Ramachandran   Patient's YOB: 1955  MRN Number: [id number]    Admitting Provider: Anna Ramirez MD  Attending Provider: Pita Olivera MD    Patient was examined and the following were absent: Pulses, Blood Pressure and Respiratory effort    I declared the patient dead on 3/30/2022 at 3:13 AM    Preliminary Cause of Death: Pericardial effusion     Electronically signed by Cristina Claros DO on 3/30/22 at 5:54 AM EDT

## 2022-03-30 NOTE — SIGNIFICANT EVENT
Rapid Response Team Note    Date of event: 3/30/2022   Time of event: Spencer Cano 74y.o. year old male   YOB: 1955   Admit date:  3/29/2022   Location: 2029/2029-A   Witnessed? : [x]Yes  [] No  Monitored? : [x]Yes  [] No  Code status: [x] Full  [] DNR-CCA  []DNR-CC  ______________________________________________________________________  Reason for RRT:    [x] RR < 8     [] RR > 28   [x] SBP < 90 mmHg    [x] HR < 40 bpm   [] HR > 130 bpm  [] Seizures    [] SpO2 <90%   [] LOC   [] Code Stroke  [] Significant Bleeding Event     [] Other:     Subjective:   CTSP regarding the above event, patient became unresponsive and lost pulses.   CODE BLUE was started at 2:43 AM.    Objective:     Initial Condition:  Conscious   [] Yes  [x] No     Breathing [] Yes  [x] No     Pulse  [] Yes  [x] No    Airway:   [] Open/ Clear     Intervention: [] None  [] Pooled secretions     [] Suctioned  [] Stridor      [x] Intubation    Lungs:   [] Symmetrical chest rise/ CTABL Intervention: [] None  [] Use of accessory muscles    [] NIV (CPAP/BiPAP)  [] Cyanosis      [] Nasal Oxygen/Mask  [x]Bilateral breath sounds with bagging  [] ABG             [] CXR  [] Other: Intubated    Circulation:   Rhythm:      Intervention: [] None      [] Sinus      [] IV Access  [] Peripheral      [x] Other: PEA       [x] Central            [] EKG            [] Cardioversion            [x] Defibrillation       Capillary Refill:  [x] > 2 seconds [] < 2 seconds    Diagnostic Test:    Blood Sugar:  47 mg/dL    ABG:      Lab Results   Component Value Date    PH 7.50 03/10/2022    O2SAT 99.9 03/10/2022       Medication(s) Given:  []  Narcan (Naloxone)   []  Romazicon (Flumazenil)    []  Breathing Treatment    []  Steroids (Prednisone, Solu-Medrol)  []  Adenosine  [x] Cardiac Medicines: Epinephrine, lidocaine, amiodarone, magnesium, calcium, bicarb, and D50    Infusion(s):   [] Normal Saline    Amount:  cc/hr      [] Lactate Ringers      [] Other:     Imaging:   [] CXR:   [] Normal         [] Pneumothorax         [] Pulmonary Edema  [] Infiltrate          [] CT Head  [] Normal          [] ICB          [] SAH          [] Other:          [] CTA Pulmonary [] Normal          [] Pulmonary embolus          [] Other:       Teams Assessment and Plan:  1. Cardiopulmonary arrest  a. Patient became unresponsive and CODE BLUE was called at approximately 2:43 AM.  CODE BLUE was ran under protocol driven ACLS guidelines. Initial rhythm was PEA with transition to fine V. fib which eventually transition to PEA. In total code was ran for approximately 30 minutes. Start time was 2:43 AM with any time being 3:13 AM.  Time of death was called at 3:13 PM.  When patient transition to find V. fib patient was shocked in total 6 times at 200 J, given 2 doses of amiodarone, and 2 doses of lidocaine. During times of PEA a total of 8 doses of epinephrine were given. Pulses were checked a total of 15 times. Additional medication included 2 ampoules of sodium bicarbonate, 1 ampoule of D50, 2 g magnesium, and calcium gluconate. Patient was intubated successfully by respiratory therapy. Despite high-quality protocol driven ACLS CODE BLUE was called at 3:13 AM.  Presumptive cause is likely pericardial effusion leading to pericardial tamponade given findings on chest CT. Family was at bedside at end of code and I was available to answer all questions they had.    ?  ?  Disposition:  [x] Nettie Lizama family updated:  [x] Yes  [] No     Discussed with:  [x]Entire code team      ?    Devan Egan DO   5:42 AM  Attending Shannan Dickson MD    Due to the immediate potential for life-threatening deterioration due to cardiopulmonary arrest, I spent 45 minutes providing critical care. This time is excluding time spent performing procedures.

## 2022-04-01 LAB
CULTURE: ABNORMAL
Lab: ABNORMAL
SPECIMEN: ABNORMAL

## 2022-04-01 NOTE — PROGRESS NOTES
Physician Progress Note      PATIENT:               Helio Dowell  CSN #:                  [id number]  :                       1955  ADMIT DATE:       3/29/2022 3:58 AM  DISCH DATE:        3/30/2022 8:04 AM  RESPONDING  PROVIDER #:        Ishmael Duarte MD          QUERY TEXT:    Pt admitted with pericardial effusion. Pt noted to have SBP in the 50's, 60's,   70's. If possible, please document in the progress notes and discharge   summary if you are evaluating and/or treating any of the following: The medical record reflects the following:  Risk Factors: CAD, AFib, CHF, CKD  Clinical Indicators: Elevated trops, pericardial effusion, SBP 50's, 60's,   70', HGB 8.9, EF 20%  Treatment: levophed drip, bolus LR, VS, Labs, cardiology consult    Thank you,  Lexi Marroquin RN CDS Supervisor  [phone]  Options provided:  -- Cardiogenic Shock  -- Hemorrhagic Shock  -- Hypovolemic Shock  -- Hypotension without Shock  -- Other - I will add my own diagnosis  -- Disagree - Not applicable / Not valid  -- Disagree - Clinically unable to determine / Unknown  -- Refer to Clinical Documentation Reviewer    PROVIDER RESPONSE TEXT:    This patient is in cardiogenic shock.     Query created by: Natalie Becerra on 2022 2:56 PM      Electronically signed by:  Ishmael Duarte MD 2022 4:43 PM

## 2022-04-03 LAB
CULTURE: NORMAL
Lab: NORMAL
SPECIMEN: NORMAL

## 2022-04-21 NOTE — DISCHARGE SUMMARY
Discharge Summary           Name:  Jason Michael /Age/Sex: 1955  (74 y.o. male)   MRN & CSN:  [id number] & [id number] Admission Date/Time: 3/29/2022  3:58 AM   Attending:  No att. providers found Discharging Physician: MICHAEL Hernandez CNP     Hospital Course:     Jason Michael is a 74 y.o. male with PMH A. fib, CHF, chronic kidney disease, CAD, COPD, HLD, obesity and [place] presented to [facility] on 3/29/2022 with complaints of SOB. He was hospitalized for further work-up/treatment; full course as follows. Per chart review patient was recently discharged for CHF and COPD exacerbation, returns with persistent SOB, GI symptoms and ABD bloating. He was found to have acute on chronic kidney disease, pericardial effusion, chronic hypotension, elevated troponin and lactic acid along with elevated transaminitis. Patient had chronic respiratory failure was adequately oxygenating on home dose of O2. Patient was seen and examined by myself in the ED. Appeared chronically ill but did not appear to be toxic or acute. Cardiology was consulted for CHF/pericardial effusion. Nephrology was consulted for acute on chronic kidney disease and general surgery was consulted for abnormal ABD CT. He was started on IV fluids, IV ATB and pressor support for low blood pressure and sent to the ICU. Per chart review patient became unresponsive at approximately 2:43 the next morning, code blue was called and CPR was provided for approximately 30 minutes with no return of ROSC.   Time of death was 3:13 AM on 3/30/2020            Electronically signed by MICHAEL Hernandez CNP on 2022 at 4:22 PM

## 2022-11-14 NOTE — PROGRESS NOTES
Nephrology Progress Note        [address], [address] [address]  Phone: [phone]  Office Hours: 8:30AM - 4:30PM  Monday - Friday 2/22/2022 7:15 AM  Subjective:   Admit Date: 2/19/2022  PCP: Vane Ghotra, CNNP  Interval History:   Chest pain during the night  Good uop    Diet: ADULT DIET; Regular; 4 carb choices (60 gm/meal); Low Sodium (2 gm); No Caffeine      Data:   Scheduled Meds:   insulin regular  10 Units IntraVENous Once    And    dextrose bolus (hypoglycemia)  250 mL IntraVENous Once    furosemide  40 mg IntraVENous Once    sodium zirconium cyclosilicate  10 g Oral BID    insulin glargine  35 Units SubCUTAneous Nightly    ipratropium  2 puff Inhalation BID    albuterol sulfate HFA  2 puff Inhalation BID    midodrine  10 mg Oral TID WC    amiodarone  200 mg Oral Daily    methylPREDNISolone  40 mg IntraVENous Q12H    insulin lispro  0-18 Units SubCUTAneous TID WC    insulin lispro  0-9 Units SubCUTAneous Nightly    lidocaine PF  5 mL IntraDERmal Once    sodium chloride flush  5-40 mL IntraVENous 2 times per day    aspirin  81 mg Oral Daily    clopidogrel  75 mg Oral Daily    colchicine  0.6 mg Oral Daily    budesonide-formoterol  2 puff Inhalation BID    fluticasone  1 spray Each Nostril Daily    pantoprazole  40 mg Oral QAM AC    apixaban  5 mg Oral BID    metoprolol succinate  50 mg Oral Daily    atorvastatin  40 mg Oral Nightly     Continuous Infusions:   sodium chloride 100 mL/hr at 02/21/22 1206    sodium chloride      dextrose       PRN Meds:ipratropium-albuterol, sodium chloride flush, sodium chloride, nitroGLYCERIN, ondansetron **OR** ondansetron, acetaminophen **OR** acetaminophen, polyethylene glycol, morphine, glucose, glucagon (rDNA), dextrose, dextrose bolus (hypoglycemia) **OR** dextrose bolus (hypoglycemia)  I/O last 3 completed shifts: In: 5763.9 [P.O.:240;  I.V.:5470.9; IV Piggyback:52.9]  Out: 2850 [Urine:2850]  No intake/output data recorded.     Intake/Output Summary (Last 24 hours) at 2/22/2022 0715  Last data filed at 2/22/2022 0500  Gross per 24 hour   Intake 3318 ml   Output 1850 ml   Net 1468 ml       CBC:   Recent Labs     02/20/22  0310 02/21/22  0450 02/22/22  0520   WBC 12.1* 17.2* 19.8*   HGB 12.5* 11.6* 11.8*    221 227       BMP:    Recent Labs     02/20/22  2105 02/21/22  0450 02/22/22  0520   * 128* 131*   K 5.4* 5.3* 5.5*   CL 92* 95* 100   CO2 20* 20* 20*   BUN 48* 50* 56*   CREATININE 4.7* 3.9* 3.2*   GLUCOSE 277* 294* 250*     Hepatic:   Recent Labs     02/20/22  0826 02/21/22  0450 02/22/22  0520   AST 11* 10* 10*   ALT 12 11 12   BILITOT 0.8 0.4 0.2   ALKPHOS 46 44 49         Objective:   Vitals: BP 96/79   Pulse 77   Temp 97.8 °F (36.6 °C) (Oral)   Resp 22   Ht 5' 7\" (1.702 m)   Wt 240 lb 12.8 oz (109.2 kg)   SpO2 96%   BMI 37.71 kg/m²   General appearance: alert and cooperative with exam, in no acute distress  HEENT: normocephalic, atraumatic,   Neck: supple, trachea midline  Lungs: breathing comfortably on nc  Heart[de-identified] regular rate and rhythm,  Abdomen: soft, non-tender; non distended,   Extremities: extremities atraumatic, no cyanosis or edema  Neurologic: alert, oriented, follows commands, interactive    Assessment and Plan:     Hyponatremia  EDMAR on CKD3: CR 4.7 from baseline 1.9//ddx: volume depletion vs ATN vs cardiorenal  Hyperkalemia  Pleuritic chest pain  CHF hx     Plan:  Cr better at 3.2  Treat the hyperkalemia medically, orders placed  Decrease NS to 75ml/hr  Please hold raas blockade agents  Avoid nephrotoxins  Will follow                  Electronically signed by Blanca Hutchins DO on 2/22/2022 at [id number] [facility], DO [address]  [place], [place] [address]  PHONE: [phone]  FAX: [phone] Detail Level: Detailed Depth Of Biopsy: dermis Was A Bandage Applied: Yes Size Of Lesion In Cm: 0 Biopsy Type: H and E Biopsy Method: Dermablade Anesthesia Type: 1% lidocaine with epinephrine Anesthesia Volume In Cc (Will Not Render If 0): 0.5 Hemostasis: Drysol Wound Care: Petrolatum Dressing: bandage Destruction After The Procedure: No Type Of Destruction Used: Curettage Curettage Text: The wound bed was treated with curettage after the biopsy was performed. Cryotherapy Text: The wound bed was treated with cryotherapy after the biopsy was performed. Electrodesiccation Text: The wound bed was treated with electrodesiccation after the biopsy was performed. Electrodesiccation And Curettage Text: The wound bed was treated with electrodesiccation and curettage after the biopsy was performed. Silver Nitrate Text: The wound bed was treated with silver nitrate after the biopsy was performed. Consent: Written consent was obtained and risks were reviewed including but not limited to scarring, infection, bleeding, scabbing, incomplete removal, nerve damage and allergy to anesthesia. Post-Care Instructions: I reviewed with the patient in detail post-care instructions. Patient is to keep the biopsy site dry overnight, and then apply bacitracin twice daily until healed. Patient may apply hydrogen peroxide soaks to remove any crusting. Notification Instructions: Patient will be notified of biopsy results. However, patient instructed to call the office if not contacted within 2 weeks. Billing Type: Third-Party Bill Information: Selecting Yes will display possible errors in your note based on the variables you have selected. This validation is only offered as a suggestion for you. PLEASE NOTE THAT THE VALIDATION TEXT WILL BE REMOVED WHEN YOU FINALIZE YOUR NOTE. IF YOU WANT TO FAX A PRELIMINARY NOTE YOU WILL NEED TO TOGGLE THIS TO 'NO' IF YOU DO NOT WANT IT IN YOUR FAXED NOTE.